# Patient Record
Sex: FEMALE | Race: WHITE | NOT HISPANIC OR LATINO | Employment: OTHER | URBAN - METROPOLITAN AREA
[De-identification: names, ages, dates, MRNs, and addresses within clinical notes are randomized per-mention and may not be internally consistent; named-entity substitution may affect disease eponyms.]

---

## 2017-02-01 ENCOUNTER — OFFICE VISIT (OUTPATIENT)
Dept: INTERNAL MEDICINE | Facility: CLINIC | Age: 77
End: 2017-02-01

## 2017-02-01 ENCOUNTER — APPOINTMENT (OUTPATIENT)
Dept: WOMENS IMAGING | Facility: HOSPITAL | Age: 77
End: 2017-02-01

## 2017-02-01 VITALS
WEIGHT: 187.2 LBS | SYSTOLIC BLOOD PRESSURE: 150 MMHG | HEART RATE: 80 BPM | DIASTOLIC BLOOD PRESSURE: 80 MMHG | HEIGHT: 61 IN | BODY MASS INDEX: 35.34 KG/M2

## 2017-02-01 DIAGNOSIS — E78.49 OTHER HYPERLIPIDEMIA: ICD-10-CM

## 2017-02-01 DIAGNOSIS — G43.109 MIGRAINE WITH AURA AND WITHOUT STATUS MIGRAINOSUS, NOT INTRACTABLE: ICD-10-CM

## 2017-02-01 DIAGNOSIS — F51.01 PRIMARY INSOMNIA: ICD-10-CM

## 2017-02-01 DIAGNOSIS — R53.82 CHRONIC FATIGUE: ICD-10-CM

## 2017-02-01 DIAGNOSIS — M79.672 LEFT FOOT PAIN: ICD-10-CM

## 2017-02-01 DIAGNOSIS — I10 ESSENTIAL HYPERTENSION: Primary | ICD-10-CM

## 2017-02-01 DIAGNOSIS — R73.02 GLUCOSE INTOLERANCE (IMPAIRED GLUCOSE TOLERANCE): ICD-10-CM

## 2017-02-01 DIAGNOSIS — E03.9 ACQUIRED HYPOTHYROIDISM: ICD-10-CM

## 2017-02-01 LAB
ALBUMIN SERPL-MCNC: 3.89 G/DL (ref 3.4–4.6)
ALBUMIN/GLOB SERPL: 1.3 G/DL
ALP SERPL-CCNC: 93 U/L (ref 46–116)
ALT SERPL W P-5'-P-CCNC: 21 U/L (ref 14–59)
ANION GAP SERPL CALCULATED.3IONS-SCNC: 13 MMOL/L
AST SERPL-CCNC: 17 U/L (ref 7–37)
BILIRUB SERPL-MCNC: 1.3 MG/DL (ref 0.2–1)
BUN BLD-MCNC: 16 MG/DL (ref 6–22)
BUN/CREAT SERPL: 20 (ref 7–25)
CALCIUM SPEC-SCNC: 9.1 MG/DL (ref 8.6–10.5)
CHLORIDE SERPL-SCNC: 103 MMOL/L (ref 95–107)
CHOLEST SERPL-MCNC: 128 MG/DL (ref 0–200)
CO2 SERPL-SCNC: 25 MMOL/L (ref 23–32)
CREAT BLD-MCNC: 0.8 MG/DL (ref 0.55–1.02)
GFR SERPL CREATININE-BSD FRML MDRD: 70 ML/MIN/1.73
GLOBULIN UR ELPH-MCNC: 2.9 GM/DL
GLUCOSE BLD-MCNC: 93 MG/DL (ref 70–100)
HDLC SERPL-MCNC: 71 MG/DL (ref 40–81)
LDLC SERPL CALC-MCNC: 42 MG/DL (ref 0–100)
LDLC/HDLC SERPL: 0.59 {RATIO}
POTASSIUM BLD-SCNC: 4.4 MMOL/L (ref 3.3–5.3)
PROT SERPL-MCNC: 6.8 G/DL (ref 6.3–8.4)
SODIUM BLD-SCNC: 141 MMOL/L (ref 136–145)
TRIGL SERPL-MCNC: 74 MG/DL (ref 0–150)
TSH SERPL DL<=0.05 MIU/L-ACNC: 3.08 MIU/ML (ref 0.4–4.2)
VLDLC SERPL-MCNC: 14.8 MG/DL

## 2017-02-01 PROCEDURE — 99214 OFFICE O/P EST MOD 30 MIN: CPT | Performed by: INTERNAL MEDICINE

## 2017-02-01 PROCEDURE — 80061 LIPID PANEL: CPT | Performed by: INTERNAL MEDICINE

## 2017-02-01 PROCEDURE — 73630 X-RAY EXAM OF FOOT: CPT | Performed by: INTERNAL MEDICINE

## 2017-02-01 PROCEDURE — 84443 ASSAY THYROID STIM HORMONE: CPT | Performed by: INTERNAL MEDICINE

## 2017-02-01 PROCEDURE — 73630 X-RAY EXAM OF FOOT: CPT | Performed by: RADIOLOGY

## 2017-02-01 PROCEDURE — 36415 COLL VENOUS BLD VENIPUNCTURE: CPT | Performed by: INTERNAL MEDICINE

## 2017-02-01 PROCEDURE — 80053 COMPREHEN METABOLIC PANEL: CPT | Performed by: INTERNAL MEDICINE

## 2017-02-01 NOTE — PROGRESS NOTES
"Kar Reyna is a 76 y.o. female here for   Chief Complaint   Patient presents with   • Hypertension   • Hyperlipidemia   • Hypothyroidism   .    Vitals:    02/01/17 0814 02/01/17 0835   BP: 150/86 150/80   Pulse: 80    Weight: 187 lb 3.2 oz (84.9 kg)    Height: 61\" (154.9 cm)        Hypertension   This is a chronic problem. The current episode started more than 1 year ago. The problem is unchanged. The problem is controlled. Associated symptoms include malaise/fatigue. Pertinent negatives include no chest pain, palpitations, peripheral edema or shortness of breath.   Hyperlipidemia   Exacerbating diseases include hypothyroidism. Pertinent negatives include no chest pain or shortness of breath.   Hypothyroidism   Associated symptoms include arthralgias (pain in left foot for 2-3 wks - no injury) and fatigue. Pertinent negatives include no chest pain, chills, coughing or fever.        Encounter Diagnoses   Name Primary?   • Essential hypertension Yes   • Other hyperlipidemia    • Glucose intolerance (impaired glucose tolerance)    • Acquired hypothyroidism    • Chronic fatigue    • Migraine with aura and without status migrainosus, not intractable    • Primary insomnia    • Left foot pain        The following portions of the patient's history were reviewed and updated as appropriate: allergies, current medications, past social history and problem list.    Review of Systems   Constitutional: Positive for fatigue and malaise/fatigue. Negative for chills and fever.   Respiratory: Negative for cough, shortness of breath and wheezing.    Cardiovascular: Negative for chest pain, palpitations and leg swelling.   Musculoskeletal: Positive for arthralgias (pain in left foot for 2-3 wks - no injury).   Psychiatric/Behavioral: Positive for sleep disturbance (b/c aching shoulders, wrists, knees). Negative for dysphoric mood. The patient is not nervous/anxious.        Objective   Physical Exam   Constitutional: She " appears well-developed and well-nourished. No distress.   Cardiovascular: Normal rate, regular rhythm and normal heart sounds.    Pulmonary/Chest: No respiratory distress. She has no wheezes. She has no rales. She exhibits no tenderness.   Musculoskeletal: Normal range of motion. She exhibits no edema or deformity.   Psychiatric: She has a normal mood and affect. Her behavior is normal.   Nursing note and vitals reviewed.    Left foot - no deformity or tenderness to palpation today.    Assessment/Plan   Problem List Items Addressed This Visit        Unprioritized    Glucose intolerance (impaired glucose tolerance)    Fatigue    Hypertension - Primary    Relevant Orders    Comprehensive Metabolic Panel    Migraine    Insomnia    Hyperlipemia    Relevant Orders    Comprehensive Metabolic Panel    Lipid Panel    Acquired hypothyroidism    Relevant Orders    TSH      Other Visit Diagnoses     Left foot pain        Relevant Orders    XR Foot 3+ View Left (Completed)    Ambulatory Referral to Podiatry       HTN - high today.  Need low salt, wt reducing diet.  Need weekly chk & call if bp over 140/90.   Left foot pain (normal exam today)- refer to podiatry - call if pain persists.

## 2017-02-02 DIAGNOSIS — E03.9 ACQUIRED HYPOTHYROIDISM: Primary | ICD-10-CM

## 2017-02-02 RX ORDER — LEVOTHYROXINE SODIUM 0.05 MG/1
50 TABLET ORAL DAILY
Qty: 90 TABLET | Refills: 1 | Status: SHIPPED | OUTPATIENT
Start: 2017-02-02 | End: 2017-08-08 | Stop reason: SDUPTHER

## 2017-03-09 RX ORDER — HYDROCHLOROTHIAZIDE 25 MG/1
TABLET ORAL
Qty: 90 TABLET | Refills: 1 | Status: SHIPPED | OUTPATIENT
Start: 2017-03-09 | End: 2017-06-12 | Stop reason: SINTOL

## 2017-06-01 RX ORDER — RAMIPRIL 10 MG/1
CAPSULE ORAL
Qty: 180 CAPSULE | Refills: 2 | Status: SHIPPED | OUTPATIENT
Start: 2017-06-01 | End: 2018-05-15 | Stop reason: SDUPTHER

## 2017-06-12 ENCOUNTER — OFFICE VISIT (OUTPATIENT)
Dept: INTERNAL MEDICINE | Facility: CLINIC | Age: 77
End: 2017-06-12

## 2017-06-12 ENCOUNTER — TELEPHONE (OUTPATIENT)
Dept: INTERNAL MEDICINE | Facility: CLINIC | Age: 77
End: 2017-06-12

## 2017-06-12 VITALS
DIASTOLIC BLOOD PRESSURE: 88 MMHG | HEIGHT: 61 IN | SYSTOLIC BLOOD PRESSURE: 162 MMHG | WEIGHT: 189.2 LBS | BODY MASS INDEX: 35.72 KG/M2

## 2017-06-12 DIAGNOSIS — I10 ESSENTIAL HYPERTENSION: ICD-10-CM

## 2017-06-12 DIAGNOSIS — G43.109 MIGRAINE WITH AURA AND WITHOUT STATUS MIGRAINOSUS, NOT INTRACTABLE: ICD-10-CM

## 2017-06-12 DIAGNOSIS — E03.9 ACQUIRED HYPOTHYROIDISM: ICD-10-CM

## 2017-06-12 DIAGNOSIS — F51.01 PRIMARY INSOMNIA: ICD-10-CM

## 2017-06-12 DIAGNOSIS — H35.30 MACULAR DEGENERATION OF BOTH EYES: ICD-10-CM

## 2017-06-12 DIAGNOSIS — E78.49 OTHER HYPERLIPIDEMIA: ICD-10-CM

## 2017-06-12 DIAGNOSIS — R53.82 CHRONIC FATIGUE: ICD-10-CM

## 2017-06-12 DIAGNOSIS — R82.90 ABNORMAL FINDING IN URINE: Primary | ICD-10-CM

## 2017-06-12 DIAGNOSIS — R35.0 FREQUENT URINATION: ICD-10-CM

## 2017-06-12 DIAGNOSIS — R73.02 GLUCOSE INTOLERANCE (IMPAIRED GLUCOSE TOLERANCE): ICD-10-CM

## 2017-06-12 LAB
BACTERIA UR QL AUTO: ABNORMAL /HPF
BASOPHILS # BLD AUTO: 0.02 10*3/MM3 (ref 0–0.2)
BASOPHILS NFR BLD AUTO: 0.2 % (ref 0–2)
BILIRUB UR QL STRIP: NEGATIVE
CLARITY UR: CLEAR
COLOR UR: YELLOW
DEPRECATED RDW RBC AUTO: 43 FL (ref 37–54)
EOSINOPHIL # BLD AUTO: 0.12 10*3/MM3 (ref 0–0.7)
EOSINOPHIL NFR BLD AUTO: 1.4 % (ref 0–5)
ERYTHROCYTE [DISTWIDTH] IN BLOOD BY AUTOMATED COUNT: 12 % (ref 11.5–15)
GLUCOSE UR STRIP-MCNC: NEGATIVE MG/DL
HCT VFR BLD AUTO: 40.6 % (ref 34.1–44.9)
HGB BLD-MCNC: 13 G/DL (ref 11.2–15.7)
HGB UR QL STRIP.AUTO: ABNORMAL
HYALINE CASTS UR QL AUTO: ABNORMAL /LPF
KETONES UR QL STRIP: NEGATIVE
LEUKOCYTE ESTERASE UR QL STRIP.AUTO: ABNORMAL
LYMPHOCYTES # BLD AUTO: 3.67 10*3/MM3 (ref 0.8–7)
LYMPHOCYTES NFR BLD AUTO: 43.3 % (ref 10–60)
MCH RBC QN AUTO: 31.9 PG (ref 26–34)
MCHC RBC AUTO-ENTMCNC: 32 G/DL (ref 31–37)
MCV RBC AUTO: 99.5 FL (ref 80–100)
MONOCYTES # BLD AUTO: 0.47 10*3/MM3 (ref 0–1)
MONOCYTES NFR BLD AUTO: 5.5 % (ref 0–13)
NEUTROPHILS # BLD AUTO: 4.2 10*3/MM3 (ref 1–11)
NEUTROPHILS NFR BLD AUTO: 49.6 % (ref 30–85)
NITRITE UR QL STRIP: NEGATIVE
PH UR STRIP.AUTO: 5.5 [PH] (ref 5–8)
PLATELET # BLD AUTO: 196 10*3/MM3 (ref 150–450)
PMV BLD AUTO: 10.9 FL (ref 6–12)
PROT UR QL STRIP: NEGATIVE
RBC # BLD AUTO: 4.08 10*6/MM3 (ref 3.93–5.22)
RBC # UR: ABNORMAL /HPF
REF LAB TEST METHOD: ABNORMAL
SP GR UR STRIP: <=1.005 (ref 1–1.03)
SQUAMOUS #/AREA URNS HPF: ABNORMAL /HPF
TSH SERPL DL<=0.05 MIU/L-ACNC: 2.39 MIU/ML (ref 0.4–4.2)
UROBILINOGEN UR QL STRIP: ABNORMAL
WBC NRBC COR # BLD: 8.48 10*3/MM3 (ref 5–10)
WBC UR QL AUTO: ABNORMAL /HPF

## 2017-06-12 PROCEDURE — 99214 OFFICE O/P EST MOD 30 MIN: CPT | Performed by: INTERNAL MEDICINE

## 2017-06-12 PROCEDURE — 84443 ASSAY THYROID STIM HORMONE: CPT | Performed by: INTERNAL MEDICINE

## 2017-06-12 PROCEDURE — 81001 URINALYSIS AUTO W/SCOPE: CPT | Performed by: INTERNAL MEDICINE

## 2017-06-12 PROCEDURE — 85025 COMPLETE CBC W/AUTO DIFF WBC: CPT | Performed by: INTERNAL MEDICINE

## 2017-06-12 PROCEDURE — 36415 COLL VENOUS BLD VENIPUNCTURE: CPT | Performed by: INTERNAL MEDICINE

## 2017-06-12 RX ORDER — NEBIVOLOL 10 MG/1
10 TABLET ORAL DAILY
Qty: 30 TABLET | Refills: 6 | Status: SHIPPED | OUTPATIENT
Start: 2017-06-12 | End: 2017-06-13 | Stop reason: RX

## 2017-06-12 NOTE — PROGRESS NOTES
"Kar Reyna is a 77 y.o. female here for   Chief Complaint   Patient presents with   • Hypertension     BP has been elevated   • Hyperlipidemia   • Hypothyroidism   • Urinary Frequency   .    Vitals:    06/12/17 1036   BP: 162/88   BP Location: Left arm   Patient Position: Sitting   Cuff Size: Large Adult   Weight: 189 lb 3.2 oz (85.8 kg)   Height: 61\" (154.9 cm)       Hypertension   This is a chronic problem. The current episode started more than 1 year ago. The problem has been gradually worsening since onset. The problem is uncontrolled. Associated symptoms include malaise/fatigue. Pertinent negatives include no chest pain, palpitations, peripheral edema or shortness of breath.   Hyperlipidemia   This is a chronic problem. The current episode started more than 1 year ago. The problem is controlled. Recent lipid tests were reviewed and are normal. Exacerbating diseases include hypothyroidism. She has no history of diabetes. Pertinent negatives include no chest pain or shortness of breath.   Hypothyroidism   This is a chronic problem. The current episode started more than 1 year ago. The problem occurs constantly. The problem has been unchanged. Associated symptoms include fatigue. Pertinent negatives include no chest pain, chills, coughing or fever.   Urinary Frequency    This is a chronic problem. The problem occurs intermittently. The problem has been gradually worsening. The patient is experiencing no pain. There has been no fever. Associated symptoms include frequency and urgency. Pertinent negatives include no chills.        Encounter Diagnoses   Name Primary?   • Essential hypertension Yes   • Other hyperlipidemia    • Glucose intolerance (impaired glucose tolerance)    • Macular degeneration of both eyes    • Primary insomnia    • Chronic fatigue    • Acquired hypothyroidism    • Migraine with aura and without status migrainosus, not intractable    • Frequent urination        The following " portions of the patient's history were reviewed and updated as appropriate: allergies, current medications, past social history and problem list.    Review of Systems   Constitutional: Positive for fatigue and malaise/fatigue. Negative for chills and fever.   Respiratory: Negative for cough, shortness of breath and wheezing.    Cardiovascular: Negative for chest pain, palpitations and leg swelling.   Genitourinary: Positive for enuresis, frequency and urgency.   Psychiatric/Behavioral: Positive for sleep disturbance. Negative for dysphoric mood. The patient is not nervous/anxious.      Not taking diuretic for 1 mo b/c frequent urination (& nocturia x3).    Objective   Physical Exam   Constitutional: She appears well-developed and well-nourished. No distress.   Cardiovascular: Normal rate, regular rhythm and normal heart sounds.    Pulmonary/Chest: No respiratory distress. She has no wheezes. She has no rales. She exhibits no tenderness.   Abdominal: Soft. She exhibits no mass. There is no tenderness. There is no rebound and no guarding. No hernia.   Musculoskeletal: She exhibits no edema.   Psychiatric: She has a normal mood and affect. Her behavior is normal.   Nursing note and vitals reviewed.      Assessment/Plan   Problem List Items Addressed This Visit        Unprioritized    Glucose intolerance (impaired glucose tolerance)    Fatigue    Relevant Orders    TSH    CBC Auto Differential    Hypertension - Primary    Relevant Medications    nebivolol (BYSTOLIC) 10 MG tablet    Migraine    Relevant Medications    nebivolol (BYSTOLIC) 10 MG tablet    Insomnia    Hyperlipemia    Macular degeneration of both eyes    Acquired hypothyroidism    Relevant Medications    nebivolol (BYSTOLIC) 10 MG tablet    Frequent urination    Relevant Medications    Mirabegron ER (MYRBETRIQ) 50 MG tablet sustained-release 24 hour 24 hr tablet    Other Relevant Orders    Urinalysis With / Microscopic If Indicated (Completed)     Urinalysis, Microscopic Only       HTN - uncontrolled b/c diuretic stopped 1 mo ago.  Trial of bystolic (or toprol if bystolic too expensive).  Call if bp stays over 140/90.  Need low salt diet & daily exercise.   Increased freq urin for 6 wks - chk urine today.  Sample of myrbetriq given today - use 1 daily.   Fatigue is worse - from hypothyroid vs frequent urin/noctia? - TSH & urine today.    Call if sx persist.

## 2017-06-12 NOTE — TELEPHONE ENCOUNTER
I spoke to MsRob Maribell and she was asking about the two medications that Dr. Giraldo has sent in to her pharmacy today. I had informed her that is was Bystolic 10 mg  # 30 w/ 6 refills and Mirabegron # 30 w/ 0 refills.     She stated that she was going to purchase them as she was a bit confused if there was a lot of tablets sent in of the Mirabegron since it was a bit expensive. I explained to her that it was only # 30 tablets. She voiced she understood and thank you.

## 2017-06-12 NOTE — TELEPHONE ENCOUNTER
----- Message from Edgard Stoddard sent at 6/12/2017  3:39 PM EDT -----  Contact: pt  Pt calling would like a call back regarding the b/p medication that was prescribed today. She says that there is some confusion at the pharmacy.    #[phone]

## 2017-06-13 ENCOUNTER — TELEPHONE (OUTPATIENT)
Dept: INTERNAL MEDICINE | Facility: CLINIC | Age: 77
End: 2017-06-13

## 2017-06-13 DIAGNOSIS — I10 ESSENTIAL HYPERTENSION: Primary | ICD-10-CM

## 2017-06-13 RX ORDER — METOPROLOL SUCCINATE 50 MG/1
50 TABLET, EXTENDED RELEASE ORAL DAILY
Qty: 30 TABLET | Refills: 6 | Status: SHIPPED | OUTPATIENT
Start: 2017-06-13 | End: 2017-07-14

## 2017-06-13 NOTE — TELEPHONE ENCOUNTER
----- Message from Edgard Stoddard sent at 6/13/2017  8:32 AM EDT -----  Contact: pt  Pt calling, she says that her insurance will not cover bystolic, and she would like you to prescribe something else. Please advise.    [address] [place] [address] [facility] [phone] [facility] [phone] FX    #[phone]

## 2017-06-13 NOTE — TELEPHONE ENCOUNTER
Ms. Maribell was informed of the new Rx that was sent in to her pharmacy. She stated that has picked this up and thank you.

## 2017-06-13 NOTE — TELEPHONE ENCOUNTER
I spoke to Ms. Reyna this morning to see if she knew what medications her insurance would cover since bystolic isn't one of them and she stated that she was unsure, but she was given 3 names by her pharmacy but didn't write them down.

## 2017-06-16 LAB
BACTERIA UR CULT: ABNORMAL
Lab: ABNORMAL
SUSCEPTIBILITY TESTING: ABNORMAL

## 2017-06-19 DIAGNOSIS — N30.00 ACUTE CYSTITIS WITHOUT HEMATURIA: Primary | ICD-10-CM

## 2017-06-19 RX ORDER — SULFAMETHOXAZOLE AND TRIMETHOPRIM 800; 160 MG/1; MG/1
1 TABLET ORAL 2 TIMES DAILY
Qty: 10 TABLET | Refills: 0 | Status: SHIPPED | OUTPATIENT
Start: 2017-06-19 | End: 2017-07-14

## 2017-07-14 ENCOUNTER — OFFICE VISIT (OUTPATIENT)
Dept: INTERNAL MEDICINE | Facility: CLINIC | Age: 77
End: 2017-07-14

## 2017-07-14 VITALS
TEMPERATURE: 96.9 F | WEIGHT: 189 LBS | OXYGEN SATURATION: 98 % | DIASTOLIC BLOOD PRESSURE: 94 MMHG | HEART RATE: 75 BPM | BODY MASS INDEX: 37.11 KG/M2 | HEIGHT: 60 IN | SYSTOLIC BLOOD PRESSURE: 144 MMHG | RESPIRATION RATE: 17 BRPM

## 2017-07-14 DIAGNOSIS — R73.02 GLUCOSE INTOLERANCE (IMPAIRED GLUCOSE TOLERANCE): ICD-10-CM

## 2017-07-14 DIAGNOSIS — M81.0 OSTEOPOROSIS: ICD-10-CM

## 2017-07-14 DIAGNOSIS — I10 ESSENTIAL HYPERTENSION: ICD-10-CM

## 2017-07-14 DIAGNOSIS — E55.9 VITAMIN D DEFICIENCY: ICD-10-CM

## 2017-07-14 DIAGNOSIS — F51.01 PRIMARY INSOMNIA: ICD-10-CM

## 2017-07-14 DIAGNOSIS — Z00.00 MEDICARE ANNUAL WELLNESS VISIT, SUBSEQUENT: Primary | ICD-10-CM

## 2017-07-14 DIAGNOSIS — E78.49 OTHER HYPERLIPIDEMIA: ICD-10-CM

## 2017-07-14 DIAGNOSIS — E03.9 ACQUIRED HYPOTHYROIDISM: ICD-10-CM

## 2017-07-14 DIAGNOSIS — R53.82 CHRONIC FATIGUE: ICD-10-CM

## 2017-07-14 LAB
25(OH)D3 SERPL-MCNC: 33.9 NG/ML (ref 30–100)
ALBUMIN SERPL-MCNC: 4.6 G/DL (ref 3.5–5.2)
ALBUMIN/GLOB SERPL: 1.8 G/DL
ALP SERPL-CCNC: 89 U/L (ref 39–117)
ALT SERPL-CCNC: 19 U/L (ref 1–33)
AST SERPL-CCNC: 20 U/L (ref 1–32)
BILIRUB SERPL-MCNC: 1.3 MG/DL (ref 0.1–1.2)
BUN SERPL-MCNC: 17 MG/DL (ref 8–23)
BUN/CREAT SERPL: 20 (ref 7–25)
CALCIUM SERPL-MCNC: 10.1 MG/DL (ref 8.6–10.5)
CHLORIDE SERPL-SCNC: 104 MMOL/L (ref 98–107)
CHOLEST SERPL-MCNC: 123 MG/DL (ref 0–200)
CO2 SERPL-SCNC: 26.4 MMOL/L (ref 22–29)
CREAT SERPL-MCNC: 0.85 MG/DL (ref 0.57–1)
GLOBULIN SER CALC-MCNC: 2.6 GM/DL
GLUCOSE SERPL-MCNC: 94 MG/DL (ref 65–99)
HBA1C MFR BLD: 5.47 % (ref 4.8–5.6)
HDLC SERPL-MCNC: 71 MG/DL (ref 40–60)
LDLC SERPL CALC-MCNC: 37 MG/DL (ref 0–100)
POTASSIUM SERPL-SCNC: 4.4 MMOL/L (ref 3.5–5.2)
PROT SERPL-MCNC: 7.2 G/DL (ref 6–8.5)
SODIUM SERPL-SCNC: 144 MMOL/L (ref 136–145)
TRIGL SERPL-MCNC: 76 MG/DL (ref 0–150)
VLDLC SERPL-MCNC: 15.2 MG/DL (ref 5–40)

## 2017-07-14 PROCEDURE — 99214 OFFICE O/P EST MOD 30 MIN: CPT | Performed by: INTERNAL MEDICINE

## 2017-07-14 RX ORDER — METOPROLOL SUCCINATE 100 MG/1
100 TABLET, EXTENDED RELEASE ORAL DAILY
Qty: 90 TABLET | Refills: 3 | Status: SHIPPED | OUTPATIENT
Start: 2017-07-14 | End: 2018-07-27 | Stop reason: SDUPTHER

## 2017-07-14 NOTE — PATIENT INSTRUCTIONS
Medicare Wellness  Personal Prevention Plan of Service     Date of Office Visit:  2017  Encounter Provider:  Margarita Giraldo MD  Place of Service:  [facility] MEDIC[facility]  Patient Name: Ayana Reyna  :  1940    As part of the Medicare Wellness portion of your visit today, we are providing you with this personalized preventive plan of services (PPPS). This plan is based upon recommendations of the [place] Preventive Services Task Force (USPSTF) and the Advisory Committee on Immunization Practices (ACIP).    This lists the preventive care services that should be considered, and provides dates of when you are due. Items listed as completed are up-to-date and do not require any further intervention.    Health Maintenance   Topic Date Due   • PNEUMOCOCCAL VACCINES (65+ LOW/MEDIUM RISK) (2 of 2 - PPSV23) 2013   • INFLUENZA VACCINE  2017   • LIPID PANEL  2018   • MEDICARE ANNUAL WELLNESS  2018   • MAMMOGRAM  10/11/2018   • DXA SCAN  10/11/2018   • TDAP/TD VACCINES (2 - Td) 2022   • ZOSTER VACCINE  Completed       No orders of the defined types were placed in this encounter.      No Follow-up on file.

## 2017-07-14 NOTE — PROGRESS NOTES
QUICK REFERENCE INFORMATION:  The ABCs of the Annual Wellness Visit    Subsequent Medicare Wellness Visit    HEALTH RISK ASSESSMENT    1940    Recent Hospitalizations:  No hospitalization(s) within the last year..        Current Medical Providers:  Patient Care Team:  Margarita Giraldo MD as PCP - General (Internal Medicine)  Margarita Giraldo MD as PCP - Claims Attributed  Tomer Fong MD as Consulting Physician (Orthopedic Surgery)  Toi Riley MD as Consulting Physician (Ophthalmology)  Monica Nichols MD (Dermatology)        Smoking Status:  History   Smoking Status   • Never Smoker   Smokeless Tobacco   • Never Used       Alcohol Consumption:  History   Alcohol Use No       Depression Screen:   PHQ-9 Depression Screening 7/14/2017   Little interest or pleasure in doing things 0   Feeling down, depressed, or hopeless 0   Trouble falling or staying asleep, or sleeping too much 0   Feeling tired or having little energy 0   Poor appetite or overeating 0   Feeling bad about yourself - or that you are a failure or have let yourself or your family down 0   Trouble concentrating on things, such as reading the newspaper or watching television 0   Moving or speaking so slowly that other people could have noticed. Or the opposite - being so fidgety or restless that you have been moving around a lot more than usual 0   Thoughts that you would be better off dead, or of hurting yourself in some way 0   PHQ-9 Total Score 0       Health Habits and Functional and Cognitive Screening:  Functional & Cognitive Status 7/14/2017   Do you have difficulty preparing food and eating? No   Do you have difficulty bathing yourself? No   Do you have difficulty getting dressed? No   Do you have difficulty using the toilet? No   Do you have difficulty moving around from place to place? No   In the past year have you fallen or experienced a near fall? Yes   Do you need help using the phone?  No   Are you deaf or do you have  serious difficulty hearing?  Yes   Do you need help with transportation? Yes   Do you need help shopping? No   Do you need help preparing meals?  No   Do you need help with housework?  No   Do you need help with laundry? No   Do you need help taking your medications? No   Do you need help managing money? No   Do you have difficulty concentrating, remembering or making decisions? No              Does the patient have evidence of cognitive impairment? No    Aspirin use counseling: Does not need ASA (and currently is not on it)      Recent Lab Results:  CMP:  Lab Results   Component Value Date     (H) 07/05/2016    BUN 16 02/01/2017    CREATININE 0.80 02/01/2017    EGFRIFNONA 70 02/01/2017    EGFRIFAFRI 82 07/05/2016    BCR 20.0 02/01/2017     02/01/2017    K 4.4 02/01/2017    CO2 25.0 02/01/2017    CALCIUM 9.1 02/01/2017    PROTENTOTREF 6.8 07/05/2016    ALBUMIN 3.89 02/01/2017    LABGLOBREF 2.1 07/05/2016    LABIL2 1.3 02/01/2017    BILITOT 1.3 (H) 02/01/2017    ALKPHOS 93 02/01/2017    AST 17 02/01/2017    ALT 21 02/01/2017     Lipid Panel:  Lab Results   Component Value Date    CHOL 128 02/01/2017    TRIG 74 02/01/2017    HDL 71 02/01/2017    VLDL 14.8 02/01/2017    LDLCALC 42 02/01/2017    LDLHDL 0.59 02/01/2017     HbA1c:  Lab Results   Component Value Date    HGBA1C 5.6 07/05/2016       Visual Acuity:  No exam data present    Age-appropriate Screening Schedule:  Refer to the list below for future screening recommendations based on patient's age, sex and/or medical conditions. Orders for these recommended tests are listed in the plan section. The patient has been provided with a written plan.    Health Maintenance   Topic Date Due   • PNEUMOCOCCAL VACCINES (65+ LOW/MEDIUM RISK) (2 of 2 - PPSV23) 02/01/2013   • INFLUENZA VACCINE  08/01/2017   • LIPID PANEL  02/01/2018   • MAMMOGRAM  10/11/2018   • DXA SCAN  10/11/2018   • TDAP/TD VACCINES (2 - Td) 02/01/2022   • ZOSTER VACCINE  Completed         Subjective   History of Present Illness    Ayana Reyna is a 77 y.o. female who presents for an Subsequent Wellness Visit.    The following portions of the patient's history were reviewed and updated as appropriate: allergies, current medications, past family history, past medical history, past social history, past surgical history and problem list.    Outpatient Medications Prior to Visit   Medication Sig Dispense Refill   • atorvastatin (LIPITOR) 10 MG tablet TAKE 1 TABLET EVERY DAY 90 tablet 2   • cholecalciferol (VITAMIN D3) 1000 UNITS tablet Take 1,000 Units by mouth daily.     • levothyroxine (SYNTHROID, LEVOTHROID) 50 MCG tablet Take 1 tablet by mouth Daily. 90 tablet 1   • Multiple Vitamins-Minerals (PRESERVISION/LUTEIN) capsule Take  by mouth 2 (two) times a day.     • ramipril (ALTACE) 10 MG capsule TAKE 2 CAPSULES EVERY  capsule 2   • vitamin B-12 (CYANOCOBALAMIN) 100 MCG tablet Take 50 mcg by mouth daily.     • metoprolol succinate XL (TOPROL XL) 50 MG 24 hr tablet Take 1 tablet by mouth Daily. 30 tablet 6   • Mirabegron ER (MYRBETRIQ) 50 MG tablet sustained-release 24 hour 24 hr tablet Take 50 mg by mouth Daily. 30 tablet 0   • sulfamethoxazole-trimethoprim (BACTRIM DS) 800-160 MG per tablet Take 1 tablet by mouth 2 (Two) Times a Day. 10 tablet 0     No facility-administered medications prior to visit.        Patient Active Problem List   Diagnosis   • Vitamin D deficiency   • Allergic rhinitis   • Glucose intolerance (impaired glucose tolerance)   • Osteoporosis   • Fatigue   • Hypertension   • Migraine   • Insomnia   • Hyperlipemia   • Macular degeneration of both eyes   • Acquired hypothyroidism   • Frequent urination       Advance Care Planning:  has an advance directive - a copy HAS NOT been provided. Have asked the patient to send this to us to add to record.    Identification of Risk Factors:  Risk factors include: weight , unhealthy diet, cardiovascular risk, inactivity, increased fall  "risk and chronic pain.    Review of Systems    Compared to one year ago, the patient feels her physical health is the same.  Compared to one year ago, the patient feels her mental health is the same.    Objective     Physical Exam    Vitals:    07/14/17 0914   BP: 144/94   BP Location: Left arm   Patient Position: Sitting   Cuff Size: Adult   Pulse: 75   Resp: 17   Temp: 96.9 °F (36.1 °C)   TempSrc: Temporal Artery    SpO2: 98%   Weight: 189 lb (85.7 kg)   Height: 60.25\" (153 cm)   PainSc:   2   PainLoc: Leg  Comment: Right       Body mass index is 36.61 kg/(m^2).  Discussed the patient's BMI with her. The BMI is above average; BMI management plan is completed.    Assessment/Plan   Patient Self-Management and Personalized Health Advice  The patient has been provided with information about: diet, exercise, weight management, prevention of cardiac or vascular disease, the relationship between weight and GERD and fall prevention and preventive services including:   · Advance directive, Exercise counseling provided, Fall Risk assessment done, Fall Risk plan of care done, Nutrition counseling provided, Pneumococcal vaccine , Screening for AAA, referral for ultrasound placed.    Visit Diagnoses:    ICD-10-CM ICD-9-CM   1. Essential hypertension I10 401.9   2. Other hyperlipidemia E78.4 272.4   3. Acquired hypothyroidism E03.9 244.9   4. Glucose intolerance (impaired glucose tolerance) R73.02 790.22   5. Chronic fatigue R53.82 780.79   6. Primary insomnia F51.01 307.42   7. Osteoporosis M81.0 733.00   8. Vitamin D deficiency E55.9 268.9       No orders of the defined types were placed in this encounter.      Outpatient Encounter Prescriptions as of 7/14/2017   Medication Sig Dispense Refill   • atorvastatin (LIPITOR) 10 MG tablet TAKE 1 TABLET EVERY DAY 90 tablet 2   • cholecalciferol (VITAMIN D3) 1000 UNITS tablet Take 1,000 Units by mouth daily.     • levothyroxine (SYNTHROID, LEVOTHROID) 50 MCG tablet Take 1 tablet by " mouth Daily. 90 tablet 1   • metoprolol succinate XL (TOPROL-XL) 100 MG 24 hr tablet Take 1 tablet by mouth Daily. 90 tablet 3   • Multiple Vitamins-Minerals (PRESERVISION/LUTEIN) capsule Take  by mouth 2 (two) times a day.     • ramipril (ALTACE) 10 MG capsule TAKE 2 CAPSULES EVERY  capsule 2   • vitamin B-12 (CYANOCOBALAMIN) 100 MCG tablet Take 50 mcg by mouth daily.     • [DISCONTINUED] metoprolol succinate XL (TOPROL XL) 50 MG 24 hr tablet Take 1 tablet by mouth Daily. 30 tablet 6   • [DISCONTINUED] Mirabegron ER (MYRBETRIQ) 50 MG tablet sustained-release 24 hour 24 hr tablet Take 50 mg by mouth Daily. 30 tablet 0   • [DISCONTINUED] sulfamethoxazole-trimethoprim (BACTRIM DS) 800-160 MG per tablet Take 1 tablet by mouth 2 (Two) Times a Day. 10 tablet 0     No facility-administered encounter medications on file as of 7/14/2017.        Reviewed use of high risk medication in the elderly: no  Reviewed for potential of harmful drug interactions in the elderly: yes    Follow Up:  Return in about 4 months (around 11/14/2017) for Recheck.     An After Visit Summary and PPPS with all of these plans were given to the patient.

## 2017-07-14 NOTE — PROGRESS NOTES
"Kar Reyna is a 77 y.o. female here for   Chief Complaint   Patient presents with   • Subsequent Wellness Visit   • Hypertension   • Hyperlipidemia   • Hypothyroidism   .    Vitals:    07/14/17 0914   BP: 144/94   BP Location: Left arm   Patient Position: Sitting   Cuff Size: Adult   Pulse: 75   Resp: 17   Temp: 96.9 °F (36.1 °C)   TempSrc: Temporal Artery    SpO2: 98%   Weight: 189 lb (85.7 kg)   Height: 60.25\" (153 cm)       Hypertension   This is a chronic problem. The current episode started more than 1 year ago. The problem is unchanged. The problem is controlled. Associated symptoms include malaise/fatigue. Pertinent negatives include no chest pain, palpitations, peripheral edema or shortness of breath.   Hyperlipidemia   This is a chronic problem. The current episode started more than 1 year ago. The problem is controlled. Recent lipid tests were reviewed and are normal. Exacerbating diseases include hypothyroidism. She has no history of diabetes. Pertinent negatives include no chest pain or shortness of breath.   Hypothyroidism   This is a chronic problem. The current episode started more than 1 year ago. The problem occurs constantly. The problem has been unchanged. Associated symptoms include fatigue. Pertinent negatives include no chest pain, chills, coughing or fever.        Encounter Diagnoses   Name Primary?   • Medicare annual wellness visit, subsequent Yes   • Essential hypertension    • Other hyperlipidemia    • Acquired hypothyroidism    • Glucose intolerance (impaired glucose tolerance)    • Chronic fatigue    • Primary insomnia    • Osteoporosis    • Vitamin D deficiency        The following portions of the patient's history were reviewed and updated as appropriate: allergies, current medications, past social history and problem list.    Review of Systems   Constitutional: Positive for fatigue and malaise/fatigue. Negative for chills and fever.   Respiratory: Negative for cough, " shortness of breath and wheezing.    Cardiovascular: Negative for chest pain, palpitations and leg swelling.   Psychiatric/Behavioral: Positive for sleep disturbance. Negative for dysphoric mood. The patient is not nervous/anxious.        Objective   Physical Exam   Constitutional: She appears well-developed and well-nourished. No distress.   Cardiovascular: Normal rate, regular rhythm and normal heart sounds.    Pulmonary/Chest: No respiratory distress. She has no wheezes. She has no rales. She exhibits no tenderness.   Musculoskeletal: She exhibits no edema.   Psychiatric: She has a normal mood and affect. Her behavior is normal.   Nursing note and vitals reviewed.      Assessment/Plan   Problem List Items Addressed This Visit        Unprioritized    Vitamin D deficiency     D=19         Relevant Orders    Vitamin D 25 Hydroxy    Glucose intolerance (impaired glucose tolerance)    Relevant Orders    Hemoglobin A1c    Osteoporosis    Fatigue    Hypertension    Relevant Medications    metoprolol succinate XL (TOPROL-XL) 100 MG 24 hr tablet    Other Relevant Orders    Comprehensive Metabolic Panel    Lipid Panel    Insomnia    Hyperlipemia    Relevant Orders    Comprehensive Metabolic Panel    Lipid Panel    Acquired hypothyroidism    Relevant Medications    metoprolol succinate XL (TOPROL-XL) 100 MG 24 hr tablet      Other Visit Diagnoses     Medicare annual wellness visit, subsequent    -  Primary       HTN uncontrolled at night.  Need to increase toprol from 50 mg to 100 mg daily.  Call if bp over 140/90.  Need diet/exercise.   Fatigue & insomnia (b/c pain in right leg)    Leg pain (right hip to ankle) - to see ortho.   Obesity - need low carb diet & more exercise.     Wellness today.     She will discuss pneu vaccine with pharmacy (she gets vaccines there).  She had carotid artery screening test 6/2017.

## 2017-08-08 DIAGNOSIS — E03.9 ACQUIRED HYPOTHYROIDISM: ICD-10-CM

## 2017-08-08 RX ORDER — LEVOTHYROXINE SODIUM 0.05 MG/1
TABLET ORAL
Qty: 90 TABLET | Refills: 1 | Status: SHIPPED | OUTPATIENT
Start: 2017-08-08 | End: 2018-01-30 | Stop reason: SDUPTHER

## 2017-10-05 RX ORDER — ATORVASTATIN CALCIUM 10 MG/1
TABLET, FILM COATED ORAL
Qty: 90 TABLET | Refills: 2 | Status: SHIPPED | OUTPATIENT
Start: 2017-10-05 | End: 2018-08-02 | Stop reason: SDUPTHER

## 2017-11-17 ENCOUNTER — APPOINTMENT (OUTPATIENT)
Dept: WOMENS IMAGING | Facility: HOSPITAL | Age: 77
End: 2017-11-17

## 2017-11-17 ENCOUNTER — OFFICE VISIT (OUTPATIENT)
Dept: INTERNAL MEDICINE | Facility: CLINIC | Age: 77
End: 2017-11-17

## 2017-11-17 VITALS
HEART RATE: 78 BPM | SYSTOLIC BLOOD PRESSURE: 154 MMHG | OXYGEN SATURATION: 98 % | WEIGHT: 188 LBS | HEIGHT: 60 IN | DIASTOLIC BLOOD PRESSURE: 94 MMHG | BODY MASS INDEX: 36.91 KG/M2

## 2017-11-17 DIAGNOSIS — R53.82 CHRONIC FATIGUE: ICD-10-CM

## 2017-11-17 DIAGNOSIS — J30.2 SEASONAL ALLERGIC RHINITIS, UNSPECIFIED CHRONICITY, UNSPECIFIED TRIGGER: ICD-10-CM

## 2017-11-17 DIAGNOSIS — R05.9 COUGH: Primary | ICD-10-CM

## 2017-11-17 DIAGNOSIS — I10 ESSENTIAL HYPERTENSION: ICD-10-CM

## 2017-11-17 DIAGNOSIS — E78.49 OTHER HYPERLIPIDEMIA: ICD-10-CM

## 2017-11-17 DIAGNOSIS — E03.9 ACQUIRED HYPOTHYROIDISM: ICD-10-CM

## 2017-11-17 DIAGNOSIS — R73.02 GLUCOSE INTOLERANCE (IMPAIRED GLUCOSE TOLERANCE): ICD-10-CM

## 2017-11-17 LAB
ALBUMIN SERPL-MCNC: 4.2 G/DL (ref 3.5–5.2)
ALBUMIN/GLOB SERPL: 1.8 G/DL
ALP SERPL-CCNC: 96 U/L (ref 39–117)
ALT SERPL W P-5'-P-CCNC: 12 U/L (ref 1–33)
ANION GAP SERPL CALCULATED.3IONS-SCNC: 13.2 MMOL/L
AST SERPL-CCNC: 17 U/L (ref 1–32)
BASOPHILS # BLD AUTO: 0.03 10*3/MM3 (ref 0–0.2)
BASOPHILS NFR BLD AUTO: 0.3 % (ref 0–2)
BILIRUB SERPL-MCNC: 1.2 MG/DL (ref 0.1–1.2)
BUN BLD-MCNC: 13 MG/DL (ref 8–23)
BUN/CREAT SERPL: 20.6 (ref 7–25)
CALCIUM SPEC-SCNC: 9.4 MG/DL (ref 8.6–10.5)
CHLORIDE SERPL-SCNC: 105 MMOL/L (ref 98–107)
CHOLEST SERPL-MCNC: 115 MG/DL (ref 0–200)
CO2 SERPL-SCNC: 24.8 MMOL/L (ref 22–29)
CREAT BLD-MCNC: 0.63 MG/DL (ref 0.57–1)
DEPRECATED RDW RBC AUTO: 44.3 FL (ref 37–54)
EOSINOPHIL # BLD AUTO: 0.12 10*3/MM3 (ref 0–0.7)
EOSINOPHIL NFR BLD AUTO: 1.2 % (ref 0–5)
ERYTHROCYTE [DISTWIDTH] IN BLOOD BY AUTOMATED COUNT: 12.5 % (ref 11.5–15)
GFR SERPL CREATININE-BSD FRML MDRD: 91 ML/MIN/1.73
GLOBULIN UR ELPH-MCNC: 2.3 GM/DL
GLUCOSE BLD-MCNC: 105 MG/DL (ref 65–99)
HCT VFR BLD AUTO: 37.7 % (ref 34.1–44.9)
HDLC SERPL-MCNC: 59 MG/DL (ref 40–60)
HGB BLD-MCNC: 12.4 G/DL (ref 11.2–15.7)
LDLC SERPL CALC-MCNC: 40 MG/DL (ref 0–100)
LDLC/HDLC SERPL: 0.67 {RATIO}
LYMPHOCYTES # BLD AUTO: 4.36 10*3/MM3 (ref 0.8–7)
LYMPHOCYTES NFR BLD AUTO: 42.7 % (ref 10–60)
MCH RBC QN AUTO: 32.7 PG (ref 26–34)
MCHC RBC AUTO-ENTMCNC: 32.9 G/DL (ref 31–37)
MCV RBC AUTO: 99.5 FL (ref 80–100)
MONOCYTES # BLD AUTO: 0.47 10*3/MM3 (ref 0–1)
MONOCYTES NFR BLD AUTO: 4.6 % (ref 0–13)
NEUTROPHILS # BLD AUTO: 5.23 10*3/MM3 (ref 1–11)
NEUTROPHILS NFR BLD AUTO: 51.2 % (ref 30–85)
PLATELET # BLD AUTO: 207 10*3/MM3 (ref 150–450)
PMV BLD AUTO: 11.1 FL (ref 6–12)
POTASSIUM BLD-SCNC: 4.1 MMOL/L (ref 3.5–5.2)
PROT SERPL-MCNC: 6.5 G/DL (ref 6–8.5)
RBC # BLD AUTO: 3.79 10*6/MM3 (ref 3.93–5.22)
SODIUM BLD-SCNC: 143 MMOL/L (ref 136–145)
TRIGL SERPL-MCNC: 81 MG/DL (ref 0–150)
TSH SERPL DL<=0.05 MIU/L-ACNC: 3.73 MIU/ML (ref 0.27–4.2)
VLDLC SERPL-MCNC: 16.2 MG/DL (ref 5–40)
WBC NRBC COR # BLD: 10.21 10*3/MM3 (ref 5–10)

## 2017-11-17 PROCEDURE — 36415 COLL VENOUS BLD VENIPUNCTURE: CPT | Performed by: INTERNAL MEDICINE

## 2017-11-17 PROCEDURE — 71020 XR CHEST 2 VW: CPT | Performed by: INTERNAL MEDICINE

## 2017-11-17 PROCEDURE — 71020 CHG CHEST X-RAY 2 VW: CPT | Performed by: RADIOLOGY

## 2017-11-17 PROCEDURE — 85025 COMPLETE CBC W/AUTO DIFF WBC: CPT | Performed by: INTERNAL MEDICINE

## 2017-11-17 PROCEDURE — 80061 LIPID PANEL: CPT | Performed by: INTERNAL MEDICINE

## 2017-11-17 PROCEDURE — 80053 COMPREHEN METABOLIC PANEL: CPT | Performed by: INTERNAL MEDICINE

## 2017-11-17 PROCEDURE — 84443 ASSAY THYROID STIM HORMONE: CPT | Performed by: INTERNAL MEDICINE

## 2017-11-17 PROCEDURE — 99214 OFFICE O/P EST MOD 30 MIN: CPT | Performed by: INTERNAL MEDICINE

## 2017-11-17 RX ORDER — PROMETHAZINE HYDROCHLORIDE AND CODEINE PHOSPHATE 6.25; 1 MG/5ML; MG/5ML
5 SYRUP ORAL EVERY 4 HOURS PRN
Qty: 118 ML | Refills: 0 | Status: SHIPPED | OUTPATIENT
Start: 2017-11-17 | End: 2017-11-22

## 2017-11-17 NOTE — PROGRESS NOTES
"Kar Reyna is a 77 y.o. female here for   Chief Complaint   Patient presents with   • Hypertension     4 mo f/u Fasting   • Hypothyroidism   • Cough     productive cough for x weeks   .    Vitals:    11/17/17 0810   BP: 154/94   BP Location: Left arm   Patient Position: Sitting   Pulse: 78   SpO2: 98%   Weight: 188 lb (85.3 kg)   Height: 60.25\" (153 cm)       Body mass index is 36.41 kg/(m^2).    Hypertension   This is a chronic problem. The current episode started more than 1 year ago. The problem is unchanged. The problem is controlled. Associated symptoms include headaches, malaise/fatigue, shortness of breath and sweats. Pertinent negatives include no chest pain, orthopnea, palpitations or peripheral edema.   Hypothyroidism   This is a chronic problem. The current episode started more than 1 year ago. The problem occurs constantly. The problem has been unchanged. Associated symptoms include congestion, coughing, fatigue and headaches. Pertinent negatives include no chest pain, chills, fever or sore throat.   Cough   This is a new problem. The current episode started 1 to 4 weeks ago. The problem has been gradually improving. The problem occurs every few minutes. The cough is non-productive. Associated symptoms include headaches, nasal congestion, postnasal drip, rhinorrhea, shortness of breath, sweats and wheezing. Pertinent negatives include no chest pain, chills, ear congestion, ear pain, fever or sore throat. The symptoms are aggravated by lying down.        The following portions of the patient's history were reviewed and updated as appropriate: allergies, current medications, past social history and problem list.    Review of Systems   Constitutional: Positive for fatigue and malaise/fatigue. Negative for chills and fever.   HENT: Positive for congestion, postnasal drip, rhinorrhea and voice change. Negative for ear pain, sinus pressure, sneezing, sore throat, tinnitus and trouble swallowing.  "   Respiratory: Positive for cough, shortness of breath and wheezing.    Cardiovascular: Negative for chest pain, palpitations, orthopnea and leg swelling.   Neurological: Positive for headaches.       Objective   Physical Exam   Constitutional: She appears well-developed and well-nourished. No distress.   HENT:   Head: Normocephalic.   Right Ear: External ear normal. Tympanic membrane is bulging. Tympanic membrane is not erythematous.   Left Ear: External ear normal. Tympanic membrane is bulging. Tympanic membrane is not erythematous.   Nose: Right sinus exhibits no frontal sinus tenderness. Left sinus exhibits no frontal sinus tenderness.   Mouth/Throat: Oropharynx is clear and moist. No oropharyngeal exudate.   Neck: Normal range of motion. Neck supple.   Cardiovascular: Normal rate, regular rhythm and normal heart sounds.    Pulmonary/Chest: Effort normal and breath sounds normal. No respiratory distress. She has no wheezes. She has no rales. She exhibits no tenderness.   Musculoskeletal: She exhibits no edema.   Lymphadenopathy:     She has no cervical adenopathy.   Psychiatric: She has a normal mood and affect. Her behavior is normal.   Nursing note and vitals reviewed.      Assessment/Plan   Diagnoses and all orders for this visit:    Cough  Comments:  for 2 wks (since return from cruise) - continue mucinex  Orders:  -     XR Chest 2 View  -     Fluticasone Furoate-Vilanterol (BREO ELLIPTA) 100-25 MCG/INH aerosol powder ; Inhale 1 puff Daily.  -     promethazine-codeine (PHENERGAN with CODEINE) 6.25-10 MG/5ML syrup; Take 5 mL by mouth Every 4 (Four) Hours As Needed for Cough for up to 5 days.    Acquired hypothyroidism  Comments:  stable  Orders:  -     TSH; Future    Essential hypertension  Comments:  call if bp over 140/90  Orders:  -     Comprehensive Metabolic Panel; Future  -     Lipid Panel; Future  -     CBC Auto Differential; Future    Other hyperlipidemia  Comments:  need diet & exercise  Orders:  -      Comprehensive Metabolic Panel; Future  -     Lipid Panel; Future    Glucose intolerance (impaired glucose tolerance)    Chronic fatigue  -     CBC Auto Differential; Future  -     TSH; Future    Seasonal allergic rhinitis, unspecified chronicity, unspecified trigger

## 2017-12-11 ENCOUNTER — APPOINTMENT (OUTPATIENT)
Dept: GENERAL RADIOLOGY | Facility: HOSPITAL | Age: 77
End: 2017-12-11

## 2017-12-11 ENCOUNTER — HOSPITAL ENCOUNTER (EMERGENCY)
Facility: HOSPITAL | Age: 77
Discharge: HOME | End: 2017-12-11
Attending: EMERGENCY MEDICINE | Admitting: EMERGENCY MEDICINE

## 2017-12-11 VITALS
OXYGEN SATURATION: 98 % | HEART RATE: 69 BPM | SYSTOLIC BLOOD PRESSURE: 171 MMHG | DIASTOLIC BLOOD PRESSURE: 74 MMHG | WEIGHT: 180 LBS | RESPIRATION RATE: 18 BRPM | HEIGHT: 61 IN | TEMPERATURE: 97.7 F | BODY MASS INDEX: 33.99 KG/M2

## 2017-12-11 DIAGNOSIS — M25.511 ACUTE PAIN OF RIGHT SHOULDER: Primary | ICD-10-CM

## 2017-12-11 PROCEDURE — 73030 X-RAY EXAM OF SHOULDER: CPT

## 2017-12-11 PROCEDURE — 93010 ELECTROCARDIOGRAM REPORT: CPT | Performed by: INTERNAL MEDICINE

## 2017-12-11 PROCEDURE — 71020 HC CHEST PA AND LATERAL: CPT

## 2017-12-11 PROCEDURE — 99284 EMERGENCY DEPT VISIT MOD MDM: CPT

## 2017-12-11 PROCEDURE — 93005 ELECTROCARDIOGRAM TRACING: CPT | Performed by: PHYSICIAN ASSISTANT

## 2017-12-11 RX ORDER — OXYCODONE HYDROCHLORIDE AND ACETAMINOPHEN 5; 325 MG/1; MG/1
2 TABLET ORAL ONCE
Status: COMPLETED | OUTPATIENT
Start: 2017-12-11 | End: 2017-12-11

## 2017-12-11 RX ORDER — ONDANSETRON 4 MG/1
4 TABLET, ORALLY DISINTEGRATING ORAL ONCE
Status: COMPLETED | OUTPATIENT
Start: 2017-12-11 | End: 2017-12-11

## 2017-12-11 RX ORDER — HYDROCODONE BITARTRATE AND ACETAMINOPHEN 5; 325 MG/1; MG/1
1 TABLET ORAL EVERY 6 HOURS PRN
Qty: 16 TABLET | Refills: 0 | Status: SHIPPED | OUTPATIENT
Start: 2017-12-11 | End: 2018-05-18

## 2017-12-11 RX ADMIN — ONDANSETRON 4 MG: 4 TABLET, ORALLY DISINTEGRATING ORAL at 20:27

## 2017-12-11 RX ADMIN — OXYCODONE HYDROCHLORIDE AND ACETAMINOPHEN 2 TABLET: 5; 325 TABLET ORAL at 20:27

## 2017-12-11 NOTE — ED NOTES
Pt reports sitting in chair watching tv about 30 minutes before she came here, and suddenly she felt a sharp pain in R shoulder, tried to lift arm above head and couldn't. She denies injury. Neurovascularly intact. 22 years ago she had R rotator cuff surgery and has not had problem since then.      Alecia Ramirez RN  12/11/17 9521

## 2017-12-12 ENCOUNTER — TELEPHONE (OUTPATIENT)
Dept: SOCIAL WORK | Facility: HOSPITAL | Age: 77
End: 2017-12-12

## 2017-12-12 NOTE — ED PROVIDER NOTES
" EMERGENCY DEPARTMENT ENCOUNTER    CHIEF COMPLAINT  Chief Complaint: R shoulder pain  History given by: Patient  History limited by: Nothing  Room Number: 04/04  PMD: Margarita Giraldo MD      HPI:  Pt is a 77 y.o. female who presents complaining of sharp R shoulder pain that started at about 2:30 this afternoon.  Pt reports that she was sitting in a chair watching TV when her shoulder pain began. The pain radiates down her right arm. Pt denies injury to the area, numbness or tingling, chest pain, nausea or vomiting.  Pt had a rotator cuff surgery 22 years ago. Pt has not taken anything for the pain PTA        Duration:  6 hours  Onset: Gradual   Timing: Constant  Location: R shoulder  Radiation: Radiates down R arm  Quality: \"Sharp\"  Intensity/Severity: Moderate  Progression: Unchanged  Associated Symptoms: None  Aggravating Factors: Movement  Alleviating Factors: Nothing  Previous Episodes: None  Treatment before arrival: NOne    PAST MEDICAL HISTORY  Active Ambulatory Problems     Diagnosis Date Noted   • Vitamin D deficiency 07/05/2016   • Allergic rhinitis 07/05/2016   • Glucose intolerance (impaired glucose tolerance) 07/05/2016   • Osteoporosis 07/05/2016   • Fatigue 07/05/2016   • Hypertension 07/05/2016   • Migraine 07/05/2016   • Insomnia 07/05/2016   • Hyperlipemia 07/05/2016   • Macular degeneration of both eyes 07/05/2016   • Acquired hypothyroidism 10/11/2016   • Frequent urination 06/12/2017     Resolved Ambulatory Problems     Diagnosis Date Noted   • No Resolved Ambulatory Problems     Past Medical History:   Diagnosis Date   • Allergic rhinitis    • Fatigue    • Glucose intolerance (impaired glucose tolerance)    • Hearing loss    • Hematuria    • Hyperlipemia    • Hypertension    • Insomnia    • Migraine    • Osteoporosis    • Shortness of breath    • Vitamin D deficiency        PAST SURGICAL HISTORY  Past Surgical History:   Procedure Laterality Date   • COLONOSCOPY  2008   • HYSTERECTOMY N/A " 1971    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   • KNEE SURGERY  1989       FAMILY HISTORY  Family History   Problem Relation Age of Onset   • Hodgkin's lymphoma Mother        SOCIAL HISTORY  Social History     Social History   • Marital status:      Spouse name: N/A   • Number of children: N/A   • Years of education: N/A     Occupational History   • Not on file.     Social History Main Topics   • Smoking status: Never Smoker   • Smokeless tobacco: Never Used   • Alcohol use No   • Drug use: No   • Sexual activity: No     Other Topics Concern   • Not on file     Social History Narrative       ALLERGIES  Review of patient's allergies indicates no known allergies.    REVIEW OF SYSTEMS  Review of Systems   Constitutional: Negative.  Negative for chills and fever.   HENT: Negative.  Negative for sore throat.    Eyes: Negative.    Respiratory: Negative.  Negative for cough.    Cardiovascular: Negative.  Negative for chest pain.   Gastrointestinal: Negative.  Negative for nausea and vomiting.   Genitourinary: Negative.  Negative for dysuria.   Musculoskeletal: Negative.  Negative for back pain.        R shoulder pain, R arm pain   Skin: Negative.  Negative for rash.   Neurological: Negative.  Negative for headaches.       PHYSICAL EXAM  ED Triage Vitals   Temp Heart Rate Resp BP SpO2   12/11/17 1511 12/11/17 1511 12/11/17 1538 12/11/17 1537 12/11/17 1511   97.6 °F (36.4 °C) 90 16 206/96 94 %      Temp src Heart Rate Source Patient Position BP Location FiO2 (%)   -- -- 12/11/17 1537 12/11/17 1537 --     Sitting Left arm        Physical Exam   Constitutional: She is oriented to person, place, and time and well-developed, well-nourished, and in no distress. No distress.   HENT:   Head: Normocephalic and atraumatic.   Eyes: EOM are normal. Pupils are equal, round, and reactive to light.   Neck: Normal range of motion. Neck supple.   Cardiovascular: Normal rate, regular rhythm, normal heart sounds and intact distal pulses.     Pulmonary/Chest: Effort normal and breath sounds normal. No respiratory distress.   Abdominal: Soft. There is no tenderness. There is no rebound and no guarding.   Musculoskeletal: Normal range of motion. She exhibits no edema.        Right shoulder: She exhibits no deformity.   Decreased range of motion at the right shoulder due to pain.    Neurological: She is alert and oriented to person, place, and time. She has normal sensation and normal strength.   Skin: Skin is warm and dry. No rash noted.   Psychiatric: Mood and affect normal.   Nursing note and vitals reviewed.      LAB RESULTS  Lab Results (last 24 hours)     ** No results found for the last 24 hours. **          I ordered the above labs and reviewed the results    RADIOLOGY  XR Chest 2 View   Final Result      XR Shoulder 2+ View Right   Final Result       TWO-VIEW CHEST      HISTORY: Chest pain.      FINDINGS: The lungs are well-expanded and clear. The heart is top normal  in size and no acute abnormality is seen.      This report was finalized on 12/11/2017 6:12 PM by Dr. Dennis Funez MD.      TWO-VIEW RIGHT SHOULDER      HISTORY: Right shoulder pain.      There are extremely severe degenerative changes at the glenohumeral  joint with marked joint space narrowing and some associated marginal  spurring of the humeral head and adjacent glenoid.      This report was finalized on 12/11/2017 6:12 PM by Dr. Dennis Funez MD.        I ordered the above noted radiological studies. Interpreted by radiologist.     PROCEDURES  Procedures  EKG           EKG time: 2053  Rhythm/Rate: Sinus, 67  P waves and DE: Normal  QRS, axis: Normal   ST and T waves: Normal     Interpreted Contemporaneously by me, independently viewed  unchanged compared to prior 9/11/13      PROGRESS AND CONSULTS  ED Course     Consulted with Dr. Loyd, and he agrees with plan of care.      2152  Rechecked the patient and she states that the pain medication helped but the pain is coming  back after being placed in the sling. Discussed EKG and imaging results.. Discussed the plan to discharge. Patient agrees with plan and all questions were addressed.       MEDICAL DECISION MAKING  Results were reviewed/discussed with the patient and they were also made aware of online access. Pt also made aware that some labs, such as cultures, will not be resulted during ER visit and follow up with PMD is necessary.     MDM  Number of Diagnoses or Management Options  Acute pain of right shoulder:      Amount and/or Complexity of Data Reviewed  Tests in the radiology section of CPT®: reviewed and ordered (XR scans show nothing acute.)  Tests in the medicine section of CPT®: ordered and reviewed (EKG is normal)    Patient Progress  Patient progress: stable         DIAGNOSIS  Final diagnoses:   Acute pain of right shoulder       DISPOSITION  DISCHARGE    Patient discharged in stable condition.    Reviewed implications of results, diagnosis, meds, responsibility to follow up, warning signs and symptoms of possible worsening, potential complications and reasons to return to ER.    Patient/Family voiced understanding of above instructions.    Discussed plan for discharge, as there is no emergent indication for admission.  Pt/family is agreeable and understands need for follow up and repeat testing.  Pt is aware that discharge does not mean that nothing is wrong but it indicates no emergency is present that requires admission and they must continue care with follow-up as given below or physician of their choice.     FOLLOW-UP  Margarita Giraldo MD  [facility] [place] [address]  [phone]    Schedule an appointment as soon as possible for a visit      [facility] - [place] [address]  [phone]  Schedule an appointment as soon as possible for a visit           Medication List      New Prescriptions          HYDROcodone-acetaminophen  5-325 MG per tablet   Commonly known as:  NORCO   Take 1 tablet by mouth Every 6 (Six) Hours As Needed for Moderate Pain .               Latest Documented Vital Signs:  As of 10:00 PM  BP- 171/74 HR- 69 Temp- 97.7 °F (36.5 °C) (Tympanic) O2 sat- 98%    --  Documentation assistance provided by samuel Dempsey for JANES Fields.  Information recorded by the charlesibe was done at my direction and has been verified and validated by me.            Margareth Dempsey  12/11/17 1141       DELORES Mcgraw  12/11/17 9598

## 2017-12-12 NOTE — ED NOTES
Pt calls out due to R shoulder pain at this time; pt and family updated that pt has to be seen by provider prior to receiving medication. HOB moved for pt comfort and pt denies extremity elevation. Reassurance given.            Alecia Ramirez RN  12/11/17 2055

## 2017-12-12 NOTE — TELEPHONE ENCOUNTER
ED f/u phone call: states she is wearing sling and has upcoming f/u mirta't w/ orthopedist. States that pain is relived somewhat w/ Norco, but it caused some nausea. Advised her to contact PCP re this. No other questions/concerns

## 2017-12-12 NOTE — ED PROVIDER NOTES
Pt presents complaining of atraumatic right shoulder pain onset this afternoon. The pain is exacerbated by certain movements. She was doing light housework today but cannot think of anything that would have caused the pain. She had rotator cuff surgery 22 years ago. On exam, her shoulder is held in internal rotation. Pt does initial abduction by recruiting her deltoid but cannot go past 5 degrees. No redness nor warmth. Pt will be discharged to f/u with orthopedist .    XR right shoulder: There are extremely severe degenerative changes at the glenohumeral  joint with marked joint space narrowing and some associated marginal spurring of the humeral head and adjacent glenoid.      EKG         EKG time: 2053  Rhythm/Rate: SR at 67  P waves and IA: normal  QRS, axis: normal   ST and T waves: normal     Interpreted Contemporaneously by me, independently viewed  Unchanged compared to prior.    I supervised care provided by the midlevel provider.    We have discussed this patient's history, physical exam, and treatment plan.   I have reviewed the note and personally saw and examined the patient and agree with the plan of care.    Documentation assistance provided by samuel Leo for Maurice Loyd.  Information recorded by the samuel was done at my direction and has been verified and validated by me.         Melissa Leo  12/11/17 8513       Maurice Loyd MD  12/12/17 [id number]

## 2017-12-12 NOTE — DISCHARGE INSTRUCTIONS
Wear sling, apply ice, pain medicine as needed, call first thing in the morning to schedule follow up with the orthopedist.

## 2018-01-30 DIAGNOSIS — E03.9 ACQUIRED HYPOTHYROIDISM: ICD-10-CM

## 2018-01-30 RX ORDER — LEVOTHYROXINE SODIUM 0.05 MG/1
TABLET ORAL
Qty: 90 TABLET | Refills: 1 | Status: SHIPPED | OUTPATIENT
Start: 2018-01-30 | End: 2018-08-02 | Stop reason: SDUPTHER

## 2018-03-21 ENCOUNTER — EPISODE CHANGES (OUTPATIENT)
Dept: CASE MANAGEMENT | Facility: OTHER | Age: 78
End: 2018-03-21

## 2018-05-15 RX ORDER — RAMIPRIL 10 MG/1
CAPSULE ORAL
Qty: 180 CAPSULE | Refills: 2 | Status: SHIPPED | OUTPATIENT
Start: 2018-05-15 | End: 2019-05-20 | Stop reason: SDUPTHER

## 2018-05-18 ENCOUNTER — OFFICE VISIT (OUTPATIENT)
Dept: INTERNAL MEDICINE | Facility: CLINIC | Age: 78
End: 2018-05-18

## 2018-05-18 VITALS
WEIGHT: 178 LBS | SYSTOLIC BLOOD PRESSURE: 124 MMHG | OXYGEN SATURATION: 95 % | BODY MASS INDEX: 33.61 KG/M2 | HEIGHT: 61 IN | HEART RATE: 70 BPM | DIASTOLIC BLOOD PRESSURE: 84 MMHG

## 2018-05-18 DIAGNOSIS — I10 ESSENTIAL HYPERTENSION: Primary | ICD-10-CM

## 2018-05-18 DIAGNOSIS — R06.02 SHORTNESS OF BREATH: ICD-10-CM

## 2018-05-18 DIAGNOSIS — R53.82 CHRONIC FATIGUE: ICD-10-CM

## 2018-05-18 DIAGNOSIS — R73.09 ELEVATED GLUCOSE: ICD-10-CM

## 2018-05-18 DIAGNOSIS — F51.01 PRIMARY INSOMNIA: ICD-10-CM

## 2018-05-18 DIAGNOSIS — E78.49 OTHER HYPERLIPIDEMIA: ICD-10-CM

## 2018-05-18 DIAGNOSIS — E55.9 VITAMIN D DEFICIENCY: ICD-10-CM

## 2018-05-18 DIAGNOSIS — E03.9 ACQUIRED HYPOTHYROIDISM: ICD-10-CM

## 2018-05-18 LAB
ALBUMIN SERPL-MCNC: 4.3 G/DL (ref 3.5–5.2)
ALBUMIN/GLOB SERPL: 2 G/DL
ALP SERPL-CCNC: 87 U/L (ref 39–117)
ALT SERPL W P-5'-P-CCNC: 15 U/L (ref 1–33)
ANION GAP SERPL CALCULATED.3IONS-SCNC: 13.1 MMOL/L
AST SERPL-CCNC: 17 U/L (ref 1–32)
BILIRUB SERPL-MCNC: 1.4 MG/DL (ref 0.1–1.2)
BUN BLD-MCNC: 19 MG/DL (ref 8–23)
BUN/CREAT SERPL: 22.6 (ref 7–25)
CALCIUM SPEC-SCNC: 9.8 MG/DL (ref 8.6–10.5)
CHLORIDE SERPL-SCNC: 106 MMOL/L (ref 98–107)
CHOLEST SERPL-MCNC: 124 MG/DL (ref 0–200)
CO2 SERPL-SCNC: 26.9 MMOL/L (ref 22–29)
CREAT BLD-MCNC: 0.84 MG/DL (ref 0.57–1)
GFR SERPL CREATININE-BSD FRML MDRD: 65 ML/MIN/1.73
GLOBULIN UR ELPH-MCNC: 2.2 GM/DL
GLUCOSE BLD-MCNC: 105 MG/DL (ref 65–99)
HBA1C MFR BLD: 5.35 % (ref 4.8–5.6)
HDLC SERPL-MCNC: 60 MG/DL (ref 40–60)
LDLC SERPL CALC-MCNC: 49 MG/DL (ref 0–100)
LDLC/HDLC SERPL: 0.82 {RATIO}
POTASSIUM BLD-SCNC: 5 MMOL/L (ref 3.5–5.2)
PROT SERPL-MCNC: 6.5 G/DL (ref 6–8.5)
SODIUM BLD-SCNC: 146 MMOL/L (ref 136–145)
TRIGL SERPL-MCNC: 74 MG/DL (ref 0–150)
TSH SERPL-ACNC: 3.3 MIU/ML (ref 0.27–4.2)
VLDLC SERPL-MCNC: 14.8 MG/DL (ref 5–40)

## 2018-05-18 PROCEDURE — 80053 COMPREHEN METABOLIC PANEL: CPT | Performed by: INTERNAL MEDICINE

## 2018-05-18 PROCEDURE — 80061 LIPID PANEL: CPT | Performed by: INTERNAL MEDICINE

## 2018-05-18 PROCEDURE — 83036 HEMOGLOBIN GLYCOSYLATED A1C: CPT | Performed by: INTERNAL MEDICINE

## 2018-05-18 PROCEDURE — 99214 OFFICE O/P EST MOD 30 MIN: CPT | Performed by: INTERNAL MEDICINE

## 2018-05-18 RX ORDER — TRAZODONE HYDROCHLORIDE 50 MG/1
50 TABLET ORAL NIGHTLY
Qty: 30 TABLET | Refills: 6 | Status: SHIPPED | OUTPATIENT
Start: 2018-05-18 | End: 2019-07-11 | Stop reason: SDUPTHER

## 2018-05-18 NOTE — PROGRESS NOTES
"Kar Reyna is a 78 y.o. female here for   Chief Complaint   Patient presents with   • Allergic Rhinitis   • Hyperlipidemia   • Hypertension   • Hypothyroidism   • Insomnia   .    Vitals:    05/18/18 0927   BP: 124/84   BP Location: Right arm   Patient Position: Sitting   Cuff Size: Adult   Pulse: 70   SpO2: 95%   Weight: 80.7 kg (178 lb)   Height: 154.9 cm (61\")       Body mass index is 33.63 kg/m².    Allergic Rhinitis   Presents for follow-up visit. She complains of fatigue. She reports no cough, fever or wheezing.   Hyperlipidemia   This is a chronic problem. The current episode started more than 1 year ago. The problem is controlled. Recent lipid tests were reviewed and are normal. Exacerbating diseases include hypothyroidism. Associated symptoms include shortness of breath (with exertion). Pertinent negatives include no chest pain.   Hypertension   This is a chronic problem. The current episode started more than 1 year ago. The problem is unchanged. The problem is controlled. Associated symptoms include anxiety and shortness of breath (with exertion). Pertinent negatives include no chest pain or palpitations.   Hypothyroidism   This is a chronic problem. The current episode started more than 1 year ago. The problem occurs constantly. The problem has been unchanged. Associated symptoms include fatigue. Pertinent negatives include no chest pain, chills, coughing or fever.   Insomnia   This is a chronic problem. The current episode started more than 1 year ago. The problem occurs constantly. The problem has been unchanged. Associated symptoms include fatigue. Pertinent negatives include no chest pain, chills, coughing or fever.   Shortness of Breath   This is a recurrent problem. The current episode started more than 1 month ago. The problem occurs intermittently. The problem has been waxing and waning. Pertinent negatives include no chest pain, fever, leg swelling or wheezing.        The following " portions of the patient's history were reviewed and updated as appropriate: allergies, current medications, past social history and problem list.    Review of Systems   Constitutional: Positive for fatigue. Negative for chills and fever.   Respiratory: Positive for shortness of breath (with exertion). Negative for cough and wheezing.    Cardiovascular: Negative for chest pain, palpitations and leg swelling.   Psychiatric/Behavioral: Positive for sleep disturbance. Negative for dysphoric mood. The patient is nervous/anxious and has insomnia.      She is losing wt with wt watchers.    Objective   Physical Exam   Constitutional: She appears well-developed and well-nourished. No distress.   Cardiovascular: Normal rate, regular rhythm and normal heart sounds.    Pulmonary/Chest: No respiratory distress. She has no wheezes. She has no rales. She exhibits no tenderness.   Musculoskeletal: She exhibits no edema.   Psychiatric: She has a normal mood and affect. Her behavior is normal.   Nursing note and vitals reviewed.      Assessment/Plan   Diagnoses and all orders for this visit:    Essential hypertension  Comments:  stable - continue wt loss - call if bp over 140/90  Orders:  -     Comprehensive Metabolic Panel; Future  -     Lipid Panel; Future  -     Comprehensive Metabolic Panel  -     Lipid Panel    Other hyperlipidemia  Comments:  continue diet/ex  Orders:  -     Comprehensive Metabolic Panel; Future  -     Lipid Panel; Future  -     Comprehensive Metabolic Panel  -     Lipid Panel    Acquired hypothyroidism  -     TSH Rfx On Abnormal To Free T4; Future  -     TSH Rfx On Abnormal To Free T4    Primary insomnia  Comments:  trial of trazodone nightly - may start with 1/2 pill, then 1 qhs - may increase to 1.5 qhs if needed  Orders:  -     traZODone (DESYREL) 50 MG tablet; Take 1 tablet by mouth Every Night.    Chronic fatigue  Comments:  call if worse    Shortness of breath  Comments:  LOZADA - need stress test - call if  sx persist  Orders:  -     Treadmill Stress Test; Future    Elevated glucose  Comments:  need low sugar diet  Orders:  -     Hemoglobin A1c; Future  -     Hemoglobin A1c    Vitamin D deficiency  Comments:  continue 3,000 units/d

## 2018-05-24 ENCOUNTER — HOSPITAL ENCOUNTER (OUTPATIENT)
Dept: CARDIOLOGY | Facility: HOSPITAL | Age: 78
Discharge: HOME | End: 2018-05-24
Attending: INTERNAL MEDICINE | Admitting: INTERNAL MEDICINE

## 2018-05-24 DIAGNOSIS — R06.02 SHORTNESS OF BREATH: ICD-10-CM

## 2018-05-24 LAB
BH CV STRESS BP STAGE 1: NORMAL
BH CV STRESS BP STAGE 2: NORMAL
BH CV STRESS DURATION MIN STAGE 1: 3
BH CV STRESS DURATION MIN STAGE 2: 0
BH CV STRESS DURATION SEC STAGE 1: 0
BH CV STRESS DURATION SEC STAGE 2: 56
BH CV STRESS GRADE STAGE 1: 10
BH CV STRESS GRADE STAGE 2: 12
BH CV STRESS HR STAGE 1: 114
BH CV STRESS HR STAGE 2: 122
BH CV STRESS METS STAGE 1: 5
BH CV STRESS METS STAGE 2: 7.5
BH CV STRESS PROTOCOL 1: NORMAL
BH CV STRESS RECOVERY BP: NORMAL MMHG
BH CV STRESS RECOVERY HR: 77 BPM
BH CV STRESS SPEED STAGE 1: 1.7
BH CV STRESS SPEED STAGE 2: 2.5
BH CV STRESS STAGE 1: 1
BH CV STRESS STAGE 2: 2
MAXIMAL PREDICTED HEART RATE: 141 BPM
PERCENT MAX PREDICTED HR: 88.65 %
STRESS BASELINE BP: NORMAL MMHG
STRESS BASELINE HR: 72 BPM
STRESS PERCENT HR: 104 %
STRESS POST ESTIMATED WORKLOAD: 5.4 METS
STRESS POST EXERCISE DUR MIN: 3 MIN
STRESS POST EXERCISE DUR SEC: 56 SEC
STRESS POST PEAK BP: NORMAL MMHG
STRESS POST PEAK HR: 125 BPM
STRESS TARGET HR: 120 BPM

## 2018-05-24 PROCEDURE — 93017 CV STRESS TEST TRACING ONLY: CPT

## 2018-05-24 PROCEDURE — 93018 CV STRESS TEST I&R ONLY: CPT | Performed by: INTERNAL MEDICINE

## 2018-05-24 PROCEDURE — 93016 CV STRESS TEST SUPVJ ONLY: CPT | Performed by: INTERNAL MEDICINE

## 2018-07-27 DIAGNOSIS — I10 ESSENTIAL HYPERTENSION: ICD-10-CM

## 2018-07-27 RX ORDER — METOPROLOL SUCCINATE 100 MG/1
TABLET, EXTENDED RELEASE ORAL
Qty: 90 TABLET | Refills: 3 | Status: SHIPPED | OUTPATIENT
Start: 2018-07-27 | End: 2019-08-02 | Stop reason: SDUPTHER

## 2018-08-02 DIAGNOSIS — E03.9 ACQUIRED HYPOTHYROIDISM: ICD-10-CM

## 2018-08-02 RX ORDER — LEVOTHYROXINE SODIUM 0.05 MG/1
TABLET ORAL
Qty: 90 TABLET | Refills: 1 | Status: SHIPPED | OUTPATIENT
Start: 2018-08-02 | End: 2019-02-07 | Stop reason: SDUPTHER

## 2018-08-02 RX ORDER — ATORVASTATIN CALCIUM 10 MG/1
TABLET, FILM COATED ORAL
Qty: 90 TABLET | Refills: 2 | Status: SHIPPED | OUTPATIENT
Start: 2018-08-02 | End: 2019-05-15 | Stop reason: SDUPTHER

## 2018-08-17 ENCOUNTER — OFFICE VISIT (OUTPATIENT)
Dept: INTERNAL MEDICINE | Facility: CLINIC | Age: 78
End: 2018-08-17

## 2018-08-17 VITALS
BODY MASS INDEX: 33.23 KG/M2 | HEIGHT: 61 IN | WEIGHT: 176 LBS | SYSTOLIC BLOOD PRESSURE: 138 MMHG | DIASTOLIC BLOOD PRESSURE: 80 MMHG

## 2018-08-17 DIAGNOSIS — R73.02 GLUCOSE INTOLERANCE (IMPAIRED GLUCOSE TOLERANCE): ICD-10-CM

## 2018-08-17 DIAGNOSIS — E03.9 ACQUIRED HYPOTHYROIDISM: ICD-10-CM

## 2018-08-17 DIAGNOSIS — E78.49 OTHER HYPERLIPIDEMIA: ICD-10-CM

## 2018-08-17 DIAGNOSIS — R60.0 PEDAL EDEMA: ICD-10-CM

## 2018-08-17 DIAGNOSIS — I10 ESSENTIAL HYPERTENSION: Primary | ICD-10-CM

## 2018-08-17 LAB
ALBUMIN SERPL-MCNC: 4.6 G/DL (ref 3.5–5.2)
ALBUMIN/GLOB SERPL: 2.3 G/DL
ALP SERPL-CCNC: 74 U/L (ref 39–117)
ALT SERPL W P-5'-P-CCNC: 17 U/L (ref 1–33)
ANION GAP SERPL CALCULATED.3IONS-SCNC: 12.6 MMOL/L
AST SERPL-CCNC: 18 U/L (ref 1–32)
BASOPHILS # BLD AUTO: 0.03 10*3/MM3 (ref 0–0.2)
BASOPHILS NFR BLD AUTO: 0.4 % (ref 0–2)
BILIRUB SERPL-MCNC: 1.3 MG/DL (ref 0.1–1.2)
BUN BLD-MCNC: 18 MG/DL (ref 8–23)
BUN/CREAT SERPL: 20.7 (ref 7–25)
CALCIUM SPEC-SCNC: 9.2 MG/DL (ref 8.6–10.5)
CHLORIDE SERPL-SCNC: 105 MMOL/L (ref 98–107)
CHOLEST SERPL-MCNC: 114 MG/DL (ref 0–200)
CO2 SERPL-SCNC: 27.4 MMOL/L (ref 22–29)
CREAT BLD-MCNC: 0.87 MG/DL (ref 0.57–1)
DEPRECATED RDW RBC AUTO: 43.1 FL (ref 37–54)
EOSINOPHIL # BLD AUTO: 0.09 10*3/MM3 (ref 0–0.7)
EOSINOPHIL NFR BLD AUTO: 1.1 % (ref 0–5)
ERYTHROCYTE [DISTWIDTH] IN BLOOD BY AUTOMATED COUNT: 12 % (ref 11.5–15)
GFR SERPL CREATININE-BSD FRML MDRD: 63 ML/MIN/1.73
GLOBULIN UR ELPH-MCNC: 2 GM/DL
GLUCOSE BLD-MCNC: 97 MG/DL (ref 65–99)
HCT VFR BLD AUTO: 36.6 % (ref 34.1–44.9)
HDLC SERPL-MCNC: 63 MG/DL (ref 40–60)
HGB BLD-MCNC: 12.2 G/DL (ref 11.2–15.7)
LDLC SERPL CALC-MCNC: 40 MG/DL (ref 0–100)
LDLC/HDLC SERPL: 0.63 {RATIO}
LYMPHOCYTES # BLD AUTO: 3.94 10*3/MM3 (ref 0.8–7)
LYMPHOCYTES NFR BLD AUTO: 50.1 % (ref 10–60)
MCH RBC QN AUTO: 33.5 PG (ref 26–34)
MCHC RBC AUTO-ENTMCNC: 33.3 G/DL (ref 31–37)
MCV RBC AUTO: 100.5 FL (ref 80–100)
MONOCYTES # BLD AUTO: 0.45 10*3/MM3 (ref 0–1)
MONOCYTES NFR BLD AUTO: 5.7 % (ref 0–13)
NEUTROPHILS # BLD AUTO: 3.35 10*3/MM3 (ref 1–11)
NEUTROPHILS NFR BLD AUTO: 42.7 % (ref 30–85)
PLATELET # BLD AUTO: 182 10*3/MM3 (ref 150–450)
PMV BLD AUTO: 10.7 FL (ref 6–12)
POTASSIUM BLD-SCNC: 4.4 MMOL/L (ref 3.5–5.2)
PROT SERPL-MCNC: 6.6 G/DL (ref 6–8.5)
RBC # BLD AUTO: 3.64 10*6/MM3 (ref 3.93–5.22)
SODIUM BLD-SCNC: 145 MMOL/L (ref 136–145)
TRIGL SERPL-MCNC: 56 MG/DL (ref 0–150)
TSH SERPL DL<=0.05 MIU/L-ACNC: 3.08 MIU/ML (ref 0.27–4.2)
VLDLC SERPL-MCNC: 11.2 MG/DL (ref 5–40)
WBC NRBC COR # BLD: 7.86 10*3/MM3 (ref 5–10)

## 2018-08-17 PROCEDURE — 80053 COMPREHEN METABOLIC PANEL: CPT | Performed by: INTERNAL MEDICINE

## 2018-08-17 PROCEDURE — 36415 COLL VENOUS BLD VENIPUNCTURE: CPT | Performed by: INTERNAL MEDICINE

## 2018-08-17 PROCEDURE — 80061 LIPID PANEL: CPT | Performed by: INTERNAL MEDICINE

## 2018-08-17 PROCEDURE — 99213 OFFICE O/P EST LOW 20 MIN: CPT | Performed by: INTERNAL MEDICINE

## 2018-08-17 PROCEDURE — 84443 ASSAY THYROID STIM HORMONE: CPT | Performed by: INTERNAL MEDICINE

## 2018-08-17 PROCEDURE — 85025 COMPLETE CBC W/AUTO DIFF WBC: CPT | Performed by: INTERNAL MEDICINE

## 2018-08-17 RX ORDER — HYDROCHLOROTHIAZIDE 25 MG/1
25 TABLET ORAL DAILY
Qty: 30 TABLET | Refills: 1 | Status: SHIPPED | OUTPATIENT
Start: 2018-08-17 | End: 2019-02-20 | Stop reason: SDUPTHER

## 2018-08-17 NOTE — PROGRESS NOTES
"Kar Reyna is a 78 y.o. female here for   Chief Complaint   Patient presents with   • Hyperlipidemia     3 month follow-up   • Hypertension   • Hypothyroidism   • Insomnia   .    Vitals:    08/17/18 1555   BP: 138/80   BP Location: Left arm   Patient Position: Sitting   Cuff Size: Adult   Weight: 79.8 kg (176 lb)   Height: 154.9 cm (61\")       Body mass index is 33.25 kg/m².    Hypertension   This is a chronic problem. The current episode started more than 1 year ago. The problem is unchanged. The problem is controlled. Pertinent negatives include no chest pain, palpitations or shortness of breath.   Hyperlipidemia   This is a chronic problem. The current episode started more than 1 year ago. The problem is controlled. Recent lipid tests were reviewed and are normal. She has no history of diabetes. Pertinent negatives include no chest pain or shortness of breath.        The following portions of the patient's history were reviewed and updated as appropriate: allergies, current medications, past social history and problem list.    Review of Systems   Constitutional: Negative for chills, fatigue and fever.   Respiratory: Negative for cough, shortness of breath and wheezing.    Cardiovascular: Negative for chest pain, palpitations and leg swelling.   Psychiatric/Behavioral: Negative for dysphoric mood and sleep disturbance. The patient is not nervous/anxious.      She requests HCTZ to use for occ pedal edema.    Objective   Physical Exam   Constitutional: She appears well-developed and well-nourished. No distress.   Cardiovascular: Normal rate, regular rhythm and normal heart sounds.    Pulmonary/Chest: No respiratory distress. She has no wheezes. She has no rales. She exhibits no tenderness.   Musculoskeletal: She exhibits no edema.   Psychiatric: She has a normal mood and affect. Her behavior is normal.   Nursing note and vitals reviewed.      Assessment/Plan   Diagnoses and all orders for this " visit:    Essential hypertension  Comments:  stable - ok to take 1 ramipril instead of 2 daily - call if bp over 140/90  Orders:  -     Lipid Panel; Future  -     Comprehensive Metabolic Panel; Future  -     CBC Auto Differential; Future  -     Lipid Panel  -     Comprehensive Metabolic Panel  -     CBC Auto Differential    Other hyperlipidemia  Comments:  continue diet/ex  Orders:  -     Lipid Panel; Future  -     Comprehensive Metabolic Panel; Future  -     Lipid Panel  -     Comprehensive Metabolic Panel    Glucose intolerance (impaired glucose tolerance)  Comments:  continue low sugar diet    Acquired hypothyroidism  Comments:  rechk today  Orders:  -     TSH Rfx On Abnormal To Free T4; Future  -     TSH Rfx On Abnormal To Free T4    Pedal edema  Comments:  off & on - ok to use HCTZ rarely prn  Orders:  -     hydrochlorothiazide (HYDRODIURIL) 25 MG tablet; Take 1 tablet by mouth Daily.

## 2018-10-10 ENCOUNTER — EPISODE CHANGES (OUTPATIENT)
Dept: CASE MANAGEMENT | Facility: OTHER | Age: 78
End: 2018-10-10

## 2019-02-07 DIAGNOSIS — E03.9 ACQUIRED HYPOTHYROIDISM: ICD-10-CM

## 2019-02-07 RX ORDER — LEVOTHYROXINE SODIUM 0.05 MG/1
TABLET ORAL
Qty: 90 TABLET | Refills: 1 | Status: SHIPPED | OUTPATIENT
Start: 2019-02-07 | End: 2019-08-19 | Stop reason: SDUPTHER

## 2019-02-20 ENCOUNTER — RESULTS ENCOUNTER (OUTPATIENT)
Dept: INTERNAL MEDICINE | Facility: CLINIC | Age: 79
End: 2019-02-20

## 2019-02-20 ENCOUNTER — OFFICE VISIT (OUTPATIENT)
Dept: INTERNAL MEDICINE | Facility: CLINIC | Age: 79
End: 2019-02-20

## 2019-02-20 VITALS
OXYGEN SATURATION: 99 % | WEIGHT: 171 LBS | DIASTOLIC BLOOD PRESSURE: 78 MMHG | BODY MASS INDEX: 32.28 KG/M2 | HEIGHT: 61 IN | HEART RATE: 70 BPM | SYSTOLIC BLOOD PRESSURE: 150 MMHG

## 2019-02-20 DIAGNOSIS — R53.82 CHRONIC FATIGUE: ICD-10-CM

## 2019-02-20 DIAGNOSIS — F51.01 PRIMARY INSOMNIA: ICD-10-CM

## 2019-02-20 DIAGNOSIS — Z12.11 COLON CANCER SCREENING: ICD-10-CM

## 2019-02-20 DIAGNOSIS — E78.49 OTHER HYPERLIPIDEMIA: ICD-10-CM

## 2019-02-20 DIAGNOSIS — R60.0 PEDAL EDEMA: ICD-10-CM

## 2019-02-20 DIAGNOSIS — Z23 NEED FOR PNEUMOCOCCAL VACCINATION: ICD-10-CM

## 2019-02-20 DIAGNOSIS — E55.9 VITAMIN D DEFICIENCY: ICD-10-CM

## 2019-02-20 DIAGNOSIS — I10 ESSENTIAL HYPERTENSION: Primary | ICD-10-CM

## 2019-02-20 DIAGNOSIS — E03.9 ACQUIRED HYPOTHYROIDISM: ICD-10-CM

## 2019-02-20 DIAGNOSIS — Z00.00 MEDICARE ANNUAL WELLNESS VISIT, SUBSEQUENT: ICD-10-CM

## 2019-02-20 DIAGNOSIS — R73.02 GLUCOSE INTOLERANCE (IMPAIRED GLUCOSE TOLERANCE): ICD-10-CM

## 2019-02-20 DIAGNOSIS — Z78.0 MENOPAUSE: ICD-10-CM

## 2019-02-20 PROBLEM — R06.02 SHORTNESS OF BREATH: Status: RESOLVED | Noted: 2018-05-18 | Resolved: 2019-02-20

## 2019-02-20 LAB
ALBUMIN SERPL-MCNC: 4.1 G/DL (ref 3.5–5.2)
ALBUMIN/GLOB SERPL: 1.6 G/DL
ALP SERPL-CCNC: 68 U/L (ref 39–117)
ALT SERPL W P-5'-P-CCNC: 20 U/L (ref 1–33)
ANION GAP SERPL CALCULATED.3IONS-SCNC: 11.9 MMOL/L
AST SERPL-CCNC: 16 U/L (ref 1–32)
BACTERIA UR QL AUTO: ABNORMAL /HPF
BASOPHILS # BLD AUTO: 0.02 10*3/MM3 (ref 0–0.2)
BASOPHILS NFR BLD AUTO: 0.3 % (ref 0–1.5)
BILIRUB SERPL-MCNC: 1 MG/DL (ref 0.1–1.2)
BILIRUB UR QL STRIP: NEGATIVE
BUN BLD-MCNC: 24 MG/DL (ref 8–23)
BUN/CREAT SERPL: 27.6 (ref 7–25)
CALCIUM SPEC-SCNC: 10 MG/DL (ref 8.6–10.5)
CHLORIDE SERPL-SCNC: 105 MMOL/L (ref 98–107)
CHOLEST SERPL-MCNC: 125 MG/DL (ref 0–200)
CLARITY UR: ABNORMAL
CO2 SERPL-SCNC: 27.1 MMOL/L (ref 22–29)
COLOR UR: YELLOW
CREAT BLD-MCNC: 0.87 MG/DL (ref 0.57–1)
DEPRECATED RDW RBC AUTO: 43.1 FL (ref 37–54)
EOSINOPHIL # BLD AUTO: 0.22 10*3/MM3 (ref 0–0.4)
EOSINOPHIL NFR BLD AUTO: 2.9 % (ref 0.3–6.2)
ERYTHROCYTE [DISTWIDTH] IN BLOOD BY AUTOMATED COUNT: 12.3 % (ref 12.3–15.4)
GFR SERPL CREATININE-BSD FRML MDRD: 63 ML/MIN/1.73
GLOBULIN UR ELPH-MCNC: 2.5 GM/DL
GLUCOSE BLD-MCNC: 97 MG/DL (ref 65–99)
GLUCOSE UR STRIP-MCNC: NEGATIVE MG/DL
HCT VFR BLD AUTO: 36.7 % (ref 34–46.6)
HDLC SERPL-MCNC: 63 MG/DL (ref 40–60)
HGB BLD-MCNC: 12.1 G/DL (ref 12–15.9)
HGB UR QL STRIP.AUTO: ABNORMAL
HYALINE CASTS UR QL AUTO: ABNORMAL /LPF
KETONES UR QL STRIP: NEGATIVE
LDLC SERPL CALC-MCNC: 51 MG/DL (ref 0–100)
LDLC/HDLC SERPL: 0.81 {RATIO}
LEUKOCYTE ESTERASE UR QL STRIP.AUTO: ABNORMAL
LYMPHOCYTES # BLD AUTO: 3.08 10*3/MM3 (ref 0.7–3.1)
LYMPHOCYTES NFR BLD AUTO: 40.4 % (ref 19.6–45.3)
MCH RBC QN AUTO: 33 PG (ref 26.6–33)
MCHC RBC AUTO-ENTMCNC: 33 G/DL (ref 31.5–35.7)
MCV RBC AUTO: 100 FL (ref 79–97)
MONOCYTES # BLD AUTO: 0.47 10*3/MM3 (ref 0.1–0.9)
MONOCYTES NFR BLD AUTO: 6.2 % (ref 5–12)
MUCOUS THREADS URNS QL MICRO: ABNORMAL /HPF
NEUTROPHILS # BLD AUTO: 3.83 10*3/MM3 (ref 1.4–7)
NEUTROPHILS NFR BLD AUTO: 50.2 % (ref 42.7–76)
NITRITE UR QL STRIP: NEGATIVE
PH UR STRIP.AUTO: 6 [PH] (ref 5–8)
PLATELET # BLD AUTO: 170 10*3/MM3 (ref 140–450)
PMV BLD AUTO: 10.6 FL (ref 6–12)
POTASSIUM BLD-SCNC: 4.9 MMOL/L (ref 3.5–5.2)
PROT SERPL-MCNC: 6.6 G/DL (ref 6–8.5)
PROT UR QL STRIP: NEGATIVE
RBC # BLD AUTO: 3.67 10*6/MM3 (ref 3.77–5.28)
RBC # UR: ABNORMAL /HPF
REF LAB TEST METHOD: ABNORMAL
SODIUM BLD-SCNC: 144 MMOL/L (ref 136–145)
SP GR UR STRIP: 1.02 (ref 1–1.03)
SQUAMOUS #/AREA URNS HPF: ABNORMAL /HPF
TRIGL SERPL-MCNC: 55 MG/DL (ref 0–150)
UROBILINOGEN UR QL STRIP: ABNORMAL
VLDLC SERPL-MCNC: 11 MG/DL (ref 5–40)
WBC NRBC COR # BLD: 7.62 10*3/MM3 (ref 3.4–10.8)
WBC UR QL AUTO: ABNORMAL /HPF

## 2019-02-20 PROCEDURE — 90732 PPSV23 VACC 2 YRS+ SUBQ/IM: CPT | Performed by: INTERNAL MEDICINE

## 2019-02-20 PROCEDURE — 85025 COMPLETE CBC W/AUTO DIFF WBC: CPT | Performed by: INTERNAL MEDICINE

## 2019-02-20 PROCEDURE — 80053 COMPREHEN METABOLIC PANEL: CPT | Performed by: INTERNAL MEDICINE

## 2019-02-20 PROCEDURE — G0439 PPPS, SUBSEQ VISIT: HCPCS | Performed by: INTERNAL MEDICINE

## 2019-02-20 PROCEDURE — 80061 LIPID PANEL: CPT | Performed by: INTERNAL MEDICINE

## 2019-02-20 PROCEDURE — 81001 URINALYSIS AUTO W/SCOPE: CPT | Performed by: INTERNAL MEDICINE

## 2019-02-20 PROCEDURE — 99214 OFFICE O/P EST MOD 30 MIN: CPT | Performed by: INTERNAL MEDICINE

## 2019-02-20 PROCEDURE — G0009 ADMIN PNEUMOCOCCAL VACCINE: HCPCS | Performed by: INTERNAL MEDICINE

## 2019-02-20 RX ORDER — HYDROCHLOROTHIAZIDE 25 MG/1
25 TABLET ORAL DAILY
Qty: 30 TABLET | Refills: 3 | Status: SHIPPED | OUTPATIENT
Start: 2019-02-20 | End: 2019-08-02 | Stop reason: SDUPTHER

## 2019-02-20 NOTE — PROGRESS NOTES
QUICK REFERENCE INFORMATION:  The ABCs of the Annual Wellness Visit    Subsequent Medicare Wellness Visit    HEALTH RISK ASSESSMENT    1940    Recent Hospitalizations:  No hospitalization(s) within the last year..        Current Medical Providers:  Patient Care Team:  Margarita Giraldo MD as PCP - General (Internal Medicine)  Margarita Giraldo MD as PCP - Claims Attributed  Tomer Fong MD as Consulting Physician (Orthopedic Surgery)  Toi Riley MD as Consulting Physician (Ophthalmology)  Monica Nichols MD (Dermatology)  Amanuel Nuñez MD as Consulting Physician (Orthopedic Surgery)        Smoking Status:  Social History     Tobacco Use   Smoking Status Never Smoker   Smokeless Tobacco Never Used       Alcohol Consumption:  Social History     Substance and Sexual Activity   Alcohol Use No       Depression Screen:   PHQ-2/PHQ-9 Depression Screening 2/20/2019   Little interest or pleasure in doing things 0   Feeling down, depressed, or hopeless 0   Trouble falling or staying asleep, or sleeping too much 0   Feeling tired or having little energy 0   Poor appetite or overeating 0   Feeling bad about yourself - or that you are a failure or have let yourself or your family down 0   Trouble concentrating on things, such as reading the newspaper or watching television 0   Moving or speaking so slowly that other people could have noticed. Or the opposite - being so fidgety or restless that you have been moving around a lot more than usual 0   Thoughts that you would be better off dead, or of hurting yourself in some way 0   Total Score 0       Health Habits and Functional and Cognitive Screening:  Functional & Cognitive Status 2/20/2019   Do you have difficulty preparing food and eating? No   Do you have difficulty bathing yourself, getting dressed or grooming yourself? No   Do you have difficulty using the toilet? No   Do you have difficulty moving around from place to place? No   Do you have  trouble with steps or getting out of a bed or a chair? No   In the past year have you fallen or experienced a near fall? No   Current Diet Well Balanced Diet   Dental Exam Up to date   Eye Exam Up to date   Exercise (times per week) 3 times per week   Current Exercise Activities Include Walking   Do you need help using the phone?  No   Are you deaf or do you have serious difficulty hearing?  No   Do you need help with transportation? No   Do you need help shopping? No   Do you need help preparing meals?  No   Do you need help with housework?  No   Do you need help with laundry? No   Do you need help taking your medications? No   Do you need help managing money? No   Do you ever drive or ride in a car without wearing a seat belt? No   Have you felt unusual stress, anger or loneliness in the last month? No   Who do you live with? Spouse   If you need help, do you have trouble finding someone available to you? No   Have you been bothered in the last four weeks by sexual problems? No   Do you have difficulty concentrating, remembering or making decisions? No           Does the patient have evidence of cognitive impairment? No    Aspirin use counseling: Does not need ASA (and currently is not on it)      Recent Lab Results:  CMP:  Lab Results   Component Value Date    GLU 94 07/14/2017    BUN 18 08/17/2018    CREATININE 0.87 08/17/2018    EGFRIFNONA 63 08/17/2018    EGFRIFAFRI 78 07/14/2017    BCR 20.7 08/17/2018     08/17/2018    K 4.4 08/17/2018    CO2 27.4 08/17/2018    CALCIUM 9.2 08/17/2018    PROTENTOTREF 7.2 07/14/2017    ALBUMIN 4.60 08/17/2018    LABGLOBREF 2.6 07/14/2017    LABIL2 1.8 07/14/2017    BILITOT 1.3 (H) 08/17/2018    ALKPHOS 74 08/17/2018    AST 18 08/17/2018    ALT 17 08/17/2018     Lipid Panel:  Lab Results   Component Value Date    CHOL 114 08/17/2018    TRIG 56 08/17/2018    HDL 63 (H) 08/17/2018    VLDL 11.2 08/17/2018    LDLHDL 0.63 08/17/2018     HbA1c:  Lab Results   Component Value  Date    HGBA1C 5.35 05/18/2018       Visual Acuity:  No exam data present    Age-appropriate Screening Schedule:  Refer to the list below for future screening recommendations based on patient's age, sex and/or medical conditions. Orders for these recommended tests are listed in the plan section. The patient has been provided with a written plan.    Health Maintenance   Topic Date Due   • ZOSTER VACCINE (2 of 3) 03/28/2012   • PNEUMOCOCCAL VACCINES (65+ LOW/MEDIUM RISK) (2 of 2 - PPSV23) 04/03/2016   • DXA SCAN  10/11/2018   • MAMMOGRAM  02/20/2020 (Originally 10/11/2018)   • LIPID PANEL  08/17/2019   • TDAP/TD VACCINES (2 - Td) 02/01/2022   • INFLUENZA VACCINE  Completed        Subjective   History of Present Illness    Ayana Reyna is a 78 y.o. female who presents for an Subsequent Wellness Visit.    The following portions of the patient's history were reviewed and updated as appropriate: allergies, current medications, past family history, past medical history, past social history, past surgical history and problem list.    Outpatient Medications Prior to Visit   Medication Sig Dispense Refill   • atorvastatin (LIPITOR) 10 MG tablet TAKE 1 TABLET EVERY DAY 90 tablet 2   • cholecalciferol (VITAMIN D3) 1000 UNITS tablet Take 1,000 Units by mouth daily.     • levothyroxine (SYNTHROID, LEVOTHROID) 50 MCG tablet TAKE 1 TABLET BY MOUTH ONCE DAILY 90 tablet 1   • metoprolol succinate XL (TOPROL-XL) 100 MG 24 hr tablet TAKE ONE TABLET BY MOUTH ONCE DAILY 90 tablet 3   • Multiple Vitamins-Minerals (PRESERVISION/LUTEIN) capsule Take  by mouth 2 (two) times a day.     • ramipril (ALTACE) 10 MG capsule TAKE 2 CAPSULES EVERY  capsule 2   • traZODone (DESYREL) 50 MG tablet Take 1 tablet by mouth Every Night. 30 tablet 6   • vitamin B-12 (CYANOCOBALAMIN) 100 MCG tablet Take 50 mcg by mouth daily.     • hydrochlorothiazide (HYDRODIURIL) 25 MG tablet Take 1 tablet by mouth Daily. 30 tablet 1     No facility-administered  "medications prior to visit.        Patient Active Problem List   Diagnosis   • Vitamin D deficiency   • Allergic rhinitis   • Glucose intolerance (impaired glucose tolerance)   • Osteoporosis   • Fatigue   • Hypertension   • Migraine   • Insomnia   • Hyperlipemia   • Macular degeneration of both eyes   • Acquired hypothyroidism   • Frequent urination   • Pedal edema       Advance Care Planning:  has NO advance directive - not interested in additional information    Identification of Risk Factors:  Risk factors include: weight , unhealthy diet, cardiovascular risk, inactivity, increased fall risk, vision limitations and incontinence.    Review of Systems    Compared to one year ago, the patient feels her physical health is the same.  Compared to one year ago, the patient feels her mental health is the same.    Objective     Physical Exam    Vitals:    02/20/19 0808   BP: 158/74   Pulse: 70   SpO2: 99%   Weight: 77.6 kg (171 lb)   Height: 154.9 cm (61\")       Patient's Body mass index is 32.31 kg/m². BMI is above normal parameters. Recommendations include: exercise counseling, no follow-up required and nutrition counseling.      Assessment/Plan   Patient Self-Management and Personalized Health Advice  The patient has been provided with information about: diet, exercise, weight management, prevention of cardiac or vascular disease, the relationship between weight and GERD, fall prevention and designing advance directives and preventive services including:   · Advance directive, Bone densitometry screening, Colorectal cancer screening, cologuard test ordered, Counseling for cardiovascular disease risk reduction, Diabetes screening, see lab orders, Exercise counseling provided, Fall Risk assessment done, Fall Risk plan of care done, Nutrition counseling provided, Pneumococcal vaccine , Screening mammography, referral placed, Urinary Incontinence assessment done, Zostavax vaccine (Herpes Zoster).    Visit Diagnoses:    " ICD-10-CM ICD-9-CM   1. Essential hypertension I10 401.9   2. Menopause Z78.0 627.2   3. Pedal edema R60.0 782.3   4. Medicare annual wellness visit, subsequent Z00.00 V70.0   5. Other hyperlipidemia E78.49 272.4   6. Vitamin D deficiency E55.9 268.9   7. Glucose intolerance (impaired glucose tolerance) R73.02 790.22   8. Acquired hypothyroidism E03.9 244.9   9. Primary insomnia F51.01 307.42   10. Chronic fatigue R53.82 780.79       Orders Placed This Encounter   Procedures   • DEXA Bone Density Axial     Standing Status:   Future     Standing Expiration Date:   2/20/2020     Order Specific Question:   Reason for Exam:     Answer:   x       Outpatient Encounter Medications as of 2/20/2019   Medication Sig Dispense Refill   • atorvastatin (LIPITOR) 10 MG tablet TAKE 1 TABLET EVERY DAY 90 tablet 2   • cholecalciferol (VITAMIN D3) 1000 UNITS tablet Take 1,000 Units by mouth daily.     • hydrochlorothiazide (HYDRODIURIL) 25 MG tablet Take 1 tablet by mouth Daily. 30 tablet 3   • levothyroxine (SYNTHROID, LEVOTHROID) 50 MCG tablet TAKE 1 TABLET BY MOUTH ONCE DAILY 90 tablet 1   • metoprolol succinate XL (TOPROL-XL) 100 MG 24 hr tablet TAKE ONE TABLET BY MOUTH ONCE DAILY 90 tablet 3   • Multiple Vitamins-Minerals (PRESERVISION/LUTEIN) capsule Take  by mouth 2 (two) times a day.     • ramipril (ALTACE) 10 MG capsule TAKE 2 CAPSULES EVERY  capsule 2   • traZODone (DESYREL) 50 MG tablet Take 1 tablet by mouth Every Night. 30 tablet 6   • vitamin B-12 (CYANOCOBALAMIN) 100 MCG tablet Take 50 mcg by mouth daily.     • [DISCONTINUED] hydrochlorothiazide (HYDRODIURIL) 25 MG tablet Take 1 tablet by mouth Daily. 30 tablet 1     No facility-administered encounter medications on file as of 2/20/2019.        Reviewed use of high risk medication in the elderly: not applicable  Reviewed for potential of harmful drug interactions in the elderly: not applicable    Follow Up:  No Follow-up on file.     An After Visit Summary and  PPPS with all of these plans were given to the patient.

## 2019-02-20 NOTE — PROGRESS NOTES
"Kar Reyna is a 78 y.o. female here for   Chief Complaint   Patient presents with   • Medicare Wellness-subsequent   • Hypertension   • Hyperlipidemia   • Hypothyroidism   .    Vitals:    02/20/19 0808 02/20/19 1243   BP: 158/74 150/78   Pulse: 70    SpO2: 99%    Weight: 77.6 kg (171 lb)    Height: 154.9 cm (61\")        Body mass index is 32.31 kg/m².    Hypertension   This is a chronic problem. The current episode started more than 1 year ago. The problem is unchanged. The problem is controlled. Pertinent negatives include no chest pain, palpitations or shortness of breath.   Hyperlipidemia   This is a chronic problem. The current episode started more than 1 year ago. The problem is controlled. Recent lipid tests were reviewed and are normal. Exacerbating diseases include hypothyroidism. She has no history of diabetes. Pertinent negatives include no chest pain or shortness of breath.   Hypothyroidism   This is a chronic problem. The current episode started more than 1 year ago. The problem occurs constantly. The problem has been unchanged. Associated symptoms include arthralgias (multiple). Pertinent negatives include no chest pain, chills, coughing, fatigue or fever.        The following portions of the patient's history were reviewed and updated as appropriate: allergies, current medications, past social history and problem list.    Review of Systems   Constitutional: Negative for chills, fatigue and fever.   Respiratory: Negative for cough, shortness of breath and wheezing.    Cardiovascular: Positive for leg swelling (only with prolonged sitting, noé traveling). Negative for chest pain and palpitations.   Musculoskeletal: Positive for arthralgias (multiple).   Psychiatric/Behavioral: Positive for sleep disturbance (occ). Negative for dysphoric mood. The patient is not nervous/anxious.        Objective   Physical Exam   Constitutional: She appears well-developed and well-nourished. No distress. "   Cardiovascular: Normal rate, regular rhythm and normal heart sounds.   Pulmonary/Chest: No respiratory distress. She has no wheezes. She has no rales. She exhibits no tenderness.   Musculoskeletal: She exhibits no edema.   Psychiatric: She has a normal mood and affect. Her behavior is normal.   Nursing note and vitals reviewed.      Assessment/Plan   Diagnoses and all orders for this visit:    Essential hypertension  Comments:  high today -need diet/ex - may need incr dose toprol- need weekly chk & call if bp over 140/90  Orders:  -     CBC Auto Differential; Future  -     Comprehensive Metabolic Panel; Future  -     Lipid Panel; Future  -     Urinalysis With Microscopic If Indicated (No Culture) - Urine, Clean Catch; Future  -     CBC Auto Differential  -     Comprehensive Metabolic Panel  -     Lipid Panel  -     Urinalysis With Microscopic If Indicated (No Culture) - Urine, Clean Catch  -     Urinalysis, Microscopic Only - Urine, Clean Catch; Future  -     Urinalysis, Microscopic Only - Urine, Clean Catch    Menopause  -     DEXA Bone Density Axial; Future    Pedal edema  Comments:  off & on - ok to use HCTZ rarely prn - she requests refill  Orders:  -     hydrochlorothiazide (HYDRODIURIL) 25 MG tablet; Take 1 tablet by mouth Daily.    Medicare annual wellness visit, subsequent    Other hyperlipidemia  Comments:  need diet/ex  Orders:  -     Lipid Panel; Future  -     Lipid Panel    Vitamin D deficiency  Comments:  continue vit D 2,000 units/d    Glucose intolerance (impaired glucose tolerance)  Comments:  continue low sugar/carb diet & daily exercise    Acquired hypothyroidism  Comments:  rechk TSH  Orders:  -     TSH Rfx On Abnormal To Free T4; Future  -     TSH Rfx On Abnormal To Free T4    Primary insomnia  Comments:  mild - call if worse    Chronic fatigue  Comments:  mild -call if worse    Colon cancer screening  -     Cologuard - Stool, Per Rectum; Future    Need for pneumococcal vaccination  -      Pneumococcal Polysaccharide Vaccine 23-Valent Greater Than or Equal To 3yo Subcutaneous / IM       Need shingrix.     Wellness today.   Need daily strengthening & balance exercises (shown today).   Need screening for AAA & carotid disease.  Information given today.

## 2019-02-20 NOTE — PATIENT INSTRUCTIONS
Medicare Wellness  Personal Prevention Plan of Service     Date of Office Visit:  2019  Encounter Provider:  Margarita Giraldo MD  Place of Service:  [facility]  Patient Name: Ayana Reyna  :  1940    As part of the Medicare Wellness portion of your visit today, we are providing you with this personalized preventive plan of services (PPPS). This plan is based upon recommendations of the [place] Preventive Services Task Force (USPSTF) and the Advisory Committee on Immunization Practices (ACIP).    This lists the preventive care services that should be considered, and provides dates of when you are due. Items listed as completed are up-to-date and do not require any further intervention.    Health Maintenance   Topic Date Due   • ZOSTER VACCINE (2 of 3) 2012   • PNEUMOCOCCAL VACCINES (65+ LOW/MEDIUM RISK) (2 of 2 - PPSV23) 2016   • DXA SCAN  10/11/2018   • COLOGUARD  2019   • MAMMOGRAM  2020 (Originally 10/11/2018)   • LIPID PANEL  2019   • MEDICARE ANNUAL WELLNESS  2020   • TDAP/TD VACCINES (2 - Td) 2022   • INFLUENZA VACCINE  Completed       Orders Placed This Encounter   Procedures   • DEXA Bone Density Axial     Standing Status:   Future     Standing Expiration Date:   2020     Order Specific Question:   Reason for Exam:     Answer:   x   • Cologuard - Stool, Per Rectum     Standing Status:   Future     Standing Expiration Date:   2020       No Follow-up on file.

## 2019-02-21 LAB — TSH SERPL-ACNC: 4.22 UIU/ML (ref 0.45–4.5)

## 2019-05-03 ENCOUNTER — TELEPHONE (OUTPATIENT)
Dept: INTERNAL MEDICINE | Facility: CLINIC | Age: 79
End: 2019-05-03

## 2019-05-03 NOTE — TELEPHONE ENCOUNTER
Patient informed we will have to contact [employer] to obtain results as we have not seen them at this time. She was very understanding and pleased

## 2019-05-03 NOTE — TELEPHONE ENCOUNTER
----- Message from Chelo De La Rosa sent at 5/3/2019 10:14 AM EDT -----  Pt had Cologard in March and is calling for results.  Please advise    Pt# [phone]

## 2019-05-15 RX ORDER — ATORVASTATIN CALCIUM 10 MG/1
TABLET, FILM COATED ORAL
Qty: 90 TABLET | Refills: 2 | Status: SHIPPED | OUTPATIENT
Start: 2019-05-15 | End: 2020-03-02

## 2019-05-20 RX ORDER — RAMIPRIL 10 MG/1
CAPSULE ORAL
Qty: 180 CAPSULE | Refills: 2 | Status: SHIPPED | OUTPATIENT
Start: 2019-05-20 | End: 2020-05-19

## 2019-07-11 DIAGNOSIS — F51.01 PRIMARY INSOMNIA: ICD-10-CM

## 2019-07-11 RX ORDER — TRAZODONE HYDROCHLORIDE 50 MG/1
50 TABLET ORAL NIGHTLY
Qty: 30 TABLET | Refills: 6 | Status: SHIPPED | OUTPATIENT
Start: 2019-07-11 | End: 2020-05-04

## 2019-08-02 DIAGNOSIS — R60.0 PEDAL EDEMA: ICD-10-CM

## 2019-08-02 DIAGNOSIS — I10 ESSENTIAL HYPERTENSION: ICD-10-CM

## 2019-08-02 RX ORDER — HYDROCHLOROTHIAZIDE 25 MG/1
TABLET ORAL
Qty: 90 TABLET | Refills: 1 | Status: SHIPPED | OUTPATIENT
Start: 2019-08-02 | End: 2020-04-15

## 2019-08-02 RX ORDER — METOPROLOL SUCCINATE 100 MG/1
TABLET, EXTENDED RELEASE ORAL
Qty: 90 TABLET | Refills: 1 | Status: SHIPPED | OUTPATIENT
Start: 2019-08-02 | End: 2020-02-05

## 2019-08-19 DIAGNOSIS — E03.9 ACQUIRED HYPOTHYROIDISM: ICD-10-CM

## 2019-08-19 RX ORDER — LEVOTHYROXINE SODIUM 0.05 MG/1
TABLET ORAL
Qty: 90 TABLET | Refills: 1 | Status: SHIPPED | OUTPATIENT
Start: 2019-08-19 | End: 2020-02-20

## 2019-08-27 ENCOUNTER — OFFICE VISIT (OUTPATIENT)
Dept: INTERNAL MEDICINE | Facility: CLINIC | Age: 79
End: 2019-08-27

## 2019-08-27 ENCOUNTER — CLINICAL SUPPORT (OUTPATIENT)
Dept: INTERNAL MEDICINE | Facility: CLINIC | Age: 79
End: 2019-08-27

## 2019-08-27 VITALS
DIASTOLIC BLOOD PRESSURE: 86 MMHG | SYSTOLIC BLOOD PRESSURE: 134 MMHG | WEIGHT: 171 LBS | HEIGHT: 61 IN | BODY MASS INDEX: 32.28 KG/M2

## 2019-08-27 DIAGNOSIS — E78.49 OTHER HYPERLIPIDEMIA: ICD-10-CM

## 2019-08-27 DIAGNOSIS — R53.82 CHRONIC FATIGUE: ICD-10-CM

## 2019-08-27 DIAGNOSIS — E03.9 ACQUIRED HYPOTHYROIDISM: ICD-10-CM

## 2019-08-27 DIAGNOSIS — Z78.0 MENOPAUSE: ICD-10-CM

## 2019-08-27 DIAGNOSIS — R73.02 GLUCOSE INTOLERANCE (IMPAIRED GLUCOSE TOLERANCE): ICD-10-CM

## 2019-08-27 DIAGNOSIS — I10 ESSENTIAL HYPERTENSION: Primary | ICD-10-CM

## 2019-08-27 DIAGNOSIS — F51.01 PRIMARY INSOMNIA: ICD-10-CM

## 2019-08-27 LAB
ALBUMIN SERPL-MCNC: 4.1 G/DL (ref 3.5–5.2)
ALBUMIN/GLOB SERPL: 2 G/DL
ALP SERPL-CCNC: 76 U/L (ref 39–117)
ALT SERPL W P-5'-P-CCNC: 17 U/L (ref 1–33)
ANION GAP SERPL CALCULATED.3IONS-SCNC: 10.3 MMOL/L (ref 5–15)
AST SERPL-CCNC: 19 U/L (ref 1–32)
BACTERIA UR QL AUTO: ABNORMAL /HPF
BASOPHILS # BLD AUTO: 0.03 10*3/MM3 (ref 0–0.2)
BASOPHILS NFR BLD AUTO: 0.4 % (ref 0–1.5)
BILIRUB SERPL-MCNC: 1.3 MG/DL (ref 0.2–1.2)
BILIRUB UR QL STRIP: NEGATIVE
BUN BLD-MCNC: 18 MG/DL (ref 8–23)
BUN/CREAT SERPL: 23.4 (ref 7–25)
CALCIUM SPEC-SCNC: 9.5 MG/DL (ref 8.6–10.5)
CHLORIDE SERPL-SCNC: 106 MMOL/L (ref 98–107)
CHOLEST SERPL-MCNC: 139 MG/DL (ref 0–200)
CLARITY UR: CLEAR
CO2 SERPL-SCNC: 26.7 MMOL/L (ref 22–29)
COLOR UR: YELLOW
CREAT BLD-MCNC: 0.77 MG/DL (ref 0.57–1)
DEPRECATED RDW RBC AUTO: 44 FL (ref 37–54)
EOSINOPHIL # BLD AUTO: 0.13 10*3/MM3 (ref 0–0.4)
EOSINOPHIL NFR BLD AUTO: 1.8 % (ref 0.3–6.2)
ERYTHROCYTE [DISTWIDTH] IN BLOOD BY AUTOMATED COUNT: 12.3 % (ref 12.3–15.4)
GFR SERPL CREATININE-BSD FRML MDRD: 72 ML/MIN/1.73
GLOBULIN UR ELPH-MCNC: 2.1 GM/DL
GLUCOSE BLD-MCNC: 100 MG/DL (ref 65–99)
GLUCOSE UR STRIP-MCNC: NEGATIVE MG/DL
HCT VFR BLD AUTO: 38.6 % (ref 34–46.6)
HDLC SERPL-MCNC: 74 MG/DL (ref 40–60)
HGB BLD-MCNC: 12.7 G/DL (ref 12–15.9)
HGB UR QL STRIP.AUTO: ABNORMAL
HYALINE CASTS UR QL AUTO: ABNORMAL /LPF
KETONES UR QL STRIP: NEGATIVE
LDLC SERPL CALC-MCNC: 49 MG/DL (ref 0–100)
LDLC/HDLC SERPL: 0.66 {RATIO}
LEUKOCYTE ESTERASE UR QL STRIP.AUTO: NEGATIVE
LYMPHOCYTES # BLD AUTO: 2.81 10*3/MM3 (ref 0.7–3.1)
LYMPHOCYTES NFR BLD AUTO: 39.7 % (ref 19.6–45.3)
MCH RBC QN AUTO: 32.9 PG (ref 26.6–33)
MCHC RBC AUTO-ENTMCNC: 32.9 G/DL (ref 31.5–35.7)
MCV RBC AUTO: 100 FL (ref 79–97)
MONOCYTES # BLD AUTO: 0.31 10*3/MM3 (ref 0.1–0.9)
MONOCYTES NFR BLD AUTO: 4.4 % (ref 5–12)
MUCOUS THREADS URNS QL MICRO: ABNORMAL /HPF
NEUTROPHILS # BLD AUTO: 3.8 10*3/MM3 (ref 1.7–7)
NEUTROPHILS NFR BLD AUTO: 53.7 % (ref 42.7–76)
NITRITE UR QL STRIP: NEGATIVE
PH UR STRIP.AUTO: 5.5 [PH] (ref 5–8)
PLATELET # BLD AUTO: 185 10*3/MM3 (ref 140–450)
PMV BLD AUTO: 10.4 FL (ref 6–12)
POTASSIUM BLD-SCNC: 4.5 MMOL/L (ref 3.5–5.2)
PROT SERPL-MCNC: 6.2 G/DL (ref 6–8.5)
PROT UR QL STRIP: ABNORMAL
RBC # BLD AUTO: 3.86 10*6/MM3 (ref 3.77–5.28)
RBC # UR: ABNORMAL /HPF
REF LAB TEST METHOD: ABNORMAL
SODIUM BLD-SCNC: 143 MMOL/L (ref 136–145)
SP GR UR STRIP: 1.02 (ref 1–1.03)
SQUAMOUS #/AREA URNS HPF: ABNORMAL /HPF
TRIGL SERPL-MCNC: 82 MG/DL (ref 0–150)
TSH SERPL DL<=0.05 MIU/L-ACNC: 2.95 MIU/ML (ref 0.27–4.2)
UROBILINOGEN UR QL STRIP: ABNORMAL
VLDLC SERPL-MCNC: 16.4 MG/DL (ref 5–40)
WBC NRBC COR # BLD: 7.08 10*3/MM3 (ref 3.4–10.8)
WBC UR QL AUTO: ABNORMAL /HPF

## 2019-08-27 PROCEDURE — 77080 DXA BONE DENSITY AXIAL: CPT | Performed by: INTERNAL MEDICINE

## 2019-08-27 PROCEDURE — 36415 COLL VENOUS BLD VENIPUNCTURE: CPT | Performed by: INTERNAL MEDICINE

## 2019-08-27 PROCEDURE — 80061 LIPID PANEL: CPT | Performed by: INTERNAL MEDICINE

## 2019-08-27 PROCEDURE — 80053 COMPREHEN METABOLIC PANEL: CPT | Performed by: INTERNAL MEDICINE

## 2019-08-27 PROCEDURE — 84443 ASSAY THYROID STIM HORMONE: CPT | Performed by: INTERNAL MEDICINE

## 2019-08-27 PROCEDURE — 85025 COMPLETE CBC W/AUTO DIFF WBC: CPT | Performed by: INTERNAL MEDICINE

## 2019-08-27 PROCEDURE — 99213 OFFICE O/P EST LOW 20 MIN: CPT | Performed by: INTERNAL MEDICINE

## 2019-08-27 PROCEDURE — 81001 URINALYSIS AUTO W/SCOPE: CPT | Performed by: INTERNAL MEDICINE

## 2019-08-27 NOTE — PROGRESS NOTES
"Kar Reyna is a 79 y.o. female here for   Chief Complaint   Patient presents with   • Hyperlipidemia   • Hypertension   .    Vitals:    08/27/19 1031   BP: 134/86   BP Location: Right arm   Patient Position: Sitting   Cuff Size: Adult   Weight: 77.6 kg (171 lb)   Height: 154.9 cm (61\")       Body mass index is 32.31 kg/m².    Hyperlipidemia   This is a chronic problem. The current episode started more than 1 year ago. The problem is controlled. Recent lipid tests were reviewed and are normal. She has no history of diabetes. Pertinent negatives include no chest pain or shortness of breath.   Hypertension   This is a chronic problem. The current episode started more than 1 year ago. The problem is unchanged. The problem is controlled. Pertinent negatives include no chest pain, palpitations or shortness of breath.        The following portions of the patient's history were reviewed and updated as appropriate: allergies, current medications, past social history and problem list.    Review of Systems   Constitutional: Positive for fatigue. Negative for chills and fever.   Respiratory: Negative for cough, shortness of breath and wheezing.    Cardiovascular: Negative for chest pain, palpitations and leg swelling.   Psychiatric/Behavioral: Positive for sleep disturbance. Negative for dysphoric mood. The patient is nervous/anxious.        Objective   Physical Exam   Constitutional: She appears well-developed and well-nourished. No distress.   Cardiovascular: Normal rate, regular rhythm and normal heart sounds.   Pulmonary/Chest: No respiratory distress. She has no wheezes. She has no rales. She exhibits no tenderness.   Musculoskeletal: She exhibits no edema.   Psychiatric: She has a normal mood and affect. Her behavior is normal.   Nursing note and vitals reviewed.      Assessment/Plan   Diagnoses and all orders for this visit:    Essential hypertension  Comments:  controlled - call if bp over " 140/90  Orders:  -     CBC Auto Differential; Future  -     Comprehensive Metabolic Panel; Future  -     Lipid Panel; Future  -     Urinalysis With Microscopic If Indicated (No Culture) - Urine, Clean Catch; Future    Other hyperlipidemia  Comments:  need diet/ex  Orders:  -     Comprehensive Metabolic Panel; Future  -     Lipid Panel; Future    Glucose intolerance (impaired glucose tolerance)  Comments:  need low sugar diet    Acquired hypothyroidism  Comments:  need rechk  Orders:  -     TSH Rfx On Abnormal To Free T4; Future    Chronic fatigue  Comments:  call if worse    Primary insomnia  Comments:  better with trazodone

## 2019-09-03 ENCOUNTER — OFFICE VISIT (OUTPATIENT)
Dept: INTERNAL MEDICINE | Facility: CLINIC | Age: 79
End: 2019-09-03

## 2019-09-03 VITALS
SYSTOLIC BLOOD PRESSURE: 120 MMHG | HEART RATE: 69 BPM | HEIGHT: 61 IN | DIASTOLIC BLOOD PRESSURE: 78 MMHG | OXYGEN SATURATION: 98 % | WEIGHT: 171 LBS | BODY MASS INDEX: 32.28 KG/M2 | TEMPERATURE: 98.4 F

## 2019-09-03 DIAGNOSIS — H65.02 ACUTE SEROUS OTITIS MEDIA OF LEFT EAR, RECURRENCE NOT SPECIFIED: ICD-10-CM

## 2019-09-03 DIAGNOSIS — H61.22 IMPACTED CERUMEN OF LEFT EAR: ICD-10-CM

## 2019-09-03 DIAGNOSIS — H93.8X2 EAR FULLNESS, LEFT: Primary | ICD-10-CM

## 2019-09-03 PROCEDURE — 69209 REMOVE IMPACTED EAR WAX UNI: CPT | Performed by: NURSE PRACTITIONER

## 2019-09-03 PROCEDURE — 99213 OFFICE O/P EST LOW 20 MIN: CPT | Performed by: NURSE PRACTITIONER

## 2019-09-03 RX ORDER — AMOXICILLIN 500 MG/1
500 CAPSULE ORAL 2 TIMES DAILY
Qty: 20 CAPSULE | Refills: 0 | Status: SHIPPED | OUTPATIENT
Start: 2019-09-03 | End: 2019-09-13

## 2019-09-03 NOTE — PATIENT INSTRUCTIONS
Otitis Media, Adult    Otitis media means that the middle ear is red and swollen (inflamed) and full of fluid. The condition usually goes away on its own.  Follow these instructions at home:  · Take over-the-counter and prescription medicines only as told by your doctor.  · If you were prescribed an antibiotic medicine, take it as told by your doctor. Do not stop taking the antibiotic even if you start to feel better.  · Keep all follow-up visits as told by your doctor. This is important.  Contact a doctor if:  · You have bleeding from your nose.  · There is a lump on your neck.  · You are not getting better in 5 days.  · You feel worse instead of better.  Get help right away if:  · You have pain that is not helped with medicine.  · You have swelling, redness, or pain around your ear.  · You get a stiff neck.  · You cannot move part of your face (paralyzed).  · You notice that the bone behind your ear hurts when you touch it.  · You get a very bad headache.  Summary  · Otitis media means that the middle ear is red, swollen, and full of fluid.  · This condition usually goes away on its own. In some cases, treatment may be needed.  · If you were prescribed an antibiotic medicine, take it as told by your doctor.  This information is not intended to replace advice given to you by your health care provider. Make sure you discuss any questions you have with your health care provider.  Document Released: 06/05/2009 Document Revised: 01/08/2018 Document Reviewed: 01/08/2018  [employer] Interactive Patient Education © 2019 [employer]  Earwax Buildup, Adult  The ears produce a substance called earwax that helps keep bacteria out of the ear and protects the skin in the ear canal. Occasionally, earwax can build up in the ear and cause discomfort or hearing loss.  What increases the risk?  This condition is more likely to develop in people who:  · Are male.  · Are elderly.  · Naturally produce more earwax.  · Clean their ears  often with cotton swabs.  · Use earplugs often.  · Use in-ear headphones often.  · Wear hearing aids.  · Have narrow ear canals.  · Have earwax that is overly thick or sticky.  · Have eczema.  · Are dehydrated.  · Have excess hair in the ear canal.  What are the signs or symptoms?  Symptoms of this condition include:  · Reduced or muffled hearing.  · A feeling of fullness in the ear or feeling that the ear is plugged.  · Fluid coming from the ear.  · Ear pain.  · Ear itch.  · Ringing in the ear.  · Coughing.  · An obvious piece of earwax that can be seen inside the ear canal.  How is this diagnosed?  This condition may be diagnosed based on:  · Your symptoms.  · Your medical history.  · An ear exam. During the exam, your health care provider will look into your ear with an instrument called an otoscope.  You may have tests, including a hearing test.  How is this treated?  This condition may be treated by:  · Using ear drops to soften the earwax.  · Having the earwax removed by a health care provider. The health care provider may:  ? Flush the ear with water.  ? Use an instrument that has a loop on the end (curette).  ? Use a suction device.  · Surgery to remove the wax buildup. This may be done in severe cases.  Follow these instructions at home:    · Take over-the-counter and prescription medicines only as told by your health care provider.  · Do not put any objects, including cotton swabs, into your ear. You can clean the opening of your ear canal with a washcloth or facial tissue.  · Follow instructions from your health care provider about cleaning your ears. Do not over-clean your ears.  · Drink enough fluid to keep your urine clear or pale yellow. This will help to thin the earwax.  · Keep all follow-up visits as told by your health care provider. If earwax builds up in your ears often or if you use hearing aids, consider seeing your health care provider for routine, preventive ear cleanings. Ask your health  care provider how often you should schedule your cleanings.  · If you have hearing aids, clean them according to instructions from the  and your health care provider.  Contact a health care provider if:  · You have ear pain.  · You develop a fever.  · You have blood, pus, or other fluid coming from your ear.  · You have hearing loss.  · You have ringing in your ears that does not go away.  · Your symptoms do not improve with treatment.  · You feel like the room is spinning (vertigo).  Summary  · Earwax can build up in the ear and cause discomfort or hearing loss.  · The most common symptoms of this condition include reduced or muffled hearing and a feeling of fullness in the ear or feeling that the ear is plugged.  · This condition may be diagnosed based on your symptoms, your medical history, and an ear exam.  · This condition may be treated by using ear drops to soften the earwax or by having the earwax removed by a health care provider.  · Do not put any objects, including cotton swabs, into your ear. You can clean the opening of your ear canal with a washcloth or facial tissue.  This information is not intended to replace advice given to you by your health care provider. Make sure you discuss any questions you have with your health care provider.  Document Released: 01/25/2006 Document Revised: 11/29/2018 Document Reviewed: 02/28/2018  [employer] Interactive Patient Education © 2019 [employer]

## 2019-09-03 NOTE — PROGRESS NOTES
Kar Reyna is a 79 y.o. female.     Ear Fullness    There is pain in the left ear. This is a new problem. Episode onset: sunday  The pain is at a severity of 0/10. The patient is experiencing no pain. Associated symptoms include hearing loss (left ear ) and rhinorrhea. Pertinent negatives include no abdominal pain, coughing, diarrhea, ear discharge, headaches (slight), neck pain, sore throat or vomiting.        The following portions of the patient's history were reviewed and updated as appropriate: allergies, current medications, past family history, past medical history, past social history, past surgical history and problem list.    Review of Systems   Constitutional: Negative for appetite change, chills, fatigue and fever.   HENT: Positive for hearing loss (left ear ) and rhinorrhea. Negative for congestion, ear discharge, ear pain, facial swelling, postnasal drip, sinus pressure, sneezing, sore throat and tinnitus.    Respiratory: Negative for cough, chest tightness, shortness of breath and wheezing.    Cardiovascular: Negative for chest pain, palpitations and leg swelling.   Gastrointestinal: Negative for abdominal pain, diarrhea, nausea and vomiting.   Musculoskeletal: Negative for neck pain and neck stiffness.   Allergic/Immunologic: Negative for environmental allergies.   Neurological: Negative for headaches (slight).   Hematological: Negative for adenopathy.       Objective     Physical Exam   Constitutional: She appears well-developed and well-nourished. She is cooperative. She does not have a sickly appearance. She does not appear ill.   HENT:   Head: Normocephalic.   Right Ear: Tympanic membrane and external ear normal. No drainage, swelling or tenderness. No mastoid tenderness. Tympanic membrane is not injected, not scarred, not erythematous and not bulging. Tympanic membrane mobility is normal. No middle ear effusion. Decreased hearing (chronic ) is noted.   Left Ear: Hearing and  external ear normal. No drainage, swelling or tenderness. No mastoid tenderness. Tympanic membrane is erythematous. Tympanic membrane is not injected, not scarred and not bulging. Tympanic membrane mobility is normal. A middle ear effusion is present. No decreased hearing is noted.   Nose: Nose normal. No mucosal edema, rhinorrhea, sinus tenderness or nasal deformity. Right sinus exhibits no maxillary sinus tenderness and no frontal sinus tenderness. Left sinus exhibits no maxillary sinus tenderness and no frontal sinus tenderness.   Mouth/Throat: Oropharynx is clear and moist and mucous membranes are normal. Normal dentition.   Left cerumen impaction   Visible air bubbles noted after irrigation    Eyes: Conjunctivae and lids are normal. Pupils are equal, round, and reactive to light. Right eye exhibits no discharge and no exudate. Left eye exhibits no discharge and no exudate.   Neck: Trachea normal and normal range of motion. Carotid bruit is not present. No edema present. No thyroid mass and no thyromegaly present.   Cardiovascular: Regular rhythm, normal heart sounds and normal pulses.   No murmur heard.  Pulmonary/Chest: Breath sounds normal. No respiratory distress. She has no decreased breath sounds. She has no wheezes. She has no rhonchi. She has no rales.   Lymphadenopathy:        Head (right side): No submental, no submandibular, no tonsillar, no preauricular, no posterior auricular and no occipital adenopathy present.        Head (left side): No submental, no submandibular, no tonsillar, no preauricular, no posterior auricular and no occipital adenopathy present.   Neurological: She is alert.   Skin: Skin is warm, dry and intact. No cyanosis. Nails show no clubbing.       Assessment/Plan   Ayana was seen today for ear fullness.    Diagnoses and all orders for this visit:    Ear fullness, left    Impacted cerumen of left ear  -     Ear Cerumen Removal    Acute serous otitis media of left ear, recurrence not  specified  -     amoxicillin (AMOXIL) 500 MG capsule; Take 1 capsule by mouth 2 (Two) Times a Day for 10 days.    Ear Cerumen Removal  Date/Time: 9/3/2019 3:00 PM  Performed by: Sana Guerra APRN  Authorized by: Sana Guerra APRN   Consent: Verbal consent obtained.  Risks and benefits: risks, benefits and alternatives were discussed  Consent given by: patient  Ceruminolytics applied: Ceruminolytics applied prior to the procedure.  Location details: left ear  Patient tolerance: Patient tolerated the procedure well with no immediate complications  Procedure type: irrigation

## 2019-09-09 ENCOUNTER — OFFICE VISIT (OUTPATIENT)
Dept: INTERNAL MEDICINE | Facility: CLINIC | Age: 79
End: 2019-09-09

## 2019-09-09 VITALS
BODY MASS INDEX: 32.28 KG/M2 | OXYGEN SATURATION: 99 % | TEMPERATURE: 97.9 F | DIASTOLIC BLOOD PRESSURE: 90 MMHG | SYSTOLIC BLOOD PRESSURE: 142 MMHG | WEIGHT: 171 LBS | HEIGHT: 61 IN | HEART RATE: 69 BPM

## 2019-09-09 DIAGNOSIS — H93.8X2 EAR CONGESTION, LEFT: Primary | ICD-10-CM

## 2019-09-09 PROCEDURE — 99213 OFFICE O/P EST LOW 20 MIN: CPT | Performed by: NURSE PRACTITIONER

## 2019-09-09 NOTE — PROGRESS NOTES
Subjective   Ayana Reyna is a 79 y.o. female.     She presents for L plugged ear sensation/ear congestion x 1 wk. 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 She is on her 7th day of a 10 day regimen of amoxicillin for L otitis media. She was seen here in our office 6 days ago and was given the amoxicillin. She doesn't have ear pain.      Earache    There is pain in the left ear. This is a new problem. The current episode started in the past 7 days. The problem occurs constantly. The problem has been gradually improving. There has been no fever. Associated symptoms include rhinorrhea. Pertinent negatives include no ear discharge or sore throat. She has tried antibiotics (claritin) for the symptoms. The treatment provided mild relief. Her past medical history is significant for hearing loss.        The following portions of the patient's history were reviewed and updated as appropriate: allergies, current medications, past family history, past medical history, past social history, past surgical history and problem list.    Review of Systems   Constitutional: Negative for fever.   HENT: Positive for rhinorrhea. Negative for ear discharge, ear pain and sore throat.        Objective   Physical Exam   Constitutional: She appears well-developed and well-nourished. No distress.   HENT:   Head: Normocephalic and atraumatic.   Right Ear: Hearing and tympanic membrane normal.   Left Ear: Hearing and tympanic membrane normal.   Nose: Rhinorrhea present. No mucosal edema.   Mouth/Throat: Uvula is midline and mucous membranes are normal. No oropharyngeal exudate or posterior oropharyngeal erythema (clear hypersecretions noted). No tonsillar exudate.   Eyes: Conjunctivae are normal. Right eye exhibits no discharge. Left eye exhibits no discharge.   Cardiovascular: Normal rate, regular rhythm and normal heart sounds.   No murmur heard.  Pulmonary/Chest: Effort normal and breath sounds normal. No tachypnea. She  has no wheezes.   Lymphadenopathy:        Head (right side): No submental, no submandibular and no tonsillar adenopathy present.        Head (left side): No submental, no submandibular and no tonsillar adenopathy present.   Neurological: She is alert.   Skin: She is not diaphoretic.   Psychiatric: She has a normal mood and affect.   Vitals reviewed.      Assessment/Plan   [place] was seen today for earache.    Diagnoses and all orders for this visit:    Ear congestion, left    She will continue to use Claritin daily and add Flonase to her regimen. She is to finish the rest of the amoxicillin.    Return for worsening of sx.

## 2019-09-26 ENCOUNTER — TELEPHONE (OUTPATIENT)
Dept: INTERNAL MEDICINE | Facility: CLINIC | Age: 79
End: 2019-09-26

## 2019-09-26 DIAGNOSIS — H92.09 OTALGIA, UNSPECIFIED LATERALITY: Primary | ICD-10-CM

## 2019-09-26 NOTE — TELEPHONE ENCOUNTER
----- Message from Carrie Patel sent at 9/26/2019 12:17 PM EDT -----  Contact: Patient   The patient is requesting that  give her a referral to an ENT. She has been in to our acute clinic twice this month and is still having problems with her left ear that did not improve after an ear lavage and 10 days of amoxicillin (AMOXIL) 500 MG capsule. She states her symptoms are worsening. Her call back is [phone]. Please advise. Thank you.

## 2020-02-05 DIAGNOSIS — I10 ESSENTIAL HYPERTENSION: ICD-10-CM

## 2020-02-05 RX ORDER — METOPROLOL SUCCINATE 100 MG/1
TABLET, EXTENDED RELEASE ORAL
Qty: 90 TABLET | Refills: 1 | Status: SHIPPED | OUTPATIENT
Start: 2020-02-05 | End: 2020-08-04

## 2020-02-20 DIAGNOSIS — E03.9 ACQUIRED HYPOTHYROIDISM: ICD-10-CM

## 2020-02-20 RX ORDER — LEVOTHYROXINE SODIUM 0.05 MG/1
TABLET ORAL
Qty: 90 TABLET | Refills: 0 | Status: SHIPPED | OUTPATIENT
Start: 2020-02-20 | End: 2020-05-25

## 2020-02-28 ENCOUNTER — OFFICE VISIT (OUTPATIENT)
Dept: INTERNAL MEDICINE | Facility: CLINIC | Age: 80
End: 2020-02-28

## 2020-02-28 VITALS
HEIGHT: 61 IN | DIASTOLIC BLOOD PRESSURE: 82 MMHG | SYSTOLIC BLOOD PRESSURE: 120 MMHG | BODY MASS INDEX: 32.1 KG/M2 | WEIGHT: 170 LBS

## 2020-02-28 DIAGNOSIS — R00.2 HEART PALPITATIONS: ICD-10-CM

## 2020-02-28 DIAGNOSIS — E78.49 OTHER HYPERLIPIDEMIA: ICD-10-CM

## 2020-02-28 DIAGNOSIS — Z00.00 MEDICARE ANNUAL WELLNESS VISIT, SUBSEQUENT: Primary | ICD-10-CM

## 2020-02-28 DIAGNOSIS — Z12.31 ENCOUNTER FOR SCREENING MAMMOGRAM FOR BREAST CANCER: ICD-10-CM

## 2020-02-28 DIAGNOSIS — R01.1 HEART MURMUR: ICD-10-CM

## 2020-02-28 DIAGNOSIS — E03.9 ACQUIRED HYPOTHYROIDISM: ICD-10-CM

## 2020-02-28 DIAGNOSIS — J30.2 SEASONAL ALLERGIC RHINITIS, UNSPECIFIED TRIGGER: ICD-10-CM

## 2020-02-28 DIAGNOSIS — I10 ESSENTIAL HYPERTENSION: ICD-10-CM

## 2020-02-28 DIAGNOSIS — R53.82 CHRONIC FATIGUE: ICD-10-CM

## 2020-02-28 DIAGNOSIS — R73.02 GLUCOSE INTOLERANCE (IMPAIRED GLUCOSE TOLERANCE): ICD-10-CM

## 2020-02-28 LAB
ALBUMIN SERPL-MCNC: 4 G/DL (ref 3.5–5.2)
ALBUMIN/GLOB SERPL: 2.2 G/DL
ALP SERPL-CCNC: 66 U/L (ref 39–117)
ALT SERPL-CCNC: 18 U/L (ref 1–33)
AST SERPL-CCNC: 13 U/L (ref 1–32)
BILIRUB SERPL-MCNC: 0.8 MG/DL (ref 0.2–1.2)
BUN SERPL-MCNC: 20 MG/DL (ref 8–23)
BUN/CREAT SERPL: 24.4 (ref 7–25)
CALCIUM SERPL-MCNC: 9 MG/DL (ref 8.6–10.5)
CHLORIDE SERPL-SCNC: 106 MMOL/L (ref 98–107)
CHOLEST SERPL-MCNC: 128 MG/DL (ref 0–200)
CO2 SERPL-SCNC: 27.8 MMOL/L (ref 22–29)
CREAT SERPL-MCNC: 0.82 MG/DL (ref 0.57–1)
GLOBULIN SER CALC-MCNC: 1.8 GM/DL
GLUCOSE SERPL-MCNC: 95 MG/DL (ref 65–99)
HDLC SERPL-MCNC: 66 MG/DL (ref 40–60)
LDLC SERPL CALC-MCNC: 49 MG/DL (ref 0–100)
POTASSIUM SERPL-SCNC: 4.3 MMOL/L (ref 3.5–5.2)
PROT SERPL-MCNC: 5.8 G/DL (ref 6–8.5)
SODIUM SERPL-SCNC: 143 MMOL/L (ref 136–145)
TRIGL SERPL-MCNC: 64 MG/DL (ref 0–150)
TSH SERPL DL<=0.005 MIU/L-ACNC: 3.87 UIU/ML (ref 0.27–4.2)
VLDLC SERPL CALC-MCNC: 12.8 MG/DL

## 2020-02-28 PROCEDURE — G0439 PPPS, SUBSEQ VISIT: HCPCS | Performed by: INTERNAL MEDICINE

## 2020-02-28 PROCEDURE — 99213 OFFICE O/P EST LOW 20 MIN: CPT | Performed by: INTERNAL MEDICINE

## 2020-02-28 NOTE — PATIENT INSTRUCTIONS
Medicare Wellness  Personal Prevention Plan of Service     Date of Office Visit:  2020  Encounter Provider:  Margarita Giraldo MD  Place of Service:  [facility] INTERNAL MED[facility]  Patient Name: Ayana Reyna  :  1940    As part of the Medicare Wellness portion of your visit today, we are providing you with this personalized preventive plan of services (PPPS). This plan is based upon recommendations of the [place] Preventive Services Task Force (USPSTF) and the Advisory Committee on Immunization Practices (ACIP).    This lists the preventive care services that should be considered, and provides dates of when you are due. Items listed as completed are up-to-date and do not require any further intervention.    Health Maintenance   Topic Date Due   • MAMMOGRAM  10/11/2018   • ZOSTER VACCINE (2 of 3) 2021 (Originally 3/28/2012)   • LIPID PANEL  2020   • MEDICARE ANNUAL WELLNESS  2021   • DXA SCAN  2021   • TDAP/TD VACCINES (2 - Td) 2022   • COLOGUARD  2022   • Pneumococcal Vaccine Once at 65 Years Old  Completed   • INFLUENZA VACCINE  Completed       Orders Placed This Encounter   Procedures   • Mammo Screening Bilateral With CAD     Standing Status:   Future     Standing Expiration Date:   2021     Order Specific Question:   Reason for Exam:     Answer:   screen       No follow-ups on file.

## 2020-02-28 NOTE — PROGRESS NOTES
The ABCs of the Annual Wellness Visit  Subsequent Medicare Wellness Visit    Chief Complaint   Patient presents with   • Medicare Wellness-subsequent   • Fatigue   • Palpitations       Subjective   History of Present Illness:  Ayana Ryena is a 79 y.o. female who presents for a Subsequent Medicare Wellness Visit.    HEALTH RISK ASSESSMENT    Recent Hospitalizations:  No hospitalization(s) within the last year.    Current Medical Providers:  Patient Care Team:  Margarita Giraldo MD as PCP - General (Internal Medicine)  Margarita Giraldo MD as PCP - Claims Attributed  Tomer Fong MD as Consulting Physician (Orthopedic Surgery)  Toi Riley MD as Consulting Physician (Ophthalmology)  Monica Nichols MD (Dermatology)  Amanuel Nuñez MD as Consulting Physician (Orthopedic Surgery)  Darryl Joel MD as Consulting Physician (Otolaryngology)    Smoking Status:  Social History     Tobacco Use   Smoking Status Never Smoker   Smokeless Tobacco Never Used       Alcohol Consumption:  Social History     Substance and Sexual Activity   Alcohol Use No       Depression Screen:   PHQ-2/PHQ-9 Depression Screening 2/28/2020   Little interest or pleasure in doing things 0   Feeling down, depressed, or hopeless 0   Trouble falling or staying asleep, or sleeping too much 0   Feeling tired or having little energy 0   Poor appetite or overeating 0   Feeling bad about yourself - or that you are a failure or have let yourself or your family down 0   Trouble concentrating on things, such as reading the newspaper or watching television 0   Moving or speaking so slowly that other people could have noticed. Or the opposite - being so fidgety or restless that you have been moving around a lot more than usual 0   Thoughts that you would be better off dead, or of hurting yourself in some way 0   Total Score 0   If you checked off any problems, how difficult have these problems made it for you to do your work, take care  of things at home, or get along with other people? Not difficult at all       Fall Risk Screen:  YARITZA Fall Risk Assessment was completed, and patient is at LOW risk for falls.Assessment completed on:2/28/2020    Health Habits and Functional and Cognitive Screening:  Functional & Cognitive Status 2/28/2020   Do you have difficulty preparing food and eating? No   Do you have difficulty bathing yourself, getting dressed or grooming yourself? No   Do you have difficulty using the toilet? No   Do you have difficulty moving around from place to place? No   Do you have trouble with steps or getting out of a bed or a chair? No   Current Diet Well Balanced Diet   Dental Exam Up to date   Eye Exam Up to date   Exercise (times per week) 0 times per week   Current Exercise Activities Include None   Do you need help using the phone?  No   Are you deaf or do you have serious difficulty hearing?  No   Do you need help with transportation? No   Do you need help shopping? No   Do you need help preparing meals?  No   Do you need help with housework?  No   Do you need help with laundry? No   Do you need help taking your medications? No   Do you need help managing money? No   Do you ever drive or ride in a car without wearing a seat belt? No   Have you felt unusual stress, anger or loneliness in the last month? No   Who do you live with? Spouse   If you need help, do you have trouble finding someone available to you? No   Have you been bothered in the last four weeks by sexual problems? No   Do you have difficulty concentrating, remembering or making decisions? No         Does the patient have evidence of cognitive impairment? No    Asprin use counseling:Does not need ASA (and currently is not on it)    Age-appropriate Screening Schedule:  Refer to the list below for future screening recommendations based on patient's age, sex and/or medical conditions. Orders for these recommended tests are listed in the plan section. The patient  has been provided with a written plan.    Health Maintenance   Topic Date Due   • MAMMOGRAM  10/11/2018   • ZOSTER VACCINE (2 of 3) 03/08/2021 (Originally 3/28/2012)   • LIPID PANEL  08/27/2020   • DXA SCAN  08/27/2021   • TDAP/TD VACCINES (2 - Td) 02/01/2022   • INFLUENZA VACCINE  Completed          The following portions of the patient's history were reviewed and updated as appropriate: allergies, current medications, past family history, past medical history, past social history, past surgical history and problem list.    Outpatient Medications Prior to Visit   Medication Sig Dispense Refill   • atorvastatin (LIPITOR) 10 MG tablet TAKE 1 TABLET EVERY DAY 90 tablet 2   • cholecalciferol (VITAMIN D3) 1000 UNITS tablet Take 1,000 Units by mouth daily.     • hydrochlorothiazide (HYDRODIURIL) 25 MG tablet TAKE 1 TABLET BY MOUTH ONCE DAILY 90 tablet 1   • levothyroxine (SYNTHROID, LEVOTHROID) 50 MCG tablet TAKE 1 TABLET BY MOUTH ONCE DAILY 90 tablet 0   • metoprolol succinate XL (TOPROL-XL) 100 MG 24 hr tablet TAKE 1 TABLET BY MOUTH ONCE DAILY 90 tablet 1   • Multiple Vitamins-Minerals (PRESERVISION/LUTEIN) capsule Take  by mouth 2 (two) times a day.     • ramipril (ALTACE) 10 MG capsule TAKE 2 CAPSULES EVERY  capsule 2   • traZODone (DESYREL) 50 MG tablet TAKE 1 TABLET BY MOUTH EVERY NIGHT 30 tablet 6   • vitamin B-12 (CYANOCOBALAMIN) 100 MCG tablet Take 50 mcg by mouth daily.       No facility-administered medications prior to visit.        Patient Active Problem List   Diagnosis   • Vitamin D deficiency   • Allergic rhinitis   • Glucose intolerance (impaired glucose tolerance)   • Osteoporosis   • Fatigue   • Hypertension   • Migraine   • Insomnia   • Hyperlipemia   • Macular degeneration of both eyes   • Acquired hypothyroidism   • Frequent urination   • Pedal edema   • Heart murmur   • Heart palpitations       Advanced Care Planning:  ACP discussion was held with the patient during this visit. Patient does  "not have an advance directive, declines further assistance.    Review of Systems   Constitutional: Positive for fatigue. Negative for chills and fever.   HENT: Positive for congestion and sinus pressure. Negative for ear pain and voice change.    Respiratory: Negative for cough, shortness of breath and wheezing.    Cardiovascular: Positive for palpitations. Negative for chest pain and leg swelling.   Allergic/Immunologic: Positive for environmental allergies.   Neurological: Positive for headaches (off/on). Negative for dizziness.   Psychiatric/Behavioral: Negative for dysphoric mood and sleep disturbance. The patient is not nervous/anxious.        Compared to one year ago, the patient feels her physical health is the same.  Compared to one year ago, the patient feels her mental health is the same.    Reviewed chart for potential of high risk medication in the elderly: not applicable  Reviewed chart for potential of harmful drug interactions in the elderly:not applicable    Objective         Vitals:    02/28/20 0754   BP: 120/82   BP Location: Right arm   Patient Position: Sitting   Cuff Size: Adult   Weight: 77.1 kg (170 lb)   Height: 154.9 cm (61\")   PainSc: 0-No pain       Body mass index is 32.12 kg/m².  Discussed the patient's BMI with her. The BMI is above average; BMI management plan is completed.    Physical Exam   Constitutional: She appears well-developed and well-nourished. No distress.   Cardiovascular: Normal rate and regular rhythm.   Murmur heard.   Systolic murmur is present with a grade of 2/6.  Pulmonary/Chest: No respiratory distress. She has no wheezes. She has no rales. She exhibits no tenderness.   Musculoskeletal: She exhibits no edema.   Psychiatric: She has a normal mood and affect. Her behavior is normal.   Nursing note and vitals reviewed.            Assessment/Plan   Medicare Risks and Personalized Health Plan  CMS Preventative Services Quick Reference  Abdominal Aortic Aneurysm " Screening  Advance Directive Discussion  Breast Cancer/Mammogram Screening  Cardiovascular risk  Immunizations Discussed/Encouraged (specific immunizations; Shingrix )  Inactivity/Sedentary  Obesity/Overweight   Urinary Incontinence    The above risks/problems have been discussed with the patient.  Pertinent information has been shared with the patient in the After Visit Summary.  Follow up plans and orders are seen below in the Assessment/Plan Section.    Diagnoses and all orders for this visit:    1. Medicare annual wellness visit, subsequent (Primary)    2. Encounter for screening mammogram for breast cancer  -     Mammo Screening Bilateral With CAD; Future    3. Essential hypertension  Comments:  controlled -call if bp over 140/90  Orders:  -     Comprehensive Metabolic Panel  -     Lipid Panel    4. Glucose intolerance (impaired glucose tolerance)  Comments:  need low sugar diet    5. Seasonal allergic rhinitis, unspecified trigger  Comments:  increased congestion - need daily antihis & flonase,etc    6. Acquired hypothyroidism  Comments:  labs today  Orders:  -     TSH Rfx On Abnormal To Free T4    7. Chronic fatigue  Comments:  need labs - call if worse    8. Other hyperlipidemia  Comments:  need diet/ex  Orders:  -     Comprehensive Metabolic Panel  -     Lipid Panel    9. Heart palpitations  Comments:  off & on - need echo - may need holter -call if incr sx  Orders:  -     Adult Transthoracic Echo Complete W/ Cont if Necessary Per Protocol; Future    10. Heart murmur  Comments:  need echo      Follow Up:  Return in about 6 months (around 8/28/2020) for Recheck.     An After Visit Summary and PPPS were given to the patient.        Need daily strengthening & balance exercises (shown today).   Need screening for AAA & carotid disease.  Information given today.

## 2020-02-28 NOTE — PROGRESS NOTES
"Kar Reyna is a 79 y.o. female here for   Chief Complaint   Patient presents with   • Medicare Wellness-subsequent   .    Vitals:    02/28/20 0754   BP: 120/82   BP Location: Right arm   Patient Position: Sitting   Cuff Size: Adult   Weight: 77.1 kg (170 lb)   Height: 154.9 cm (61\")       Body mass index is 32.12 kg/m².    History of Present Illness     The following portions of the patient's history were reviewed and updated as appropriate: allergies, current medications, past social history and problem list.    Review of Systems    Objective   Physical Exam    Assessment/Plan   There are no diagnoses linked to this encounter.       "

## 2020-03-02 RX ORDER — ATORVASTATIN CALCIUM 10 MG/1
TABLET, FILM COATED ORAL
Qty: 90 TABLET | Refills: 0 | Status: SHIPPED | OUTPATIENT
Start: 2020-03-02 | End: 2020-06-02

## 2020-03-16 ENCOUNTER — APPOINTMENT (OUTPATIENT)
Dept: WOMENS IMAGING | Facility: HOSPITAL | Age: 80
End: 2020-03-16

## 2020-03-16 ENCOUNTER — OFFICE VISIT (OUTPATIENT)
Dept: INTERNAL MEDICINE | Facility: CLINIC | Age: 80
End: 2020-03-16

## 2020-03-16 DIAGNOSIS — Z12.31 ENCOUNTER FOR SCREENING MAMMOGRAM FOR BREAST CANCER: ICD-10-CM

## 2020-03-16 PROCEDURE — 77067 SCR MAMMO BI INCL CAD: CPT | Performed by: RADIOLOGY

## 2020-03-16 PROCEDURE — 77067 SCR MAMMO BI INCL CAD: CPT | Performed by: INTERNAL MEDICINE

## 2020-03-17 ENCOUNTER — TELEPHONE (OUTPATIENT)
Dept: INTERNAL MEDICINE | Facility: CLINIC | Age: 80
End: 2020-03-17

## 2020-03-19 ENCOUNTER — TELEPHONE (OUTPATIENT)
Dept: INTERNAL MEDICINE | Facility: CLINIC | Age: 80
End: 2020-03-19

## 2020-03-19 NOTE — TELEPHONE ENCOUNTER
Hub can tell patient: Lvm for patient to call and reschedule Echo appointment. Dr Giraldo as well as cardiology have requested that the patient reschedule her appointment due to the corona virus and keeping her from being possibly exposed. Gave patient phone number [phone] to call and reschedule.

## 2020-03-26 ENCOUNTER — DOCUMENTATION (OUTPATIENT)
Dept: INTERNAL MEDICINE | Facility: CLINIC | Age: 80
End: 2020-03-26

## 2020-03-26 DIAGNOSIS — N64.89 BREAST ASYMMETRY: Primary | ICD-10-CM

## 2020-03-26 NOTE — PROGRESS NOTES
Spoke with pt regarding recent abnormal screening mammogram results. She is scheduled for diagnostic breast imaging at [facility] on 3-    ASHLEY Ray)(NEHEMIAS),AYANAT

## 2020-03-27 ENCOUNTER — APPOINTMENT (OUTPATIENT)
Dept: WOMENS IMAGING | Facility: HOSPITAL | Age: 80
End: 2020-03-27

## 2020-03-27 PROCEDURE — 76641 ULTRASOUND BREAST COMPLETE: CPT | Performed by: RADIOLOGY

## 2020-03-27 PROCEDURE — G0279 TOMOSYNTHESIS, MAMMO: HCPCS | Performed by: RADIOLOGY

## 2020-03-27 PROCEDURE — 77065 DX MAMMO INCL CAD UNI: CPT | Performed by: RADIOLOGY

## 2020-04-14 ENCOUNTER — HOSPITAL ENCOUNTER (OUTPATIENT)
Dept: CARDIOLOGY | Facility: HOSPITAL | Age: 80
Discharge: HOME | End: 2020-04-14
Admitting: INTERNAL MEDICINE

## 2020-04-14 VITALS
WEIGHT: 170 LBS | BODY MASS INDEX: 32.1 KG/M2 | HEART RATE: 77 BPM | DIASTOLIC BLOOD PRESSURE: 72 MMHG | HEIGHT: 61 IN | SYSTOLIC BLOOD PRESSURE: 124 MMHG | OXYGEN SATURATION: 99 %

## 2020-04-14 DIAGNOSIS — R00.2 HEART PALPITATIONS: ICD-10-CM

## 2020-04-14 PROCEDURE — 93306 TTE W/DOPPLER COMPLETE: CPT | Performed by: INTERNAL MEDICINE

## 2020-04-14 PROCEDURE — 93306 TTE W/DOPPLER COMPLETE: CPT

## 2020-04-15 DIAGNOSIS — R60.0 PEDAL EDEMA: ICD-10-CM

## 2020-04-15 LAB
AORTIC DIMENSIONLESS INDEX: 0.4 (DI)
AORTIC ROOT ANNULUS: 2 CM
ASCENDING AORTA: 2.6 CM
BH CV ECHO MEAS - ACS: 1.6 CM
BH CV ECHO MEAS - AI DEC SLOPE: 301.2 CM/SEC^2
BH CV ECHO MEAS - AI MAX PG: 92.6 MMHG
BH CV ECHO MEAS - AI MAX VEL: 481.2 CM/SEC
BH CV ECHO MEAS - AI P1/2T: 468 MSEC
BH CV ECHO MEAS - AO MAX PG (FULL): 19.5 MMHG
BH CV ECHO MEAS - AO MAX PG: 22.9 MMHG
BH CV ECHO MEAS - AO MEAN PG (FULL): 12.1 MMHG
BH CV ECHO MEAS - AO MEAN PG: 14.4 MMHG
BH CV ECHO MEAS - AO ROOT AREA (BSA CORRECTED): 1.4
BH CV ECHO MEAS - AO ROOT AREA: 4.5 CM^2
BH CV ECHO MEAS - AO ROOT DIAM: 2.4 CM
BH CV ECHO MEAS - AO V2 MAX: 239.4 CM/SEC
BH CV ECHO MEAS - AO V2 MEAN: 181.9 CM/SEC
BH CV ECHO MEAS - AO V2 VTI: 66.3 CM
BH CV ECHO MEAS - ASC AORTA: 2.6 CM
BH CV ECHO MEAS - AVA(I,A): 0.92 CM^2
BH CV ECHO MEAS - AVA(I,D): 0.92 CM^2
BH CV ECHO MEAS - AVA(V,A): 0.98 CM^2
BH CV ECHO MEAS - AVA(V,D): 0.98 CM^2
BH CV ECHO MEAS - BSA(HAYCOCK): 1.9 M^2
BH CV ECHO MEAS - BSA: 1.8 M^2
BH CV ECHO MEAS - BZI_BMI: 32.1 KILOGRAMS/M^2
BH CV ECHO MEAS - BZI_METRIC_HEIGHT: 154.9 CM
BH CV ECHO MEAS - BZI_METRIC_WEIGHT: 77.1 KG
BH CV ECHO MEAS - EDV(MOD-SP2): 64 ML
BH CV ECHO MEAS - EDV(MOD-SP4): 77 ML
BH CV ECHO MEAS - EDV(TEICH): 69.1 ML
BH CV ECHO MEAS - EF(CUBED): 71.7 %
BH CV ECHO MEAS - EF(MOD-BP): 65 %
BH CV ECHO MEAS - EF(MOD-SP2): 64.1 %
BH CV ECHO MEAS - EF(MOD-SP4): 63.6 %
BH CV ECHO MEAS - EF(TEICH): 64 %
BH CV ECHO MEAS - ESV(MOD-SP2): 23 ML
BH CV ECHO MEAS - ESV(MOD-SP4): 28 ML
BH CV ECHO MEAS - ESV(TEICH): 24.9 ML
BH CV ECHO MEAS - FS: 34.4 %
BH CV ECHO MEAS - IVS/LVPW: 1.1
BH CV ECHO MEAS - IVSD: 1.1 CM
BH CV ECHO MEAS - LAT PEAK E' VEL: 6 CM/SEC
BH CV ECHO MEAS - LV DIASTOLIC VOL/BSA (35-75): 43.7 ML/M^2
BH CV ECHO MEAS - LV MASS(C)D: 141 GRAMS
BH CV ECHO MEAS - LV MASS(C)DI: 80 GRAMS/M^2
BH CV ECHO MEAS - LV MAX PG: 3.4 MMHG
BH CV ECHO MEAS - LV MEAN PG: 2.2 MMHG
BH CV ECHO MEAS - LV SYSTOLIC VOL/BSA (12-30): 15.9 ML/M^2
BH CV ECHO MEAS - LV V1 MAX: 92.4 CM/SEC
BH CV ECHO MEAS - LV V1 MEAN: 71.8 CM/SEC
BH CV ECHO MEAS - LV V1 VTI: 24.3 CM
BH CV ECHO MEAS - LVIDD: 4 CM
BH CV ECHO MEAS - LVIDS: 2.6 CM
BH CV ECHO MEAS - LVLD AP2: 6.5 CM
BH CV ECHO MEAS - LVLD AP4: 7.1 CM
BH CV ECHO MEAS - LVLS AP2: 5.8 CM
BH CV ECHO MEAS - LVLS AP4: 5.7 CM
BH CV ECHO MEAS - LVOT AREA (M): 2.5 CM^2
BH CV ECHO MEAS - LVOT AREA: 2.5 CM^2
BH CV ECHO MEAS - LVOT DIAM: 1.8 CM
BH CV ECHO MEAS - LVPWD: 1 CM
BH CV ECHO MEAS - MED PEAK E' VEL: 7 CM/SEC
BH CV ECHO MEAS - MR MAX PG: 127.5 MMHG
BH CV ECHO MEAS - MR MAX VEL: 564.7 CM/SEC
BH CV ECHO MEAS - MV A DUR: 0.1 SEC
BH CV ECHO MEAS - MV A MAX VEL: 103.4 CM/SEC
BH CV ECHO MEAS - MV DEC SLOPE: 518.1 CM/SEC^2
BH CV ECHO MEAS - MV DEC TIME: 0.16 SEC
BH CV ECHO MEAS - MV E MAX VEL: 138 CM/SEC
BH CV ECHO MEAS - MV E/A: 1.3
BH CV ECHO MEAS - MV MAX PG: 7.2 MMHG
BH CV ECHO MEAS - MV MEAN PG: 3 MMHG
BH CV ECHO MEAS - MV P1/2T MAX VEL: 135.7 CM/SEC
BH CV ECHO MEAS - MV P1/2T: 76.7 MSEC
BH CV ECHO MEAS - MV V2 MAX: 134.2 CM/SEC
BH CV ECHO MEAS - MV V2 MEAN: 82.8 CM/SEC
BH CV ECHO MEAS - MV V2 VTI: 39.6 CM
BH CV ECHO MEAS - MVA P1/2T LCG: 1.6 CM^2
BH CV ECHO MEAS - MVA(P1/2T): 2.9 CM^2
BH CV ECHO MEAS - MVA(VTI): 1.5 CM^2
BH CV ECHO MEAS - PA ACC TIME: 0.08 SEC
BH CV ECHO MEAS - PA MAX PG (FULL): 1.8 MMHG
BH CV ECHO MEAS - PA MAX PG: 3 MMHG
BH CV ECHO MEAS - PA PR(ACCEL): 43.3 MMHG
BH CV ECHO MEAS - PA V2 MAX: 87.3 CM/SEC
BH CV ECHO MEAS - PAPD(PI EDV): 5.7 MMHG
BH CV ECHO MEAS - PI END-D VEL: 119.4 CM/SEC
BH CV ECHO MEAS - PULM A REVS DUR: 0.08 SEC
BH CV ECHO MEAS - PULM A REVS VEL: 32.6 CM/SEC
BH CV ECHO MEAS - PULM DIAS VEL: 82.7 CM/SEC
BH CV ECHO MEAS - PULM S/D: 1.1
BH CV ECHO MEAS - PULM SYS VEL: 87 CM/SEC
BH CV ECHO MEAS - PVA(V,A): 1.6 CM^2
BH CV ECHO MEAS - PVA(V,D): 1.6 CM^2
BH CV ECHO MEAS - QP/QS: 0.65
BH CV ECHO MEAS - RAP SYSTOLE: 3 MMHG
BH CV ECHO MEAS - RV MAX PG: 1.2 MMHG
BH CV ECHO MEAS - RV MEAN PG: 0.86 MMHG
BH CV ECHO MEAS - RV V1 MAX: 55.3 CM/SEC
BH CV ECHO MEAS - RV V1 MEAN: 45 CM/SEC
BH CV ECHO MEAS - RV V1 VTI: 15.9 CM
BH CV ECHO MEAS - RVOT AREA: 2.5 CM^2
BH CV ECHO MEAS - RVOT DIAM: 1.8 CM
BH CV ECHO MEAS - RVSP: 43 MMHG
BH CV ECHO MEAS - SI(AO): 169.6 ML/M^2
BH CV ECHO MEAS - SI(CUBED): 25.6 ML/M^2
BH CV ECHO MEAS - SI(LVOT): 34.8 ML/M^2
BH CV ECHO MEAS - SI(MOD-SP2): 23.3 ML/M^2
BH CV ECHO MEAS - SI(MOD-SP4): 27.8 ML/M^2
BH CV ECHO MEAS - SI(TEICH): 25.1 ML/M^2
BH CV ECHO MEAS - SUP REN AO DIAM: 1.9 CM
BH CV ECHO MEAS - SV(AO): 299 ML
BH CV ECHO MEAS - SV(CUBED): 45.2 ML
BH CV ECHO MEAS - SV(LVOT): 61.3 ML
BH CV ECHO MEAS - SV(MOD-SP2): 41 ML
BH CV ECHO MEAS - SV(MOD-SP4): 49 ML
BH CV ECHO MEAS - SV(RVOT): 39.9 ML
BH CV ECHO MEAS - SV(TEICH): 44.3 ML
BH CV ECHO MEAS - TAPSE (>1.6): 2.8 CM2
BH CV ECHO MEAS - TR MAX VEL: 317.4 CM/SEC
BH CV ECHO MEASUREMENTS AVERAGE E/E' RATIO: 21.23
BH CV VAS BP RIGHT ARM: NORMAL MMHG
BH CV XLRA - RV BASE: 2.5 CM
BH CV XLRA - RV LENGTH: 7.8 CM
BH CV XLRA - RV MID: 3 CM
BH CV XLRA - TDI S': 13 CM/SEC
LEFT ATRIUM VOLUME INDEX: 22 ML/M2
LV EF 2D ECHO EST: 65 %
MAXIMAL PREDICTED HEART RATE: 140 BPM
SINUS: 2.8 CM
STJ: 2.6 CM
STRESS TARGET HR: 119 BPM

## 2020-04-15 RX ORDER — HYDROCHLOROTHIAZIDE 25 MG/1
TABLET ORAL
Qty: 90 TABLET | Refills: 0 | Status: SHIPPED | OUTPATIENT
Start: 2020-04-15 | End: 2020-08-28

## 2020-04-20 ENCOUNTER — TELEPHONE (OUTPATIENT)
Dept: INTERNAL MEDICINE | Facility: CLINIC | Age: 80
End: 2020-04-20

## 2020-04-21 DIAGNOSIS — R06.02 SHORTNESS OF BREATH: ICD-10-CM

## 2020-04-21 DIAGNOSIS — R93.1 ABNORMAL ECHOCARDIOGRAM: Primary | ICD-10-CM

## 2020-05-04 DIAGNOSIS — F51.01 PRIMARY INSOMNIA: ICD-10-CM

## 2020-05-04 RX ORDER — TRAZODONE HYDROCHLORIDE 50 MG/1
TABLET ORAL
Qty: 30 TABLET | Refills: 5 | Status: SHIPPED | OUTPATIENT
Start: 2020-05-04 | End: 2020-11-23

## 2020-05-19 RX ORDER — RAMIPRIL 10 MG/1
CAPSULE ORAL
Qty: 180 CAPSULE | Refills: 2 | Status: SHIPPED | OUTPATIENT
Start: 2020-05-19 | End: 2020-12-23 | Stop reason: SDUPTHER

## 2020-05-23 DIAGNOSIS — E03.9 ACQUIRED HYPOTHYROIDISM: ICD-10-CM

## 2020-05-25 RX ORDER — LEVOTHYROXINE SODIUM 50 UG/1
TABLET ORAL
Qty: 90 TABLET | Refills: 0 | Status: SHIPPED | OUTPATIENT
Start: 2020-05-25 | End: 2020-08-20

## 2020-06-02 DIAGNOSIS — E78.49 OTHER HYPERLIPIDEMIA: Primary | ICD-10-CM

## 2020-06-02 RX ORDER — ATORVASTATIN CALCIUM 10 MG/1
TABLET, FILM COATED ORAL
Qty: 90 TABLET | Refills: 1 | Status: SHIPPED | OUTPATIENT
Start: 2020-06-02 | End: 2020-12-03

## 2020-07-16 ENCOUNTER — HOSPITAL ENCOUNTER (OUTPATIENT)
Dept: CARDIOLOGY | Facility: HOSPITAL | Age: 80
Discharge: HOME | End: 2020-07-16
Admitting: INTERNAL MEDICINE

## 2020-07-16 ENCOUNTER — OFFICE VISIT (OUTPATIENT)
Dept: CARDIOLOGY | Facility: CLINIC | Age: 80
End: 2020-07-16

## 2020-07-16 VITALS
WEIGHT: 172 LBS | HEIGHT: 61 IN | HEART RATE: 80 BPM | SYSTOLIC BLOOD PRESSURE: 132 MMHG | DIASTOLIC BLOOD PRESSURE: 90 MMHG | BODY MASS INDEX: 32.47 KG/M2

## 2020-07-16 DIAGNOSIS — E78.49 OTHER HYPERLIPIDEMIA: ICD-10-CM

## 2020-07-16 DIAGNOSIS — R60.0 EDEMA LEG: ICD-10-CM

## 2020-07-16 DIAGNOSIS — R06.02 SHORTNESS OF BREATH: Primary | ICD-10-CM

## 2020-07-16 DIAGNOSIS — I10 ESSENTIAL HYPERTENSION: ICD-10-CM

## 2020-07-16 LAB
BH CV LOWER VASCULAR LEFT COMMON FEMORAL AUGMENT: NORMAL
BH CV LOWER VASCULAR LEFT COMMON FEMORAL COMPETENT: NORMAL
BH CV LOWER VASCULAR LEFT COMMON FEMORAL COMPRESS: NORMAL
BH CV LOWER VASCULAR LEFT COMMON FEMORAL PHASIC: NORMAL
BH CV LOWER VASCULAR LEFT COMMON FEMORAL SPONT: NORMAL
BH CV LOWER VASCULAR LEFT DISTAL FEMORAL COMPRESS: NORMAL
BH CV LOWER VASCULAR LEFT GASTRONEMIUS COMPRESS: NORMAL
BH CV LOWER VASCULAR LEFT GREATER SAPH AK COMPRESS: NORMAL
BH CV LOWER VASCULAR LEFT GREATER SAPH BK COMPRESS: NORMAL
BH CV LOWER VASCULAR LEFT MID FEMORAL AUGMENT: NORMAL
BH CV LOWER VASCULAR LEFT MID FEMORAL COMPETENT: NORMAL
BH CV LOWER VASCULAR LEFT MID FEMORAL COMPRESS: NORMAL
BH CV LOWER VASCULAR LEFT MID FEMORAL PHASIC: NORMAL
BH CV LOWER VASCULAR LEFT MID FEMORAL SPONT: NORMAL
BH CV LOWER VASCULAR LEFT PERONEAL COMPRESS: NORMAL
BH CV LOWER VASCULAR LEFT POPLITEAL AUGMENT: NORMAL
BH CV LOWER VASCULAR LEFT POPLITEAL COMPETENT: NORMAL
BH CV LOWER VASCULAR LEFT POPLITEAL COMPRESS: NORMAL
BH CV LOWER VASCULAR LEFT POPLITEAL PHASIC: NORMAL
BH CV LOWER VASCULAR LEFT POPLITEAL SPONT: NORMAL
BH CV LOWER VASCULAR LEFT POSTERIOR TIBIAL COMPRESS: NORMAL
BH CV LOWER VASCULAR LEFT PROFUNDA FEMORAL COMPRESS: NORMAL
BH CV LOWER VASCULAR LEFT PROXIMAL FEMORAL COMPRESS: NORMAL
BH CV LOWER VASCULAR LEFT SAPHENOFEMORAL JUNCTION COMPRESS: NORMAL
BH CV LOWER VASCULAR RIGHT COMMON FEMORAL AUGMENT: NORMAL
BH CV LOWER VASCULAR RIGHT COMMON FEMORAL COMPETENT: NORMAL
BH CV LOWER VASCULAR RIGHT COMMON FEMORAL COMPRESS: NORMAL
BH CV LOWER VASCULAR RIGHT COMMON FEMORAL PHASIC: NORMAL
BH CV LOWER VASCULAR RIGHT COMMON FEMORAL SPONT: NORMAL

## 2020-07-16 PROCEDURE — 93000 ELECTROCARDIOGRAM COMPLETE: CPT | Performed by: INTERNAL MEDICINE

## 2020-07-16 PROCEDURE — 93971 EXTREMITY STUDY: CPT

## 2020-07-16 PROCEDURE — 99204 OFFICE O/P NEW MOD 45 MIN: CPT | Performed by: INTERNAL MEDICINE

## 2020-07-16 NOTE — PROGRESS NOTES
Answers for HPI/ROS submitted by the patient on 2020   What is the primary reason for your visit?: Other  Please describe your symptoms.: Visit is due to test ordered by Dr Margarita Giraldo, Also some shortness of breath  Have you had these symptoms before?: Yes  How long have you been having these symptoms?: Greater than 2 weeks  Please list any medications you are currently taking for this condition.: None  Please describe any probable cause for these symptoms. : Climbing Stairs, Brisk walking  Date of Office Visit: 2020  Encounter Provider: Ana Orozco MD  Place of Service: [facility] CARDIOLOGY  Patient Name: Ayana Reyna  :1940      Patient ID:  Ayana Reyna is a 80 y.o. female is here for shortness of breath          History of Present Illness    She has a history of hypertension, hyperlipidemia, hypothyroidism.    She is , has 4 children and is retired.  She is 1 cup of coffee per day.  She uses no cigarettes, alcohol or drugs.  Her mother had cancer.  Her  is a patient of mine, named Sandoval reyna    Labs on 2020 show normal CMP, normal TSH, HDL 66, LDL 49, normal CBC.  Echo done 2020 shows ejection fraction 65% with grade 2 diastolic dysfunction, mild concentric left ventricular hypertrophy, mild to moderate aortic stenosis with mild to moderate aortic insufficiency, moderate mitral insufficiency posteriorly directed with severe mitral annular calcification, RVSP 43, mild to moderate tricuspid insufficiency.  She had a nonischemic treadmill stress study on 2018.  During the stress study, she had dizziness.  Her [facility] treadmill score indicated moderate risk for ischemic heart disease.    She had a normal lifeline screening 2 years ago.      In 2020, she and her  were exercising, walking in the mall, and she noticed short windedness.  The short windedness with such that she would have to stop and she became very  diaphoretic.  She did not feel her heart racing or skipping.  She was noticing some intermittent chest tightness which she still gets right now with activity.  The chest tightness never wakes her out of sleep.  She does not have orthopnea or PND.  Her energy level has been fairly stable but they have not been exercising.  She stays active doing yard work.  She has noticed however 1 week of left lower extremity edema which is new.  It is painless.  She is had no recent travel or injury to the left lower extremity.    Past Medical History:   Diagnosis Date   • Acquired hypothyroidism    • Allergic rhinitis    • Fatigue    • Glucose intolerance (impaired glucose tolerance)    • Hearing loss    • Heart murmur    • Heart palpitations    • Hematuria    • Hyperlipemia    • Hypertension    • Insomnia    • Migraine    • Osteoporosis    • Shortness of breath    • Vitamin D deficiency    • Wears hearing aid in both ears 12/2019         Past Surgical History:   Procedure Laterality Date   • CHOLECYSTECTOMY     • COLONOSCOPY  2008   • FRACTURE SURGERY  1975,2014   • HYSTERECTOMY N/A 1971    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   • JOINT REPLACEMENT  1995, 2013   • KNEE SURGERY  1989   • TONSILLECTOMY  1950       Current Outpatient Medications on File Prior to Visit   Medication Sig Dispense Refill   • atorvastatin (LIPITOR) 10 MG tablet Take 1 tablet by mouth once daily 90 tablet 1   • cholecalciferol (VITAMIN D3) 1000 UNITS tablet Take 1,000 Units by mouth daily.     • EUTHYROX 50 MCG tablet Take 1 tablet by mouth once daily 90 tablet 0   • hydroCHLOROthiazide (HYDRODIURIL) 25 MG tablet Take 1 tablet by mouth once daily 90 tablet 0   • metoprolol succinate XL (TOPROL-XL) 100 MG 24 hr tablet TAKE 1 TABLET BY MOUTH ONCE DAILY 90 tablet 1   • Multiple Vitamins-Minerals (PRESERVISION/LUTEIN) capsule Take  by mouth 2 (two) times a day.     • ramipril (ALTACE) 10 MG capsule TAKE 2 CAPSULES EVERY  capsule 2   • traZODone (DESYREL) 50 MG tablet  Take 1 tablet by mouth nightly 30 tablet 5   • vitamin B-12 (CYANOCOBALAMIN) 100 MCG tablet Take 50 mcg by mouth daily.       No current facility-administered medications on file prior to visit.        Social History     Socioeconomic History   • Marital status:      Spouse name: Not on file   • Number of children: Not on file   • Years of education: Not on file   • Highest education level: Not on file   Tobacco Use   • Smoking status: Never Smoker   • Smokeless tobacco: Never Used   • Tobacco comment: caffeine use   Substance and Sexual Activity   • Alcohol use: No   • Drug use: No   • Sexual activity: Never     Partners: Male           Review of Systems   Constitution: Negative.   HENT: Negative for congestion.    Eyes: Negative for vision loss in left eye and vision loss in right eye.   Respiratory: Positive for shortness of breath. Negative for cough, hemoptysis, sleep disturbances due to breathing, snoring, sputum production and wheezing.    Endocrine: Negative.    Hematologic/Lymphatic: Negative.    Skin: Negative for poor wound healing and rash.   Musculoskeletal: Negative for falls, gout, muscle cramps and myalgias.   Gastrointestinal: Negative for abdominal pain, diarrhea, dysphagia, hematemesis, melena, nausea and vomiting.   Neurological: Negative for excessive daytime sleepiness, dizziness, headaches, light-headedness, loss of balance, seizures and vertigo.   Psychiatric/Behavioral: Negative for depression and substance abuse. The patient is not nervous/anxious.        Procedures    ECG 12 Lead  Date/Time: 7/16/2020 8:46 AM  Performed by: Ana Orozco MD  Authorized by: Ana Orozco MD   Comparison: compared with previous ECG   Similar to previous ECG  Rhythm: sinus rhythm  Ectopy: atrial premature contractions    Clinical impression: normal ECG                Objective:      Vitals:    07/16/20 0805 07/16/20 0806   BP: 132/90 132/90   BP Location: Right arm Left arm   Patient  "Position: Sitting Sitting   Pulse: 80    Weight: 78 kg (172 lb)    Height: 154.9 cm (61\")      Body mass index is 32.5 kg/m².    Physical Exam   Constitutional: She is oriented to person, place, and time. She appears well-developed and well-nourished. No distress.   HENT:   Head: Normocephalic and atraumatic.   Eyes: Conjunctivae are normal. No scleral icterus.   Neck: Neck supple. No JVD present. Carotid bruit is not present. No thyromegaly present.   Cardiovascular: Normal rate, regular rhythm, S1 normal, S2 normal and intact distal pulses.  No extrasystoles are present. PMI is not displaced. Exam reveals no gallop.   Murmur heard.   Harsh midsystolic murmur is present with a grade of 3/6 at the upper right sternal border radiating to the neck. 1+ LLE edema  Pulses:       Carotid pulses are 2+ on the right side with bruit, and 2+ on the left side with bruit.       Radial pulses are 2+ on the right side, and 2+ on the left side.        Dorsalis pedis pulses are 2+ on the right side, and 2+ on the left side.        Posterior tibial pulses are 2+ on the right side, and 2+ on the left side.   Pulmonary/Chest: Effort normal and breath sounds normal. No respiratory distress. She has no wheezes. She has no rhonchi. She has no rales. She exhibits no tenderness.   Abdominal: Soft. Bowel sounds are normal. She exhibits no distension, no abdominal bruit and no mass. There is no tenderness.   Musculoskeletal: She exhibits no edema or deformity.   Lymphadenopathy:     She has no cervical adenopathy.   Neurological: She is alert and oriented to person, place, and time. No cranial nerve deficit.   Skin: Skin is warm and dry. No rash noted. She is not diaphoretic. No cyanosis. No pallor. Nails show no clubbing.   Psychiatric: She has a normal mood and affect. Judgment normal.   Vitals reviewed.      Lab Review:       Assessment:      Diagnosis Plan   1. Shortness of breath  Stress Test With Myocardial Perfusion One Day   2. " Essential hypertension  Stress Test With Myocardial Perfusion One Day   3. Other hyperlipidemia  Stress Test With Myocardial Perfusion One Day   4. Edema leg  Duplex Venous Lower Extremity - Left CAR     1. Dyspnea with exertion.  Concern for coronary ischemia.  She also has diastolic dysfunction and valvular disease.  We will set up for a non-walking stress nuclear perfusion study.  2. New left lower extremity edema.  Set for venous duplex to be done today.  3. Hypertension, take metoprolol at night and take hydrochlorothiazide and ramipril in the morning.  Goal is less than 120/80.  She checks her blood pressure at home.  Advised her to record these.  4. Hyperlipidemia, controlled on atorvastatin.  5. Hypothyroidism.     Plan:       Change timing of medications only.  See Aminat in 3 months, set up testing.  May need better blood pressure control given the results of her echo showing left ventricular hypertrophy and grade 2 diastolic dysfunction.

## 2020-07-16 NOTE — PROGRESS NOTES
7/16/20 preliminary results are negative for acute deep vein thrombosis within the left lower extremity. Results called to ordering Dr. Ana Orozco.

## 2020-07-21 ENCOUNTER — HOSPITAL ENCOUNTER (OUTPATIENT)
Dept: CARDIOLOGY | Facility: HOSPITAL | Age: 80
Discharge: HOME | End: 2020-07-21
Admitting: INTERNAL MEDICINE

## 2020-07-21 ENCOUNTER — TELEPHONE (OUTPATIENT)
Dept: CARDIOLOGY | Facility: CLINIC | Age: 80
End: 2020-07-21

## 2020-07-21 VITALS — WEIGHT: 171.96 LBS | HEIGHT: 61 IN | BODY MASS INDEX: 32.47 KG/M2

## 2020-07-21 DIAGNOSIS — E78.49 OTHER HYPERLIPIDEMIA: ICD-10-CM

## 2020-07-21 DIAGNOSIS — I10 ESSENTIAL HYPERTENSION: ICD-10-CM

## 2020-07-21 DIAGNOSIS — R06.02 SHORTNESS OF BREATH: ICD-10-CM

## 2020-07-21 LAB
BH CV NUCLEAR PRIOR STUDY: 3
BH CV STRESS BP STAGE 1: NORMAL
BH CV STRESS COMMENTS STAGE 1: NORMAL
BH CV STRESS DOSE REGADENOSON STAGE 1: 0.4
BH CV STRESS DURATION MIN STAGE 1: 0
BH CV STRESS DURATION SEC STAGE 1: 10
BH CV STRESS HR STAGE 1: 112
BH CV STRESS PROTOCOL 1: NORMAL
BH CV STRESS RECOVERY BP: NORMAL MMHG
BH CV STRESS RECOVERY HR: 94 BPM
BH CV STRESS STAGE 1: 1
LV EF NUC BP: 74 %
MAXIMAL PREDICTED HEART RATE: 139 BPM
PERCENT MAX PREDICTED HR: 80.58 %
STRESS BASELINE BP: NORMAL MMHG
STRESS BASELINE HR: 90 BPM
STRESS PERCENT HR: 95 %
STRESS POST EXERCISE DUR SEC: 10 SEC
STRESS POST PEAK BP: NORMAL MMHG
STRESS POST PEAK HR: 112 BPM
STRESS TARGET HR: 118 BPM

## 2020-07-21 PROCEDURE — A9502 TC99M TETROFOSMIN: HCPCS | Performed by: INTERNAL MEDICINE

## 2020-07-21 PROCEDURE — 93018 CV STRESS TEST I&R ONLY: CPT | Performed by: INTERNAL MEDICINE

## 2020-07-21 PROCEDURE — 78452 HT MUSCLE IMAGE SPECT MULT: CPT | Performed by: INTERNAL MEDICINE

## 2020-07-21 PROCEDURE — 93016 CV STRESS TEST SUPVJ ONLY: CPT | Performed by: INTERNAL MEDICINE

## 2020-07-21 PROCEDURE — 93017 CV STRESS TEST TRACING ONLY: CPT

## 2020-07-21 PROCEDURE — 78452 HT MUSCLE IMAGE SPECT MULT: CPT

## 2020-07-21 PROCEDURE — 25010000002 REGADENOSON 0.4 MG/5ML SOLUTION: Performed by: INTERNAL MEDICINE

## 2020-07-21 PROCEDURE — 0 TECHNETIUM TETROFOSMIN KIT: Performed by: INTERNAL MEDICINE

## 2020-07-21 RX ADMIN — TETROFOSMIN 1 DOSE: 1.38 INJECTION, POWDER, LYOPHILIZED, FOR SOLUTION INTRAVENOUS at 08:58

## 2020-07-21 RX ADMIN — TETROFOSMIN 1 DOSE: 1.38 INJECTION, POWDER, LYOPHILIZED, FOR SOLUTION INTRAVENOUS at 08:04

## 2020-07-21 RX ADMIN — REGADENOSON 0.4 MG: 0.08 INJECTION, SOLUTION INTRAVENOUS at 08:58

## 2020-08-04 DIAGNOSIS — I10 ESSENTIAL HYPERTENSION: ICD-10-CM

## 2020-08-04 RX ORDER — METOPROLOL SUCCINATE 100 MG/1
TABLET, EXTENDED RELEASE ORAL
Qty: 90 TABLET | Refills: 1 | Status: SHIPPED | OUTPATIENT
Start: 2020-08-04 | End: 2021-02-08

## 2020-08-20 DIAGNOSIS — E03.9 ACQUIRED HYPOTHYROIDISM: ICD-10-CM

## 2020-08-20 RX ORDER — LEVOTHYROXINE SODIUM 50 UG/1
TABLET ORAL
Qty: 90 TABLET | Refills: 0 | Status: SHIPPED | OUTPATIENT
Start: 2020-08-20 | End: 2020-11-05

## 2020-08-28 DIAGNOSIS — R60.0 PEDAL EDEMA: ICD-10-CM

## 2020-08-28 RX ORDER — HYDROCHLOROTHIAZIDE 25 MG/1
TABLET ORAL
Qty: 90 TABLET | Refills: 0 | Status: SHIPPED | OUTPATIENT
Start: 2020-08-28 | End: 2020-12-23

## 2020-09-01 ENCOUNTER — OFFICE VISIT (OUTPATIENT)
Dept: INTERNAL MEDICINE | Facility: CLINIC | Age: 80
End: 2020-09-01

## 2020-09-01 VITALS
DIASTOLIC BLOOD PRESSURE: 90 MMHG | WEIGHT: 179 LBS | HEART RATE: 74 BPM | OXYGEN SATURATION: 99 % | TEMPERATURE: 98.2 F | HEIGHT: 61 IN | BODY MASS INDEX: 33.79 KG/M2 | SYSTOLIC BLOOD PRESSURE: 136 MMHG

## 2020-09-01 DIAGNOSIS — R73.02 GLUCOSE INTOLERANCE (IMPAIRED GLUCOSE TOLERANCE): ICD-10-CM

## 2020-09-01 DIAGNOSIS — E03.9 ACQUIRED HYPOTHYROIDISM: ICD-10-CM

## 2020-09-01 DIAGNOSIS — E78.49 OTHER HYPERLIPIDEMIA: ICD-10-CM

## 2020-09-01 DIAGNOSIS — R53.82 CHRONIC FATIGUE: ICD-10-CM

## 2020-09-01 DIAGNOSIS — H35.30 MACULAR DEGENERATION OF BOTH EYES, UNSPECIFIED TYPE: ICD-10-CM

## 2020-09-01 DIAGNOSIS — J30.2 SEASONAL ALLERGIC RHINITIS, UNSPECIFIED TRIGGER: ICD-10-CM

## 2020-09-01 DIAGNOSIS — E55.9 VITAMIN D DEFICIENCY: ICD-10-CM

## 2020-09-01 DIAGNOSIS — G44.041 INTRACTABLE CHRONIC PAROXYSMAL HEMICRANIA: Primary | ICD-10-CM

## 2020-09-01 DIAGNOSIS — I10 ESSENTIAL HYPERTENSION: ICD-10-CM

## 2020-09-01 LAB — ERYTHROCYTE [SEDIMENTATION RATE] IN BLOOD: 7 MM/HR (ref 0–20)

## 2020-09-01 PROCEDURE — 36415 COLL VENOUS BLD VENIPUNCTURE: CPT | Performed by: INTERNAL MEDICINE

## 2020-09-01 PROCEDURE — 85651 RBC SED RATE NONAUTOMATED: CPT | Performed by: INTERNAL MEDICINE

## 2020-09-01 PROCEDURE — 99214 OFFICE O/P EST MOD 30 MIN: CPT | Performed by: INTERNAL MEDICINE

## 2020-09-01 NOTE — PROGRESS NOTES
"Kar Reyna is a 80 y.o. female here for   Chief Complaint   Patient presents with   • Hyperlipidemia   • Hypertension   • Headache     worse for 1 mo   • Eye Problem     right eye worsening - seeing dr sepulveda   .    Vitals:    09/01/20 0752   BP: 136/90   Pulse: 74   Temp: 98.2 °F (36.8 °C)   SpO2: 99%   Weight: 81.2 kg (179 lb)   Height: 154.9 cm (61\")       Body mass index is 33.82 kg/m².    Hyperlipidemia   This is a chronic problem. The current episode started more than 1 year ago. The problem is controlled. Recent lipid tests were reviewed and are normal. She has no history of diabetes. Associated symptoms include shortness of breath. Pertinent negatives include no chest pain.   Hypertension   This is a chronic problem. The current episode started more than 1 year ago. The problem is unchanged. The problem is controlled. Associated symptoms include headaches (increased right sided h/a for 1 mo) and shortness of breath. Pertinent negatives include no chest pain, neck pain or palpitations.   Headache    This is a chronic problem. The current episode started more than 1 year ago. The problem occurs daily. The problem has been gradually worsening. The pain is located in the right unilateral region. The pain does not radiate. The quality of the pain is described as aching. The pain is moderate. Associated symptoms include sinus pressure. Pertinent negatives include no coughing, fever, neck pain, sore throat or vomiting.   Eye Problem    Both eyes are affected.The problem occurs constantly. The problem has been gradually worsening. There was no injury mechanism. Pertinent negatives include no fever or vomiting.        The following portions of the patient's history were reviewed and updated as appropriate: allergies, current medications, past social history and problem list.    Review of Systems   Constitutional: Positive for fatigue. Negative for chills and fever.   HENT: Positive for sinus pressure. " Negative for sore throat.    Eyes: Positive for visual disturbance (left macular degen - now right eye problem).   Respiratory: Positive for shortness of breath. Negative for cough and wheezing.    Cardiovascular: Negative for chest pain, palpitations and leg swelling.   Gastrointestinal: Negative for vomiting.   Musculoskeletal: Negative for neck pain.   Neurological: Positive for headaches (increased right sided h/a for 1 mo).   Psychiatric/Behavioral: Positive for dysphoric mood. Negative for sleep disturbance. The patient is nervous/anxious.        Objective   Physical Exam   Constitutional: She appears well-developed and well-nourished. No distress.   HENT:   Head: Normocephalic.   Right Ear: External ear normal. Tympanic membrane is bulging. Tympanic membrane is not erythematous.   Left Ear: External ear normal. Tympanic membrane is bulging. Tympanic membrane is not erythematous.   Nose: Right sinus exhibits no frontal sinus tenderness. Left sinus exhibits no frontal sinus tenderness.   Mouth/Throat: Oropharynx is clear and moist. No oropharyngeal exudate.   Neck: Normal range of motion. Neck supple.   Cardiovascular: Normal rate, regular rhythm and normal heart sounds.   Pulmonary/Chest: Effort normal and breath sounds normal. No respiratory distress. She has no wheezes. She has no rales. She exhibits no tenderness.   Musculoskeletal: She exhibits no edema.   Lymphadenopathy:     She has no cervical adenopathy.   Psychiatric: She has a normal mood and affect. Her behavior is normal.   Nursing note and vitals reviewed.    Nontender temporal arteries.    Assessment/Plan   Diagnoses and all orders for this visit:    Intractable chronic paroxysmal hemicrania  Comments:  worsening h/a- labs today - need head CT - call if no better  Orders:  -     Sedimentation Rate  -     CT Head Without Contrast; Future    Vitamin D deficiency  Comments:  need rechk    Seasonal allergic rhinitis, unspecified trigger    Glucose  intolerance (impaired glucose tolerance)  Comments:  need low sugar diet  Orders:  -     Hemoglobin A1c    Acquired hypothyroidism  Comments:  need rechk  Orders:  -     TSH Rfx On Abnormal To Free T4    Essential hypertension  Comments:  normal at home - call if bp over 140/90  Orders:  -     CBC & Differential  -     Comprehensive Metabolic Panel  -     Lipid Panel  -     Urinalysis With Microscopic If Indicated (No Culture) - Urine, Clean Catch    Chronic fatigue    Other hyperlipidemia  Comments:  continue diet/ex  Orders:  -     Comprehensive Metabolic Panel  -     Lipid Panel    Macular degeneration of both eyes, unspecified type  Comments:  need f/u in 3 wks with opthal

## 2020-09-02 DIAGNOSIS — D64.9 ANEMIA, UNSPECIFIED TYPE: Primary | ICD-10-CM

## 2020-09-02 LAB
ALBUMIN SERPL-MCNC: 4.3 G/DL (ref 3.5–5.2)
ALBUMIN/GLOB SERPL: 3.1 G/DL
ALP SERPL-CCNC: 68 U/L (ref 39–117)
ALT SERPL-CCNC: 16 U/L (ref 1–33)
APPEARANCE UR: CLEAR
AST SERPL-CCNC: 18 U/L (ref 1–32)
BACTERIA #/AREA URNS HPF: ABNORMAL /HPF
BASOPHILS # BLD AUTO: 0.04 10*3/MM3 (ref 0–0.2)
BASOPHILS NFR BLD AUTO: 0.6 % (ref 0–1.5)
BILIRUB SERPL-MCNC: 1.1 MG/DL (ref 0–1.2)
BILIRUB UR QL STRIP: NEGATIVE
BUN SERPL-MCNC: 19 MG/DL (ref 8–23)
BUN/CREAT SERPL: 21.8 (ref 7–25)
CALCIUM SERPL-MCNC: 9.3 MG/DL (ref 8.6–10.5)
CASTS URNS MICRO: ABNORMAL
CHLORIDE SERPL-SCNC: 107 MMOL/L (ref 98–107)
CHOLEST SERPL-MCNC: 125 MG/DL (ref 0–200)
CO2 SERPL-SCNC: 28.4 MMOL/L (ref 22–29)
COLOR UR: YELLOW
CREAT SERPL-MCNC: 0.87 MG/DL (ref 0.57–1)
EOSINOPHIL # BLD AUTO: 0.11 10*3/MM3 (ref 0–0.4)
EOSINOPHIL NFR BLD AUTO: 1.7 % (ref 0.3–6.2)
EPI CELLS #/AREA URNS HPF: ABNORMAL /HPF
ERYTHROCYTE [DISTWIDTH] IN BLOOD BY AUTOMATED COUNT: 11.8 % (ref 12.3–15.4)
GLOBULIN SER CALC-MCNC: 1.4 GM/DL
GLUCOSE SERPL-MCNC: 97 MG/DL (ref 65–99)
GLUCOSE UR QL: NEGATIVE
HBA1C MFR BLD: 5.63 % (ref 4.8–5.6)
HCT VFR BLD AUTO: 34.4 % (ref 34–46.6)
HDLC SERPL-MCNC: 69 MG/DL (ref 40–60)
HGB BLD-MCNC: 11.9 G/DL (ref 12–15.9)
HGB UR QL STRIP: ABNORMAL
IMM GRANULOCYTES # BLD AUTO: 0.01 10*3/MM3 (ref 0–0.05)
IMM GRANULOCYTES NFR BLD AUTO: 0.2 % (ref 0–0.5)
KETONES UR QL STRIP: NEGATIVE
LDLC SERPL CALC-MCNC: 43 MG/DL (ref 0–100)
LEUKOCYTE ESTERASE UR QL STRIP: ABNORMAL
LYMPHOCYTES # BLD AUTO: 2.21 10*3/MM3 (ref 0.7–3.1)
LYMPHOCYTES NFR BLD AUTO: 34.2 % (ref 19.6–45.3)
MCH RBC QN AUTO: 33.8 PG (ref 26.6–33)
MCHC RBC AUTO-ENTMCNC: 34.6 G/DL (ref 31.5–35.7)
MCV RBC AUTO: 97.7 FL (ref 79–97)
MONOCYTES # BLD AUTO: 0.38 10*3/MM3 (ref 0.1–0.9)
MONOCYTES NFR BLD AUTO: 5.9 % (ref 5–12)
NEUTROPHILS # BLD AUTO: 3.71 10*3/MM3 (ref 1.7–7)
NEUTROPHILS NFR BLD AUTO: 57.4 % (ref 42.7–76)
NITRITE UR QL STRIP: POSITIVE
NRBC BLD AUTO-RTO: 0 /100 WBC (ref 0–0.2)
PH UR STRIP: 5.5 [PH] (ref 5–8)
PLATELET # BLD AUTO: 193 10*3/MM3 (ref 140–450)
POTASSIUM SERPL-SCNC: 4.2 MMOL/L (ref 3.5–5.2)
PROT SERPL-MCNC: 5.7 G/DL (ref 6–8.5)
PROT UR QL STRIP: ABNORMAL
RBC # BLD AUTO: 3.52 10*6/MM3 (ref 3.77–5.28)
RBC #/AREA URNS HPF: ABNORMAL /HPF
SODIUM SERPL-SCNC: 143 MMOL/L (ref 136–145)
SP GR UR: 1.02 (ref 1–1.03)
T4 FREE SERPL-MCNC: 1.28 NG/DL (ref 0.93–1.7)
TRIGL SERPL-MCNC: 67 MG/DL (ref 0–150)
TSH SERPL DL<=0.005 MIU/L-ACNC: 4.85 UIU/ML (ref 0.27–4.2)
UROBILINOGEN UR STRIP-MCNC: ABNORMAL MG/DL
VLDLC SERPL CALC-MCNC: 13.4 MG/DL
WBC # BLD AUTO: 6.46 10*3/MM3 (ref 3.4–10.8)
WBC #/AREA URNS HPF: ABNORMAL /HPF

## 2020-09-03 LAB
FERRITIN SERPL-MCNC: 364 NG/ML (ref 13–150)
FOLATE SERPL-MCNC: 16.4 NG/ML (ref 4.78–24.2)
IRON SATN MFR SERPL: 39 % (ref 20–50)
IRON SERPL-MCNC: 107 MCG/DL (ref 37–145)
REF LAB TEST METHOD: NORMAL
TIBC SERPL-MCNC: 273 MCG/DL
UIBC SERPL-MCNC: 166 MCG/DL (ref 112–346)
VIT B12 SERPL-MCNC: 1767 PG/ML (ref 211–946)
WRITTEN AUTHORIZATION: NORMAL

## 2020-09-15 ENCOUNTER — HOSPITAL ENCOUNTER (OUTPATIENT)
Dept: CT IMAGING | Facility: HOSPITAL | Age: 80
Discharge: HOME | End: 2020-09-15
Admitting: INTERNAL MEDICINE

## 2020-09-15 DIAGNOSIS — G44.041 INTRACTABLE CHRONIC PAROXYSMAL HEMICRANIA: ICD-10-CM

## 2020-09-15 PROCEDURE — 70450 CT HEAD/BRAIN W/O DYE: CPT

## 2020-10-19 ENCOUNTER — OFFICE VISIT (OUTPATIENT)
Dept: CARDIOLOGY | Facility: CLINIC | Age: 80
End: 2020-10-19

## 2020-10-19 ENCOUNTER — TELEPHONE (OUTPATIENT)
Dept: CARDIOLOGY | Facility: CLINIC | Age: 80
End: 2020-10-19

## 2020-10-19 VITALS
HEIGHT: 61 IN | WEIGHT: 178.4 LBS | DIASTOLIC BLOOD PRESSURE: 100 MMHG | HEART RATE: 73 BPM | SYSTOLIC BLOOD PRESSURE: 180 MMHG | BODY MASS INDEX: 33.68 KG/M2

## 2020-10-19 DIAGNOSIS — R06.02 SHORTNESS OF BREATH: ICD-10-CM

## 2020-10-19 DIAGNOSIS — R07.2 PRECORDIAL PAIN: ICD-10-CM

## 2020-10-19 DIAGNOSIS — I10 ESSENTIAL HYPERTENSION: Primary | ICD-10-CM

## 2020-10-19 DIAGNOSIS — I35.0 NONRHEUMATIC AORTIC VALVE STENOSIS: ICD-10-CM

## 2020-10-19 DIAGNOSIS — H54.3 VISION LOSS, BILATERAL: ICD-10-CM

## 2020-10-19 DIAGNOSIS — I35.1 NONRHEUMATIC AORTIC VALVE INSUFFICIENCY: ICD-10-CM

## 2020-10-19 DIAGNOSIS — I34.0 NONRHEUMATIC MITRAL VALVE REGURGITATION: ICD-10-CM

## 2020-10-19 PROCEDURE — 99214 OFFICE O/P EST MOD 30 MIN: CPT | Performed by: NURSE PRACTITIONER

## 2020-10-19 PROCEDURE — 93000 ELECTROCARDIOGRAM COMPLETE: CPT | Performed by: NURSE PRACTITIONER

## 2020-10-19 RX ORDER — AMLODIPINE BESYLATE 5 MG/1
5 TABLET ORAL DAILY
Qty: 30 TABLET | Refills: 11 | Status: SHIPPED | OUTPATIENT
Start: 2020-10-19 | End: 2021-03-24 | Stop reason: SDUPTHER

## 2020-10-19 NOTE — TELEPHONE ENCOUNTER
My apoligies patient was seen with Aminta today, however she want to schedule next appointment same time as her  in December.     Thank you,   Alyssia

## 2020-10-19 NOTE — PROGRESS NOTES
"  Date of Office Visit: 10/19/2020  Encounter Provider: TRINI Bangura  Place of Service: [facility] CARDIOLOGY  Patient Name: Ayana Reyna  :1940  Primary Cardiologist: Dr. Ana Orozco     Chief Complaint   Patient presents with   • Chest Pain   • Shortness of Breath   • Hypertension   • Follow-up   :     Dear Dr. Margarita Giraldo,     HPI: Ayana Reyna is a pleasant 80 y.o. female who presents today for cardiac follow up. She is a new patient to me and her previous records have been reviewed.    She has a known history of hypertension, hyperlipidemia, and hypothyroidism.  She is  with 4 daughters and describes herself as the matriarch of the large family.  Her , Sandoval Carty is also a patient of ours.    In 2020, she had a 2D echocardiogram completed with the following results: EF 65%, grade 2 diastolic dysfunction, mild LVH, mild to moderate aortic stenosis, mild to moderate aortic insufficiency, moderate mitral insufficiency, mild pulmonary hypertension, and mild to moderate tricuspid insufficiency.    In 2020, she was evaluated by Dr. Forte.  She explains in 2020, she was exercise walking at the mall and became short of breath and very diaphoretic.  She reported some new onset intermittent chest tightness with exertion and new onset lower extremity edema.  Dr. Orozco initiated HCTZ.  On 2020, nuclear stress test was completed and showed no evidence of ischemia.    She presents today for a 3-month follow-up visit.  A couple of weeks ago she was walking at [employer] and developed the same midsternal chest tightness and shortness of breath with exertion that improved with rest.  She also notices at nighttime when she first lies down a heaviness or a weight on her chest.  She reports some mild dyspnea with exertion although she is really not been exerting herself since .  She also states that she is having \"a pity " "party for herself\" because she is now having vision loss of the right eye over the past couple of months.  She has suffered from macular degeneration of the left eye and has a little bit of peripheral vision there, but is really struggling with the loss of vision of her right eye.  She also reports bilateral hearing loss.  Her blood pressures are elevated today and she admits that she has been under an increased amount of stress.  She denies palpitations, dizziness, syncope, or bleeding.    I reviewed her blood work from 9/1/2020: CBC showed hemoglobin 11.9 and hematocrit 34.4.  CMP normal except for total protein of 5.7.  Total cholesterol 125, triglycerides 67, HDL 69, and LDL 43.    Past Medical History:   Diagnosis Date   • Acquired hypothyroidism    • Allergic rhinitis    • Aortic stenosis     mild to moderate per echo 2020   • Fatigue    • Glucose intolerance (impaired glucose tolerance)    • Hearing loss    • Heart murmur    • Heart palpitations    • Hematuria    • Hyperlipemia    • Hypertension    • Insomnia    • Macular degeneration     vision loss of left eye    • Migraine    • Nonrheumatic aortic valve insufficiency 10/19/2020   • Osteoporosis    • Shortness of breath    • Vision loss, bilateral     left eye macular degeneration; right eye etiology unknown   • Vitamin D deficiency    • Wears hearing aid in both ears 12/2019       Past Surgical History:   Procedure Laterality Date   • CHOLECYSTECTOMY     • COLONOSCOPY  2008   • FRACTURE SURGERY  1975,2014   • HYSTERECTOMY N/A 1971    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   • JOINT REPLACEMENT  1995, 2013   • KNEE SURGERY  1989   • TONSILLECTOMY  1950       Social History     Socioeconomic History   • Marital status:      Spouse name: Not on file   • Number of children: Not on file   • Years of education: Not on file   • Highest education level: Not on file   Tobacco Use   • Smoking status: Never Smoker   • Smokeless tobacco: Never Used   Substance and Sexual Activity "   • Alcohol use: Yes     Alcohol/week: 1.0 standard drinks     Types: 1 Glasses of wine per week     Comment: Less than 1 glass of wine a week//caffeine use:1 cup daily   • Drug use: No   • Sexual activity: Never     Partners: Male       Family History   Problem Relation Age of Onset   • Hodgkin's lymphoma Mother    • Cancer Mother        The following portion of the patient's history were reviewed and updated as appropriate: past medical history, past surgical history, past social history, past family history, allergies, current medications, and problem list.    Review of Systems   Constitution: Negative for chills, diaphoresis, fever, malaise/fatigue, night sweats, weight gain and weight loss.   HENT: Negative for hearing loss, nosebleeds, sore throat and tinnitus.    Eyes: Positive for vision loss in left eye and vision loss in right eye. Negative for blurred vision, double vision, pain and visual disturbance.   Cardiovascular: Positive for chest pain and dyspnea on exertion. Negative for claudication, cyanosis, irregular heartbeat, leg swelling, near-syncope, orthopnea, palpitations, paroxysmal nocturnal dyspnea and syncope.   Respiratory: Positive for shortness of breath. Negative for cough, hemoptysis, snoring and wheezing.    Endocrine: Negative for cold intolerance, heat intolerance and polyuria.   Hematologic/Lymphatic: Negative for bleeding problem. Does not bruise/bleed easily.   Skin: Negative for color change, dry skin, flushing and itching.   Musculoskeletal: Negative for falls, joint pain, joint swelling, muscle cramps, muscle weakness and myalgias.   Gastrointestinal: Negative for abdominal pain, constipation, heartburn, melena, nausea and vomiting.   Genitourinary: Negative for dysuria and hematuria.   Neurological: Negative for excessive daytime sleepiness, dizziness, light-headedness, loss of balance, numbness, paresthesias, seizures and vertigo.   Psychiatric/Behavioral: Negative for altered  "mental status, depression, memory loss and substance abuse. The patient does not have insomnia and is not nervous/anxious.    Allergic/Immunologic: Negative for environmental allergies.       No Known Allergies      Current Outpatient Medications:   •  atorvastatin (LIPITOR) 10 MG tablet, Take 1 tablet by mouth once daily, Disp: 90 tablet, Rfl: 1  •  cholecalciferol (VITAMIN D3) 1000 UNITS tablet, Take 1,000 Units by mouth daily., Disp: , Rfl:   •  EUTHYROX 50 MCG tablet, Take 1 tablet by mouth once daily, Disp: 90 tablet, Rfl: 0  •  hydroCHLOROthiazide (HYDRODIURIL) 25 MG tablet, Take 1 tablet by mouth once daily, Disp: 90 tablet, Rfl: 0  •  metoprolol succinate XL (TOPROL-XL) 100 MG 24 hr tablet, Take 1 tablet by mouth once daily, Disp: 90 tablet, Rfl: 1  •  Multiple Vitamins-Minerals (PRESERVISION/LUTEIN) capsule, Take  by mouth 2 (two) times a day., Disp: , Rfl:   •  ramipril (ALTACE) 10 MG capsule, TAKE 2 CAPSULES EVERY DAY, Disp: 180 capsule, Rfl: 2  •  traZODone (DESYREL) 50 MG tablet, Take 1 tablet by mouth nightly, Disp: 30 tablet, Rfl: 5  •  vitamin B-12 (CYANOCOBALAMIN) 100 MCG tablet, Take 50 mcg by mouth daily., Disp: , Rfl:           Objective:     Vitals:    10/19/20 0802 10/19/20 0804 10/19/20 0837   BP: 170/80 172/80 180/100   BP Location: Left arm Right arm Left arm   Patient Position:   Sitting   Pulse: 73     Weight: 80.9 kg (178 lb 6.4 oz)     Height: 154.9 cm (61\")       Body mass index is 33.71 kg/m².    PHYSICAL EXAM:    Vitals Reviewed.   General Appearance: No acute distress, well developed and well nourished. Obese.   Eyes: Conjunctiva and lids: No erythema, swelling, or discharge. Sclera non-icteric.   HENT: Atraumatic, normocephalic. External eyes, ears, and nose normal. No hearing loss noted. Mucous membranes normal. Lips not cyanotic. Neck supple with no tenderness.  Respiratory: No signs of respiratory distress. Respiration rhythm and depth normal.   Clear to auscultation. No rales, " crackles, rhonchi, or wheezing auscultated.   Cardiovascular:  Jugular Venous Pressure: Normal  Heart Rate and Rhythm: Normal, Heart Sounds: Normal S1 and S2. No S3 or S4 noted.  Murmurs: RUSB grade 2/6 systolic murmur. [place] grade 1/6 systolic murmur present. No rubs, thrills, or gallops.   Arterial Pulses: Carotid pulses normal. No carotid bruit noted. Posterior tibialis and dorsalis pedis pulses normal.   Lower Extremities: No edema noted.  Gastrointestinal:  Abdomen soft, non-distended, non-tender. Normal bowel sounds.    Musculoskeletal: Normal movement of extremities  Skin and Nails: General appearance normal. No pallor, cyanosis, diaphoresis. Skin temperature normal. No clubbing of fingernails.   Psychiatric: Patient alert and oriented to person, place, and time. Speech and behavior appropriate. Normal mood and affect.       ECG 12 Lead    Date/Time: 10/19/2020 7:58 AM  Performed by: Aminta Bowles APRN  Authorized by: Aminta Bowles APRN   Comparison: compared with previous ECG from 7/16/2020  Similar to previous ECG  Rhythm: sinus rhythm  Rate: normal  BPM: 73  Conduction: conduction normal  ST Segments: ST segments normal  T Waves: T waves normal  QRS axis: normal  Other: no other findings    Clinical impression: normal ECG              Assessment:       Diagnosis Plan   1. Essential hypertension     2. Shortness of breath     3. Precordial pain     4. Nonrheumatic aortic valve stenosis     5. Nonrheumatic mitral valve regurgitation     6. Nonrheumatic aortic valve insufficiency     7. Vision loss, bilateral            Plan:       1.  Hypertension: Her blood pressure is quite elevated today.  She says at home her blood pressure is averaging in the upper 130s/70s.  I have recommended adding amlodipine 5 mg daily to her regimen.  We discussed potential adverse effects and she will call with any concerns.  She was noted to have diastolic dysfunction and left ventricular hypertrophy on  echocardiogram.    2/3.  Shortness of Breath and Chest Pain: She had an echocardiogram previously which showed normal EF and mild to moderate valvular heart disease.  Her recent nuclear stress test was normal.  She continues to experience exertional shortness of breath and occasional chest pain.  We had a long discussion about the next steps.  She would like to first treat her blood pressure to see if her symptoms improve or resolve.  If not, she may benefit from a heart catheterization and this was discussed.    4/5/6.  Valvular Heart Disease: Noted to have mild to moderate aortic valve stenosis, mitral valve regurgitation, and aortic insufficiency.  We discussed the importance of good blood pressure control.    7.  Vision Loss: She has had vision loss at the left eye for some time now and over the past couple of months has lost vision in her right eye.  She is having a very hard time with this understandably and feels that she is under an increased amount of stress which could be affecting her symptoms and blood pressure.    8.  I offered her a telehealth visit with me in November.  She would prefer to come back in person because of her vision loss.  Her  is scheduled on 12/8 to see Dr. Orozco and will see if we can arrange the same day.    As always, it has been a pleasure to participate in your patient's care. Thank you.       Sincerely,       TRINI Honeycutt  [facility] Cardiology      · COVID-19 Precautions - Patient was compliant in wearing a mask. When I saw the patient, I used appropriate personal protective equipment (PPE) including mask and eye shield (standard procedure).  Additionally, I used gown and gloves if indicated.  Hand hygiene was completed before and after seeing the patient.  · Dictated utilizing Dragon Dictation

## 2020-10-19 NOTE — TELEPHONE ENCOUNTER
Mainor MANNING recommended the patient come back within the next month to be seen.  Her  is scheduled to see Dr. Orozco in December.  They were wondering if she could be seen the same day.  Please advise.  Thank you

## 2020-11-05 DIAGNOSIS — E03.9 ACQUIRED HYPOTHYROIDISM: ICD-10-CM

## 2020-11-05 RX ORDER — LEVOTHYROXINE SODIUM 50 UG/1
TABLET ORAL
Qty: 90 TABLET | Refills: 0 | Status: SHIPPED | OUTPATIENT
Start: 2020-11-05 | End: 2021-02-08

## 2020-11-23 DIAGNOSIS — F51.01 PRIMARY INSOMNIA: ICD-10-CM

## 2020-11-23 RX ORDER — TRAZODONE HYDROCHLORIDE 50 MG/1
TABLET ORAL
Qty: 30 TABLET | Refills: 0 | Status: SHIPPED | OUTPATIENT
Start: 2020-11-23 | End: 2020-12-21

## 2020-12-03 DIAGNOSIS — E78.49 OTHER HYPERLIPIDEMIA: ICD-10-CM

## 2020-12-03 RX ORDER — ATORVASTATIN CALCIUM 10 MG/1
TABLET, FILM COATED ORAL
Qty: 90 TABLET | Refills: 0 | Status: SHIPPED | OUTPATIENT
Start: 2020-12-03 | End: 2021-03-04

## 2020-12-21 DIAGNOSIS — F51.01 PRIMARY INSOMNIA: ICD-10-CM

## 2020-12-21 RX ORDER — TRAZODONE HYDROCHLORIDE 50 MG/1
TABLET ORAL
Qty: 30 TABLET | Refills: 6 | Status: SHIPPED | OUTPATIENT
Start: 2020-12-21 | End: 2021-01-05 | Stop reason: SDUPTHER

## 2020-12-23 ENCOUNTER — OFFICE VISIT (OUTPATIENT)
Dept: CARDIOLOGY | Facility: CLINIC | Age: 80
End: 2020-12-23

## 2020-12-23 ENCOUNTER — TELEPHONE (OUTPATIENT)
Dept: CARDIOLOGY | Facility: CLINIC | Age: 80
End: 2020-12-23

## 2020-12-23 VITALS
HEART RATE: 83 BPM | SYSTOLIC BLOOD PRESSURE: 140 MMHG | HEIGHT: 61 IN | BODY MASS INDEX: 34.21 KG/M2 | WEIGHT: 181.2 LBS | DIASTOLIC BLOOD PRESSURE: 70 MMHG

## 2020-12-23 DIAGNOSIS — R07.2 PRECORDIAL PAIN: ICD-10-CM

## 2020-12-23 DIAGNOSIS — I34.0 NONRHEUMATIC MITRAL VALVE REGURGITATION: ICD-10-CM

## 2020-12-23 DIAGNOSIS — I35.0 NONRHEUMATIC AORTIC VALVE STENOSIS: Primary | ICD-10-CM

## 2020-12-23 DIAGNOSIS — E78.2 MIXED HYPERLIPIDEMIA: ICD-10-CM

## 2020-12-23 DIAGNOSIS — I10 ESSENTIAL HYPERTENSION: ICD-10-CM

## 2020-12-23 DIAGNOSIS — I35.1 NONRHEUMATIC AORTIC VALVE INSUFFICIENCY: ICD-10-CM

## 2020-12-23 PROCEDURE — 99214 OFFICE O/P EST MOD 30 MIN: CPT | Performed by: INTERNAL MEDICINE

## 2020-12-23 PROCEDURE — 93000 ELECTROCARDIOGRAM COMPLETE: CPT | Performed by: INTERNAL MEDICINE

## 2020-12-23 RX ORDER — RAMIPRIL 10 MG/1
10 CAPSULE ORAL NIGHTLY
Qty: 90 CAPSULE | Refills: 3 | Status: SHIPPED | OUTPATIENT
Start: 2020-12-23 | End: 2022-01-21 | Stop reason: SDUPTHER

## 2020-12-23 RX ORDER — SPIRONOLACTONE 25 MG/1
25 TABLET ORAL EVERY MORNING
Qty: 90 TABLET | Refills: 3 | Status: SHIPPED | OUTPATIENT
Start: 2020-12-23 | End: 2021-12-10 | Stop reason: SDUPTHER

## 2020-12-23 NOTE — PROGRESS NOTES
Date of Office Visit: 2020  Encounter Provider: Ana Orozco MD  Place of Service: [facility] CARDIOLOGY  Patient Name: Ayana Parikh  :1940      Patient ID:  Ayana Parikh is a 80 y.o. female is here for  followup for valvular heart disease.        History of Present Illness    She has a history of hypertension, hyperlipidemia, hypothyroidism.     She is , has 4 children and is retired.  She is 1 cup of coffee per day.  She uses no cigarettes, alcohol or drugs.  Her mother had cancer.  Her  is a patient of mine, named Sandoval parikh     She had a nonischemic treadmill stress study on 2018.  During the stress study, she had dizziness.  Her [facility] treadmill score indicated moderate risk for ischemic heart disease.  She had a normal lifeline screening 2 years ago.       She had dyspnea with exertion starting 2020.  She underwent stress nuclear perfusion study on 2020 showing no evidence of ischemia.  She had an echocardiogram done 2020 which showed ejection fraction 65% with grade 2 diastolic dysfunction, mild concentric left ventricular perjury, mild to moderate aortic stenosis and insufficiency, moderate mitral insufficiency, mild to moderate tricuspid insufficiency with RVSP 43 mmHg.  She had normal left lower extremity venous duplex study on 2020.  Laboratory values on 2020 show normal CMP, hemoglobin A1c 5.6, normal TSH and free T4, HDL 69, LDL 43, hemoglobin 11.9, otherwise normal CBC.    She has noticed that with activity, she gets squeezing in the central and left side of her chest.  This is most noticeable when going up steps or walking quickly.  She is also had increasing exertional dyspnea.  She has occasional palpitations but no tachycardia, dizziness or syncope.  She has no orthopnea or PND and the chest pain does not wake her up sleep.  She is taking her Medications as directed without difficulty.  She has had no  nausea, vomiting or diarrhea.    Past Medical History:   Diagnosis Date   • Acquired hypothyroidism    • Allergic rhinitis    • Aortic stenosis     mild to moderate per echo 2020   • Fatigue    • Glucose intolerance (impaired glucose tolerance)    • Hearing loss    • Heart murmur    • Heart palpitations    • Hematuria    • Hyperlipemia    • Hypertension    • Insomnia    • Macular degeneration     vision loss of left eye    • Migraine    • Nonrheumatic aortic valve insufficiency 10/19/2020   • Osteoporosis    • Shortness of breath    • Vision loss, bilateral     left eye macular degeneration; right eye etiology unknown   • Vitamin D deficiency    • Wears hearing aid in both ears 12/2019         Past Surgical History:   Procedure Laterality Date   • CHOLECYSTECTOMY     • COLONOSCOPY  2008   • FRACTURE SURGERY  1975,2014   • HYSTERECTOMY N/A 1971    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   • JOINT REPLACEMENT  1995, 2013   • KNEE SURGERY  1989   • TONSILLECTOMY  1950       Current Outpatient Medications on File Prior to Visit   Medication Sig Dispense Refill   • amLODIPine (NORVASC) 5 MG tablet Take 1 tablet by mouth Daily. 30 tablet 11   • atorvastatin (LIPITOR) 10 MG tablet Take 1 tablet by mouth once daily 90 tablet 0   • cholecalciferol (VITAMIN D3) 1000 UNITS tablet Take 1,000 Units by mouth daily.     • Euthyrox 50 MCG tablet Take 1 tablet by mouth once daily 90 tablet 0   • hydroCHLOROthiazide (HYDRODIURIL) 25 MG tablet Take 1 tablet by mouth once daily 90 tablet 0   • metoprolol succinate XL (TOPROL-XL) 100 MG 24 hr tablet Take 1 tablet by mouth once daily 90 tablet 1   • Multiple Vitamins-Minerals (PRESERVISION/LUTEIN) capsule Take  by mouth 2 (two) times a day.     • ramipril (ALTACE) 10 MG capsule TAKE 2 CAPSULES EVERY  capsule 2   • traZODone (DESYREL) 50 MG tablet Take 1 tablet by mouth nightly 30 tablet 6   • vitamin B-12 (CYANOCOBALAMIN) 100 MCG tablet Take 50 mcg by mouth daily.       No current facility-administered  "medications on file prior to visit.        Social History     Socioeconomic History   • Marital status:      Spouse name: Not on file   • Number of children: Not on file   • Years of education: Not on file   • Highest education level: Not on file   Tobacco Use   • Smoking status: Never Smoker   • Smokeless tobacco: Never Used   Substance and Sexual Activity   • Alcohol use: Yes     Alcohol/week: 1.0 standard drinks     Types: 1 Glasses of wine per week     Comment: Less than 1 glass of wine a week//caffeine use:1 cup daily   • Drug use: No   • Sexual activity: Never     Partners: Male           Review of Systems   Constitution: Negative.   HENT: Negative for congestion.    Eyes: Negative for vision loss in left eye and vision loss in right eye.   Respiratory: Negative.  Negative for cough, hemoptysis, shortness of breath, sleep disturbances due to breathing, snoring, sputum production and wheezing.    Endocrine: Negative.    Hematologic/Lymphatic: Negative.    Skin: Negative for poor wound healing and rash.   Musculoskeletal: Negative for falls, gout, muscle cramps and myalgias.   Gastrointestinal: Negative for abdominal pain, diarrhea, dysphagia, hematemesis, melena, nausea and vomiting.   Neurological: Negative for excessive daytime sleepiness, dizziness, headaches, light-headedness, loss of balance, seizures and vertigo.   Psychiatric/Behavioral: Negative for depression and substance abuse. The patient is not nervous/anxious.        Procedures    ECG 12 Lead    Date/Time: 12/23/2020 8:29 AM  Performed by: Ana Orozco MD  Authorized by: Ana Orozco MD   Comparison: compared with previous ECG   Similar to previous ECG  Rhythm: sinus rhythm    Clinical impression: normal ECG                Objective:      Vitals:    12/23/20 0758   BP: 140/70   BP Location: Left arm   Pulse: 83   Weight: 82.2 kg (181 lb 3.2 oz)   Height: 154.9 cm (61\")     Body mass index is 34.24 kg/m².    Vitals signs " reviewed.   Constitutional:       General: Not in acute distress.     Appearance: Well-developed. Not diaphoretic.   Eyes:      General: No scleral icterus.     Conjunctiva/sclera: Conjunctivae normal.   HENT:      Head: Normocephalic and atraumatic.   Neck:      Musculoskeletal: Neck supple.      Thyroid: No thyromegaly.      Vascular: No carotid bruit or JVD.      Lymphadenopathy: No cervical adenopathy.   Pulmonary:      Effort: Pulmonary effort is normal. No respiratory distress.      Breath sounds: Normal breath sounds. No wheezing. No rhonchi. No rales.   Chest:      Chest wall: Not tender to palpatation.   Cardiovascular:      Normal rate. Regular rhythm.      Murmurs: There is a grade 2/6 harsh midsystolic murmur at the URSB, radiating to the neck.      No gallop.   Pulses:     Intact distal pulses.   Edema:     Peripheral edema absent.   Abdominal:      General: Bowel sounds are normal. There is no distension or abdominal bruit.      Palpations: Abdomen is soft. There is no abdominal mass.      Tenderness: There is no abdominal tenderness.   Musculoskeletal:         General: No deformity.      Extremities: No clubbing present.  Skin:     General: Skin is warm and dry. There is no cyanosis.      Coloration: Skin is not pale.      Findings: No rash.   Neurological:      Mental Status: Alert and oriented to person, place, and time.      Cranial Nerves: No cranial nerve deficit.   Psychiatric:         Judgment: Judgment normal.         Lab Review:       Assessment:      Diagnosis Plan   1. Nonrheumatic aortic valve stenosis     2. Nonrheumatic mitral valve regurgitation     3. Nonrheumatic aortic valve insufficiency     4. Essential hypertension  CT Angiogram Heart With 3D Image   5. Mixed hyperlipidemia  CT Angiogram Heart With 3D Image   6. Precordial pain  CT Angiogram Heart With 3D Image     1. Dyspnea with exertion no ischemia on stress nuclear perfusion study.  2. Left lower extremity edema.  Noncardiac,  likely venous insufficiency.  3. Hypertension, goal less than 120/80.  4. Hyperlipidemia, controlled on atorvastatin.  5. Hypothyroidism.  6. Mild to moderate aortic stenosis insufficiency  7. moderate mitral insufficiency.  8. Exertional chest tightness, set up coronary CTA.     Plan:       See steve in 3 months, start spironolactone 25 mg in the morning and decrease ramipril to 10 mg nightly only.  Stop hydrochlorothiazide.  May need to increase metoprolol to 150 mg nightly.  She is taking amlodipine in the morning.

## 2020-12-23 NOTE — TELEPHONE ENCOUNTER
[facility] called stating that they are getting a red flag interaction with Spironolactone and pts ace inhibitor    They wanted to make you aware and see if you wanted to make any changes

## 2021-01-05 ENCOUNTER — OFFICE VISIT (OUTPATIENT)
Dept: INTERNAL MEDICINE | Facility: CLINIC | Age: 81
End: 2021-01-05

## 2021-01-05 VITALS
HEART RATE: 69 BPM | RESPIRATION RATE: 15 BRPM | WEIGHT: 179.4 LBS | DIASTOLIC BLOOD PRESSURE: 100 MMHG | SYSTOLIC BLOOD PRESSURE: 142 MMHG | TEMPERATURE: 97.3 F | OXYGEN SATURATION: 97 % | BODY MASS INDEX: 33.87 KG/M2 | HEIGHT: 61 IN

## 2021-01-05 DIAGNOSIS — R73.02 GLUCOSE INTOLERANCE (IMPAIRED GLUCOSE TOLERANCE): ICD-10-CM

## 2021-01-05 DIAGNOSIS — F51.01 PRIMARY INSOMNIA: ICD-10-CM

## 2021-01-05 DIAGNOSIS — I10 ESSENTIAL HYPERTENSION: Primary | ICD-10-CM

## 2021-01-05 DIAGNOSIS — E03.9 ACQUIRED HYPOTHYROIDISM: ICD-10-CM

## 2021-01-05 DIAGNOSIS — E78.2 MIXED HYPERLIPIDEMIA: ICD-10-CM

## 2021-01-05 DIAGNOSIS — H35.3223: ICD-10-CM

## 2021-01-05 PROCEDURE — 99214 OFFICE O/P EST MOD 30 MIN: CPT | Performed by: INTERNAL MEDICINE

## 2021-01-05 RX ORDER — TRAZODONE HYDROCHLORIDE 50 MG/1
TABLET ORAL
Qty: 135 TABLET | Refills: 3 | Status: SHIPPED | OUTPATIENT
Start: 2021-01-05 | End: 2021-12-10 | Stop reason: SDUPTHER

## 2021-01-05 NOTE — PROGRESS NOTES
Kar Reyna is a 80 y.o. female seen today for Hyperlipidemia (4 month follow-up), Hypertension, Hypothyroidism, and Insomnia.     Hyperlipidemia  This is a chronic problem. The current episode started more than 1 year ago. The problem is controlled. Recent lipid tests were reviewed and are normal. Exacerbating diseases include hypothyroidism. Associated symptoms include chest pain (with exertion) and shortness of breath (with exertion).   Hypertension  This is a chronic problem. The current episode started more than 1 year ago. The problem is unchanged. The problem is controlled. Associated symptoms include chest pain (with exertion) and shortness of breath (with exertion). Pertinent negatives include no palpitations.   Hypothyroidism  This is a chronic problem. The current episode started more than 1 year ago. The problem occurs constantly. The problem has been unchanged. Associated symptoms include chest pain (with exertion) and fatigue. Pertinent negatives include no chills, coughing or fever.   Insomnia  This is a recurrent problem. The current episode started more than 1 year ago. The problem occurs intermittently. The problem has been gradually worsening. Associated symptoms include chest pain (with exertion) and fatigue. Pertinent negatives include no chills, coughing or fever.        The following portions of the patient's history were reviewed and updated as appropriate: allergies, current medications, past social history and problem list.    Review of Systems   Constitutional: Positive for fatigue. Negative for chills and fever.   Respiratory: Positive for shortness of breath (with exertion). Negative for cough and wheezing.    Cardiovascular: Positive for chest pain (with exertion). Negative for palpitations and leg swelling.   Psychiatric/Behavioral: Positive for sleep disturbance. Negative for dysphoric mood. The patient has insomnia. The patient is not nervous/anxious.      BP less than  "140/90 at home.    Objective   /100 (BP Location: Left arm, Patient Position: Sitting, Cuff Size: Adult)   Pulse 69   Temp 97.3 °F (36.3 °C) (Temporal)   Resp 15   Ht 154.9 cm (61\")   Wt 81.4 kg (179 lb 6.4 oz)   LMP  (LMP Unknown)   SpO2 97%   Breastfeeding No   BMI 33.90 kg/m²   Physical Exam  Vitals signs and nursing note reviewed.   Constitutional:       General: She is not in acute distress.     Appearance: She is well-developed.   Cardiovascular:      Rate and Rhythm: Normal rate and regular rhythm.      Heart sounds: Normal heart sounds.   Pulmonary:      Effort: No respiratory distress.      Breath sounds: No wheezing or rales.   Chest:      Chest wall: No tenderness.   Psychiatric:         Behavior: Behavior normal.         Assessment/Plan   Problems Addressed this Visit        Unprioritized    Hypertension - Primary     Hypertension is unchanged.  Continue current treatment regimen.  Dietary sodium restriction.  Weight loss.  Regular aerobic exercise.  Blood pressure will be reassessed at the next regular appointment.         Insomnia     Worse - trial of increased trazodone. Need daily exercise.         Relevant Medications    traZODone (DESYREL) 50 MG tablet    Hyperlipemia     Lipid abnormalities are improving with treatment.  Nutritional counseling was provided.  Lipids will be reassessed in 6 months.         Acquired hypothyroidism    Exudative age-related macular degeneration, left eye, with inactive scar (CMS/[facility])      Diagnoses       Codes Comments    Essential hypertension    -  Primary ICD-10-CM: I10  ICD-9-CM: 401.9     Mixed hyperlipidemia     ICD-10-CM: E78.2  ICD-9-CM: 272.2     Acquired hypothyroidism     ICD-10-CM: E03.9  ICD-9-CM: 244.9     Primary insomnia     ICD-10-CM: F51.01  ICD-9-CM: 307.42     Primary insomnia     ICD-10-CM: F51.01  ICD-9-CM: 307.42 trial of trazodone nightly - may start with 1/2 pill, then 1 qhs - may increase to 1.5 qhs if needed    Exudative " age-related macular degeneration, left eye, with inactive scar (CMS/[facility])     ICD-10-CM: H35.3223  ICD-9-CM: 362.52          She will call if insomnia still severe in spite of incr activity during day & increased trazodone qhs.

## 2021-01-22 ENCOUNTER — HOSPITAL ENCOUNTER (OUTPATIENT)
Dept: CT IMAGING | Facility: HOSPITAL | Age: 81
Discharge: HOME | End: 2021-01-22
Admitting: INTERNAL MEDICINE

## 2021-01-22 VITALS
TEMPERATURE: 97.8 F | SYSTOLIC BLOOD PRESSURE: 176 MMHG | HEART RATE: 80 BPM | DIASTOLIC BLOOD PRESSURE: 81 MMHG | OXYGEN SATURATION: 99 % | RESPIRATION RATE: 16 BRPM

## 2021-01-22 DIAGNOSIS — I10 ESSENTIAL HYPERTENSION: ICD-10-CM

## 2021-01-22 DIAGNOSIS — R07.2 PRECORDIAL PAIN: ICD-10-CM

## 2021-01-22 DIAGNOSIS — E78.2 MIXED HYPERLIPIDEMIA: ICD-10-CM

## 2021-01-22 LAB
CREAT BLDA-MCNC: 0.9 MG/DL (ref 0.6–1.3)
QT INTERVAL: 398 MS

## 2021-01-22 PROCEDURE — 93005 ELECTROCARDIOGRAM TRACING: CPT | Performed by: INTERNAL MEDICINE

## 2021-01-22 PROCEDURE — 75574 CT ANGIO HRT W/3D IMAGE: CPT

## 2021-01-22 PROCEDURE — 82565 ASSAY OF CREATININE: CPT

## 2021-01-22 PROCEDURE — 75574 CT ANGIO HRT W/3D IMAGE: CPT | Performed by: INTERNAL MEDICINE

## 2021-01-22 PROCEDURE — 0 IOPAMIDOL PER 1 ML: Performed by: INTERNAL MEDICINE

## 2021-01-22 RX ADMIN — IOPAMIDOL 100 ML: 755 INJECTION, SOLUTION INTRAVENOUS at 14:07

## 2021-01-22 NOTE — NURSING NOTE
1345 phoned Mary RN with , to report patients HR 66, /74.  She is going to speak with Dr. Orozco and then call me back.  1350 Mary phoned back and states Dr. Orozco states ok to proceed.  1353 CT phoned and told we can proceed with CT Angiogram for her.

## 2021-01-22 NOTE — NURSING NOTE
Patient in triage bay 2 for CT angiogram. She is mask compliant, we are wearing masks and eye protection to care for her.

## 2021-01-25 ENCOUNTER — DOCUMENTATION (OUTPATIENT)
Dept: CARDIOLOGY | Facility: CLINIC | Age: 81
End: 2021-01-25

## 2021-01-25 NOTE — PROGRESS NOTES
Cardiac CTA with morphology  Imaging done 1/22/2021  Reason for the exam: chest pain    Calcium score is 252 Agatston units.  This is between the  percentile for women between the ages of 80-84 years old.    Heart rate 71 bpm.  Left ventricular end-diastolic volume 100 mL.  Left ventricular end-systolic volume 42 mL.  Ejection fraction 58%.  Stroke volume 58 mL.  Cardiac output 4125 mL/m    The right atrium is normal in size.  The right ventricle is normal in size.  There is grossly normal right ventricular systolic function.  The pulmonary artery is normal in size.  There are 4 pulmonary veins which enter the left atrium in their expected location.  The left atrial appendage was visualized and is without thrombus.  The intra-atrial septum appeared to be intact.  The left ventricle is normal in size with normal systolic function.  There was no evidence of a left ventricular thrombus.  The intraventricular septum appeared to be intact.  The mitral valve has mitral annular calcification.  The aortic valve was trileaflet and appears structurally and functionally normal.  The pulmonic valve appeared structurally normal.  The tricuspid valve appeared structurally normal.  There was no pericardial effusion.  The pericardium appeared normal.    The left main coronary artery came off the left coronary cusp in its anticipated location.  It bifurcated into the left anterior descending artery and the circumflex coronary artery.  There was evidence of atherosclerotic disease of the left main coronary artery, >50% calcified ostial stenosis.  Left anterior descending artery wraps around the apex of the heart.  There is evidence of atherosclerotic disease, 50% proximal calcified LAD stenosis and 50% mid calcified LAD stenosis, >50% distal mixed plaque stenosis.  The circumflex artery is the dominant vessel with evidence of atherosclerotic disease, <25% calcified proximal stenosis.  The right coronary artery is almost  nonexistent.     Conclusions:  1.  Normal coronary artery anatomy with evidence of atherosclerotic disease (see above).  Calcium score is 252 Agatston units.  2.  Structurally normal heart.      Ana Orozco MD  01/25/21

## 2021-01-27 ENCOUNTER — TELEPHONE (OUTPATIENT)
Dept: CARDIOLOGY | Facility: CLINIC | Age: 81
End: 2021-01-27

## 2021-01-27 DIAGNOSIS — R06.02 SHORTNESS OF BREATH: ICD-10-CM

## 2021-01-27 DIAGNOSIS — I25.10 LEFT MAIN CORONARY ARTERY DISEASE: ICD-10-CM

## 2021-01-27 DIAGNOSIS — R07.2 PRECORDIAL PAIN: Primary | ICD-10-CM

## 2021-01-27 NOTE — TELEPHONE ENCOUNTER
I called the patient and gave her the results of her coronary CTA which includes what appears to be significant left main coronary artery calcification with stenosis.  She is having exertional dyspnea which is significant and lifestyle limiting.  In addition she has chest heaviness.  I was set up for stress echocardiogram to see how her EKG and her left ventricle response to exercise unlikely after this she is getting a heart catheterization as well.  Unfortunately the coronary artery calcification and stenosis are right at the ostial left main which sometimes can even get missed on a heart catheterization.  I did not make any medication changes but she does understand the test results and is awaiting scheduling.

## 2021-01-29 ENCOUNTER — TRANSCRIBE ORDERS (OUTPATIENT)
Dept: SLEEP MEDICINE | Facility: HOSPITAL | Age: 81
End: 2021-01-29

## 2021-01-29 DIAGNOSIS — Z01.818 OTHER SPECIFIED PRE-OPERATIVE EXAMINATION: Primary | ICD-10-CM

## 2021-01-30 ENCOUNTER — LAB (OUTPATIENT)
Dept: LAB | Facility: HOSPITAL | Age: 81
End: 2021-01-30

## 2021-01-30 DIAGNOSIS — Z01.818 OTHER SPECIFIED PRE-OPERATIVE EXAMINATION: ICD-10-CM

## 2021-01-30 PROCEDURE — C9803 HOPD COVID-19 SPEC COLLECT: HCPCS

## 2021-01-30 PROCEDURE — U0004 COV-19 TEST NON-CDC HGH THRU: HCPCS

## 2021-02-01 LAB — SARS-COV-2 RNA RESP QL NAA+PROBE: NOT DETECTED

## 2021-02-02 ENCOUNTER — TELEPHONE (OUTPATIENT)
Dept: CARDIOLOGY | Facility: CLINIC | Age: 81
End: 2021-02-02

## 2021-02-02 ENCOUNTER — HOSPITAL ENCOUNTER (OUTPATIENT)
Dept: CARDIOLOGY | Facility: HOSPITAL | Age: 81
Discharge: HOME | End: 2021-02-02
Admitting: INTERNAL MEDICINE

## 2021-02-02 VITALS
SYSTOLIC BLOOD PRESSURE: 120 MMHG | BODY MASS INDEX: 33.79 KG/M2 | DIASTOLIC BLOOD PRESSURE: 60 MMHG | WEIGHT: 179 LBS | HEART RATE: 102 BPM | HEIGHT: 61 IN | OXYGEN SATURATION: 100 %

## 2021-02-02 DIAGNOSIS — R07.2 PRECORDIAL PAIN: ICD-10-CM

## 2021-02-02 DIAGNOSIS — I25.10 LEFT MAIN CORONARY ARTERY DISEASE: ICD-10-CM

## 2021-02-02 DIAGNOSIS — R06.02 SHORTNESS OF BREATH: ICD-10-CM

## 2021-02-02 LAB
ASCENDING AORTA: 2.8 CM
BH CV ECHO MEAS - ACS: 1.4 CM
BH CV ECHO MEAS - AI DEC SLOPE: 464.7 CM/SEC^2
BH CV ECHO MEAS - AI MAX PG: 91.3 MMHG
BH CV ECHO MEAS - AI MAX VEL: 477.8 CM/SEC
BH CV ECHO MEAS - AI P1/2T: 301.1 MSEC
BH CV ECHO MEAS - AO MAX PG (FULL): 29.2 MMHG
BH CV ECHO MEAS - AO MAX PG: 33.8 MMHG
BH CV ECHO MEAS - AO MEAN PG (FULL): 19.1 MMHG
BH CV ECHO MEAS - AO MEAN PG: 22.2 MMHG
BH CV ECHO MEAS - AO ROOT AREA (BSA CORRECTED): 1.4
BH CV ECHO MEAS - AO ROOT AREA: 5.3 CM^2
BH CV ECHO MEAS - AO ROOT DIAM: 2.6 CM
BH CV ECHO MEAS - AO V2 MAX: 290.5 CM/SEC
BH CV ECHO MEAS - AO V2 MEAN: 225.7 CM/SEC
BH CV ECHO MEAS - AO V2 VTI: 73.3 CM
BH CV ECHO MEAS - ASC AORTA: 2.8 CM
BH CV ECHO MEAS - AVA(I,A): 1 CM^2
BH CV ECHO MEAS - AVA(I,D): 1 CM^2
BH CV ECHO MEAS - AVA(V,A): 1.1 CM^2
BH CV ECHO MEAS - AVA(V,D): 1.1 CM^2
BH CV ECHO MEAS - BSA(HAYCOCK): 1.9 M^2
BH CV ECHO MEAS - BSA: 1.8 M^2
BH CV ECHO MEAS - BZI_BMI: 33.8 KILOGRAMS/M^2
BH CV ECHO MEAS - BZI_METRIC_HEIGHT: 154.9 CM
BH CV ECHO MEAS - BZI_METRIC_WEIGHT: 81.2 KG
BH CV ECHO MEAS - EDV(MOD-SP2): 57 ML
BH CV ECHO MEAS - EDV(MOD-SP4): 73 ML
BH CV ECHO MEAS - EDV(TEICH): 70.6 ML
BH CV ECHO MEAS - EF(CUBED): 75 %
BH CV ECHO MEAS - EF(MOD-BP): 62 %
BH CV ECHO MEAS - EF(MOD-SP2): 70.2 %
BH CV ECHO MEAS - EF(MOD-SP4): 79.5 %
BH CV ECHO MEAS - EF(TEICH): 67.5 %
BH CV ECHO MEAS - ESV(MOD-SP2): 17 ML
BH CV ECHO MEAS - ESV(MOD-SP4): 15 ML
BH CV ECHO MEAS - ESV(TEICH): 23 ML
BH CV ECHO MEAS - FS: 37 %
BH CV ECHO MEAS - IVS/LVPW: 1
BH CV ECHO MEAS - IVSD: 1 CM
BH CV ECHO MEAS - LAT PEAK E' VEL: 5.2 CM/SEC
BH CV ECHO MEAS - LV DIASTOLIC VOL/BSA (35-75): 40.5 ML/M^2
BH CV ECHO MEAS - LV MASS(C)D: 130.2 GRAMS
BH CV ECHO MEAS - LV MASS(C)DI: 72.2 GRAMS/M^2
BH CV ECHO MEAS - LV MAX PG: 4.5 MMHG
BH CV ECHO MEAS - LV MEAN PG: 3 MMHG
BH CV ECHO MEAS - LV SYSTOLIC VOL/BSA (12-30): 8.3 ML/M^2
BH CV ECHO MEAS - LV V1 MAX: 106.5 CM/SEC
BH CV ECHO MEAS - LV V1 MEAN: 82.5 CM/SEC
BH CV ECHO MEAS - LV V1 VTI: 25.9 CM
BH CV ECHO MEAS - LVIDD: 4 CM
BH CV ECHO MEAS - LVIDS: 2.5 CM
BH CV ECHO MEAS - LVLD AP2: 6.7 CM
BH CV ECHO MEAS - LVLD AP4: 6.5 CM
BH CV ECHO MEAS - LVLS AP2: 5.5 CM
BH CV ECHO MEAS - LVLS AP4: 5.5 CM
BH CV ECHO MEAS - LVOT AREA (M): 2.8 CM^2
BH CV ECHO MEAS - LVOT AREA: 3 CM^2
BH CV ECHO MEAS - LVOT DIAM: 1.9 CM
BH CV ECHO MEAS - LVPWD: 1 CM
BH CV ECHO MEAS - MED PEAK E' VEL: 6.1 CM/SEC
BH CV ECHO MEAS - MR MAX PG: 139.5 MMHG
BH CV ECHO MEAS - MR MAX VEL: 590.6 CM/SEC
BH CV ECHO MEAS - MV A DUR: 0.09 SEC
BH CV ECHO MEAS - MV A MAX VEL: 105.3 CM/SEC
BH CV ECHO MEAS - MV DEC SLOPE: 537.5 CM/SEC^2
BH CV ECHO MEAS - MV DEC TIME: 0.19 SEC
BH CV ECHO MEAS - MV E MAX VEL: 128 CM/SEC
BH CV ECHO MEAS - MV E/A: 1.2
BH CV ECHO MEAS - MV MAX PG: 6.3 MMHG
BH CV ECHO MEAS - MV MEAN PG: 3.4 MMHG
BH CV ECHO MEAS - MV P1/2T MAX VEL: 126.7 CM/SEC
BH CV ECHO MEAS - MV P1/2T: 69 MSEC
BH CV ECHO MEAS - MV V2 MAX: 125.2 CM/SEC
BH CV ECHO MEAS - MV V2 MEAN: 87.4 CM/SEC
BH CV ECHO MEAS - MV V2 VTI: 36.9 CM
BH CV ECHO MEAS - MVA P1/2T LCG: 1.7 CM^2
BH CV ECHO MEAS - MVA(P1/2T): 3.2 CM^2
BH CV ECHO MEAS - MVA(VTI): 2.1 CM^2
BH CV ECHO MEAS - PULM A REVS DUR: 0.08 SEC
BH CV ECHO MEAS - PULM A REVS VEL: 27.9 CM/SEC
BH CV ECHO MEAS - PULM DIAS VEL: 78.2 CM/SEC
BH CV ECHO MEAS - PULM S/D: 1.4
BH CV ECHO MEAS - PULM SYS VEL: 112.1 CM/SEC
BH CV ECHO MEAS - RAP SYSTOLE: 3 MMHG
BH CV ECHO MEAS - RVSP: 39 MMHG
BH CV ECHO MEAS - SI(AO): 216.6 ML/M^2
BH CV ECHO MEAS - SI(CUBED): 26.9 ML/M^2
BH CV ECHO MEAS - SI(LVOT): 42.6 ML/M^2
BH CV ECHO MEAS - SI(MOD-SP2): 22.2 ML/M^2
BH CV ECHO MEAS - SI(MOD-SP4): 32.2 ML/M^2
BH CV ECHO MEAS - SI(TEICH): 26.4 ML/M^2
BH CV ECHO MEAS - SV(AO): 390.4 ML
BH CV ECHO MEAS - SV(CUBED): 48.5 ML
BH CV ECHO MEAS - SV(LVOT): 76.7 ML
BH CV ECHO MEAS - SV(MOD-SP2): 40 ML
BH CV ECHO MEAS - SV(MOD-SP4): 58 ML
BH CV ECHO MEAS - SV(TEICH): 47.6 ML
BH CV ECHO MEAS - TAPSE (>1.6): 2.4 CM
BH CV ECHO MEAS - TR MAX VEL: 300 CM/SEC
BH CV ECHO MEASUREMENTS AVERAGE E/E' RATIO: 22.65
BH CV STRESS BP STAGE 1: NORMAL
BH CV STRESS DURATION MIN STAGE 1: 3
BH CV STRESS DURATION MIN STAGE 2: 1
BH CV STRESS DURATION SEC STAGE 1: 0
BH CV STRESS DURATION SEC STAGE 2: 30
BH CV STRESS ECHO POST STRESS EJECTION FRACTION EF: 76 %
BH CV STRESS GRADE STAGE 1: 10
BH CV STRESS GRADE STAGE 2: 12
BH CV STRESS HR STAGE 1: 129
BH CV STRESS HR STAGE 2: 150
BH CV STRESS METS STAGE 1: 5
BH CV STRESS METS STAGE 2: 7.5
BH CV STRESS PROTOCOL 1: NORMAL
BH CV STRESS SPEED STAGE 1: 1.7
BH CV STRESS SPEED STAGE 2: 2.5
BH CV STRESS STAGE 1: 1
BH CV STRESS STAGE 2: 2
BH CV XLRA - RV BASE: 3.3 CM
BH CV XLRA - RV LENGTH: 5.8 CM
BH CV XLRA - RV MID: 2.8 CM
BH CV XLRA - TDI S': 14.9 CM/SEC
LEFT ATRIUM VOLUME INDEX: 38 ML/M2
LV EF 2D ECHO EST: 62 %
MAXIMAL PREDICTED HEART RATE: 139 BPM
PERCENT MAX PREDICTED HR: 107.91 %
SINUS: 2.8 CM
STJ: 2.6 CM
STRESS BASELINE BP: NORMAL MMHG
STRESS BASELINE HR: 102 BPM
STRESS PERCENT HR: 127 %
STRESS POST ESTIMATED WORKLOAD: 6 METS
STRESS POST EXERCISE DUR MIN: 4 MIN
STRESS POST EXERCISE DUR SEC: 30 SEC
STRESS POST PEAK BP: NORMAL MMHG
STRESS POST PEAK HR: 150 BPM
STRESS TARGET HR: 118 BPM

## 2021-02-02 PROCEDURE — 93350 STRESS TTE ONLY: CPT

## 2021-02-02 PROCEDURE — 93320 DOPPLER ECHO COMPLETE: CPT

## 2021-02-02 PROCEDURE — 93017 CV STRESS TEST TRACING ONLY: CPT

## 2021-02-02 PROCEDURE — 93018 CV STRESS TEST I&R ONLY: CPT | Performed by: INTERNAL MEDICINE

## 2021-02-02 PROCEDURE — 93325 DOPPLER ECHO COLOR FLOW MAPG: CPT | Performed by: INTERNAL MEDICINE

## 2021-02-02 PROCEDURE — 93320 DOPPLER ECHO COMPLETE: CPT | Performed by: INTERNAL MEDICINE

## 2021-02-02 PROCEDURE — 25010000002 PERFLUTREN (DEFINITY) 8.476 MG IN SODIUM CHLORIDE (PF) 0.9 % 10 ML INJECTION: Performed by: INTERNAL MEDICINE

## 2021-02-02 PROCEDURE — 93016 CV STRESS TEST SUPVJ ONLY: CPT | Performed by: INTERNAL MEDICINE

## 2021-02-02 PROCEDURE — 93350 STRESS TTE ONLY: CPT | Performed by: INTERNAL MEDICINE

## 2021-02-02 PROCEDURE — 93325 DOPPLER ECHO COLOR FLOW MAPG: CPT

## 2021-02-02 PROCEDURE — 93352 ADMIN ECG CONTRAST AGENT: CPT | Performed by: INTERNAL MEDICINE

## 2021-02-02 RX ADMIN — PERFLUTREN 3 ML: 6.52 INJECTION, SUSPENSION INTRAVENOUS at 10:42

## 2021-02-02 NOTE — TELEPHONE ENCOUNTER
Results reviewed with the patient. Patient verbalized understanding. Denied further questions at this time.    Rubi Spencer RN  Triage MG

## 2021-02-07 DIAGNOSIS — E03.9 ACQUIRED HYPOTHYROIDISM: ICD-10-CM

## 2021-02-07 DIAGNOSIS — I10 ESSENTIAL HYPERTENSION: ICD-10-CM

## 2021-02-08 RX ORDER — LEVOTHYROXINE SODIUM 50 UG/1
TABLET ORAL
Qty: 90 TABLET | Refills: 0 | Status: SHIPPED | OUTPATIENT
Start: 2021-02-08 | End: 2021-05-10

## 2021-02-08 RX ORDER — METOPROLOL SUCCINATE 100 MG/1
TABLET, EXTENDED RELEASE ORAL
Qty: 90 TABLET | Refills: 0 | Status: SHIPPED | OUTPATIENT
Start: 2021-02-08 | End: 2021-03-24 | Stop reason: SDUPTHER

## 2021-03-04 DIAGNOSIS — E78.49 OTHER HYPERLIPIDEMIA: ICD-10-CM

## 2021-03-04 RX ORDER — ATORVASTATIN CALCIUM 10 MG/1
TABLET, FILM COATED ORAL
Qty: 90 TABLET | Refills: 0 | Status: SHIPPED | OUTPATIENT
Start: 2021-03-04 | End: 2021-03-24 | Stop reason: SDUPTHER

## 2021-03-09 DIAGNOSIS — Z23 IMMUNIZATION DUE: ICD-10-CM

## 2021-03-24 ENCOUNTER — TRANSCRIBE ORDERS (OUTPATIENT)
Dept: CARDIOLOGY | Facility: CLINIC | Age: 81
End: 2021-03-24

## 2021-03-24 ENCOUNTER — OFFICE VISIT (OUTPATIENT)
Dept: CARDIOLOGY | Facility: CLINIC | Age: 81
End: 2021-03-24

## 2021-03-24 VITALS
SYSTOLIC BLOOD PRESSURE: 122 MMHG | WEIGHT: 178.8 LBS | DIASTOLIC BLOOD PRESSURE: 64 MMHG | BODY MASS INDEX: 33.76 KG/M2 | HEIGHT: 61 IN | HEART RATE: 72 BPM

## 2021-03-24 DIAGNOSIS — R07.89 CHEST TIGHTNESS: Primary | ICD-10-CM

## 2021-03-24 DIAGNOSIS — Z01.810 PRE-OPERATIVE CARDIOVASCULAR EXAMINATION: Primary | ICD-10-CM

## 2021-03-24 DIAGNOSIS — E78.2 MIXED HYPERLIPIDEMIA: ICD-10-CM

## 2021-03-24 DIAGNOSIS — I25.10 CORONARY ARTERY CALCIFICATION: ICD-10-CM

## 2021-03-24 DIAGNOSIS — I10 ESSENTIAL HYPERTENSION: ICD-10-CM

## 2021-03-24 DIAGNOSIS — Z13.6 SCREENING FOR ISCHEMIC HEART DISEASE: ICD-10-CM

## 2021-03-24 DIAGNOSIS — E78.49 OTHER HYPERLIPIDEMIA: ICD-10-CM

## 2021-03-24 DIAGNOSIS — R06.09 DYSPNEA ON EXERTION: ICD-10-CM

## 2021-03-24 DIAGNOSIS — H35.3223: ICD-10-CM

## 2021-03-24 DIAGNOSIS — I25.84 CORONARY ARTERY CALCIFICATION: ICD-10-CM

## 2021-03-24 DIAGNOSIS — R91.8 PULMONARY NODULES: ICD-10-CM

## 2021-03-24 PROBLEM — R00.2 HEART PALPITATIONS: Status: RESOLVED | Noted: 2020-02-28 | Resolved: 2021-03-24

## 2021-03-24 PROBLEM — R60.0 PEDAL EDEMA: Status: RESOLVED | Noted: 2019-02-20 | Resolved: 2021-03-24

## 2021-03-24 PROCEDURE — 99215 OFFICE O/P EST HI 40 MIN: CPT | Performed by: NURSE PRACTITIONER

## 2021-03-24 PROCEDURE — 93000 ELECTROCARDIOGRAM COMPLETE: CPT | Performed by: NURSE PRACTITIONER

## 2021-03-24 RX ORDER — ATORVASTATIN CALCIUM 20 MG/1
20 TABLET, FILM COATED ORAL DAILY
Qty: 90 TABLET | Refills: 3 | Status: SHIPPED | OUTPATIENT
Start: 2021-03-24 | End: 2022-01-21 | Stop reason: SDUPTHER

## 2021-03-24 RX ORDER — AMLODIPINE BESYLATE 5 MG/1
5 TABLET ORAL DAILY
Qty: 90 TABLET | Refills: 3 | Status: SHIPPED | OUTPATIENT
Start: 2021-03-24 | End: 2021-10-27

## 2021-03-24 RX ORDER — ATORVASTATIN CALCIUM 20 MG/1
10 TABLET, FILM COATED ORAL DAILY
Qty: 90 TABLET | Refills: 3 | Status: SHIPPED | OUTPATIENT
Start: 2021-03-24 | End: 2021-03-24 | Stop reason: SDUPTHER

## 2021-03-24 RX ORDER — METOPROLOL SUCCINATE 100 MG/1
100 TABLET, EXTENDED RELEASE ORAL DAILY
Qty: 90 TABLET | Refills: 3 | Status: SHIPPED | OUTPATIENT
Start: 2021-03-24 | End: 2021-05-10

## 2021-03-24 RX ORDER — ASPIRIN 81 MG/1
81 TABLET ORAL DAILY
Qty: 90 TABLET | Refills: 0
Start: 2021-03-24 | End: 2023-01-25 | Stop reason: ALTCHOICE

## 2021-03-24 NOTE — PROGRESS NOTES
Date of Office Visit: 2021  Encounter Provider: TRINI Bangura  Place of Service: [facility] CARDIOLOGY  Patient Name: Ayana Reyna  :1940  Primary Cardiologist: Dr. Ana Orozco     Chief Complaint   Patient presents with   • Chest Pain   • Shortness of Breath   • Hypertension   :     HPI: Ayana Reyna is a pleasant 80 y.o. female who presents today for cardiac reassessment.  I reviewed her medical records.    She has a known history of hypertension, hyperlipidemia, and hypothyroidism.  She is  with 4 daughters and describes herself as the matriarch of the large family.  Her , Sandoval Carty is also a patient of ours.     In 2020, she had a 2D echocardiogram completed with the following results: EF 65%, grade 2 diastolic dysfunction, mild LVH, mild to moderate aortic stenosis, mild to moderate aortic insufficiency, moderate mitral insufficiency, mild pulmonary hypertension, and mild to moderate tricuspid insufficiency.     In 2020, she was evaluated by Dr. Orozco.  In 2020, she was exercise walking at the mall and became short of breath and very diaphoretic. She reported some new onset intermittent chest tightness with exertion and new onset lower extremity edema.  Dr. Orozco initiated HCTZ.  On 2020, nuclear stress test was completed and showed no evidence of ischemia.    In 2020, she followed up with me in the office.  She reported midsternal chest tightness and dyspnea.  She had also lost vision in her right eye and had bilateral hearing loss.  She felt like her blood pressure was elevated because of stress.  I recommended adding amlodipine 5 mg for better blood pressure control.  As far as her symptoms, she wanted to see if they would improve with better blood pressure and we discussed that potentially a heart catheterization may be warranted.    In 2020, she followed up with Dr. Orozco.  Her  blood pressure was 140/70.  She was started on spironolactone 25 mg in the morning and recommended decrease the ramipril to 10 mg at nighttime.  Her HCT was discontinued and she was to take amlodipine in the morning.  Dr. Forte said we may need to increase her metoprolol to 150 mg at nighttime.      On 1/22/2021, she had a CT angiogram of her heart.  This showed a 9 mm left upper lobe pulmonary nodule, 2 smaller pleural based nodules at the left upper lobe, linear scarring at the lower and right lobes, and scarring of the medial aspect of the right lower lobe.  Structurally her heart was normal, left main had greater than 50% calcified ostial stenosis, 50% proximal calcified LAD stenosis, 50% mid calcified LAD stenosis, greater than 50% distal mixed plaque stenosis, less than 25% calcified proximal circumflex stenosis, and the RCA was almost nonexistent.  Her total calcium score was 252.    On 2/2/2021 she had a stress echocardiogram completed with the following results: EF 62%, grade 2 diastolic dysfunction, mild aortic valve stenosis, moderate aortic valve calcification, trace aortic regurgitation, mild mitral valve regurgitation, moderate mitral annular calcification, mild tricuspid regurgitation, normal stress portion.    She presents today for follow-up visit.  Her blood pressure is under excellent control with her current medication regimen.  She continues to experience midsternal chest tightness and dyspnea with exertion that resolves with rest.  She notices this just with making her bed.  She has lost vision in both of her eyes and feels dizzy/lightheaded because of this she thinks.  She denies palpitations, edema, syncope, or bleeding.    Past Medical History:   Diagnosis Date   • Acquired hypothyroidism    • Allergic rhinitis    • Aortic stenosis     mild to moderate per echo 2020   • Fatigue    • Glucose intolerance (impaired glucose tolerance)    • Hearing loss    • Heart murmur    • Heart  palpitations    • Hematuria    • Hyperlipemia    • Hypertension    • Insomnia    • Macular degeneration     vision loss of left eye    • Migraine    • Nonrheumatic aortic valve insufficiency 10/19/2020   • Osteoporosis    • Pulmonary nodules 3/24/2021   • Shortness of breath    • Vision loss, bilateral     left eye macular degeneration; right eye etiology unknown   • Vitamin D deficiency    • Wears hearing aid in both ears 12/2019       Past Surgical History:   Procedure Laterality Date   • CHOLECYSTECTOMY     • COLONOSCOPY  2008   • FRACTURE SURGERY  1975,2014   • HYSTERECTOMY N/A 1971    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   • JOINT REPLACEMENT  1995, 2013   • KNEE SURGERY  1989   • TONSILLECTOMY  1950       Social History     Socioeconomic History   • Marital status:      Spouse name: Not on file   • Number of children: Not on file   • Years of education: Not on file   • Highest education level: Not on file   Tobacco Use   • Smoking status: Never Smoker   • Smokeless tobacco: Never Used   Substance and Sexual Activity   • Alcohol use: Yes     Alcohol/week: 1.0 standard drinks     Types: 1 Glasses of wine per week     Comment: Less than 1 glass of wine a week//caffeine use:1 cup daily   • Drug use: No   • Sexual activity: Never     Partners: Male       Family History   Problem Relation Age of Onset   • Hodgkin's lymphoma Mother    • Cancer Mother        The following portion of the patient's history were reviewed and updated as appropriate: past medical history, past surgical history, past social history, past family history, allergies, current medications, and problem list.    Review of Systems   Constitutional: Positive for weight gain.   HENT: Positive for hearing loss.    Eyes: Positive for vision loss in left eye and vision loss in right eye.   Cardiovascular: Positive for dyspnea on exertion.   Respiratory: Positive for shortness of breath.    Hematologic/Lymphatic: Negative.    Musculoskeletal: Positive for joint pain  "and myalgias.   Neurological: Positive for dizziness and light-headedness.       No Known Allergies      Current Outpatient Medications:   •  amLODIPine (NORVASC) 5 MG tablet, Take 1 tablet by mouth Daily., Disp: 90 tablet, Rfl: 3  •  atorvastatin (LIPITOR) 20 MG tablet, Take 0.5 tablets by mouth Daily., Disp: 90 tablet, Rfl: 3  •  cholecalciferol (VITAMIN D3) 1000 UNITS tablet, Take 1,000 Units by mouth daily., Disp: , Rfl:   •  Euthyrox 50 MCG tablet, Take 1 tablet by mouth once daily, Disp: 90 tablet, Rfl: 0  •  metoprolol succinate XL (TOPROL-XL) 100 MG 24 hr tablet, Take 1 tablet by mouth Daily., Disp: 90 tablet, Rfl: 3  •  Multiple Vitamins-Minerals (PRESERVISION/LUTEIN) capsule, Take  by mouth 2 (two) times a day., Disp: , Rfl:   •  ramipril (ALTACE) 10 MG capsule, Take 1 capsule by mouth Every Night., Disp: 90 capsule, Rfl: 3  •  spironolactone (ALDACTONE) 25 MG tablet, Take 1 tablet by mouth Every Morning., Disp: 90 tablet, Rfl: 3  •  traZODone (DESYREL) 50 MG tablet, 1.5 po qhs, Disp: 135 tablet, Rfl: 3  •  vitamin B-12 (CYANOCOBALAMIN) 100 MCG tablet, Take 50 mcg by mouth daily., Disp: , Rfl:         Objective:     Vitals:    03/24/21 0801 03/24/21 0808   BP: 122/64 122/64   BP Location: Left arm Right arm   Pulse: 72    Weight: 81.1 kg (178 lb 12.8 oz)    Height: 154.9 cm (61\")      Body mass index is 33.78 kg/m².    PHYSICAL EXAM:    Vitals Reviewed.   General Appearance: No acute distress, well developed and well nourished. Obese.   Eyes: Conjunctiva and lids: No erythema, swelling, or discharge. Sclera non-icteric.   HENT: Atraumatic, normocephalic. External eyes, ears, and nose normal. No hearing loss noted. Mucous membranes normal. Lips not cyanotic. Neck supple with no tenderness.  Respiratory: No signs of respiratory distress. Respiration rhythm and depth normal.   Clear to auscultation. No rales, crackles, rhonchi, or wheezing auscultated.   Cardiovascular:  Jugular Venous Pressure: Normal  Heart " Rate and Rhythm: Normal, Heart Sounds: Normal S1 and S2. No S3 or S4 noted.  Murmurs: No murmurs noted. No rubs, thrills, or gallops.   Lower Extremities: No edema noted.  Gastrointestinal:  Abdomen soft, non-distended, non-tender. Normal bowel sounds.    Musculoskeletal: Normal movement of extremities  Skin and Nails: General appearance normal. No pallor, cyanosis, diaphoresis. Skin temperature normal. No clubbing of fingernails.   Psychiatric: Patient alert and oriented to person, place, and time. Speech and behavior appropriate. Normal mood and affect.       ECG 12 Lead    Date/Time: 3/24/2021 8:04 AM  Performed by: Aminta Bowles APRN  Authorized by: Aminta Bowles APRN   Comparison: compared with previous ECG from 1/22/2021  Similar to previous ECG  Rhythm: sinus rhythm  Rate: normal  BPM: 72  Conduction: conduction normal  ST Segments: ST segments normal  T Waves: T waves normal  QRS axis: normal  Other: no other findings    Clinical impression: normal ECG              Assessment:       Diagnosis Plan   1. Chest tightness  Case Request Cath Lab: Coronary angiography, Left heart cath, Left ventriculography   2. Dyspnea on exertion  Case Request Cath Lab: Coronary angiography, Left heart cath, Left ventriculography   3. Coronary artery calcification  Case Request Cath Lab: Coronary angiography, Left heart cath, Left ventriculography   4. Essential hypertension  metoprolol succinate XL (TOPROL-XL) 100 MG 24 hr tablet   5. Mixed hyperlipidemia     6. Exudative age-related macular degeneration, left eye, with inactive scar (CMS/HCC)     7. Other hyperlipidemia  atorvastatin (LIPITOR) 20 MG tablet   8. Pulmonary nodules            Plan:       1/2/3.  Chest Tightness and Dyspnea on Exertion: Since at least July 2020, she has been experiencing midsternal chest tightness and dyspnea with exertion that resolves with rest.  She now notices symptoms with minimal exertion such as making her bed or doing her normal  housework.  Despite the medication changes in her regimen, her symptoms have not improved.  A coronary CT angiogram showed greater than 50% calcified left main stenosis and 50% stenosis in the LAD as well.  I recommended adding aspirin 81 mg and increasing Lipitor to 20 mg to her regimen.  I have recommended proceeding with a left heart catheterization for further evaluation.    4.  Hypertension: Blood pressure well controlled.    5.  Hyperlipidemia: Increase atorvastatin to 20 mg daily.    6.  Macular Degeneration: She has vision loss of both eyes.    7.  Pulmonary Nodules: Noted on CT scan and she had some mild scarring of the lungs.  I recommended follow-up with Dr. Giraldo.    8.  Further recommendations to follow after heart catheterization.    As always, it has been a pleasure to participate in your patient's care. Thank you.       Sincerely,       TRINI Honeycutt  [facility]      · COVID-19 Precautions - Patient was compliant in wearing a mask. When I saw the patient, I used appropriate personal protective equipment (PPE) including mask and eye shield (standard procedure).  Additionally, I used gown and gloves if indicated.  Hand hygiene was completed before and after seeing the patient.  · Dictated utilizing Dragon Dictation

## 2021-03-25 ENCOUNTER — TRANSCRIBE ORDERS (OUTPATIENT)
Dept: LAB | Facility: HOSPITAL | Age: 81
End: 2021-03-25

## 2021-03-25 DIAGNOSIS — Z01.818 OTHER SPECIFIED PRE-OPERATIVE EXAMINATION: Primary | ICD-10-CM

## 2021-03-26 ENCOUNTER — LAB (OUTPATIENT)
Dept: LAB | Facility: HOSPITAL | Age: 81
End: 2021-03-26

## 2021-03-26 DIAGNOSIS — Z01.818 OTHER SPECIFIED PRE-OPERATIVE EXAMINATION: ICD-10-CM

## 2021-03-26 DIAGNOSIS — Z13.6 SCREENING FOR ISCHEMIC HEART DISEASE: ICD-10-CM

## 2021-03-26 DIAGNOSIS — Z01.810 PRE-OPERATIVE CARDIOVASCULAR EXAMINATION: ICD-10-CM

## 2021-03-26 LAB
ANION GAP SERPL CALCULATED.3IONS-SCNC: 8.3 MMOL/L (ref 5–15)
BASOPHILS # BLD AUTO: 0.08 10*3/MM3 (ref 0–0.2)
BASOPHILS NFR BLD AUTO: 1.1 % (ref 0–1.5)
BUN SERPL-MCNC: 17 MG/DL (ref 8–23)
BUN/CREAT SERPL: 17.7 (ref 7–25)
CALCIUM SPEC-SCNC: 9.7 MG/DL (ref 8.6–10.5)
CHLORIDE SERPL-SCNC: 107 MMOL/L (ref 98–107)
CO2 SERPL-SCNC: 26.7 MMOL/L (ref 22–29)
CREAT SERPL-MCNC: 0.96 MG/DL (ref 0.57–1)
DEPRECATED RDW RBC AUTO: 42 FL (ref 37–54)
EOSINOPHIL # BLD AUTO: 0.24 10*3/MM3 (ref 0–0.4)
EOSINOPHIL NFR BLD AUTO: 3.2 % (ref 0.3–6.2)
ERYTHROCYTE [DISTWIDTH] IN BLOOD BY AUTOMATED COUNT: 12 % (ref 12.3–15.4)
GFR SERPL CREATININE-BSD FRML MDRD: 56 ML/MIN/1.73
GLUCOSE SERPL-MCNC: 107 MG/DL (ref 65–99)
HCT VFR BLD AUTO: 37.1 % (ref 34–46.6)
HGB BLD-MCNC: 12.4 G/DL (ref 12–15.9)
IMM GRANULOCYTES # BLD AUTO: 0.02 10*3/MM3 (ref 0–0.05)
IMM GRANULOCYTES NFR BLD AUTO: 0.3 % (ref 0–0.5)
LYMPHOCYTES # BLD AUTO: 3.58 10*3/MM3 (ref 0.7–3.1)
LYMPHOCYTES NFR BLD AUTO: 47.7 % (ref 19.6–45.3)
MCH RBC QN AUTO: 32.5 PG (ref 26.6–33)
MCHC RBC AUTO-ENTMCNC: 33.4 G/DL (ref 31.5–35.7)
MCV RBC AUTO: 97.1 FL (ref 79–97)
MONOCYTES # BLD AUTO: 0.42 10*3/MM3 (ref 0.1–0.9)
MONOCYTES NFR BLD AUTO: 5.6 % (ref 5–12)
NEUTROPHILS NFR BLD AUTO: 3.17 10*3/MM3 (ref 1.7–7)
NEUTROPHILS NFR BLD AUTO: 42.1 % (ref 42.7–76)
NRBC BLD AUTO-RTO: 0 /100 WBC (ref 0–0.2)
PLATELET # BLD AUTO: 203 10*3/MM3 (ref 140–450)
PMV BLD AUTO: 10.4 FL (ref 6–12)
POTASSIUM SERPL-SCNC: 4.7 MMOL/L (ref 3.5–5.2)
RBC # BLD AUTO: 3.82 10*6/MM3 (ref 3.77–5.28)
SODIUM SERPL-SCNC: 142 MMOL/L (ref 136–145)
WBC # BLD AUTO: 7.51 10*3/MM3 (ref 3.4–10.8)

## 2021-03-26 PROCEDURE — C9803 HOPD COVID-19 SPEC COLLECT: HCPCS

## 2021-03-26 PROCEDURE — 36415 COLL VENOUS BLD VENIPUNCTURE: CPT

## 2021-03-26 PROCEDURE — U0005 INFEC AGEN DETEC AMPLI PROBE: HCPCS

## 2021-03-26 PROCEDURE — 80048 BASIC METABOLIC PNL TOTAL CA: CPT

## 2021-03-26 PROCEDURE — U0004 COV-19 TEST NON-CDC HGH THRU: HCPCS

## 2021-03-26 PROCEDURE — 85025 COMPLETE CBC W/AUTO DIFF WBC: CPT

## 2021-03-27 LAB — SARS-COV-2 RNA RESP QL NAA+PROBE: NOT DETECTED

## 2021-03-29 ENCOUNTER — HOSPITAL ENCOUNTER (OUTPATIENT)
Facility: HOSPITAL | Age: 81
Setting detail: HOSPITAL OUTPATIENT SURGERY
Discharge: HOME | End: 2021-03-29
Attending: INTERNAL MEDICINE | Admitting: INTERNAL MEDICINE

## 2021-03-29 VITALS
WEIGHT: 175 LBS | BODY MASS INDEX: 33.04 KG/M2 | HEART RATE: 62 BPM | RESPIRATION RATE: 20 BRPM | HEIGHT: 61 IN | OXYGEN SATURATION: 97 % | DIASTOLIC BLOOD PRESSURE: 65 MMHG | TEMPERATURE: 98.9 F | SYSTOLIC BLOOD PRESSURE: 141 MMHG

## 2021-03-29 DIAGNOSIS — I25.84 CORONARY ARTERY CALCIFICATION: ICD-10-CM

## 2021-03-29 DIAGNOSIS — I25.10 CORONARY ARTERY CALCIFICATION: ICD-10-CM

## 2021-03-29 DIAGNOSIS — R06.09 DYSPNEA ON EXERTION: ICD-10-CM

## 2021-03-29 DIAGNOSIS — R07.89 CHEST TIGHTNESS: ICD-10-CM

## 2021-03-29 PROCEDURE — 93458 L HRT ARTERY/VENTRICLE ANGIO: CPT | Performed by: INTERNAL MEDICINE

## 2021-03-29 PROCEDURE — C1894 INTRO/SHEATH, NON-LASER: HCPCS | Performed by: INTERNAL MEDICINE

## 2021-03-29 PROCEDURE — 0 IOPAMIDOL PER 1 ML: Performed by: INTERNAL MEDICINE

## 2021-03-29 PROCEDURE — C1769 GUIDE WIRE: HCPCS | Performed by: INTERNAL MEDICINE

## 2021-03-29 PROCEDURE — 99152 MOD SED SAME PHYS/QHP 5/>YRS: CPT | Performed by: INTERNAL MEDICINE

## 2021-03-29 PROCEDURE — 25010000002 MIDAZOLAM PER 1 MG: Performed by: INTERNAL MEDICINE

## 2021-03-29 PROCEDURE — 25010000002 FENTANYL CITRATE (PF) 100 MCG/2ML SOLUTION: Performed by: INTERNAL MEDICINE

## 2021-03-29 PROCEDURE — 25010000002 HEPARIN (PORCINE) PER 1000 UNITS: Performed by: INTERNAL MEDICINE

## 2021-03-29 RX ORDER — LIDOCAINE HYDROCHLORIDE 20 MG/ML
INJECTION, SOLUTION INFILTRATION; PERINEURAL AS NEEDED
Status: DISCONTINUED | OUTPATIENT
Start: 2021-03-29 | End: 2021-03-29 | Stop reason: HOSPADM

## 2021-03-29 RX ORDER — SODIUM CHLORIDE 9 MG/ML
75 INJECTION, SOLUTION INTRAVENOUS CONTINUOUS
Status: DISCONTINUED | OUTPATIENT
Start: 2021-03-29 | End: 2021-03-29 | Stop reason: HOSPADM

## 2021-03-29 RX ORDER — ACETAMINOPHEN 325 MG/1
650 TABLET ORAL EVERY 4 HOURS PRN
Status: CANCELLED | OUTPATIENT
Start: 2021-03-29

## 2021-03-29 RX ORDER — FENTANYL CITRATE 50 UG/ML
INJECTION, SOLUTION INTRAMUSCULAR; INTRAVENOUS AS NEEDED
Status: DISCONTINUED | OUTPATIENT
Start: 2021-03-29 | End: 2021-03-29 | Stop reason: HOSPADM

## 2021-03-29 RX ORDER — MIDAZOLAM HYDROCHLORIDE 1 MG/ML
INJECTION INTRAMUSCULAR; INTRAVENOUS AS NEEDED
Status: DISCONTINUED | OUTPATIENT
Start: 2021-03-29 | End: 2021-03-29 | Stop reason: HOSPADM

## 2021-03-29 RX ORDER — SODIUM CHLORIDE 0.9 % (FLUSH) 0.9 %
3 SYRINGE (ML) INJECTION EVERY 12 HOURS SCHEDULED
Status: DISCONTINUED | OUTPATIENT
Start: 2021-03-29 | End: 2021-03-29 | Stop reason: HOSPADM

## 2021-03-29 RX ORDER — LIDOCAINE HYDROCHLORIDE 10 MG/ML
0.1 INJECTION, SOLUTION EPIDURAL; INFILTRATION; INTRACAUDAL; PERINEURAL ONCE AS NEEDED
Status: DISCONTINUED | OUTPATIENT
Start: 2021-03-29 | End: 2021-03-29 | Stop reason: HOSPADM

## 2021-03-29 RX ORDER — SODIUM CHLORIDE 0.9 % (FLUSH) 0.9 %
10 SYRINGE (ML) INJECTION AS NEEDED
Status: CANCELLED | OUTPATIENT
Start: 2021-03-29

## 2021-03-29 RX ORDER — SODIUM CHLORIDE 0.9 % (FLUSH) 0.9 %
10 SYRINGE (ML) INJECTION AS NEEDED
Status: DISCONTINUED | OUTPATIENT
Start: 2021-03-29 | End: 2021-03-29 | Stop reason: HOSPADM

## 2021-03-29 RX ORDER — SODIUM CHLORIDE 0.9 % (FLUSH) 0.9 %
3 SYRINGE (ML) INJECTION EVERY 12 HOURS SCHEDULED
Status: CANCELLED | OUTPATIENT
Start: 2021-03-29

## 2021-03-29 RX ADMIN — SODIUM CHLORIDE 75 ML/HR: 9 INJECTION, SOLUTION INTRAVENOUS at 08:23

## 2021-04-05 ENCOUNTER — TELEPHONE (OUTPATIENT)
Dept: CARDIOLOGY | Facility: CLINIC | Age: 81
End: 2021-04-05

## 2021-04-05 NOTE — TELEPHONE ENCOUNTER
I called to check on patient after the heart catheterization.  She is doing fine.  She walked at the mall today and had some shortness of breath and chest tightness.  We reviewed her heart catheterization results which showed coronary artery calcification, but no stenosis.  I do not feel that her symptoms are coming from a cardiac origin.  I have recommended follow-up with her PCP for possible pulmonary etiology.    She will follow-up with Dr. Orozco in 6 months.

## 2021-04-06 NOTE — TELEPHONE ENCOUNTER
Margarita Giraldo MD Simpson, Shereha, MA  Caller: Unspecified (Yesterday,  4:21 PM)  Ok to work her in             4/6/2021-Spoke to patient- She has been scheduled for Thursday 4/8/2021 at 9 AM.

## 2021-04-08 ENCOUNTER — OFFICE VISIT (OUTPATIENT)
Dept: INTERNAL MEDICINE | Facility: CLINIC | Age: 81
End: 2021-04-08

## 2021-04-08 VITALS
BODY MASS INDEX: 33.64 KG/M2 | WEIGHT: 178.2 LBS | OXYGEN SATURATION: 98 % | HEIGHT: 61 IN | RESPIRATION RATE: 16 BRPM | TEMPERATURE: 97.5 F | DIASTOLIC BLOOD PRESSURE: 78 MMHG | SYSTOLIC BLOOD PRESSURE: 135 MMHG | HEART RATE: 72 BPM

## 2021-04-08 DIAGNOSIS — R91.8 INDETERMINATE PULMONARY NODULES: ICD-10-CM

## 2021-04-08 DIAGNOSIS — I10 ESSENTIAL HYPERTENSION: ICD-10-CM

## 2021-04-08 DIAGNOSIS — E03.9 ACQUIRED HYPOTHYROIDISM: ICD-10-CM

## 2021-04-08 DIAGNOSIS — E78.2 MIXED HYPERLIPIDEMIA: ICD-10-CM

## 2021-04-08 DIAGNOSIS — R91.8 PULMONARY NODULES: ICD-10-CM

## 2021-04-08 DIAGNOSIS — R73.02 GLUCOSE INTOLERANCE (IMPAIRED GLUCOSE TOLERANCE): ICD-10-CM

## 2021-04-08 DIAGNOSIS — R06.02 SHORTNESS OF BREATH ON EXERTION: Primary | ICD-10-CM

## 2021-04-08 PROBLEM — R07.89 CHEST TIGHTNESS: Status: RESOLVED | Noted: 2021-03-24 | Resolved: 2021-04-08

## 2021-04-08 LAB
ALBUMIN SERPL-MCNC: 4.2 G/DL (ref 3.5–5.2)
ALBUMIN/GLOB SERPL: 2.2 G/DL
ALP SERPL-CCNC: 95 U/L (ref 39–117)
ALT SERPL-CCNC: 15 U/L (ref 1–33)
AST SERPL-CCNC: 18 U/L (ref 1–32)
BILIRUB SERPL-MCNC: 1.3 MG/DL (ref 0–1.2)
BUN SERPL-MCNC: 17 MG/DL (ref 8–23)
BUN/CREAT SERPL: 20.7 (ref 7–25)
CALCIUM SERPL-MCNC: 9.8 MG/DL (ref 8.6–10.5)
CHLORIDE SERPL-SCNC: 108 MMOL/L (ref 98–107)
CHOLEST SERPL-MCNC: 99 MG/DL (ref 0–200)
CO2 SERPL-SCNC: 26.2 MMOL/L (ref 22–29)
CREAT SERPL-MCNC: 0.82 MG/DL (ref 0.57–1)
GLOBULIN SER CALC-MCNC: 1.9 GM/DL
GLUCOSE SERPL-MCNC: 98 MG/DL (ref 65–99)
HBA1C MFR BLD: 5.4 % (ref 4.8–5.6)
HDLC SERPL-MCNC: 63 MG/DL (ref 40–60)
LDLC SERPL CALC-MCNC: 21 MG/DL (ref 0–100)
POTASSIUM SERPL-SCNC: 5.2 MMOL/L (ref 3.5–5.2)
PROT SERPL-MCNC: 6.1 G/DL (ref 6–8.5)
SODIUM SERPL-SCNC: 142 MMOL/L (ref 136–145)
TRIGL SERPL-MCNC: 69 MG/DL (ref 0–150)
TSH SERPL DL<=0.005 MIU/L-ACNC: 4.1 UIU/ML (ref 0.27–4.2)
VLDLC SERPL CALC-MCNC: 15 MG/DL (ref 5–40)

## 2021-04-08 PROCEDURE — 99214 OFFICE O/P EST MOD 30 MIN: CPT | Performed by: INTERNAL MEDICINE

## 2021-04-08 NOTE — PROGRESS NOTES
"Chief Complaint  Shortness of Breath, Fall (2 days ago -in shower), Eye Problem (macular degeneration), and Hypertension    Subjective          Ayana Reyna presents to [facility] for   Shortness of Breath  This is a recurrent problem. The current episode started more than 1 year ago. The problem occurs intermittently. The problem has been unchanged. Pertinent negatives include no chest pain, fever, headaches, leg swelling, vomiting or wheezing. The symptoms are aggravated by any activity.   Hypertension  This is a chronic problem. The current episode started more than 1 year ago. The problem is unchanged. The problem is controlled. Pertinent negatives include no chest pain, headaches, palpitations or shortness of breath.   Fall  The accident occurred 2 days ago. The fall occurred while standing. She fell from a height of 1 to 2 ft. She landed on hard floor. The point of impact was the head. The pain is present in the head. The pain is mild. Pertinent negatives include no fever, headaches, loss of consciousness, nausea, numbness, tingling, visual change or vomiting.       Review of Systems   Constitutional: Negative for chills, fatigue and fever.   Respiratory: Negative for cough, shortness of breath and wheezing.    Cardiovascular: Negative for chest pain, palpitations and leg swelling.   Gastrointestinal: Negative for nausea and vomiting.   Neurological: Negative for tingling, loss of consciousness, numbness and headaches.        Objective   Vital Signs:   /78 (BP Location: Left arm, Patient Position: Sitting, Cuff Size: Adult)   Pulse 72   Temp 97.5 °F (36.4 °C) (Temporal)   Resp 16   Ht 154.9 cm (61\")   Wt 80.8 kg (178 lb 3.2 oz)   SpO2 98%   BMI 33.67 kg/m²     Physical Exam  Vitals and nursing note reviewed.   Constitutional:       General: She is not in acute distress.     Appearance: She is well-developed.   Cardiovascular:      Rate and Rhythm: Normal rate and " regular rhythm.      Heart sounds: Normal heart sounds.   Pulmonary:      Effort: No respiratory distress.      Breath sounds: No wheezing or rales.   Chest:      Chest wall: No tenderness.   Psychiatric:         Behavior: Behavior normal.        Result Review :                 Assessment and Plan    Problem List Items Addressed This Visit        Unprioritized    Glucose intolerance (impaired glucose tolerance)    Relevant Orders    Hemoglobin A1c    Hypertension    Current Assessment & Plan     Hypertension is improving with treatment.  Continue current treatment regimen.  Dietary sodium restriction.  Weight loss.  Regular aerobic exercise.  Continue current medications.  Blood pressure will be reassessed at the next regular appointment.         Relevant Orders    Comprehensive Metabolic Panel    Lipid Panel    Hyperlipemia    Current Assessment & Plan     Lipid abnormalities are unchanged.  Nutritional counseling was provided.  Lipids will be reassessed in 3 months.         Acquired hypothyroidism    Current Assessment & Plan     Continue euthyrox 50 mcg daily.         Relevant Orders    TSH Rfx On Abnormal To Free T4    Pulmonary nodules    Current Assessment & Plan     Need recheck chest CT and referral to pulmonology.         Shortness of breath on exertion - Primary    Overview     Added automatically from request for surgery [id number]         Relevant Orders    Ambulatory Referral to Pulmonology    CT Chest Without Contrast      Other Visit Diagnoses     Indeterminate pulmonary nodules        Relevant Orders    Ambulatory Referral to Pulmonology    CT Chest Without Contrast          Follow Up   Return in about 2 months (around 6/8/2021), or if symptoms worsen or fail to improve, for Next scheduled follow up.  Patient was given instructions and counseling regarding her condition or for health maintenance advice. Please see specific information pulled into the AVS if appropriate.      Exercise oximetry showed  resting oxygen of 100% but it did decrease to 90% with exercise and her heart rate went up to 105.

## 2021-04-21 ENCOUNTER — HOSPITAL ENCOUNTER (OUTPATIENT)
Dept: CT IMAGING | Facility: HOSPITAL | Age: 81
Discharge: HOME | End: 2021-04-21
Admitting: INTERNAL MEDICINE

## 2021-04-21 DIAGNOSIS — R91.8 INDETERMINATE PULMONARY NODULES: ICD-10-CM

## 2021-04-21 DIAGNOSIS — R06.02 SHORTNESS OF BREATH ON EXERTION: ICD-10-CM

## 2021-04-21 PROCEDURE — 71250 CT THORAX DX C-: CPT

## 2021-05-10 DIAGNOSIS — E03.9 ACQUIRED HYPOTHYROIDISM: ICD-10-CM

## 2021-05-10 DIAGNOSIS — I10 ESSENTIAL HYPERTENSION: ICD-10-CM

## 2021-05-10 RX ORDER — METOPROLOL SUCCINATE 100 MG/1
TABLET, EXTENDED RELEASE ORAL
Qty: 90 TABLET | Refills: 0 | Status: SHIPPED | OUTPATIENT
Start: 2021-05-10 | End: 2021-08-16

## 2021-05-10 RX ORDER — LEVOTHYROXINE SODIUM 50 UG/1
TABLET ORAL
Qty: 90 TABLET | Refills: 0 | Status: SHIPPED | OUTPATIENT
Start: 2021-05-10 | End: 2021-08-09

## 2021-05-28 ENCOUNTER — OFFICE VISIT (OUTPATIENT)
Dept: INTERNAL MEDICINE | Facility: CLINIC | Age: 81
End: 2021-05-28

## 2021-05-28 VITALS
RESPIRATION RATE: 15 BRPM | OXYGEN SATURATION: 98 % | WEIGHT: 175 LBS | TEMPERATURE: 97.8 F | BODY MASS INDEX: 33.04 KG/M2 | HEIGHT: 61 IN | DIASTOLIC BLOOD PRESSURE: 70 MMHG | SYSTOLIC BLOOD PRESSURE: 122 MMHG | HEART RATE: 74 BPM

## 2021-05-28 DIAGNOSIS — Z00.00 ENCOUNTER FOR SUBSEQUENT ANNUAL WELLNESS VISIT IN MEDICARE PATIENT: Primary | ICD-10-CM

## 2021-05-28 DIAGNOSIS — Z12.31 ENCOUNTER FOR SCREENING MAMMOGRAM FOR BREAST CANCER: ICD-10-CM

## 2021-05-28 PROBLEM — H40.023 OPEN ANGLE WITH BORDERLINE FINDINGS, HIGH RISK, BILATERAL: Status: ACTIVE | Noted: 2021-04-14

## 2021-05-28 PROBLEM — H25.13 AGE-RELATED NUCLEAR CATARACT OF BOTH EYES: Status: ACTIVE | Noted: 2021-04-14

## 2021-05-28 PROCEDURE — G0439 PPPS, SUBSEQ VISIT: HCPCS | Performed by: INTERNAL MEDICINE

## 2021-05-28 NOTE — PROGRESS NOTES
The ABCs of the Annual Wellness Visit  Subsequent Medicare Wellness Visit    Chief Complaint   Patient presents with   • Medicare Wellness-subsequent       Subjective   History of Present Illness:  Ayana Reyna is a 81 y.o. female who presents for a Subsequent Medicare Wellness Visit.    HEALTH RISK ASSESSMENT    Recent Hospitalizations:  No hospitalization(s) within the last year.    Current Medical Providers:  Patient Care Team:  Margarita Giraldo MD as PCP - General (Internal Medicine)  Tomer Fong MD as Consulting Physician (Orthopedic Surgery)  Toi Riley MD as Consulting Physician (Ophthalmology)  Monica Nichols MD (Dermatology)  Amanuel Nuñez MD as Consulting Physician (Orthopedic Surgery)  Darryl Joel MD as Consulting Physician (Otolaryngology)    Smoking Status:  Social History     Tobacco Use   Smoking Status Never Smoker   Smokeless Tobacco Never Used       Alcohol Consumption:  Social History     Substance and Sexual Activity   Alcohol Use Yes   • Alcohol/week: 1.0 standard drinks   • Types: 1 Glasses of wine per week    Comment: Less than 1 glass of wine a week//caffeine use:1 cup daily       Depression Screen:   PHQ-2/PHQ-9 Depression Screening 5/28/2021   Little interest or pleasure in doing things 0   Feeling down, depressed, or hopeless 0   Trouble falling or staying asleep, or sleeping too much 1   Feeling tired or having little energy 1   Poor appetite or overeating 0   Feeling bad about yourself - or that you are a failure or have let yourself or your family down 0   Trouble concentrating on things, such as reading the newspaper or watching television 1   Moving or speaking so slowly that other people could have noticed. Or the opposite - being so fidgety or restless that you have been moving around a lot more than usual 0   Thoughts that you would be better off dead, or of hurting yourself in some way 0   Total Score 3   If you checked off any problems, how  difficult have these problems made it for you to do your work, take care of things at home, or get along with other people? Somewhat difficult       Fall Risk Screen:  YARITZA Fall Risk Assessment has not been completed.    Health Habits and Functional and Cognitive Screening:  Functional & Cognitive Status 5/28/2021   Do you have difficulty preparing food and eating? No   Do you have difficulty bathing yourself, getting dressed or grooming yourself? No   Do you have difficulty using the toilet? No   Do you have difficulty moving around from place to place? No   Do you have trouble with steps or getting out of a bed or a chair? No   Current Diet Well Balanced Diet   Dental Exam Up to date   Eye Exam Up to date   Exercise (times per week) 0 times per week   Current Exercises Include No Regular Exercise   Current Exercise Activities Include -   Do you need help using the phone?  No   Are you deaf or do you have serious difficulty hearing?  Yes   Do you need help with transportation? Yes   Do you need help shopping? No   Do you need help preparing meals?  No   Do you need help with housework?  No   Do you need help with laundry? No   Do you need help taking your medications? No   Do you need help managing money? No   Do you ever drive or ride in a car without wearing a seat belt? No   Have you felt unusual stress, anger or loneliness in the last month? Yes   Who do you live with? Spouse   If you need help, do you have trouble finding someone available to you? No   Have you been bothered in the last four weeks by sexual problems? No   Do you have difficulty concentrating, remembering or making decisions? No         Does the patient have evidence of cognitive impairment? No    Asprin use counseling:Taking ASA appropriately as indicated    Age-appropriate Screening Schedule:  Refer to the list below for future screening recommendations based on patient's age, sex and/or medical conditions. Orders for these recommended  tests are listed in the plan section. The patient has been provided with a written plan.    Health Maintenance   Topic Date Due   • ZOSTER VACCINE (2 of 3) 06/06/2022 (Originally 3/28/2012)   • INFLUENZA VACCINE  08/01/2021   • DXA SCAN  08/27/2021   • TDAP/TD VACCINES (2 - Td or Tdap) 02/01/2022   • MAMMOGRAM  03/27/2022   • LIPID PANEL  04/08/2022          The following portions of the patient's history were reviewed and updated as appropriate: allergies, current medications, past family history, past medical history, past social history, past surgical history and problem list.    Outpatient Medications Prior to Visit   Medication Sig Dispense Refill   • amLODIPine (NORVASC) 5 MG tablet Take 1 tablet by mouth Daily. 90 tablet 3   • aspirin (aspirin) 81 MG EC tablet Take 1 tablet by mouth Daily. 90 tablet 0   • atorvastatin (LIPITOR) 20 MG tablet Take 1 tablet by mouth Daily. 90 tablet 3   • cholecalciferol (VITAMIN D3) 1000 UNITS tablet Take 1,000 Units by mouth daily.     • Euthyrox 50 MCG tablet Take 1 tablet by mouth once daily 90 tablet 0   • metoprolol succinate XL (TOPROL-XL) 100 MG 24 hr tablet Take 1 tablet by mouth once daily 90 tablet 0   • Multiple Vitamins-Minerals (PRESERVISION/LUTEIN) capsule Take  by mouth 2 (two) times a day.     • ramipril (ALTACE) 10 MG capsule Take 1 capsule by mouth Every Night. 90 capsule 3   • spironolactone (ALDACTONE) 25 MG tablet Take 1 tablet by mouth Every Morning. 90 tablet 3   • traZODone (DESYREL) 50 MG tablet 1.5 po qhs 135 tablet 3   • vitamin B-12 (CYANOCOBALAMIN) 100 MCG tablet Take 50 mcg by mouth daily.       No facility-administered medications prior to visit.       Patient Active Problem List   Diagnosis   • Vitamin D deficiency   • Allergic rhinitis   • Glucose intolerance (impaired glucose tolerance)   • Osteoporosis   • Fatigue   • Hypertension   • Migraine   • Insomnia   • Hyperlipemia   • Macular degeneration of both eyes   • Acquired hypothyroidism   •  "Frequent urination   • Heart murmur   • Intractable chronic paroxysmal hemicrania   • Vision loss, bilateral   • Nonrheumatic aortic valve stenosis   • Nonrheumatic mitral valve regurgitation   • Nonrheumatic aortic valve insufficiency   • Exudative age-related macular degeneration, left eye, with inactive scar (CMS/HCC)   • Pulmonary nodules   • Shortness of breath on exertion   • Coronary artery calcification   • Age-related nuclear cataract of both eyes   • Open angle with borderline findings, high risk, bilateral       Advanced Care Planning:  ACP discussion was held with the patient during this visit. Patient does not have an advance directive, declines further assistance.    Review of Systems    Compared to one year ago, the patient feels her physical health is the same.  Compared to one year ago, the patient feels her mental health is the same.    Reviewed chart for potential of high risk medication in the elderly: yes  Reviewed chart for potential of harmful drug interactions in the elderly:yes    Objective         Vitals:    05/28/21 0851   BP: 122/70   BP Location: Right arm   Patient Position: Sitting   Cuff Size: Adult   Pulse: 74   Resp: 15   Temp: 97.8 °F (36.6 °C)   SpO2: 98%   Weight: 79.4 kg (175 lb)   Height: 154.9 cm (61\")   PainSc: 0-No pain       Body mass index is 33.07 kg/m².  Discussed the patient's BMI with her. The BMI is above average; BMI management plan is completed.    Physical Exam    Lab Results   Component Value Date    GLU 98 04/08/2021    CHLPL 99 04/08/2021    TRIG 69 04/08/2021    HDL 63 (H) 04/08/2021    LDL 21 04/08/2021    VLDL 15 04/08/2021    HGBA1C 5.40 04/08/2021        Assessment/Plan   Medicare Risks and Personalized Health Plan  CMS Preventative Services Quick Reference  Abdominal Aortic Aneurysm Screening  Advance Directive Discussion  Breast Cancer/Mammogram Screening  Cardiovascular risk  Inactivity/Sedentary  Obesity/Overweight     The above risks/problems have been " discussed with the patient.  Pertinent information has been shared with the patient in the After Visit Summary.  Follow up plans and orders are seen below in the Assessment/Plan Section.    Diagnoses and all orders for this visit:    1. Encounter for subsequent annual wellness visit in Medicare patient (Primary)    2. Encounter for screening mammogram for breast cancer  -     Mammo Screening Bilateral With CAD; Future      Follow Up:  Return in about 6 months (around 11/28/2021) for Recheck.     An After Visit Summary and PPPS were given to the patient.        Need screening for AAA & carotid disease.  Information given today.   Need daily strengthening & balance exercises (shown today).

## 2021-05-28 NOTE — PATIENT INSTRUCTIONS
Medicare Wellness  Personal Prevention Plan of Service     Date of Office Visit:  2021  Encounter Provider:  Margarita Giraldo MD  Place of Service:  [facility] PRIMARY [facility]ARE  Patient Name: Ayana Reyna  :  1940    As part of the Medicare Wellness portion of your visit today, we are providing you with this personalized preventive plan of services (PPPS). This plan is based upon recommendations of the [place] Preventive Services Task Force (USPSTF) and the Advisory Committee on Immunization Practices (ACIP).    This lists the preventive care services that should be considered, and provides dates of when you are due. Items listed as completed are up-to-date and do not require any further intervention.    Health Maintenance   Topic Date Due   • ZOSTER VACCINE (2 of 3) 2022 (Originally 3/28/2012)   • INFLUENZA VACCINE  2021   • DXA SCAN  2021   • TDAP/TD VACCINES (2 - Td or Tdap) 2022   • COLORECTAL CANCER SCREENING  2022   • MAMMOGRAM  2022   • LIPID PANEL  2022   • ANNUAL WELLNESS VISIT  2022   • COVID-19 Vaccine  Completed   • Pneumococcal Vaccine 65+  Completed       No orders of the defined types were placed in this encounter.      No follow-ups on file.

## 2021-06-15 ENCOUNTER — OFFICE VISIT (OUTPATIENT)
Dept: INTERNAL MEDICINE | Facility: CLINIC | Age: 81
End: 2021-06-15

## 2021-06-15 ENCOUNTER — APPOINTMENT (OUTPATIENT)
Dept: WOMENS IMAGING | Facility: HOSPITAL | Age: 81
End: 2021-06-15

## 2021-06-15 DIAGNOSIS — Z12.31 ENCOUNTER FOR SCREENING MAMMOGRAM FOR BREAST CANCER: ICD-10-CM

## 2021-06-15 PROCEDURE — 77067 SCR MAMMO BI INCL CAD: CPT | Performed by: RADIOLOGY

## 2021-06-15 PROCEDURE — 77067 SCR MAMMO BI INCL CAD: CPT | Performed by: INTERNAL MEDICINE

## 2021-07-01 ENCOUNTER — TRANSCRIBE ORDERS (OUTPATIENT)
Dept: ADMINISTRATIVE | Facility: HOSPITAL | Age: 81
End: 2021-07-01

## 2021-07-01 DIAGNOSIS — R91.1 LUNG NODULE: Primary | ICD-10-CM

## 2021-08-09 DIAGNOSIS — E03.9 ACQUIRED HYPOTHYROIDISM: ICD-10-CM

## 2021-08-09 RX ORDER — LEVOTHYROXINE SODIUM 50 UG/1
TABLET ORAL
Qty: 90 TABLET | Refills: 0 | Status: SHIPPED | OUTPATIENT
Start: 2021-08-09 | End: 2021-11-11

## 2021-08-16 DIAGNOSIS — I10 ESSENTIAL HYPERTENSION: ICD-10-CM

## 2021-08-16 RX ORDER — METOPROLOL SUCCINATE 100 MG/1
TABLET, EXTENDED RELEASE ORAL
Qty: 90 TABLET | Refills: 1 | Status: SHIPPED | OUTPATIENT
Start: 2021-08-16 | End: 2021-12-10 | Stop reason: SDUPTHER

## 2021-10-04 ENCOUNTER — HOSPITAL ENCOUNTER (OUTPATIENT)
Dept: CT IMAGING | Facility: HOSPITAL | Age: 81
Discharge: HOME | End: 2021-10-04
Admitting: INTERNAL MEDICINE

## 2021-10-04 DIAGNOSIS — R91.1 LUNG NODULE: ICD-10-CM

## 2021-10-04 PROCEDURE — 71250 CT THORAX DX C-: CPT

## 2021-10-25 ENCOUNTER — TRANSCRIBE ORDERS (OUTPATIENT)
Dept: ADMINISTRATIVE | Facility: HOSPITAL | Age: 81
End: 2021-10-25

## 2021-10-25 DIAGNOSIS — R91.1 LUNG NODULE: Primary | ICD-10-CM

## 2021-10-27 RX ORDER — AMLODIPINE BESYLATE 5 MG/1
TABLET ORAL
Qty: 90 TABLET | Refills: 0 | Status: SHIPPED | OUTPATIENT
Start: 2021-10-27 | End: 2022-01-21 | Stop reason: SDUPTHER

## 2021-11-11 DIAGNOSIS — E03.9 ACQUIRED HYPOTHYROIDISM: ICD-10-CM

## 2021-11-11 RX ORDER — LEVOTHYROXINE SODIUM 50 UG/1
TABLET ORAL
Qty: 90 TABLET | Refills: 0 | Status: SHIPPED | OUTPATIENT
Start: 2021-11-11 | End: 2021-12-10 | Stop reason: SDUPTHER

## 2021-12-10 ENCOUNTER — OFFICE VISIT (OUTPATIENT)
Dept: INTERNAL MEDICINE | Facility: CLINIC | Age: 81
End: 2021-12-10

## 2021-12-10 VITALS
TEMPERATURE: 97.8 F | OXYGEN SATURATION: 100 % | RESPIRATION RATE: 17 BRPM | DIASTOLIC BLOOD PRESSURE: 96 MMHG | HEART RATE: 74 BPM | SYSTOLIC BLOOD PRESSURE: 158 MMHG | HEIGHT: 61 IN | BODY MASS INDEX: 33.42 KG/M2 | WEIGHT: 177 LBS

## 2021-12-10 DIAGNOSIS — E03.9 ACQUIRED HYPOTHYROIDISM: Primary | ICD-10-CM

## 2021-12-10 DIAGNOSIS — R73.9 HIGH BLOOD SUGAR: ICD-10-CM

## 2021-12-10 DIAGNOSIS — I10 ESSENTIAL HYPERTENSION: ICD-10-CM

## 2021-12-10 DIAGNOSIS — I10 PRIMARY HYPERTENSION: ICD-10-CM

## 2021-12-10 DIAGNOSIS — F51.01 PRIMARY INSOMNIA: ICD-10-CM

## 2021-12-10 DIAGNOSIS — E78.2 MIXED HYPERLIPIDEMIA: ICD-10-CM

## 2021-12-10 PROCEDURE — 99214 OFFICE O/P EST MOD 30 MIN: CPT | Performed by: INTERNAL MEDICINE

## 2021-12-10 RX ORDER — METOPROLOL SUCCINATE 100 MG/1
100 TABLET, EXTENDED RELEASE ORAL DAILY
Qty: 90 TABLET | Refills: 3 | Status: SHIPPED | OUTPATIENT
Start: 2021-12-10 | End: 2022-02-17

## 2021-12-10 RX ORDER — TRAZODONE HYDROCHLORIDE 50 MG/1
TABLET ORAL
Qty: 135 TABLET | Refills: 3 | Status: SHIPPED | OUTPATIENT
Start: 2021-12-10 | End: 2022-12-16

## 2021-12-10 RX ORDER — LEVOTHYROXINE SODIUM 0.05 MG/1
50 TABLET ORAL DAILY
Qty: 90 TABLET | Refills: 3 | Status: SHIPPED | OUTPATIENT
Start: 2021-12-10 | End: 2022-06-13

## 2021-12-10 RX ORDER — SPIRONOLACTONE 25 MG/1
25 TABLET ORAL EVERY MORNING
Qty: 90 TABLET | Refills: 3 | Status: SHIPPED | OUTPATIENT
Start: 2021-12-10 | End: 2021-12-30

## 2021-12-10 NOTE — ASSESSMENT & PLAN NOTE
Hypertension is worsening.  Continue current treatment regimen.  Dietary sodium restriction.  Weight loss.  Regular aerobic exercise.  Continue current medications.  Blood pressure will be reassessed at the next regular appointment.    High today, but normal at home (& she had insomnia last night). Need bp chk several x this weekend & send message to me next wk.

## 2021-12-11 LAB
ALBUMIN SERPL-MCNC: 4.3 G/DL (ref 3.6–4.6)
ALBUMIN/GLOB SERPL: 2.3 {RATIO} (ref 1.2–2.2)
ALP SERPL-CCNC: 94 IU/L (ref 44–121)
ALT SERPL-CCNC: 23 IU/L (ref 0–32)
AST SERPL-CCNC: 15 IU/L (ref 0–40)
BASOPHILS # BLD AUTO: 0 X10E3/UL (ref 0–0.2)
BASOPHILS NFR BLD AUTO: 0 %
BILIRUB SERPL-MCNC: 1.4 MG/DL (ref 0–1.2)
BUN SERPL-MCNC: 20 MG/DL (ref 8–27)
BUN/CREAT SERPL: 23 (ref 12–28)
CALCIUM SERPL-MCNC: 9.5 MG/DL (ref 8.7–10.3)
CHLORIDE SERPL-SCNC: 104 MMOL/L (ref 96–106)
CHOLEST SERPL-MCNC: 132 MG/DL (ref 100–199)
CO2 SERPL-SCNC: 22 MMOL/L (ref 20–29)
CREAT SERPL-MCNC: 0.88 MG/DL (ref 0.57–1)
EOSINOPHIL # BLD AUTO: 0.2 X10E3/UL (ref 0–0.4)
EOSINOPHIL NFR BLD AUTO: 2 %
ERYTHROCYTE [DISTWIDTH] IN BLOOD BY AUTOMATED COUNT: 11.8 % (ref 11.7–15.4)
GLOBULIN SER CALC-MCNC: 1.9 G/DL (ref 1.5–4.5)
GLUCOSE SERPL-MCNC: 96 MG/DL (ref 65–99)
HBA1C MFR BLD: 5.9 % (ref 4.8–5.6)
HCT VFR BLD AUTO: 38.8 % (ref 34–46.6)
HDLC SERPL-MCNC: 67 MG/DL
HGB BLD-MCNC: 12.9 G/DL (ref 11.1–15.9)
IMM GRANULOCYTES # BLD AUTO: 0 X10E3/UL (ref 0–0.1)
IMM GRANULOCYTES NFR BLD AUTO: 0 %
LDLC SERPL CALC-MCNC: 52 MG/DL (ref 0–99)
LYMPHOCYTES # BLD AUTO: 3.1 X10E3/UL (ref 0.7–3.1)
LYMPHOCYTES NFR BLD AUTO: 30 %
MCH RBC QN AUTO: 32.2 PG (ref 26.6–33)
MCHC RBC AUTO-ENTMCNC: 33.2 G/DL (ref 31.5–35.7)
MCV RBC AUTO: 97 FL (ref 79–97)
MONOCYTES # BLD AUTO: 0.7 X10E3/UL (ref 0.1–0.9)
MONOCYTES NFR BLD AUTO: 7 %
NEUTROPHILS # BLD AUTO: 6.4 X10E3/UL (ref 1.4–7)
NEUTROPHILS NFR BLD AUTO: 61 %
PLATELET # BLD AUTO: 258 X10E3/UL (ref 150–450)
POTASSIUM SERPL-SCNC: 4.8 MMOL/L (ref 3.5–5.2)
PROT SERPL-MCNC: 6.2 G/DL (ref 6–8.5)
RBC # BLD AUTO: 4.01 X10E6/UL (ref 3.77–5.28)
SODIUM SERPL-SCNC: 140 MMOL/L (ref 134–144)
TRIGL SERPL-MCNC: 59 MG/DL (ref 0–149)
TSH SERPL DL<=0.005 MIU/L-ACNC: 3.83 UIU/ML (ref 0.45–4.5)
VLDLC SERPL CALC-MCNC: 13 MG/DL (ref 5–40)
WBC # BLD AUTO: 10.4 X10E3/UL (ref 3.4–10.8)

## 2021-12-30 RX ORDER — SPIRONOLACTONE 25 MG/1
TABLET ORAL
Qty: 90 TABLET | Refills: 3 | Status: SHIPPED | OUTPATIENT
Start: 2021-12-30 | End: 2022-01-21 | Stop reason: SDUPTHER

## 2022-01-21 ENCOUNTER — OFFICE VISIT (OUTPATIENT)
Dept: CARDIOLOGY | Facility: CLINIC | Age: 82
End: 2022-01-21

## 2022-01-21 VITALS
SYSTOLIC BLOOD PRESSURE: 168 MMHG | BODY MASS INDEX: 33.61 KG/M2 | DIASTOLIC BLOOD PRESSURE: 62 MMHG | HEIGHT: 61 IN | HEART RATE: 75 BPM | WEIGHT: 178 LBS

## 2022-01-21 DIAGNOSIS — E78.49 OTHER HYPERLIPIDEMIA: ICD-10-CM

## 2022-01-21 DIAGNOSIS — R01.1 HEART MURMUR: ICD-10-CM

## 2022-01-21 DIAGNOSIS — R06.02 SHORTNESS OF BREATH ON EXERTION: ICD-10-CM

## 2022-01-21 DIAGNOSIS — H35.3223 EXUDATIVE AGE-RELATED MACULAR DEGENERATION, LEFT EYE, WITH INACTIVE SCAR: ICD-10-CM

## 2022-01-21 DIAGNOSIS — R07.2 PRECORDIAL PAIN: ICD-10-CM

## 2022-01-21 DIAGNOSIS — R25.2 BILATERAL LEG CRAMPS: ICD-10-CM

## 2022-01-21 DIAGNOSIS — I25.84 CORONARY ARTERY CALCIFICATION: Primary | ICD-10-CM

## 2022-01-21 DIAGNOSIS — I25.10 CORONARY ARTERY CALCIFICATION: Primary | ICD-10-CM

## 2022-01-21 DIAGNOSIS — I10 PRIMARY HYPERTENSION: ICD-10-CM

## 2022-01-21 PROCEDURE — 99214 OFFICE O/P EST MOD 30 MIN: CPT | Performed by: NURSE PRACTITIONER

## 2022-01-21 PROCEDURE — 93000 ELECTROCARDIOGRAM COMPLETE: CPT | Performed by: NURSE PRACTITIONER

## 2022-01-21 RX ORDER — RAMIPRIL 10 MG/1
10 CAPSULE ORAL NIGHTLY
Qty: 90 CAPSULE | Refills: 3 | Status: SHIPPED | OUTPATIENT
Start: 2022-01-21 | End: 2022-12-29 | Stop reason: SDUPTHER

## 2022-01-21 RX ORDER — ATORVASTATIN CALCIUM 20 MG/1
20 TABLET, FILM COATED ORAL DAILY
Qty: 90 TABLET | Refills: 3 | Status: SHIPPED | OUTPATIENT
Start: 2022-01-21 | End: 2022-12-28

## 2022-01-21 RX ORDER — SPIRONOLACTONE 25 MG/1
25 TABLET ORAL EVERY MORNING
Qty: 90 TABLET | Refills: 3 | Status: SHIPPED | OUTPATIENT
Start: 2022-01-21 | End: 2022-10-04 | Stop reason: SDUPTHER

## 2022-01-21 RX ORDER — AMLODIPINE BESYLATE 5 MG/1
5 TABLET ORAL DAILY
Qty: 90 TABLET | Refills: 3 | Status: SHIPPED | OUTPATIENT
Start: 2022-01-21 | End: 2022-12-28

## 2022-01-21 NOTE — PROGRESS NOTES
Date of Office Visit: 2022  Encounter Provider: TRINI Bangura  Place of Service: [facility] CARDIOLOGY  Patient Name: Ayana Reyna  :1940  Primary Cardiologist: Dr. Ana Orozco     Chief Complaint   Patient presents with   • Chest Pain   • Shortness of Breath   • Follow-up   :     HPI: Ayana Reyna is a pleasant 81 y.o. female who presents today for follow-up on chest pain and dyspnea.  I reviewed her medical records.    She has a known history of hypertension, hyperlipidemia, and hypothyroidism.  She is  with 4 daughters and describes herself as the matriarch of the large family.  Her , Sandoval Reyna was also a patient of ours and unexpectedly passed away in 2021.  She has macular degeneration of both eyes and has suffered vision loss bilaterally.    She has had a known heart murmur.  In 2020, echocardiogram showed the following: EF 65%, grade 2 diastolic dysfunction, mild LVH, mild to moderate aortic stenosis, mild to moderate aortic insufficiency, moderate mitral insufficiency, mild pulmonary hypertension, and mild to moderate tricuspid insufficiency.  She started experiencing dyspnea and chest pain.  In 2020, stress test showed no evidence of ischemia.     In 2021, she had a CT angiogram of her heart.  This showed a 9 mm left upper lobe pulmonary nodule, 2 smaller pleural based nodules at the left upper lobe, linear scarring at the lower and right lobes, and scarring of the medial aspect of the right lower lobe.  Structurally her heart was normal, left main had greater than 50% calcified ostial stenosis, 50% proximal calcified LAD stenosis, 50% mid calcified LAD stenosis, greater than 50% distal mixed plaque stenosis, less than 25% calcified proximal circumflex stenosis, and the RCA was almost nonexistent.  Her total calcium score was 252.    In 2021, she had a stress echocardiogram completed with the  following results: EF 62%, grade 2 diastolic dysfunction, mild aortic valve stenosis, moderate aortic valve calcification, trace aortic regurgitation, mild mitral valve regurgitation, moderate mitral annular calcification, mild tricuspid regurgitation, normal stress portion.  She continued to have symptoms so in March 2021, she underwent cardiac catheterization which showed the following: Left main minimally calcified, LAD mildly calcified in the proximal segment, diagonal branches normal, minimal calcification in the proximal left circumflex, obtuse marginal branches, posterior lateral, and posterior descending branches normal, RCA normal.  Her chest pain and dyspnea was not felt to be a cardiac etiology.    She presents today for follow-up visit.  Her blood pressure is elevated today and she has not had her morning medications.  Typically her blood pressure runs 120-130/60-70s at home.  Unfortunately, she lost her  unexpectedly in November 2020.  She also suffers from bilateral vision loss, but is still living independently at home by herself.  She notices dyspnea with exertion and mild chest discomfort when walking into [place] where she likes to watch [employer] athletics.  Her symptoms resolved with rest.  She explains that she has followed up with pulmonary and was told that her lungs were good.  She wonders if it is due to her weight and deconditioning.  She also suffers from leg cramps bilaterally at nighttime.  Her last potassium was normal.  She denies palpitations, edema, dizziness, syncope, or bleeding.      Past Medical History:   Diagnosis Date   • Acquired hypothyroidism    • Allergic rhinitis    • Aortic stenosis     mild to moderate per echo 2020   • Bilateral leg cramps 1/21/2022   • Coronary artery calcification 03/24/2021   • Fatigue    • Glucose intolerance (impaired glucose tolerance)    • Hearing loss    • Heart murmur    • Heart palpitations    • Hematuria    • Hyperlipemia    •  Hypertension    • Insomnia    • Macular degeneration     vision loss of left eye    • Migraine    • Nonrheumatic aortic valve insufficiency 10/19/2020   • Osteoporosis    • Pulmonary nodules 3/24/2021   • Shortness of breath    • Vision loss, bilateral     left eye macular degeneration; right eye etiology unknown   • Vitamin D deficiency    • Wears hearing aid in both ears 12/2019       Past Surgical History:   Procedure Laterality Date   • CARDIAC CATHETERIZATION N/A 3/29/2021    Procedure: Coronary angiography;  Surgeon: Zain Mendoza MD;  Location:  [facility];  Service: Cardiovascular;  Laterality: N/A;   • CARDIAC CATHETERIZATION N/A 3/29/2021    Procedure: Left heart cath;  Surgeon: Zain Mendoza MD;  Location:  [facility];  Service: Cardiovascular;  Laterality: N/A;   • CARDIAC CATHETERIZATION N/A 3/29/2021    Procedure: Left ventriculography;  Surgeon: Zain Mendoza MD;  Location:  [facility];  Service: Cardiovascular;  Laterality: N/A;   • CHOLECYSTECTOMY     • COLONOSCOPY  2008   • FRACTURE SURGERY  1975,2014   • HYSTERECTOMY N/A 1971    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   • JOINT REPLACEMENT  1995, 2013   • KNEE SURGERY  1989   • TONSILLECTOMY  1950       Social History     Socioeconomic History   • Marital status:    Tobacco Use   • Smoking status: Never Smoker   • Smokeless tobacco: Never Used   Vaping Use   • Vaping Use: Never used   Substance and Sexual Activity   • Alcohol use: Yes     Alcohol/week: 1.0 standard drink     Types: 1 Glasses of wine per week     Comment: Less than 1 glass of wine a week//caffeine use:1 cup daily   • Drug use: No   • Sexual activity: Never     Partners: Male       Family History   Problem Relation Age of Onset   • Hodgkin's lymphoma Mother    • Cancer Mother        The following portion of the patient's history were reviewed and updated as appropriate: past medical history, past surgical history, past  "social history, past family history, allergies, current medications, and problem list.    Review of Systems   Constitutional: Negative for weight gain.   HENT: Positive for hearing loss.    Eyes: Positive for vision loss in left eye and vision loss in right eye.   Cardiovascular: Positive for chest pain and dyspnea on exertion.   Respiratory: Positive for shortness of breath.    Hematologic/Lymphatic: Bruises/bleeds easily.   Musculoskeletal: Negative for joint pain and myalgias.   Neurological: Negative.  Negative for dizziness, headaches and light-headedness.       No Known Allergies      Current Outpatient Medications:   •  amLODIPine (NORVASC) 5 MG tablet, Take 1 tablet by mouth Daily., Disp: 90 tablet, Rfl: 3  •  aspirin (aspirin) 81 MG EC tablet, Take 1 tablet by mouth Daily., Disp: 90 tablet, Rfl: 0  •  atorvastatin (LIPITOR) 20 MG tablet, Take 1 tablet by mouth Daily., Disp: 90 tablet, Rfl: 3  •  cholecalciferol (VITAMIN D3) 1000 UNITS tablet, Take 1,000 Units by mouth daily., Disp: , Rfl:   •  levothyroxine (Euthyrox) 50 MCG tablet, Take 1 tablet by mouth Daily., Disp: 90 tablet, Rfl: 3  •  metoprolol succinate XL (TOPROL-XL) 100 MG 24 hr tablet, Take 1 tablet by mouth Daily., Disp: 90 tablet, Rfl: 3  •  Multiple Vitamins-Minerals (PRESERVISION/LUTEIN) capsule, Take  by mouth 2 (two) times a day., Disp: , Rfl:   •  ramipril (ALTACE) 10 MG capsule, Take 1 capsule by mouth Every Night., Disp: 90 capsule, Rfl: 3  •  spironolactone (ALDACTONE) 25 MG tablet, Take 1 tablet by mouth Every Morning., Disp: 90 tablet, Rfl: 3  •  traZODone (DESYREL) 50 MG tablet, 1.5 po qhs, Disp: 135 tablet, Rfl: 3  •  vitamin B-12 (CYANOCOBALAMIN) 100 MCG tablet, Take 50 mcg by mouth daily., Disp: , Rfl:         Objective:     Vitals:    01/21/22 0853 01/21/22 0903   BP: 168/72 168/62   BP Location: Left arm Right arm   Pulse: 75    Weight: 80.7 kg (178 lb)    Height: 154.9 cm (61\")      Body mass index is 33.63 kg/m².    PHYSICAL " EXAM:    Vitals Reviewed.   General Appearance: No acute distress, well developed and well nourished. Obese.   Eyes: Conjunctiva and lids: No erythema, swelling, or discharge. Sclera non-icteric.   HENT: Atraumatic, normocephalic. External eyes, ears, and nose normal. No hearing loss noted. Mucous membranes normal. Lips not cyanotic. Neck supple with no tenderness.  Wearing a mask.  Respiratory: No signs of respiratory distress. Respiration rhythm and depth normal.   Clear to auscultation. No rales, crackles, rhonchi, or wheezing auscultated.   Cardiovascular:  Jugular Venous Pressure: Normal  Heart Rate and Rhythm: Normal, Heart Sounds: Normal S1 and S2. No S3 or S4 noted.  Murmurs: RUSB grade 1/6 systolic murmur present.  No rubs, thrills, or gallops.   Lower Extremities: No edema noted.  Gastrointestinal:  Abdomen soft, non-distended, non-tender. Normal bowel sounds.    Musculoskeletal: Normal movement of extremities  Skin and Nails: General appearance normal. No pallor, cyanosis, diaphoresis. Skin temperature normal. No clubbing of fingernails.   Psychiatric: Patient alert and oriented to person, place, and time. Speech and behavior appropriate. Normal mood and affect.       ECG 12 Lead    Date/Time: 1/21/2022 8:49 AM  Performed by: Aminta Bowles APRN  Authorized by: Aminta Bowles APRN   Comparison: compared with previous ECG from 3/24/2021  Similar to previous ECG  Rhythm: sinus rhythm  Rate: normal  BPM: 75  Conduction: conduction normal  ST Segments: ST segments normal  T Waves: T waves normal  QRS axis: normal    Clinical impression: normal ECG              Assessment:       Diagnosis Plan   1. Coronary artery calcification     2. Shortness of breath on exertion     3. Precordial pain     4. Primary hypertension     5. Other hyperlipidemia  atorvastatin (LIPITOR) 20 MG tablet   6. Heart murmur     7. Exudative age-related macular degeneration, left eye, with inactive scar (HCC)     8. Bilateral leg  "cramps            Plan:       1.  Coronary Artery Calcification: Very mild calcification noted per coronary angiography in March 2021.  Continue with current medications and risk factor modification.    2/3.  Dyspnea and Chest Pain: She has had a work-up through pulmonary and cardiac with no abnormalities noted.  She wonders if her symptoms may be due to deconditioning and her weight.    4.  Hypertension: Blood pressure typically well controlled.  Blood pressure goal of 120s/80s or less recommended.  She has not had her morning medications today.    5.  Hyperlipidemia: Cholesterol panel excellent on current dose of atorvastatin 20 mg daily.    6.  Heart Murmur: She has known aortic and mitral valve calcification with no significant stenosis/regurgitation per last echocardiogram.    7.  Macular Degeneration: She has vision loss of both eyes.  She remains independent living by herself at home.    8.  Leg Cramps: She experiences bilateral leg cramping at nighttime and says it feels like a \"labor contraction\".  Her last potassium was normal.  She may benefit from a magnesium check on her next blood test.  I recommended trying magnesium 400 mg 1-2 tablets at nighttime.  I will follow-up with her via telephone in a month.    9.  Overall, I think she is doing well from a cardiac standpoint.  I recommended a 1 year follow-up visit with Dr. Orozco.  I have provided refills on her cardiac medications.    ADDENDUM 2/24/2022:  · Leg cramps have resolved with adding magnesium.    As always, it has been a pleasure to participate in your patient's care. Thank you.       Sincerely,       TRINI Honeycutt  [facility]      · COVID-19 Precautions - Patient was compliant in wearing a mask. When I saw the patient, I used appropriate personal protective equipment (PPE) including mask and eye shield (standard procedure).  Additionally, I used gown and gloves if indicated.  Hand hygiene was completed " before and after seeing the patient.  · Dictated utilizing Dragon Dictation  I spent 34 minutes reviewing her medical records/testing/previous office notes/labs, face-to-face interaction with patient, physical examination, formulating the plan of care, and discussion of plan of care with patient.

## 2022-02-17 DIAGNOSIS — I10 ESSENTIAL HYPERTENSION: ICD-10-CM

## 2022-02-17 RX ORDER — METOPROLOL SUCCINATE 100 MG/1
TABLET, EXTENDED RELEASE ORAL
Qty: 90 TABLET | Refills: 0 | Status: SHIPPED | OUTPATIENT
Start: 2022-02-17 | End: 2023-01-10 | Stop reason: SDUPTHER

## 2022-02-24 ENCOUNTER — TELEPHONE (OUTPATIENT)
Dept: CARDIOLOGY | Facility: CLINIC | Age: 82
End: 2022-02-24

## 2022-02-24 NOTE — TELEPHONE ENCOUNTER
Please check to see how leg cramps are doing on magnesium supplement.  If better, continue the magnesium.  Thank you

## 2022-06-10 ENCOUNTER — OFFICE VISIT (OUTPATIENT)
Dept: INTERNAL MEDICINE | Facility: CLINIC | Age: 82
End: 2022-06-10

## 2022-06-10 VITALS
WEIGHT: 181 LBS | BODY MASS INDEX: 34.17 KG/M2 | TEMPERATURE: 97.7 F | SYSTOLIC BLOOD PRESSURE: 142 MMHG | HEART RATE: 76 BPM | DIASTOLIC BLOOD PRESSURE: 88 MMHG | OXYGEN SATURATION: 98 % | HEIGHT: 61 IN

## 2022-06-10 DIAGNOSIS — F51.01 PRIMARY INSOMNIA: Chronic | ICD-10-CM

## 2022-06-10 DIAGNOSIS — E03.9 ACQUIRED HYPOTHYROIDISM: Chronic | ICD-10-CM

## 2022-06-10 DIAGNOSIS — Z00.00 ENCOUNTER FOR MEDICAL EXAMINATION TO ESTABLISH CARE: Primary | ICD-10-CM

## 2022-06-10 DIAGNOSIS — I25.10 CORONARY ARTERY CALCIFICATION: Chronic | ICD-10-CM

## 2022-06-10 DIAGNOSIS — I10 PRIMARY HYPERTENSION: Chronic | ICD-10-CM

## 2022-06-10 DIAGNOSIS — E55.9 VITAMIN D DEFICIENCY: Chronic | ICD-10-CM

## 2022-06-10 DIAGNOSIS — E78.2 MIXED HYPERLIPIDEMIA: Chronic | ICD-10-CM

## 2022-06-10 DIAGNOSIS — I25.84 CORONARY ARTERY CALCIFICATION: Chronic | ICD-10-CM

## 2022-06-10 DIAGNOSIS — R73.02 GLUCOSE INTOLERANCE (IMPAIRED GLUCOSE TOLERANCE): Chronic | ICD-10-CM

## 2022-06-10 PROCEDURE — 99214 OFFICE O/P EST MOD 30 MIN: CPT | Performed by: NURSE PRACTITIONER

## 2022-06-10 NOTE — PROGRESS NOTES
"Chief Complaint  Establish Care    Subjective        Ayana Reyna presents to [facility]  History of Present Illness  This is an 83 y/o female presenting to office to establish care. Patient is currently  and retired.     Patient reports no current exercise. Patient reports trying to follow healthy diet.     Patient has hx of macular degeneration-- follows with Dr. Riley.     Patient continues following with Dr. Orozco for cardiology needs. Continues on amlodipine, metoprolol, ramipril-- checks BP at home and averages 130/70's. Patient denies chest pain or heart palpitations. Patient denies any issues with statin-- continues on this regularly.     Patient has hx of multiple lung nodules-- continues following with pulmonary-- denies any issues or shortness of breath or wheezing episodes.     Patient's last mammogram was 6/15/21. Patient no longer requires pap smear. Would like to wait to do dexa scan when mammo is due next-- hx of osteopenia. Continues on vit D supplementation.       Objective   Vital Signs:  /88 (BP Location: Left arm, Patient Position: Sitting, Cuff Size: Adult)   Pulse 76   Temp 97.7 °F (36.5 °C) (Temporal)   Ht 154.9 cm (60.98\")   Wt 82.1 kg (181 lb)   SpO2 98%   BMI 34.22 kg/m²   Estimated body mass index is 34.22 kg/m² as calculated from the following:    Height as of this encounter: 154.9 cm (60.98\").    Weight as of this encounter: 82.1 kg (181 lb).    BMI is >= 30 and <35. (Class 1 Obesity). The following options were offered after discussion;: exercise counseling/recommendations and nutrition counseling/recommendations      Physical Exam  Constitutional:       Appearance: Normal appearance. She is normal weight.   HENT:      Head: Normocephalic.   Eyes:      Conjunctiva/sclera: Conjunctivae normal.      Pupils: Pupils are equal, round, and reactive to light.   Cardiovascular:      Rate and Rhythm: Normal rate and regular rhythm.      " Pulses: Normal pulses.      Heart sounds: Normal heart sounds. No murmur heard.    No friction rub. No gallop.   Pulmonary:      Effort: Pulmonary effort is normal. No respiratory distress.      Breath sounds: Normal breath sounds. No wheezing or rales.   Abdominal:      General: Bowel sounds are normal. There is no distension.      Tenderness: There is no abdominal tenderness.   Musculoskeletal:         General: No swelling or deformity. Normal range of motion.      Cervical back: Normal range of motion.   Skin:     General: Skin is warm.   Neurological:      General: No focal deficit present.      Mental Status: She is alert and oriented to person, place, and time. Mental status is at baseline.      Motor: No weakness.   Psychiatric:         Mood and Affect: Mood normal.         Thought Content: Thought content normal.         Judgment: Judgment normal.        Result Review :  The following data was reviewed by: TRINI Seaman on 06/10/2022:  Common labs    Common Labsle 12/10/21 12/10/21 12/10/21 12/10/21    1025 1025 1025 1025   Glucose  96     BUN  20     Creatinine  0.88     eGFR Non  Am  61     eGFR African Am  71     Sodium  140     Potassium  4.8     Chloride  104     Calcium  9.5     Total Protein  6.2     Albumin  4.3     Total Bilirubin  1.4 (A)     Alkaline Phosphatase  94     AST (SGOT)  15     ALT (SGPT)  23     WBC 10.4      Hemoglobin 12.9      Hematocrit 38.8      Platelets 258      Total Cholesterol   132    Triglycerides   59    HDL Cholesterol   67    LDL Cholesterol    52    Hemoglobin A1C    5.9 (A)   (A) Abnormal value       Comments are available for some flowsheets but are not being displayed.                     Assessment and Plan   Diagnoses and all orders for this visit:    1. Encounter for medical examination to establish care (Primary)    2. Primary insomnia    3. Acquired hypothyroidism  -     TSH Rfx On Abnormal To Free T4    4. Primary hypertension  Assessment &  Plan:  Hypertension is worsening.  Dietary sodium restriction.  Weight loss.  Regular aerobic exercise.  Medication changes per orders.  Blood pressure will be reassessed at the next regular appointment.    Orders:  -     CBC & Differential  -     Comprehensive metabolic panel    5. Mixed hyperlipidemia  Assessment & Plan:  Continues on statin.     Orders:  -     Lipid panel    6. Vitamin D deficiency  Assessment & Plan:  Cont on vit d supplementation.       7. Glucose intolerance (impaired glucose tolerance)  -     Hemoglobin A1c    8. Coronary artery calcification  Assessment & Plan:  Cont on aspirin.   Following with cardiology.              I spent 30 minutes caring for Ayana on this date of service. This time includes time spent by me in the following activities:preparing for the visit, reviewing tests, obtaining and/or reviewing a separately obtained history, performing a medically appropriate examination and/or evaluation , counseling and educating the patient/family/caregiver, documenting information in the medical record and care coordination  Follow Up   Return in about 6 months (around 12/10/2022) for Medicare Wellness.  Patient was given instructions and counseling regarding her condition or for health maintenance advice. Please see specific information pulled into the AVS if appropriate.

## 2022-06-11 LAB
ALBUMIN SERPL-MCNC: 4.1 G/DL (ref 3.6–4.6)
ALBUMIN/GLOB SERPL: 2 {RATIO} (ref 1.2–2.2)
ALP SERPL-CCNC: 87 IU/L (ref 44–121)
ALT SERPL-CCNC: 17 IU/L (ref 0–32)
AST SERPL-CCNC: 15 IU/L (ref 0–40)
BASOPHILS # BLD AUTO: 0.1 X10E3/UL (ref 0–0.2)
BASOPHILS NFR BLD AUTO: 1 %
BILIRUB SERPL-MCNC: 1 MG/DL (ref 0–1.2)
BUN SERPL-MCNC: 19 MG/DL (ref 8–27)
BUN/CREAT SERPL: 22 (ref 12–28)
CALCIUM SERPL-MCNC: 9.8 MG/DL (ref 8.7–10.3)
CHLORIDE SERPL-SCNC: 107 MMOL/L (ref 96–106)
CHOLEST SERPL-MCNC: 120 MG/DL (ref 100–199)
CO2 SERPL-SCNC: 22 MMOL/L (ref 20–29)
CREAT SERPL-MCNC: 0.88 MG/DL (ref 0.57–1)
EGFRCR SERPLBLD CKD-EPI 2021: 65 ML/MIN/1.73
EOSINOPHIL # BLD AUTO: 0.3 X10E3/UL (ref 0–0.4)
EOSINOPHIL NFR BLD AUTO: 4 %
ERYTHROCYTE [DISTWIDTH] IN BLOOD BY AUTOMATED COUNT: 12.4 % (ref 11.7–15.4)
GLOBULIN SER CALC-MCNC: 2.1 G/DL (ref 1.5–4.5)
GLUCOSE SERPL-MCNC: 98 MG/DL (ref 65–99)
HBA1C MFR BLD: 5.8 % (ref 4.8–5.6)
HCT VFR BLD AUTO: 36.4 % (ref 34–46.6)
HDLC SERPL-MCNC: 66 MG/DL
HGB BLD-MCNC: 12.2 G/DL (ref 11.1–15.9)
IMM GRANULOCYTES # BLD AUTO: 0 X10E3/UL (ref 0–0.1)
IMM GRANULOCYTES NFR BLD AUTO: 0 %
LDLC SERPL CALC-MCNC: 41 MG/DL (ref 0–99)
LYMPHOCYTES # BLD AUTO: 2.9 X10E3/UL (ref 0.7–3.1)
LYMPHOCYTES NFR BLD AUTO: 37 %
MCH RBC QN AUTO: 33.2 PG (ref 26.6–33)
MCHC RBC AUTO-ENTMCNC: 33.5 G/DL (ref 31.5–35.7)
MCV RBC AUTO: 99 FL (ref 79–97)
MONOCYTES # BLD AUTO: 0.5 X10E3/UL (ref 0.1–0.9)
MONOCYTES NFR BLD AUTO: 7 %
NEUTROPHILS # BLD AUTO: 4 X10E3/UL (ref 1.4–7)
NEUTROPHILS NFR BLD AUTO: 51 %
PLATELET # BLD AUTO: 245 X10E3/UL (ref 150–450)
POTASSIUM SERPL-SCNC: 4.9 MMOL/L (ref 3.5–5.2)
PROT SERPL-MCNC: 6.2 G/DL (ref 6–8.5)
RBC # BLD AUTO: 3.68 X10E6/UL (ref 3.77–5.28)
SODIUM SERPL-SCNC: 143 MMOL/L (ref 134–144)
T4 FREE SERPL-MCNC: 1.36 NG/DL (ref 0.82–1.77)
TRIGL SERPL-MCNC: 58 MG/DL (ref 0–149)
TSH SERPL DL<=0.005 MIU/L-ACNC: 6.03 UIU/ML (ref 0.45–4.5)
VLDLC SERPL CALC-MCNC: 13 MG/DL (ref 5–40)
WBC # BLD AUTO: 7.7 X10E3/UL (ref 3.4–10.8)

## 2022-06-13 DIAGNOSIS — E03.9 ACQUIRED HYPOTHYROIDISM: Primary | ICD-10-CM

## 2022-06-13 RX ORDER — LEVOTHYROXINE SODIUM 0.07 MG/1
75 TABLET ORAL DAILY
Qty: 90 TABLET | Refills: 0 | Status: SHIPPED | OUTPATIENT
Start: 2022-06-13 | End: 2022-09-15 | Stop reason: SDUPTHER

## 2022-06-13 NOTE — PROGRESS NOTES
Please let patient know:  CBC stable.   CMP stable.   A1C shows continued prediabetes-- recommend low glycemic diet and exercise as tolerated.   TSH is elevated-- I would like patient to increase synthroid dose to 75mcg daily. Take this 1 hour before food and drink and without any other medications. Return in 6-8 weeks for recheck in lab. [facility]   Lipid panel is at goal.     F/u in labs in 6-8 weeks for lab recheck.

## 2022-07-25 ENCOUNTER — TELEPHONE (OUTPATIENT)
Dept: INTERNAL MEDICINE | Facility: CLINIC | Age: 82
End: 2022-07-25

## 2022-07-25 NOTE — TELEPHONE ENCOUNTER
UNABLE TO WARM TRANSFER    Caller: Ayana Reyna    Relationship to patient: Self    Best call back number: [phone]    Patient is needing: PATIENT IS NEEDING TO RESCHEDULE A LAB APPOINTMENT FOR 7/28.  PLEASE CALL.

## 2022-09-07 DIAGNOSIS — E03.9 ACQUIRED HYPOTHYROIDISM: ICD-10-CM

## 2022-09-08 RX ORDER — LEVOTHYROXINE SODIUM 75 UG/1
TABLET ORAL
Qty: 20 TABLET | Refills: 0 | OUTPATIENT
Start: 2022-09-08

## 2022-09-14 DIAGNOSIS — E03.9 ACQUIRED HYPOTHYROIDISM: ICD-10-CM

## 2022-09-14 RX ORDER — LEVOTHYROXINE SODIUM 75 UG/1
TABLET ORAL
Qty: 90 TABLET | Refills: 0 | OUTPATIENT
Start: 2022-09-14

## 2022-09-15 DIAGNOSIS — E03.9 ACQUIRED HYPOTHYROIDISM: ICD-10-CM

## 2022-09-15 RX ORDER — LEVOTHYROXINE SODIUM 0.07 MG/1
75 TABLET ORAL DAILY
Qty: 90 TABLET | Refills: 1 | Status: SHIPPED | OUTPATIENT
Start: 2022-09-15 | End: 2023-01-10 | Stop reason: SDUPTHER

## 2022-10-04 RX ORDER — SPIRONOLACTONE 25 MG/1
25 TABLET ORAL EVERY MORNING
Qty: 90 TABLET | Refills: 3 | Status: SHIPPED | OUTPATIENT
Start: 2022-10-04

## 2022-10-11 ENCOUNTER — HOSPITAL ENCOUNTER (OUTPATIENT)
Dept: CT IMAGING | Facility: HOSPITAL | Age: 82
Discharge: HOME | End: 2022-10-11
Admitting: INTERNAL MEDICINE

## 2022-10-11 DIAGNOSIS — R91.1 LUNG NODULE: ICD-10-CM

## 2022-10-11 PROCEDURE — 71250 CT THORAX DX C-: CPT

## 2022-11-18 ENCOUNTER — OFFICE VISIT (OUTPATIENT)
Dept: INTERNAL MEDICINE | Facility: CLINIC | Age: 82
End: 2022-11-18

## 2022-11-18 VITALS
TEMPERATURE: 97.7 F | BODY MASS INDEX: 34.4 KG/M2 | HEART RATE: 76 BPM | DIASTOLIC BLOOD PRESSURE: 80 MMHG | OXYGEN SATURATION: 100 % | HEIGHT: 61 IN | WEIGHT: 182.2 LBS | SYSTOLIC BLOOD PRESSURE: 140 MMHG

## 2022-11-18 DIAGNOSIS — S81.802A LEG WOUND, LEFT, INITIAL ENCOUNTER: Primary | ICD-10-CM

## 2022-11-18 PROCEDURE — 99213 OFFICE O/P EST LOW 20 MIN: CPT | Performed by: NURSE PRACTITIONER

## 2022-11-18 RX ORDER — CEPHALEXIN 500 MG/1
CAPSULE ORAL
COMMUNITY
Start: 2022-10-21 | End: 2023-01-25 | Stop reason: ALTCHOICE

## 2022-11-18 NOTE — PROGRESS NOTES
"Chief Complaint  Follow-up    Subjective        Ayana Reyna presents to [facility]  History of Present Illness  This is an 81 y/o female presenting to office for complaints of recent wound to LLE. Patient had hit her leg on step at home. Patient reports it had become swollen and had bled quite a bit. Patient reports she had seen orthopedic for her shoulder which they ended up prescribing antibiotics for the wound. Patient was treated with keflex for 10 days. Patient reports she is not putting anything on her wound. Denies any fever. Patient reports some tenderness to the area.     Objective   Vital Signs:  /80 (BP Location: Left arm, Patient Position: Sitting, Cuff Size: Large Adult)   Pulse 76   Temp 97.7 °F (36.5 °C) (Infrared)   Ht 154.9 cm (61\")   Wt 82.6 kg (182 lb 3.2 oz)   SpO2 100%   BMI 34.43 kg/m²   Estimated body mass index is 34.43 kg/m² as calculated from the following:    Height as of this encounter: 154.9 cm (61\").    Weight as of this encounter: 82.6 kg (182 lb 3.2 oz).          Physical Exam  Constitutional:       Appearance: Normal appearance.   HENT:      Head: Normocephalic and atraumatic.   Eyes:      Conjunctiva/sclera: Conjunctivae normal.      Pupils: Pupils are equal, round, and reactive to light.   Pulmonary:      Effort: Pulmonary effort is normal.   Musculoskeletal:         General: Swelling and signs of injury present.      Cervical back: Normal range of motion and neck supple.   Skin:     Findings: Erythema present.          Neurological:      Mental Status: She is alert.   Psychiatric:         Mood and Affect: Mood normal.         Thought Content: Thought content normal.         Judgment: Judgment normal.        Result Review :  The following data was reviewed by: TRINI Seaman on 11/18/2022:  Common labs    Common Labs 12/10/21 12/10/21 12/10/21 12/10/21 6/10/22 6/10/22 6/10/22 6/10/22    1025 1025 1025 1025 0848 0848 0848 0848 "   Glucose  96    98     BUN  20    19     Creatinine  0.88    0.88     eGFR Non African Am  61         eGFR African Am  71         Sodium  140    143     Potassium  4.8    4.9     Chloride  104    107 (A)     Calcium  9.5    9.8     Total Protein  6.2    6.2     Albumin  4.3    4.1     Total Bilirubin  1.4 (A)    1.0     Alkaline Phosphatase  94    87     AST (SGOT)  15    15     ALT (SGPT)  23    17     WBC 10.4    7.7      Hemoglobin 12.9    12.2      Hematocrit 38.8    36.4      Platelets 258    245      Total Cholesterol   132     120   Triglycerides   59     58   HDL Cholesterol   67     66   LDL Cholesterol    52     41   Hemoglobin A1C    5.9 (A)   5.8 (A)    (A) Abnormal value       Comments are available for some flowsheets but are not being displayed.                     Assessment and Plan   Diagnoses and all orders for this visit:    1. Leg wound, left, initial encounter (Primary)  Assessment & Plan:  Cleaned in office today with NS and iodine. Covered with bactracin and covered with dry dressing.   Recommended to continue to keep area clean with mild soap/water, apply GUSTAVO.   Referral placed to wound clinic.     Orders:  -     Ambulatory Referral to Wound Clinic           Follow Up   Return for Next scheduled follow up 12/16/22, Recheck.  Patient was given instructions and counseling regarding her condition or for health maintenance advice. Please see specific information pulled into the AVS if appropriate.

## 2022-11-18 NOTE — ASSESSMENT & PLAN NOTE
Cleaned in office today with NS and iodine. Covered with bactracin and covered with dry dressing.   Recommended to continue to keep area clean with mild soap/water, apply GUSTAVO.   Referral placed to wound clinic.

## 2022-12-16 ENCOUNTER — OFFICE VISIT (OUTPATIENT)
Dept: INTERNAL MEDICINE | Facility: CLINIC | Age: 82
End: 2022-12-16

## 2022-12-16 VITALS
OXYGEN SATURATION: 100 % | HEART RATE: 92 BPM | HEIGHT: 61 IN | DIASTOLIC BLOOD PRESSURE: 70 MMHG | TEMPERATURE: 97 F | BODY MASS INDEX: 33.87 KG/M2 | SYSTOLIC BLOOD PRESSURE: 128 MMHG | WEIGHT: 179.4 LBS

## 2022-12-16 DIAGNOSIS — I10 PRIMARY HYPERTENSION: ICD-10-CM

## 2022-12-16 DIAGNOSIS — I25.10 CORONARY ARTERY CALCIFICATION: ICD-10-CM

## 2022-12-16 DIAGNOSIS — E55.9 VITAMIN D DEFICIENCY: ICD-10-CM

## 2022-12-16 DIAGNOSIS — Z00.00 MEDICARE ANNUAL WELLNESS VISIT, SUBSEQUENT: Primary | ICD-10-CM

## 2022-12-16 DIAGNOSIS — E03.9 ACQUIRED HYPOTHYROIDISM: ICD-10-CM

## 2022-12-16 DIAGNOSIS — I25.84 CORONARY ARTERY CALCIFICATION: ICD-10-CM

## 2022-12-16 DIAGNOSIS — H35.30 MACULAR DEGENERATION OF BOTH EYES, UNSPECIFIED TYPE: ICD-10-CM

## 2022-12-16 DIAGNOSIS — E78.2 MIXED HYPERLIPIDEMIA: ICD-10-CM

## 2022-12-16 DIAGNOSIS — I35.0 NONRHEUMATIC AORTIC VALVE STENOSIS: ICD-10-CM

## 2022-12-16 DIAGNOSIS — M81.0 OSTEOPOROSIS, UNSPECIFIED OSTEOPOROSIS TYPE, UNSPECIFIED PATHOLOGICAL FRACTURE PRESENCE: ICD-10-CM

## 2022-12-16 DIAGNOSIS — R73.03 PREDIABETES: ICD-10-CM

## 2022-12-16 PROBLEM — S81.802A LEG WOUND, LEFT, INITIAL ENCOUNTER: Status: RESOLVED | Noted: 2022-11-18 | Resolved: 2022-12-16

## 2022-12-16 PROBLEM — R06.02 SHORTNESS OF BREATH ON EXERTION: Status: RESOLVED | Noted: 2021-03-24 | Resolved: 2022-12-16

## 2022-12-16 LAB
ALBUMIN SERPL-MCNC: 4.4 G/DL (ref 3.5–5.2)
ALBUMIN/GLOB SERPL: 2.3 G/DL
ALP SERPL-CCNC: 102 U/L (ref 39–117)
ALT SERPL-CCNC: 16 U/L (ref 1–33)
AST SERPL-CCNC: 18 U/L (ref 1–32)
BASOPHILS # BLD AUTO: 0.07 10*3/MM3 (ref 0–0.2)
BASOPHILS NFR BLD AUTO: 0.8 % (ref 0–1.5)
BILIRUB SERPL-MCNC: 1.3 MG/DL (ref 0–1.2)
BUN SERPL-MCNC: 18 MG/DL (ref 8–23)
BUN/CREAT SERPL: 20.7 (ref 7–25)
CALCIUM SERPL-MCNC: 9.9 MG/DL (ref 8.6–10.5)
CHLORIDE SERPL-SCNC: 103 MMOL/L (ref 98–107)
CHOLEST SERPL-MCNC: 129 MG/DL (ref 0–200)
CO2 SERPL-SCNC: 26.3 MMOL/L (ref 22–29)
CREAT SERPL-MCNC: 0.87 MG/DL (ref 0.57–1)
EGFRCR SERPLBLD CKD-EPI 2021: 66.2 ML/MIN/1.73
EOSINOPHIL # BLD AUTO: 0.31 10*3/MM3 (ref 0–0.4)
EOSINOPHIL NFR BLD AUTO: 3.4 % (ref 0.3–6.2)
ERYTHROCYTE [DISTWIDTH] IN BLOOD BY AUTOMATED COUNT: 11.2 % (ref 12.3–15.4)
GLOBULIN SER CALC-MCNC: 1.9 GM/DL
GLUCOSE SERPL-MCNC: 105 MG/DL (ref 65–99)
HBA1C MFR BLD: 6.2 % (ref 4.8–5.6)
HCT VFR BLD AUTO: 36 % (ref 34–46.6)
HDLC SERPL-MCNC: 65 MG/DL (ref 40–60)
HGB BLD-MCNC: 12.3 G/DL (ref 12–15.9)
IMM GRANULOCYTES # BLD AUTO: 0.03 10*3/MM3 (ref 0–0.05)
IMM GRANULOCYTES NFR BLD AUTO: 0.3 % (ref 0–0.5)
LDLC SERPL CALC-MCNC: 48 MG/DL (ref 0–100)
LYMPHOCYTES # BLD AUTO: 2.99 10*3/MM3 (ref 0.7–3.1)
LYMPHOCYTES NFR BLD AUTO: 32.5 % (ref 19.6–45.3)
MCH RBC QN AUTO: 32.4 PG (ref 26.6–33)
MCHC RBC AUTO-ENTMCNC: 34.2 G/DL (ref 31.5–35.7)
MCV RBC AUTO: 94.7 FL (ref 79–97)
MONOCYTES # BLD AUTO: 0.55 10*3/MM3 (ref 0.1–0.9)
MONOCYTES NFR BLD AUTO: 6 % (ref 5–12)
NEUTROPHILS # BLD AUTO: 5.24 10*3/MM3 (ref 1.7–7)
NEUTROPHILS NFR BLD AUTO: 57 % (ref 42.7–76)
NRBC BLD AUTO-RTO: 0.1 /100 WBC (ref 0–0.2)
PLATELET # BLD AUTO: 196 10*3/MM3 (ref 140–450)
POTASSIUM SERPL-SCNC: 5 MMOL/L (ref 3.5–5.2)
PROT SERPL-MCNC: 6.3 G/DL (ref 6–8.5)
RBC # BLD AUTO: 3.8 10*6/MM3 (ref 3.77–5.28)
SODIUM SERPL-SCNC: 138 MMOL/L (ref 136–145)
TRIGL SERPL-MCNC: 85 MG/DL (ref 0–150)
TSH SERPL DL<=0.005 MIU/L-ACNC: 2.83 UIU/ML (ref 0.27–4.2)
VLDLC SERPL CALC-MCNC: 16 MG/DL (ref 5–40)
WBC # BLD AUTO: 9.19 10*3/MM3 (ref 3.4–10.8)

## 2022-12-16 PROCEDURE — 1125F AMNT PAIN NOTED PAIN PRSNT: CPT | Performed by: NURSE PRACTITIONER

## 2022-12-16 PROCEDURE — G0439 PPPS, SUBSEQ VISIT: HCPCS | Performed by: NURSE PRACTITIONER

## 2022-12-16 PROCEDURE — 1170F FXNL STATUS ASSESSED: CPT | Performed by: NURSE PRACTITIONER

## 2022-12-16 PROCEDURE — 1159F MED LIST DOCD IN RCRD: CPT | Performed by: NURSE PRACTITIONER

## 2022-12-16 RX ORDER — MULTIVITAMIN WITH IRON
TABLET ORAL
COMMUNITY

## 2022-12-16 NOTE — PROGRESS NOTES
The ABCs of the Annual Wellness Visit  Subsequent Medicare Wellness Visit    Subjective    Ayana Reyna is a 82 y.o. female who presents for a Subsequent Medicare Wellness Visit.    The following portions of the patient's history were reviewed and   updated as appropriate: allergies, current medications, past family history, past medical history, past social history, past surgical history and problem list.    Compared to one year ago, the patient feels her physical   health is the same.    Compared to one year ago, the patient feels her mental   health is the same.    Recent Hospitalizations:  She was not admitted to the hospital during the last year.       Current Medical Providers:  Patient Care Team:  Adela Sy APRN as PCP - General (Internal Medicine)  Toi Riley MD as Consulting Physician (Ophthalmology)  Monica Nichols MD (Dermatology)  Amanuel Nuñez MD as Consulting Physician (Orthopedic Surgery)  Darryl Joel MD as Consulting Physician (Otolaryngology)  Ana Orozco MD as Consulting Physician (Cardiology)    Outpatient Medications Prior to Visit   Medication Sig Dispense Refill   • amLODIPine (NORVASC) 5 MG tablet Take 1 tablet by mouth Daily. 90 tablet 3   • aspirin (aspirin) 81 MG EC tablet Take 1 tablet by mouth Daily. 90 tablet 0   • atorvastatin (LIPITOR) 20 MG tablet Take 1 tablet by mouth Daily. 90 tablet 3   • cephalexin (KEFLEX) 500 MG capsule      • cholecalciferol (VITAMIN D3) 1000 UNITS tablet Take 1,000 Units by mouth daily.     • levothyroxine (Synthroid) 75 MCG tablet Take 1 tablet by mouth Daily. 90 tablet 1   • Magnesium 250 MG tablet Take  by mouth.     • metoprolol succinate XL (TOPROL-XL) 100 MG 24 hr tablet Take 1 tablet by mouth once daily 90 tablet 0   • Multiple Vitamins-Minerals (PRESERVISION/LUTEIN) capsule Take  by mouth 2 (two) times a day.     • ramipril (ALTACE) 10 MG capsule Take 1 capsule by mouth Every Night. 90 capsule 3   •  spironolactone (ALDACTONE) 25 MG tablet Take 1 tablet by mouth Every Morning. 90 tablet 3   • vitamin B-12 (CYANOCOBALAMIN) 100 MCG tablet Take 50 mcg by mouth daily.     • traZODone (DESYREL) 50 MG tablet 1.5 po qhs 135 tablet 3     No facility-administered medications prior to visit.       No opioid medication identified on active medication list. I have reviewed chart for other potential  high risk medication/s and harmful drug interactions in the elderly.          Aspirin is on active medication list. Aspirin use is indicated based on review of current medical condition/s. Pros and cons of this therapy have been discussed today. Benefits of this medication outweigh potential harm.  Patient has been encouraged to continue taking this medication.  .      Patient Active Problem List   Diagnosis   • Vitamin D deficiency   • Allergic rhinitis   • Prediabetes   • Osteoporosis   • Fatigue   • Hypertension   • Hyperlipemia   • Macular degeneration of both eyes   • Acquired hypothyroidism   • Frequent urination   • Heart murmur   • Intractable chronic paroxysmal hemicrania   • Vision loss, bilateral   • Nonrheumatic aortic valve stenosis   • Nonrheumatic mitral valve regurgitation   • Nonrheumatic aortic valve insufficiency   • Exudative age-related macular degeneration, left eye, with inactive scar (HCC)   • Pulmonary nodules   • Coronary artery calcification   • Age-related nuclear cataract of both eyes   • Open angle with borderline findings, high risk, bilateral   • Bilateral leg cramps   • Encounter for medical examination to establish care     Advance Care Planning  Advance Directive is not on file.  ACP discussion was held with the patient during this visit. Patient does not have an advance directive, information provided.     Objective    Vitals:    12/16/22 0953 12/16/22 1028   BP: 159/88 128/70   BP Location: Left arm Left arm   Patient Position: Sitting Sitting   Cuff Size: Adult Adult   Pulse: 92    Temp: 97  "°F (36.1 °C)    TempSrc: Infrared    SpO2: 100%    Weight: 81.4 kg (179 lb 6.4 oz)    Height: 154.9 cm (61\")    PainSc:   2    PainLoc: Leg      Estimated body mass index is 33.9 kg/m² as calculated from the following:    Height as of this encounter: 154.9 cm (61\").    Weight as of this encounter: 81.4 kg (179 lb 6.4 oz).    BMI is >= 30 and <35. (Class 1 Obesity). The following options were offered after discussion;: exercise counseling/recommendations and nutrition counseling/recommendations      Does the patient have evidence of cognitive impairment? No          HEALTH RISK ASSESSMENT    Smoking Status:  Social History     Tobacco Use   Smoking Status Never   Smokeless Tobacco Never     Alcohol Consumption:  Social History     Substance and Sexual Activity   Alcohol Use Yes   • Alcohol/week: 1.0 standard drink   • Types: 1 Glasses of wine per week    Comment: Less than 1 glass of wine a week//caffeine use:1 cup daily     Fall Risk Screen:    INDUADI Fall Risk Assessment was completed, and patient is at HIGH risk for falls. Assessment completed on:12/16/2022    Depression Screening:  PHQ-2/PHQ-9 Depression Screening 12/16/2022   Retired PHQ-9 Total Score -   Retired Total Score -   Little Interest or Pleasure in Doing Things 0-->not at all   Feeling Down, Depressed or Hopeless 0-->not at all   PHQ-9: Brief Depression Severity Measure Score 0       Health Habits and Functional and Cognitive Screening:  Functional & Cognitive Status 5/28/2021   Do you have difficulty preparing food and eating? No   Do you have difficulty bathing yourself, getting dressed or grooming yourself? No   Do you have difficulty using the toilet? No   Do you have difficulty moving around from place to place? No   Do you have trouble with steps or getting out of a bed or a chair? No   Current Diet Well Balanced Diet   Dental Exam Up to date   Eye Exam Up to date   Exercise (times per week) 0 times per week   Current Exercises Include No " Regular Exercise   Current Exercise Activities Include -   Do you need help using the phone?  No   Are you deaf or do you have serious difficulty hearing?  Yes   Do you need help with transportation? Yes   Do you need help shopping? No   Do you need help preparing meals?  No   Do you need help with housework?  No   Do you need help with laundry? No   Do you need help taking your medications? No   Do you need help managing money? No   Do you ever drive or ride in a car without wearing a seat belt? No   Have you felt unusual stress, anger or loneliness in the last month? Yes   Who do you live with? Spouse   If you need help, do you have trouble finding someone available to you? No   Have you been bothered in the last four weeks by sexual problems? No   Do you have difficulty concentrating, remembering or making decisions? No       Age-appropriate Screening Schedule:  Refer to the list below for future screening recommendations based on patient's age, sex and/or medical conditions. Orders for these recommended tests are listed in the plan section. The patient has been provided with a written plan.    Health Maintenance   Topic Date Due   • DXA SCAN  06/01/2023 (Originally 8/27/2021)   • ZOSTER VACCINE (2 of 3) 06/10/2023 (Originally 3/28/2012)   • LIPID PANEL  06/10/2023   • MAMMOGRAM  06/15/2023   • INFLUENZA VACCINE  Completed   • TDAP/TD VACCINES  Discontinued                CMS Preventative Services Quick Reference  Risk Factors Identified During Encounter  Fall Risk-High or Moderate: Discussed Fall Prevention in the home  Dental Screening Recommended  Vision Screening Recommended  The above risks/problems have been discussed with the patient.  Pertinent information has been shared with the patient in the After Visit Summary.  An After Visit Summary and PPPS were made available to the patient.    Follow Up:   Next Medicare Wellness visit to be scheduled in 1 year.       Additional E&M Note during same encounter  "follows:  Patient has multiple medical problems which are significant and separately identifiable that require additional work above and beyond the Medicare Wellness Visit.      Chief Complaint  Medicare wellness    Subjective        HPI  Ayana Reyna is here for medicare wellness. No additional complaints noted today.          Objective   Vital Signs:  /70 (BP Location: Left arm, Patient Position: Sitting, Cuff Size: Adult)   Pulse 92   Temp 97 °F (36.1 °C) (Infrared)   Ht 154.9 cm (61\")   Wt 81.4 kg (179 lb 6.4 oz)   SpO2 100%   BMI 33.90 kg/m²     Physical Exam  Constitutional:       General: She is awake.      Appearance: Normal appearance. She is obese.   HENT:      Head: Normocephalic and atraumatic.      Right Ear: Hearing and external ear normal.      Left Ear: Hearing and external ear normal.      Nose: Nose normal.      Mouth/Throat:      Lips: Pink.      Mouth: Mucous membranes are moist.      Pharynx: Oropharynx is clear.   Neck:      Vascular: No carotid bruit.   Cardiovascular:      Rate and Rhythm: Normal rate and regular rhythm.      Pulses: Normal pulses.      Heart sounds: Murmur heard.     No friction rub. No gallop.   Pulmonary:      Effort: Pulmonary effort is normal. No respiratory distress.      Breath sounds: Normal breath sounds. No stridor. No wheezing, rhonchi or rales.   Abdominal:      General: Abdomen is flat. Bowel sounds are normal. There is no distension.      Palpations: Abdomen is soft.      Tenderness: There is no abdominal tenderness.   Musculoskeletal:         General: Normal range of motion.      Cervical back: Normal range of motion and neck supple.   Skin:     General: Skin is warm and dry.      Capillary Refill: Capillary refill takes less than 2 seconds.          Neurological:      General: No focal deficit present.      Mental Status: She is alert and oriented to person, place, and time. Mental status is at baseline.      Motor: Motor function is intact.      " Coordination: Coordination is intact.      Gait: Gait is intact.      Deep Tendon Reflexes: Reflexes are normal and symmetric.   Psychiatric:         Attention and Perception: Attention normal.         Mood and Affect: Mood normal.         Speech: Speech normal.         Behavior: Behavior normal. Behavior is cooperative.         Thought Content: Thought content normal.         Cognition and Memory: Cognition normal.         Judgment: Judgment normal.          The following data was reviewed by: TRINI Seaman on 12/16/2022:  Common labs    Common Labs 6/10/22 6/10/22 6/10/22 6/10/22    0848 0848 0848 0848   Glucose  98     BUN  19     Creatinine  0.88     Sodium  143     Potassium  4.9     Chloride  107 (A)     Calcium  9.8     Total Protein  6.2     Albumin  4.1     Total Bilirubin  1.0     Alkaline Phosphatase  87     AST (SGOT)  15     ALT (SGPT)  17     WBC 7.7      Hemoglobin 12.2      Hematocrit 36.4      Platelets 245      Total Cholesterol    120   Triglycerides    58   HDL Cholesterol    66   LDL Cholesterol     41   Hemoglobin A1C   5.8 (A)    (A) Abnormal value       Comments are available for some flowsheets but are not being displayed.                      Assessment and Plan   Diagnoses and all orders for this visit:    1. Medicare annual wellness visit, subsequent (Primary)    2. Acquired hypothyroidism  -     TSH Rfx On Abnormal To Free T4    3. Primary hypertension  Assessment & Plan:  Hypertension is improving with treatment.  Continue current treatment regimen.  Blood pressure will be reassessed at the next regular appointment.    Orders:  -     CBC & Differential  -     Comprehensive metabolic panel    4. Mixed hyperlipidemia  Assessment & Plan:  Continues on statin.     Orders:  -     Lipid panel    5. Coronary artery calcification  Assessment & Plan:  Continues on aspirin.   Following with Dr. Orozco.       6. Vitamin D deficiency  Assessment & Plan:  Continues on vit d3  supplementation.       7. Osteoporosis, unspecified osteoporosis type, unspecified pathological fracture presence  Assessment & Plan:  Continues on vitamin d3 supplementation.   Recommended increased calcium intake.   Continue with weight bearing exercise.       8. Nonrheumatic aortic valve stenosis  Assessment & Plan:  Following with cardiology.       9. Prediabetes  -     Hemoglobin A1c    10. Macular degeneration of both eyes, unspecified type  Assessment & Plan:  Following with optho.              Follow Up   Return in about 6 months (around 6/16/2023) for Recheck chronic conditions.  Patient was given instructions and counseling regarding her condition or for health maintenance advice. Please see specific information pulled into the AVS if appropriate.

## 2022-12-16 NOTE — ASSESSMENT & PLAN NOTE
Continues on vitamin d3 supplementation.   Recommended increased calcium intake.   Continue with weight bearing exercise.

## 2022-12-19 NOTE — PROGRESS NOTES
Please let patient know--  Cbc stable  CMP stable-- very slight elevation in bilirubin which appears chronic in nature.  TSH WNL  A1C at 6.2-- recommend low sugar/glycemic diet choices and exercise.   LDL at goal.     F/u as scheduled

## 2022-12-28 DIAGNOSIS — E78.49 OTHER HYPERLIPIDEMIA: ICD-10-CM

## 2022-12-28 RX ORDER — ATORVASTATIN CALCIUM 20 MG/1
TABLET, FILM COATED ORAL
Qty: 90 TABLET | Refills: 0 | Status: SHIPPED | OUTPATIENT
Start: 2022-12-28 | End: 2022-12-29 | Stop reason: SDUPTHER

## 2022-12-28 RX ORDER — AMLODIPINE BESYLATE 5 MG/1
TABLET ORAL
Qty: 90 TABLET | Refills: 0 | Status: SHIPPED | OUTPATIENT
Start: 2022-12-28 | End: 2022-12-29 | Stop reason: SDUPTHER

## 2022-12-29 ENCOUNTER — TELEPHONE (OUTPATIENT)
Dept: INTERNAL MEDICINE | Facility: CLINIC | Age: 82
End: 2022-12-29

## 2022-12-29 DIAGNOSIS — E78.49 OTHER HYPERLIPIDEMIA: ICD-10-CM

## 2022-12-29 RX ORDER — ATORVASTATIN CALCIUM 20 MG/1
20 TABLET, FILM COATED ORAL DAILY
Qty: 90 TABLET | Refills: 0 | Status: SHIPPED | OUTPATIENT
Start: 2022-12-29 | End: 2023-01-25

## 2022-12-29 RX ORDER — AMLODIPINE BESYLATE 5 MG/1
5 TABLET ORAL DAILY
Qty: 90 TABLET | Refills: 0 | Status: SHIPPED | OUTPATIENT
Start: 2022-12-29 | End: 2023-01-25

## 2022-12-29 RX ORDER — RAMIPRIL 10 MG/1
10 CAPSULE ORAL NIGHTLY
Qty: 90 CAPSULE | Refills: 3 | Status: SHIPPED | OUTPATIENT
Start: 2022-12-29 | End: 2023-01-27

## 2022-12-29 NOTE — TELEPHONE ENCOUNTER
Caller: Angelica Solis     Relationship: DAUGHTER ON [employer]     Best call back number: [phone] - VM OKAY   PATIENT PREFERS TO BE CONTACTED FIRST.     What is your medical concern? BAD SPASMS IN BACK, UNABLE TO WALK WITHOUT GREAT EFFORT, GOT WORSE SINCE YESTERDAY BEEN A FEW DAYS BEFORE    Is your provider already aware of this issue? NO     Have you been treated for this issue? NO

## 2022-12-30 ENCOUNTER — TELEPHONE (OUTPATIENT)
Dept: INTERNAL MEDICINE | Facility: CLINIC | Age: 82
End: 2022-12-30

## 2022-12-30 RX ORDER — METHYLPREDNISOLONE 4 MG/1
TABLET ORAL
Qty: 21 EACH | Refills: 0 | Status: SHIPPED | OUTPATIENT
Start: 2022-12-30

## 2022-12-30 NOTE — TELEPHONE ENCOUNTER
I sent in a medrol pack to her pharmacy. But I do still recommend an ER evaluation-- I do believe she could benefit from imaging due to her hx of osteoporosis and the inability to rule out a compression fracture. Please ask patient if her pain does not improve to go be evaluated.

## 2022-12-30 NOTE — TELEPHONE ENCOUNTER
Ayana's daughter Laura called and I relied the message that you advised her to go to there ER.  Laura states that she won't do that she is afraid to go in there because she might catching something.  I notified her that you didn't have any openings today and she wanted to know if you could just send her in a steroid to help?  She said that she can't hardly move and is bent in half.  Please advise.

## 2023-01-10 DIAGNOSIS — I10 ESSENTIAL HYPERTENSION: ICD-10-CM

## 2023-01-10 DIAGNOSIS — E03.9 ACQUIRED HYPOTHYROIDISM: ICD-10-CM

## 2023-01-10 RX ORDER — METOPROLOL SUCCINATE 100 MG/1
100 TABLET, EXTENDED RELEASE ORAL DAILY
Qty: 90 TABLET | Refills: 1 | Status: SHIPPED | OUTPATIENT
Start: 2023-01-10 | End: 2023-01-25 | Stop reason: ALTCHOICE

## 2023-01-10 RX ORDER — LEVOTHYROXINE SODIUM 0.07 MG/1
75 TABLET ORAL DAILY
Qty: 90 TABLET | Refills: 1 | Status: SHIPPED | OUTPATIENT
Start: 2023-01-10

## 2023-01-10 NOTE — TELEPHONE ENCOUNTER
Caller: [address] [place] [phone] [place] [phone] FX    Relationship: Pharmacy    Best call back number:     Requested Prescriptions:   Requested Prescriptions     Pending Prescriptions Disp Refills   • metoprolol succinate XL (TOPROL-XL) 100 MG 24 hr tablet 90 tablet 0     Sig: Take 1 tablet by mouth Daily.   • levothyroxine (Synthroid) 75 MCG tablet 90 tablet 1     Sig: Take 1 tablet by mouth Daily.        Pharmacy where request should be sent: [address] [place] [phone] [place] [phone] FX     Additional details provided by patient: PHARMACIST STATES THAT THEY HAVE TRIED TO MAKE REFILL REQUESTS FOR A FEW DAYS    Does the patient have less than a 3 day supply:  [x] Yes  [] No    Would you like a call back once the refill request has been completed: [] Yes [x] No    If the office needs to give you a call back, can they leave a voicemail: [] Yes [x] No    Champ Enciso Rep   01/10/23 11:41 EST

## 2023-01-10 NOTE — TELEPHONE ENCOUNTER
Rx Refill Note  Requested Prescriptions     Pending Prescriptions Disp Refills   • metoprolol succinate XL (TOPROL-XL) 100 MG 24 hr tablet 90 tablet 1     Sig: Take 1 tablet by mouth Daily.   • levothyroxine (Synthroid) 75 MCG tablet 90 tablet 1     Sig: Take 1 tablet by mouth Daily.      Last office visit with prescribing clinician: 12/16/2022   Next office visit with prescribing clinician: 6/9/2023

## 2023-01-25 ENCOUNTER — OFFICE VISIT (OUTPATIENT)
Dept: CARDIOLOGY | Facility: CLINIC | Age: 83
End: 2023-01-25
Payer: MEDICARE

## 2023-01-25 VITALS
SYSTOLIC BLOOD PRESSURE: 120 MMHG | HEART RATE: 74 BPM | BODY MASS INDEX: 34.36 KG/M2 | HEIGHT: 61 IN | DIASTOLIC BLOOD PRESSURE: 82 MMHG | WEIGHT: 182 LBS

## 2023-01-25 DIAGNOSIS — E78.49 OTHER HYPERLIPIDEMIA: ICD-10-CM

## 2023-01-25 DIAGNOSIS — E78.2 MIXED HYPERLIPIDEMIA: ICD-10-CM

## 2023-01-25 DIAGNOSIS — R09.89 BILATERAL CAROTID BRUITS: ICD-10-CM

## 2023-01-25 DIAGNOSIS — I10 PRIMARY HYPERTENSION: ICD-10-CM

## 2023-01-25 DIAGNOSIS — I25.10 CORONARY ARTERY CALCIFICATION: Primary | ICD-10-CM

## 2023-01-25 DIAGNOSIS — I34.0 NONRHEUMATIC MITRAL VALVE REGURGITATION: ICD-10-CM

## 2023-01-25 DIAGNOSIS — I35.0 NONRHEUMATIC AORTIC VALVE STENOSIS: ICD-10-CM

## 2023-01-25 DIAGNOSIS — I25.84 CORONARY ARTERY CALCIFICATION: Primary | ICD-10-CM

## 2023-01-25 DIAGNOSIS — I35.1 NONRHEUMATIC AORTIC VALVE INSUFFICIENCY: ICD-10-CM

## 2023-01-25 DIAGNOSIS — I10 ESSENTIAL HYPERTENSION: ICD-10-CM

## 2023-01-25 PROCEDURE — 99214 OFFICE O/P EST MOD 30 MIN: CPT | Performed by: INTERNAL MEDICINE

## 2023-01-25 PROCEDURE — 93000 ELECTROCARDIOGRAM COMPLETE: CPT | Performed by: INTERNAL MEDICINE

## 2023-01-25 RX ORDER — METOPROLOL SUCCINATE 100 MG/1
100 TABLET, EXTENDED RELEASE ORAL NIGHTLY
Qty: 90 TABLET | Refills: 3 | Status: SHIPPED | OUTPATIENT
Start: 2023-01-25

## 2023-01-25 RX ORDER — ATORVASTATIN CALCIUM 20 MG/1
20 TABLET, FILM COATED ORAL NIGHTLY
Qty: 90 TABLET | Refills: 0 | Status: SHIPPED | OUTPATIENT
Start: 2023-01-25

## 2023-01-25 RX ORDER — AMLODIPINE BESYLATE 5 MG/1
5 TABLET ORAL EVERY MORNING
Qty: 90 TABLET | Refills: 3 | Status: SHIPPED | OUTPATIENT
Start: 2023-01-25

## 2023-01-25 NOTE — PROGRESS NOTES
Date of Office Visit: 2023  Encounter Provider: Ana Orozco MD  Place of Service: [facility] CARDIOLOGY  Patient Name: Ayana Parikh  :1940      Patient ID:  Ayana Parikh is a 82 y.o. female is here for  followup for mild CAD.        History of Present Illness    She has a history of hypertension, hyperlipidemia, hypothyroidism, aortic valve calcification, chest pain, mild CAD.     She is , has 4 children and is retired.  She is 1 cup of coffee per day.  She uses no cigarettes, alcohol or drugs.  Her mother had cancer.  Her  was a patient of mine, named Sandoval parikh.     She had dyspnea with exertion starting 2020.  She underwent stress nuclear perfusion study on 2020 showing no evidence of ischemia.  She had an echocardiogram done 2020 which showed ejection fraction 65% with grade 2 diastolic dysfunction, mild concentric left ventricular perjury, mild to moderate aortic stenosis and insufficiency, moderate mitral insufficiency, mild to moderate tricuspid insufficiency with RVSP 43 mmHg.  She had normal left lower extremity venous duplex study on 2020.       In 2021, she had a CT angiogram of her heart.  This showed a 9 mm left upper lobe pulmonary nodule, 2 smaller pleural based nodules at the left upper lobe, linear scarring at the lower and right lobes, and scarring of the medial aspect of the right lower lobe.  Structurally her heart was normal, left main had greater than 50% calcified ostial stenosis, 50% proximal calcified LAD stenosis, 50% mid calcified LAD stenosis, greater than 50% distal mixed plaque stenosis, less than 25% calcified proximal circumflex stenosis, and the RCA was almost nonexistent.  Her total calcium score was 252.     In 2021, she had a stress echocardiogram completed with the following results: EF 62%, grade 2 diastolic dysfunction, mild aortic valve stenosis, moderate aortic valve  calcification, trace aortic regurgitation, mild mitral valve regurgitation, moderate mitral annular calcification, mild tricuspid regurgitation, normal stress portion.  She continued to have symptoms so in March 2021, she underwent cardiac catheterization which showed the following: Left main minimally calcified, LAD mildly calcified in the proximal segment, diagonal branches normal, minimal calcification in the proximal left circumflex, obtuse marginal branches, posterior lateral, and posterior descending branches normal, RCA normal.  Her chest pain and dyspnea was not felt to be a cardiac etiology.    Labs in 12/6/22 show glucose 105, otherwise normal CMP, hemoglobin A1c 6.2%, normal TSH, total cholesterol 129, HDL 65, LDL 48, triglycerides 85, normal CBC.  She has had 2 bad falls in the last 6 months.  She has no hearing in her right ear and uses a hearing aid.  She also has decreased hearing acuity.  She believes that her falls were due to balance issues.  Sometimes she does have dizziness that when she is head upright.  She is had no syncope.  She denies orthopnea or PND.  She has no heart racing or skipping that she can feel.  She has some mild chronic lower extremity edema.  She now lives alone as her  has passed away.  She has been in the same house for 53 years.  She is a [employer] fan and goes to the [employer] women's and men's basketball games.    Past Medical History:   Diagnosis Date   • Acquired hypothyroidism    • Allergic rhinitis    • Aortic stenosis     mild to moderate per echo 2020   • Bilateral leg cramps 1/21/2022   • Coronary artery calcification 03/24/2021   • Fatigue    • Glucose intolerance (impaired glucose tolerance)    • Hearing loss    • Heart murmur    • Heart palpitations    • Hematuria    • Hyperlipemia    • Hypertension    • Insomnia    • Macular degeneration     vision loss of left eye    • Migraine    • Nonrheumatic aortic valve insufficiency 10/19/2020   • Osteoporosis     • Pulmonary nodules 3/24/2021   • Shortness of breath    • Vision loss, bilateral     left eye macular degeneration; right eye etiology unknown   • Vitamin D deficiency    • Wears hearing aid in both ears 12/2019         Past Surgical History:   Procedure Laterality Date   • CARDIAC CATHETERIZATION N/A 3/29/2021    Procedure: Coronary angiography;  Surgeon: Zain Mendoza MD;  Location:  [facility]VAS[facility];  Service: Cardiovascular;  Laterality: N/A;   • CARDIAC CATHETERIZATION N/A 3/29/2021    Procedure: Left heart cath;  Surgeon: Zain Mendoza MD;  Location:  [facility];  Service: Cardiovascular;  Laterality: N/A;   • CARDIAC CATHETERIZATION N/A 3/29/2021    Procedure: Left ventriculography;  Surgeon: Zain Mendoza MD;  Location:  [facility]VAS[facility];  Service: Cardiovascular;  Laterality: N/A;   • CHOLECYSTECTOMY     • COLONOSCOPY  2008   • FRACTURE SURGERY  1975,2014   • HYSTERECTOMY N/A 1971    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   • JOINT REPLACEMENT  1995, 2013   • KNEE SURGERY  1989   • TONSILLECTOMY  1950       Current Outpatient Medications on File Prior to Visit   Medication Sig Dispense Refill   • cholecalciferol (VITAMIN D3) 1000 UNITS tablet Take 1,000 Units by mouth daily.     • levothyroxine (Synthroid) 75 MCG tablet Take 1 tablet by mouth Daily. 90 tablet 1   • Magnesium 250 MG tablet Take  by mouth.     • methylPREDNISolone (MEDROL) 4 MG dose pack Take as directed on package instructions. 21 each 0   • Multiple Vitamins-Minerals (PRESERVISION/LUTEIN) capsule Take  by mouth 2 (two) times a day.     • ramipril (ALTACE) 10 MG capsule Take 1 capsule by mouth Every Night. 90 capsule 3   • spironolactone (ALDACTONE) 25 MG tablet Take 1 tablet by mouth Every Morning. 90 tablet 3   • vitamin B-12 (CYANOCOBALAMIN) 100 MCG tablet Take 50 mcg by mouth daily.     • [DISCONTINUED] amLODIPine (NORVASC) 5 MG tablet Take 1 tablet by mouth Daily. 90 tablet 0   •  "[DISCONTINUED] atorvastatin (LIPITOR) 20 MG tablet Take 1 tablet by mouth Daily. 90 tablet 0   • [DISCONTINUED] aspirin (aspirin) 81 MG EC tablet Take 1 tablet by mouth Daily. 90 tablet 0   • [DISCONTINUED] cephalexin (KEFLEX) 500 MG capsule      • [DISCONTINUED] metoprolol succinate XL (TOPROL-XL) 100 MG 24 hr tablet Take 1 tablet by mouth Daily. 90 tablet 1     No current facility-administered medications on file prior to visit.       Social History     Socioeconomic History   • Marital status:    Tobacco Use   • Smoking status: Never   • Smokeless tobacco: Never   Vaping Use   • Vaping Use: Never used   Substance and Sexual Activity   • Alcohol use: Yes     Alcohol/week: 1.0 standard drink     Types: 1 Glasses of wine per week     Comment: Less than 1 glass of wine a week//caffeine use:1 cup daily   • Drug use: No   • Sexual activity: Never     Partners: Male           ROS    Procedures    ECG 12 Lead    Date/Time: 1/25/2023 9:27 AM  Performed by: Ana Orozco MD  Authorized by: Ana Orozco MD   Comparison: compared with previous ECG   Similar to previous ECG  Rhythm: sinus rhythm    Clinical impression: normal ECG                Objective:      Vitals:    01/25/23 0910   BP: 120/82   Pulse: 74   Weight: 82.6 kg (182 lb)   Height: 154.9 cm (61\")     Body mass index is 34.39 kg/m².    Vitals reviewed.   Constitutional:       General: Not in acute distress.     Appearance: Well-developed. Not diaphoretic.   Eyes:      General: No scleral icterus.     Conjunctiva/sclera: Conjunctivae normal.   HENT:      Head: Normocephalic and atraumatic.   Neck:      Thyroid: No thyromegaly.      Vascular: No carotid bruit or JVD.      Lymphadenopathy: No cervical adenopathy.   Pulmonary:      Effort: Pulmonary effort is normal. No respiratory distress.      Breath sounds: Normal breath sounds. No wheezing. No rhonchi. No rales.   Chest:      Chest wall: Not tender to palpatation.   Cardiovascular:      " Normal rate. Regular rhythm.      Murmurs: There is a grade 2/6 harsh midsystolic murmur at the URSB, radiating to the neck.      No gallop.   Pulses:     Carotid: 2+ with bruit bilaterally.     Radial: 2+ bilaterally.     Dorsalis pedis: 2+ bilaterally.     Posterior tibial: 2+ bilaterally.  Edema:     Thigh: bilateral edema of the thigh.     Pretibial: bilateral edema of the pretibial area.     Ankle: bilateral 1+ edema of the ankle.     Feet: bilateral edema of the feet.  Abdominal:      General: Bowel sounds are normal. There is no distension or abdominal bruit.      Palpations: Abdomen is soft. There is no abdominal mass.      Tenderness: There is no abdominal tenderness.   Musculoskeletal:         General: No deformity.      Extremities: No clubbing present.     Cervical back: Neck supple. Skin:     General: Skin is warm and dry. There is no cyanosis.      Coloration: Skin is not pale.      Findings: No rash.   Neurological:      Mental Status: Alert and oriented to person, place, and time.      Cranial Nerves: No cranial nerve deficit.   Psychiatric:         Judgment: Judgment normal.         Lab Review:       Assessment:      Diagnosis Plan   1. Coronary artery calcification        2. Mixed hyperlipidemia        3. Primary hypertension        4. Nonrheumatic aortic valve insufficiency        5. Nonrheumatic aortic valve stenosis        6. Nonrheumatic mitral valve regurgitation        7. Other hyperlipidemia  atorvastatin (LIPITOR) 20 MG tablet      8. Bilateral carotid bruits  Duplex Carotid Ultrasound CAR      9. Essential hypertension  metoprolol succinate XL (TOPROL-XL) 100 MG 24 hr tablet          1. Left lower extremity edema.  Noncardiac, likely venous insufficiency.  2. Hypertension, goal less than 120/80.  3. Hyperlipidemia, controlled on atorvastatin.  4. Hypothyroidism.  5. Mild aortic stenosis  6. moderate mitral insufficiency.  7. Exertional chest tightness, noncardiac.  8. Mild coronary artery  disease  9. Bilateral carotid bruits, set up carotid duplex study.  10. Falls x2 in the last 6 months.     Plan:       See Aminta in 6 months.  I am worried that at times her blood pressure might get a little too low.  I have advised her to please keep records of her dizziness especially when she is had a bright and if she feels like this is related at all to her medications.  I made no changes today but did order carotid duplex study.  If I were to reduce the medication, it would be her morning amlodipine, reducing from 5 mg to 2.5 mg.

## 2023-01-27 RX ORDER — AMLODIPINE BESYLATE 5 MG/1
TABLET ORAL
Qty: 90 TABLET | Refills: 0 | OUTPATIENT
Start: 2023-01-27

## 2023-01-27 RX ORDER — RAMIPRIL 10 MG/1
CAPSULE ORAL
Qty: 90 CAPSULE | Refills: 0 | Status: SHIPPED | OUTPATIENT
Start: 2023-01-27

## 2023-03-10 ENCOUNTER — HOSPITAL ENCOUNTER (OUTPATIENT)
Dept: CARDIOLOGY | Facility: HOSPITAL | Age: 83
Discharge: HOME | End: 2023-03-10
Admitting: INTERNAL MEDICINE
Payer: MEDICARE

## 2023-03-10 DIAGNOSIS — R09.89 BILATERAL CAROTID BRUITS: ICD-10-CM

## 2023-03-10 LAB
BH CV XLRA MEAS LEFT DIST CCA EDV: 9.6 CM/SEC
BH CV XLRA MEAS LEFT DIST CCA PSV: 72.7 CM/SEC
BH CV XLRA MEAS LEFT DIST ICA EDV: -17.2 CM/SEC
BH CV XLRA MEAS LEFT DIST ICA PSV: -68.3 CM/SEC
BH CV XLRA MEAS LEFT ICA/CCA RATIO: -1.17
BH CV XLRA MEAS LEFT MID CCA EDV: 27.4 CM/SEC
BH CV XLRA MEAS LEFT MID CCA PSV: 90.5 CM/SEC
BH CV XLRA MEAS LEFT MID ICA EDV: -32.4 CM/SEC
BH CV XLRA MEAS LEFT MID ICA PSV: -85.1 CM/SEC
BH CV XLRA MEAS LEFT PROX CCA EDV: 17.8 CM/SEC
BH CV XLRA MEAS LEFT PROX CCA PSV: 83.7 CM/SEC
BH CV XLRA MEAS LEFT PROX ECA PSV: -91.1 CM/SEC
BH CV XLRA MEAS LEFT PROX ICA EDV: -15.4 CM/SEC
BH CV XLRA MEAS LEFT PROX ICA PSV: -56 CM/SEC
BH CV XLRA MEAS LEFT PROX SCLA PSV: 112.7 CM/SEC
BH CV XLRA MEAS LEFT VERTEBRAL A PSV: -52.5 CM/SEC
BH CV XLRA MEAS RIGHT DIST CCA EDV: 15.5 CM/SEC
BH CV XLRA MEAS RIGHT DIST CCA PSV: 55.3 CM/SEC
BH CV XLRA MEAS RIGHT DIST ICA EDV: -18 CM/SEC
BH CV XLRA MEAS RIGHT DIST ICA PSV: -75.2 CM/SEC
BH CV XLRA MEAS RIGHT ICA/CCA RATIO: -1.07
BH CV XLRA MEAS RIGHT MID CCA EDV: -14.9 CM/SEC
BH CV XLRA MEAS RIGHT MID CCA PSV: -58.4 CM/SEC
BH CV XLRA MEAS RIGHT MID ICA EDV: -13.7 CM/SEC
BH CV XLRA MEAS RIGHT MID ICA PSV: -50.3 CM/SEC
BH CV XLRA MEAS RIGHT PROX CCA EDV: -16.2 CM/SEC
BH CV XLRA MEAS RIGHT PROX CCA PSV: -65.2 CM/SEC
BH CV XLRA MEAS RIGHT PROX ECA EDV: -15.5 CM/SEC
BH CV XLRA MEAS RIGHT PROX ECA PSV: -93.8 CM/SEC
BH CV XLRA MEAS RIGHT PROX ICA EDV: -18.6 CM/SEC
BH CV XLRA MEAS RIGHT PROX ICA PSV: -59 CM/SEC
BH CV XLRA MEAS RIGHT PROX SCLA PSV: 164.7 CM/SEC
BH CV XLRA MEAS RIGHT VERTEBRAL A PSV: -42.9 CM/SEC
MAXIMAL PREDICTED HEART RATE: 137 BPM
STRESS TARGET HR: 116 BPM

## 2023-03-10 PROCEDURE — 93880 EXTRACRANIAL BILAT STUDY: CPT

## 2023-03-10 PROCEDURE — 93880 EXTRACRANIAL BILAT STUDY: CPT | Performed by: INTERNAL MEDICINE

## 2023-03-10 NOTE — PROGRESS NOTES
Please call and let her know that she does have some plaque in her carotid arteries but it is not limiting her flow.  It is important for her to continue to take her atorvastatin as this helps to cut down on plaque deposits not only in her heart but in her carotids as well.

## 2023-06-09 ENCOUNTER — OFFICE VISIT (OUTPATIENT)
Dept: INTERNAL MEDICINE | Facility: CLINIC | Age: 83
End: 2023-06-09
Payer: MEDICARE

## 2023-06-09 VITALS
HEIGHT: 61 IN | HEART RATE: 77 BPM | DIASTOLIC BLOOD PRESSURE: 80 MMHG | BODY MASS INDEX: 33.99 KG/M2 | SYSTOLIC BLOOD PRESSURE: 138 MMHG | OXYGEN SATURATION: 96 % | WEIGHT: 180 LBS

## 2023-06-09 DIAGNOSIS — R73.03 PREDIABETES: ICD-10-CM

## 2023-06-09 DIAGNOSIS — H54.3 VISION LOSS, BILATERAL: ICD-10-CM

## 2023-06-09 DIAGNOSIS — E78.2 MIXED HYPERLIPIDEMIA: ICD-10-CM

## 2023-06-09 DIAGNOSIS — H35.30 MACULAR DEGENERATION OF BOTH EYES, UNSPECIFIED TYPE: ICD-10-CM

## 2023-06-09 DIAGNOSIS — M81.0 OSTEOPOROSIS, UNSPECIFIED OSTEOPOROSIS TYPE, UNSPECIFIED PATHOLOGICAL FRACTURE PRESENCE: ICD-10-CM

## 2023-06-09 DIAGNOSIS — I35.0 NONRHEUMATIC AORTIC VALVE STENOSIS: ICD-10-CM

## 2023-06-09 DIAGNOSIS — E55.9 VITAMIN D DEFICIENCY: ICD-10-CM

## 2023-06-09 DIAGNOSIS — E03.9 ACQUIRED HYPOTHYROIDISM: ICD-10-CM

## 2023-06-09 DIAGNOSIS — I10 PRIMARY HYPERTENSION: ICD-10-CM

## 2023-06-09 DIAGNOSIS — R25.2 BILATERAL LEG CRAMPS: Primary | ICD-10-CM

## 2023-06-09 PROBLEM — M54.31 SCIATICA OF RIGHT SIDE: Status: ACTIVE | Noted: 2023-06-09

## 2023-06-09 PROBLEM — R35.0 FREQUENT URINATION: Status: RESOLVED | Noted: 2017-06-12 | Resolved: 2023-06-09

## 2023-06-09 PROBLEM — R91.8 PULMONARY NODULES: Status: RESOLVED | Noted: 2021-03-24 | Resolved: 2023-06-09

## 2023-06-09 LAB
ALBUMIN SERPL-MCNC: 3.9 G/DL (ref 3.5–5.2)
ALBUMIN/GLOB SERPL: 1.7 G/DL
ALP SERPL-CCNC: 104 U/L (ref 39–117)
ALT SERPL-CCNC: 24 U/L (ref 1–33)
AST SERPL-CCNC: 20 U/L (ref 1–32)
BASOPHILS # BLD AUTO: 0.06 10*3/MM3 (ref 0–0.2)
BASOPHILS NFR BLD AUTO: 0.8 % (ref 0–1.5)
BILIRUB SERPL-MCNC: 1.4 MG/DL (ref 0–1.2)
BUN SERPL-MCNC: 21 MG/DL (ref 8–23)
BUN/CREAT SERPL: 23.3 (ref 7–25)
CALCIUM SERPL-MCNC: 10.3 MG/DL (ref 8.6–10.5)
CHLORIDE SERPL-SCNC: 107 MMOL/L (ref 98–107)
CHOLEST SERPL-MCNC: 130 MG/DL (ref 0–200)
CO2 SERPL-SCNC: 26.4 MMOL/L (ref 22–29)
CREAT SERPL-MCNC: 0.9 MG/DL (ref 0.57–1)
EGFRCR SERPLBLD CKD-EPI 2021: 63.2 ML/MIN/1.73
EOSINOPHIL # BLD AUTO: 0.4 10*3/MM3 (ref 0–0.4)
EOSINOPHIL NFR BLD AUTO: 5.2 % (ref 0.3–6.2)
ERYTHROCYTE [DISTWIDTH] IN BLOOD BY AUTOMATED COUNT: 11.1 % (ref 12.3–15.4)
GLOBULIN SER CALC-MCNC: 2.3 GM/DL
GLUCOSE SERPL-MCNC: 119 MG/DL (ref 65–99)
HBA1C MFR BLD: 5.8 % (ref 4.8–5.6)
HCT VFR BLD AUTO: 37.4 % (ref 34–46.6)
HDLC SERPL-MCNC: 67 MG/DL (ref 40–60)
HGB BLD-MCNC: 12.6 G/DL (ref 12–15.9)
IMM GRANULOCYTES # BLD AUTO: 0.01 10*3/MM3 (ref 0–0.05)
IMM GRANULOCYTES NFR BLD AUTO: 0.1 % (ref 0–0.5)
LDLC SERPL CALC-MCNC: 47 MG/DL (ref 0–100)
LYMPHOCYTES # BLD AUTO: 2.74 10*3/MM3 (ref 0.7–3.1)
LYMPHOCYTES NFR BLD AUTO: 35.9 % (ref 19.6–45.3)
MCH RBC QN AUTO: 32.9 PG (ref 26.6–33)
MCHC RBC AUTO-ENTMCNC: 33.7 G/DL (ref 31.5–35.7)
MCV RBC AUTO: 97.7 FL (ref 79–97)
MONOCYTES # BLD AUTO: 0.48 10*3/MM3 (ref 0.1–0.9)
MONOCYTES NFR BLD AUTO: 6.3 % (ref 5–12)
NEUTROPHILS # BLD AUTO: 3.95 10*3/MM3 (ref 1.7–7)
NEUTROPHILS NFR BLD AUTO: 51.7 % (ref 42.7–76)
NRBC BLD AUTO-RTO: 0 /100 WBC (ref 0–0.2)
PLATELET # BLD AUTO: 214 10*3/MM3 (ref 140–450)
POTASSIUM SERPL-SCNC: 5.7 MMOL/L (ref 3.5–5.2)
PROT SERPL-MCNC: 6.2 G/DL (ref 6–8.5)
RBC # BLD AUTO: 3.83 10*6/MM3 (ref 3.77–5.28)
SODIUM SERPL-SCNC: 143 MMOL/L (ref 136–145)
T4 FREE SERPL-MCNC: 1.49 NG/DL (ref 0.93–1.7)
TRIGL SERPL-MCNC: 80 MG/DL (ref 0–150)
TSH SERPL DL<=0.005 MIU/L-ACNC: 3.64 UIU/ML (ref 0.27–4.2)
VLDLC SERPL CALC-MCNC: 16 MG/DL (ref 5–40)
WBC # BLD AUTO: 7.64 10*3/MM3 (ref 3.4–10.8)

## 2023-06-09 NOTE — PROGRESS NOTES
"Chief Complaint  Hyperlipidemia, Hypothyroidism, and Hypertension    Subjective        Ayana Reyna presents to [facility] PRIMARY CARE  History of Present Illness  This is an 84 y/o female presenting to office for f/u with HLD, hypothyroidism, and HTN.     HTN-- continues on amlodipine, metoprolol, ramipril, spironolactone. Denies Chest pain. Not checking BP at home.     HLD-- continues on atorvastatin.     Continues following with cardiology; hx VHD.     Continues following with [facility] specialists-- hx macular degeneration.     Patient reports ongoing right lower leg pain from hip to ankle region. Patient reports she does see orthopedics for her right shoulder; Patient was suggested to see the spine specialist at [facility] [place] [facility] for further work-- reports she is going to do this.     Hx osteoporosis-- continues on vitamin D supplementation. Patient has immobility due to visual loss.     UTD dentist.     Objective   Vital Signs:  /80 (BP Location: Right arm, Patient Position: Sitting, Cuff Size: Adult)   Pulse 77   Ht 154.9 cm (61\")   Wt 81.6 kg (180 lb)   SpO2 96%   BMI 34.01 kg/m²   Estimated body mass index is 34.01 kg/m² as calculated from the following:    Height as of this encounter: 154.9 cm (61\").    Weight as of this encounter: 81.6 kg (180 lb).       BMI is >= 30 and <35. (Class 1 Obesity). The following options were offered after discussion;: exercise counseling/recommendations and nutrition counseling/recommendations      Physical Exam  Constitutional:       Appearance: Normal appearance. She is obese.   HENT:      Head: Normocephalic and atraumatic.      Right Ear: External ear normal.      Left Ear: External ear normal.      Nose: Nose normal.      Mouth/Throat:      Mouth: Mucous membranes are moist.      Pharynx: Oropharynx is clear.   Eyes:      Extraocular Movements: Extraocular movements intact.   Neck:      Vascular: No carotid bruit.   Cardiovascular:      Rate " and Rhythm: Normal rate and regular rhythm.      Heart sounds: Murmur heard.     No friction rub. No gallop.   Pulmonary:      Effort: Pulmonary effort is normal. No respiratory distress.      Breath sounds: Normal breath sounds. No stridor. No wheezing, rhonchi or rales.   Musculoskeletal:         General: Tenderness present.      Left shoulder: Tenderness present. Decreased range of motion.      Cervical back: Normal range of motion and neck supple.      Left hip: Tenderness present.      Left upper leg: Tenderness present.      Left knee: Tenderness present.      Left lower leg: Tenderness present.   Neurological:      General: No focal deficit present.      Mental Status: She is alert and oriented to person, place, and time. Mental status is at baseline.   Psychiatric:         Mood and Affect: Mood normal.         Thought Content: Thought content normal.      Result Review :  The following data was reviewed by: TRINI Seaman on 06/09/2023:  Common labs          12/16/2022    10:44   Common Labs   Glucose 105    BUN 18    Creatinine 0.87    Sodium 138    Potassium 5.0    Chloride 103    Calcium 9.9    Total Protein 6.3    Albumin 4.40    Total Bilirubin 1.3    Alkaline Phosphatase 102    AST (SGOT) 18    ALT (SGPT) 16    WBC 9.19    Hemoglobin 12.3    Hematocrit 36.0    Platelets 196    Total Cholesterol 129    Triglycerides 85    HDL Cholesterol 65    LDL Cholesterol  48    Hemoglobin A1C 6.20                   Assessment and Plan   Diagnoses and all orders for this visit:    1. Bilateral leg cramps (Primary)  Assessment & Plan:  Recommended 6-8 ounces low sugar gatorade daily.       2. Primary hypertension  Assessment & Plan:  Hypertension is improving with treatment.  Continue current treatment regimen.  Dietary sodium restriction.  Weight loss.  Regular aerobic exercise.  Continue current medications.  Blood pressure will be reassessed at the next regular appointment.    Orders:  -     CBC &  Differential; Future  -     Comprehensive metabolic panel; Future    3. Mixed hyperlipidemia  Assessment & Plan:  Lipid abnormalities are improving with treatment.  Nutritional counseling was provided. and Pharmacotherapy as ordered.  Lipids will be reassessed in 6 months.    Orders:  -     TSH; Future  -     T4, free; Future  -     Lipid panel; Future    4. Prediabetes  Assessment & Plan:  Recommended low glycemic diet    Orders:  -     Hemoglobin A1c; Future    5. Vitamin D deficiency  Assessment & Plan:  Continues on vitamin D supplementation      6. Acquired hypothyroidism  Assessment & Plan:  Continues on synthroid  Labs today      7. Vision loss, bilateral  Assessment & Plan:  Following with [facility] eye specialists      8. Macular degeneration of both eyes, unspecified type  Assessment & Plan:  Following with [facility] eye specialists      9. Osteoporosis, unspecified osteoporosis type, unspecified pathological fracture presence  Assessment & Plan:  Continues on vitamin D.   Recommended 150-300 minutes weight bearing exercise.       10. Nonrheumatic aortic valve stenosis  Assessment & Plan:  Following with cardiology               Follow Up   Return in about 6 months (around 12/9/2023) for Medicare Wellness.  Patient was given instructions and counseling regarding her condition or for health maintenance advice. Please see specific information pulled into the AVS if appropriate.

## 2023-06-12 DIAGNOSIS — E87.5 HYPERKALEMIA: Primary | ICD-10-CM

## 2023-06-12 NOTE — PROGRESS NOTES
Please call patient--    CMP showed mildly elevated potassium-- please have patient return this week for recheck, we will check in adult green tube  CBC stable; no anemia  Cholesterol is at goal   Diabetic A1C shows prediabetes-- recommend low glycemic low sugar diet  Thyroid level normal    F/u in lab this week for recheck K+ level

## 2023-07-28 ENCOUNTER — OFFICE VISIT (OUTPATIENT)
Dept: CARDIOLOGY | Facility: CLINIC | Age: 83
End: 2023-07-28
Payer: MEDICARE

## 2023-07-28 VITALS
SYSTOLIC BLOOD PRESSURE: 130 MMHG | HEIGHT: 61 IN | DIASTOLIC BLOOD PRESSURE: 78 MMHG | WEIGHT: 187 LBS | HEART RATE: 76 BPM | BODY MASS INDEX: 35.3 KG/M2

## 2023-07-28 DIAGNOSIS — H35.30 MACULAR DEGENERATION OF BOTH EYES, UNSPECIFIED TYPE: ICD-10-CM

## 2023-07-28 DIAGNOSIS — I35.0 NONRHEUMATIC AORTIC VALVE STENOSIS: ICD-10-CM

## 2023-07-28 DIAGNOSIS — I25.10 CORONARY ARTERY CALCIFICATION: ICD-10-CM

## 2023-07-28 DIAGNOSIS — R42 DIZZINESS: ICD-10-CM

## 2023-07-28 DIAGNOSIS — I10 PRIMARY HYPERTENSION: Primary | ICD-10-CM

## 2023-07-28 DIAGNOSIS — R01.1 HEART MURMUR: ICD-10-CM

## 2023-07-28 DIAGNOSIS — R60.0 LOWER EXTREMITY EDEMA: ICD-10-CM

## 2023-07-28 DIAGNOSIS — G47.9 TROUBLE IN SLEEPING: ICD-10-CM

## 2023-07-28 DIAGNOSIS — H54.3 VISION LOSS, BILATERAL: ICD-10-CM

## 2023-07-28 DIAGNOSIS — I25.84 CORONARY ARTERY CALCIFICATION: ICD-10-CM

## 2023-07-28 DIAGNOSIS — I34.0 NONRHEUMATIC MITRAL VALVE REGURGITATION: ICD-10-CM

## 2023-07-28 PROBLEM — E66.812 CLASS 2 OBESITY WITH ALVEOLAR HYPOVENTILATION WITHOUT SERIOUS COMORBIDITY WITH BODY MASS INDEX (BMI) OF 38.0 TO 38.9 IN ADULT: Status: ACTIVE | Noted: 2023-07-28

## 2023-07-28 PROBLEM — E66.2 CLASS 2 OBESITY WITH ALVEOLAR HYPOVENTILATION WITHOUT SERIOUS COMORBIDITY WITH BODY MASS INDEX (BMI) OF 38.0 TO 38.9 IN ADULT: Status: ACTIVE | Noted: 2023-07-28

## 2023-07-28 PROBLEM — R25.2 BILATERAL LEG CRAMPS: Status: RESOLVED | Noted: 2022-01-21 | Resolved: 2023-07-28

## 2023-07-28 PROCEDURE — 93000 ELECTROCARDIOGRAM COMPLETE: CPT | Performed by: NURSE PRACTITIONER

## 2023-07-28 PROCEDURE — 99214 OFFICE O/P EST MOD 30 MIN: CPT | Performed by: NURSE PRACTITIONER

## 2023-07-28 PROCEDURE — 1160F RVW MEDS BY RX/DR IN RCRD: CPT | Performed by: NURSE PRACTITIONER

## 2023-07-28 PROCEDURE — 1159F MED LIST DOCD IN RCRD: CPT | Performed by: NURSE PRACTITIONER

## 2023-07-28 PROCEDURE — 3078F DIAST BP <80 MM HG: CPT | Performed by: NURSE PRACTITIONER

## 2023-07-28 PROCEDURE — 3075F SYST BP GE 130 - 139MM HG: CPT | Performed by: NURSE PRACTITIONER

## 2023-07-28 RX ORDER — SPIRONOLACTONE 50 MG/1
50 TABLET, FILM COATED ORAL DAILY
Qty: 90 TABLET | Refills: 0 | Status: SHIPPED | OUTPATIENT
Start: 2023-07-28

## 2023-07-28 NOTE — PROGRESS NOTES
Date of Office Visit: 2023  Encounter Provider: TRINI Bangura  Place of Service: [facility] CARDIOLOGY  Patient Name: Ayana Reyna  :1940  Primary Cardiologist: Dr. Ana Orozco      Chief Complaint   Patient presents with    Follow-up    Hypertension    Dizziness   :     HPI: Ayana Reyna is a 83 y.o. female who presents today for 6-month cardiac follow-up.  I have reviewed her medical records.    She has been diagnosed with hypertension, hyperlipidemia, and hypothyroidism.  She has macular degeneration of both eyes and has suffered vision loss bilaterally.    She has a known heart murmur from valvular heart disease which we have followed closely.      In , CT angiogram showed (50% calcified ostial stenosis, 50% proximal calcified LAD stenosis, 50% mid calcified LAD stenosis, greater than 50% distal mixed plaque stenosis, less than 25% calcified proximal circumflex stenosis, and the RCA was almost nonexistent. Her total calcium score was 252.)    In 2021, stress echocardiogram showed the following: EF 62%, grade 2 diastolic dysfunction, mild aortic valve stenosis, moderate aortic valve calcification, trace aortic regurgitation, mild mitral valve regurgitation, moderate mitral annular calcification, mild tricuspid regurgitation, normal stress portion.     In 2021, she underwent cardiac catheterization which showed the following: Left main minimally calcified, LAD mildly calcified in the proximal segment, diagonal branches normal, minimal calcification in the proximal left circumflex, obtuse marginal branches, posterior lateral, and posterior descending branches normal, RCA normal.     In 2023, she saw Dr. Orozco.  She was concerned that her blood pressure may be getting too low and recommended that she keep a diary of her BP and dizziness.  She said if her blood pressures were running low, she would reduce her amlodipine to 2.5 mg  "daily.  She was noted to have bilateral carotid bruits and carotid duplex showed left carotid less than 50% stenosis and right carotid plaque.  Her chest pain and dyspnea were felt to be noncardiac.    She presents today for a follow-up visit.  She has been monitoring her blood pressures at home which are running 130s/80s.  She has not been checking her blood pressure when she feels dizzy.  She presents with worsening lower extremity edema; left greater than right.  She denies chest pain, shortness of air, palpitations, or syncope.  She is having a hard time sleeping and \"turning my mind off\".      Past Medical History:   Diagnosis Date    Acquired hypothyroidism     Allergic rhinitis     Aortic stenosis     mild to moderate per echo 2020    Bilateral leg cramps 1/21/2022    Coronary artery calcification 03/24/2021    Fatigue     Glucose intolerance (impaired glucose tolerance)     Hearing loss     Heart murmur     Heart palpitations     Hematuria     Hyperlipemia     Hypertension     Insomnia     Macular degeneration     vision loss of left eye     Migraine     Nonrheumatic aortic valve insufficiency 10/19/2020    Osteoporosis     Pulmonary nodules 3/24/2021    Shortness of breath     Vision loss, bilateral     left eye macular degeneration; right eye etiology unknown    Vitamin D deficiency     Wears hearing aid in both ears 12/2019       Past Surgical History:   Procedure Laterality Date    CARDIAC CATHETERIZATION N/A 3/29/2021    Procedure: Coronary angiography;  Surgeon: Zain Mendoza MD;  Location:  INVASIVE LOCATION;  Service: Cardiovascular;  Laterality: N/A;    CARDIAC CATHETERIZATION N/A 3/29/2021    Procedure: Left heart cath;  Surgeon: Zain Mendoza MD;  Location:  INVASIVE LOCATION;  Service: Cardiovascular;  Laterality: N/A;    CARDIAC CATHETERIZATION N/A 3/29/2021    Procedure: Left ventriculography;  Surgeon: Zain Mendoza MD;  Location:  INVASIVE " LOCATION;  Service: Cardiovascular;  Laterality: N/A;    CHOLECYSTECTOMY      COLONOSCOPY  2008    FRACTURE SURGERY  1975,2014    HYSTERECTOMY N/A 1971    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    JOINT REPLACEMENT  1995, 2013    KNEE SURGERY  1989    TONSILLECTOMY  1950       Social History     Socioeconomic History    Marital status:    Tobacco Use    Smoking status: Never    Smokeless tobacco: Never   Vaping Use    Vaping Use: Never used   Substance and Sexual Activity    Alcohol use: Yes     Alcohol/week: 1.0 standard drink     Types: 1 Glasses of wine per week     Comment: Less than 1 glass of wine a week//caffeine use:1 cup daily    Drug use: No    Sexual activity: Never     Partners: Male       Family History   Problem Relation Age of Onset    Hodgkin's lymphoma Mother     Cancer Mother        The following portion of the patient's history were reviewed and updated as appropriate: past medical history, past surgical history, past social history, past family history, allergies, current medications, and problem list.    Review of Systems   Constitutional: Negative.   Eyes:  Positive for vision loss in left eye and vision loss in right eye.   Cardiovascular:  Positive for leg swelling.   Respiratory: Negative.     Hematologic/Lymphatic: Bruises/bleeds easily.   Musculoskeletal:  Positive for joint pain and myalgias.   Neurological:  Positive for dizziness.     No Known Allergies      Current Outpatient Medications:     atorvastatin (LIPITOR) 20 MG tablet, Take 1 tablet by mouth Every Night., Disp: 90 tablet, Rfl: 0    cholecalciferol (VITAMIN D3) 1000 UNITS tablet, Take 1 tablet by mouth Daily., Disp: , Rfl:     levothyroxine (Synthroid) 75 MCG tablet, Take 1 tablet by mouth Daily., Disp: 90 tablet, Rfl: 1    Magnesium 250 MG tablet, Take  by mouth., Disp: , Rfl:     metoprolol succinate XL (TOPROL-XL) 100 MG 24 hr tablet, Take 1 tablet by mouth Every Night., Disp: 90 tablet, Rfl: 3    Multiple Vitamins-Minerals  "(PRESERVISION/LUTEIN) capsule, Take  by mouth 2 (two) times a day., Disp: , Rfl:     ramipril (ALTACE) 10 MG capsule, TAKE 1 CAPSULE BY MOUTH ONCE DAILY AT NIGHT, Disp: 90 capsule, Rfl: 0    spironolactone (ALDACTONE) 25 MG tablet, Take 1 tablet by mouth Daily. Stop amlodipine, Disp: 90 tablet, Rfl: 0    vitamin B-12 (CYANOCOBALAMIN) 100 MCG tablet, Take 0.5 tablets by mouth Daily., Disp: , Rfl:    Amlodipine 5 mg 1 tablet daily        Objective:     Vitals:    07/28/23 0908   BP: 130/78   BP Location: Left arm   Patient Position: Sitting   Cuff Size: Adult   Pulse: 76   Weight: 84.8 kg (187 lb)   Height: 154.9 cm (60.98\")     Body mass index is 35.35 kg/m².    PHYSICAL EXAM:    Vitals Reviewed.   General Appearance: No acute distress, well developed and well nourished. Obese.   HENT: No hearing loss noted.    Respiratory: No signs of respiratory distress. Respiration rhythm and depth normal.  Clear to auscultation.   Cardiovascular:  Jugular Venous Pressure: Normal  Heart Rate and Rhythm: Normal, Heart Sounds: Normal S1 and S2. No S3 or S4 noted.  Murmurs:  RUSB grade 1/6 systolic murmur present.  Lower Extremities: Bilateral lower extremity edema; left greater than right.  Musculoskeletal: Normal movement of extremities.  Skin: General appearance normal.    Psychiatric: Patient alert and oriented to person, place, and time. Speech and behavior appropriate. Normal mood and affect.       ECG 12 Lead    Date/Time: 7/28/2023 9:03 AM  Performed by: Aminta Bowles APRN  Authorized by: Aminta Bowles APRN   Comparison: compared with previous ECG from 1/25/2023  Similar to previous ECG  Rhythm: sinus rhythm  Rate: normal  BPM: 76  Conduction: conduction normal  ST Segments: ST segments normal  T Waves: T waves normal  QRS axis: normal    Clinical impression: normal ECG          Assessment:       Diagnosis Plan   1. Primary hypertension  Basic Metabolic Panel      2. Lower extremity edema        3. Dizziness      " "  4. Coronary artery calcification        5. Heart murmur        6. Nonrheumatic aortic valve stenosis        7. Nonrheumatic mitral valve regurgitation        8. Macular degeneration of both eyes, unspecified type        9. Vision loss, bilateral        10. Trouble in sleeping               Plan:       1/2.  Hypertension: Blood pressure well controlled on current medication regimen.  However she has developed lower extremity edema.  I recommend stopping amlodipine and increasing spironolactone to 50 mg daily.  Check blood pressure at home and call with concerns.  Repeat a BMP in 1 week.  Low-salt diet recommended.    3.  Dizziness.  Of unknown etiology.  I recommended that she check her blood pressure when she is feeling dizzy at home.    4.  Coronary artery calcification noted per catheterization in 2021.    5/6/7.  Known heart murmur from mild aortic valve stenosis.  She also has mild mitral valve regurgitation.  Both stable.    8/9.  Macular degeneration and vision loss bilaterally.    10.  She has not been sleeping well at nighttime and has a hard time \"turning my mind off\".  I recommended melatonin 1 to 2 mg nightly.  If her sleep habits do not improve, I recommended follow-up with her PCP.    11.  Obesity. Body mass index is 35.35 kg/m².     12. Follow-up with me in 4 weeks and Dr. Orozco in March 2024 per    As always, it has been a pleasure to participate in your patient's care. Thank you.         Sincerely,         TRINI Honeycutt  [facility] Cardiology      Dictated utilizing Dragon Dictation  I spent 37 minutes reviewing her medical records/testing/previous office notes/labs, face-to-face interaction with patient, physical examination, formulating the plan of care, and discussion of plan of care with patient.       "

## 2023-08-07 ENCOUNTER — LAB (OUTPATIENT)
Dept: LAB | Facility: HOSPITAL | Age: 83
End: 2023-08-07
Payer: MEDICARE

## 2023-08-07 ENCOUNTER — TELEPHONE (OUTPATIENT)
Dept: CARDIOLOGY | Facility: CLINIC | Age: 83
End: 2023-08-07
Payer: MEDICARE

## 2023-08-07 DIAGNOSIS — I10 PRIMARY HYPERTENSION: ICD-10-CM

## 2023-08-07 LAB
ANION GAP SERPL CALCULATED.3IONS-SCNC: 9.7 MMOL/L (ref 5–15)
BUN SERPL-MCNC: 23 MG/DL (ref 8–23)
BUN/CREAT SERPL: 24.5 (ref 7–25)
CALCIUM SPEC-SCNC: 9.6 MG/DL (ref 8.6–10.5)
CHLORIDE SERPL-SCNC: 109 MMOL/L (ref 98–107)
CO2 SERPL-SCNC: 23.3 MMOL/L (ref 22–29)
CREAT SERPL-MCNC: 0.94 MG/DL (ref 0.57–1)
EGFRCR SERPLBLD CKD-EPI 2021: 60 ML/MIN/1.73
GLUCOSE SERPL-MCNC: 102 MG/DL (ref 65–99)
POTASSIUM SERPL-SCNC: 4.8 MMOL/L (ref 3.5–5.2)
SODIUM SERPL-SCNC: 142 MMOL/L (ref 136–145)

## 2023-08-07 PROCEDURE — 80048 BASIC METABOLIC PNL TOTAL CA: CPT

## 2023-08-07 PROCEDURE — 36415 COLL VENOUS BLD VENIPUNCTURE: CPT

## 2023-08-07 NOTE — TELEPHONE ENCOUNTER
Notified patient of results/recommendations. Patient verbalized understanding.    Almita Bacon RN  Triage [facility] [place]

## 2023-08-07 NOTE — PROGRESS NOTES
Please call patient.  BMP showed normal kidney function and potassium level.  Blood sugar and chloride mildly elevated but stable.  I hope she is doing well on the increased dose of spironolactone.  Monitor blood pressures and call with any concerns.  Thank you

## 2023-08-07 NOTE — TELEPHONE ENCOUNTER
----- Message from TRINI Castro sent at 8/7/2023 10:55 AM EDT -----  Please call patient.  BMP showed normal kidney function and potassium level.  Blood sugar and chloride mildly elevated but stable.  I hope she is doing well on the increased dose of spironolactone.  Monitor blood pressures and call with any concerns.  Thank you

## 2023-08-07 NOTE — TELEPHONE ENCOUNTER
Attempted to call Ayana Reyna, no answer.  Left a voicemail for patient to call back.  Will continue to try to reach patient.    Gretel Booker RN  [facility] Triage  08/07/23 11:56 EDT

## 2023-09-08 DIAGNOSIS — E78.49 OTHER HYPERLIPIDEMIA: ICD-10-CM

## 2023-09-08 RX ORDER — ATORVASTATIN CALCIUM 20 MG/1
TABLET, FILM COATED ORAL
Qty: 90 TABLET | Refills: 0 | Status: SHIPPED | OUTPATIENT
Start: 2023-09-08

## 2023-09-18 DIAGNOSIS — E03.9 ACQUIRED HYPOTHYROIDISM: ICD-10-CM

## 2023-09-18 RX ORDER — SPIRONOLACTONE 25 MG/1
TABLET ORAL
Qty: 90 TABLET | Refills: 0 | Status: SHIPPED | OUTPATIENT
Start: 2023-09-18

## 2023-09-18 RX ORDER — LEVOTHYROXINE SODIUM 0.07 MG/1
TABLET ORAL
Qty: 90 TABLET | Refills: 1 | Status: SHIPPED | OUTPATIENT
Start: 2023-09-18

## 2023-10-20 ENCOUNTER — OFFICE VISIT (OUTPATIENT)
Dept: CARDIOLOGY | Facility: CLINIC | Age: 83
End: 2023-10-20
Payer: MEDICARE

## 2023-10-20 VITALS
DIASTOLIC BLOOD PRESSURE: 62 MMHG | BODY MASS INDEX: 33.64 KG/M2 | WEIGHT: 178.2 LBS | HEIGHT: 61 IN | HEART RATE: 70 BPM | SYSTOLIC BLOOD PRESSURE: 138 MMHG

## 2023-10-20 DIAGNOSIS — I10 PRIMARY HYPERTENSION: Primary | ICD-10-CM

## 2023-10-20 DIAGNOSIS — R01.1 HEART MURMUR: ICD-10-CM

## 2023-10-20 DIAGNOSIS — I34.0 NONRHEUMATIC MITRAL VALVE REGURGITATION: ICD-10-CM

## 2023-10-20 DIAGNOSIS — I25.84 CORONARY ARTERY CALCIFICATION: ICD-10-CM

## 2023-10-20 DIAGNOSIS — R60.0 LOWER EXTREMITY EDEMA: ICD-10-CM

## 2023-10-20 DIAGNOSIS — I35.0 NONRHEUMATIC AORTIC VALVE STENOSIS: ICD-10-CM

## 2023-10-20 DIAGNOSIS — R42 DIZZINESS: ICD-10-CM

## 2023-10-20 DIAGNOSIS — E66.2 CLASS 2 OBESITY WITH ALVEOLAR HYPOVENTILATION WITHOUT SERIOUS COMORBIDITY WITH BODY MASS INDEX (BMI) OF 35.0 TO 35.9 IN ADULT: ICD-10-CM

## 2023-10-20 DIAGNOSIS — I25.10 CORONARY ARTERY CALCIFICATION: ICD-10-CM

## 2023-10-20 DIAGNOSIS — I35.1 NONRHEUMATIC AORTIC VALVE INSUFFICIENCY: ICD-10-CM

## 2023-10-20 DIAGNOSIS — G47.9 TROUBLE IN SLEEPING: ICD-10-CM

## 2023-10-20 DIAGNOSIS — H35.3223 EXUDATIVE AGE-RELATED MACULAR DEGENERATION, LEFT EYE, WITH INACTIVE SCAR: ICD-10-CM

## 2023-10-20 PROBLEM — Z00.00 ENCOUNTER FOR MEDICAL EXAMINATION TO ESTABLISH CARE: Status: RESOLVED | Noted: 2022-06-10 | Resolved: 2023-10-20

## 2023-10-20 RX ORDER — SPIRONOLACTONE 50 MG/1
50 TABLET, FILM COATED ORAL DAILY
Qty: 90 TABLET | Refills: 2 | Status: SHIPPED | OUTPATIENT
Start: 2023-10-20

## 2023-10-20 NOTE — PROGRESS NOTES
Date of Office Visit: 10/20/2023  Encounter Provider: TRINI Bangura  Place of Service: [facility] CARDIOLOGY  Patient Name: Ayana Reyna  :1940  Primary Cardiologist: Dr. Ana Orozco      Chief Complaint   Patient presents with    Hypertension    Leg Swelling    Follow-up   :     HPI: Ayana Reyna is a 83 y.o. female who presents today for follow-up on hypertension and lower extremity edema.  I have reviewed her medical records.    She has been diagnosed with hypertension, hyperlipidemia, and hypothyroidism.  She has macular degeneration of both eyes and has suffered vision loss bilaterally.  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    She has a known heart murmur from valvular heart disease which we have followed closely.      In , CT coronary angiogram showed (50% calcified ostial stenosis, 50% proximal calcified LAD stenosis, 50% mid calcified LAD stenosis, greater than 50% distal mixed plaque stenosis, less than 25% calcified proximal circumflex stenosis, and the RCA was almost nonexistent. Her total calcium score was 252.)    In 2021, stress echocardiogram showed the following: EF 62%, grade 2 diastolic dysfunction, mild aortic valve stenosis, moderate aortic valve calcification, trace aortic regurgitation, mild mitral valve regurgitation, moderate mitral annular calcification, mild tricuspid regurgitation, normal stress portion.     In 2021, she underwent cardiac catheterization which showed the following: Left main minimally calcified, LAD mildly calcified in the proximal segment, diagonal branches normal, minimal calcification in the proximal left circumflex, obtuse marginal branches, posterior lateral, and posterior descending branches normal, RCA normal.     In 2023, she was experiencing low blood pressure and dizziness.  Amlodipine was reduced to 2.5 mg daily.   She was noted to have carotid bruits and carotid duplex showed left carotid less than 50% stenosis and right carotid plaque. Her chest pain and dyspnea were felt to be noncardiac.    In July 2023, she followed up with me and her blood pressure was running 130s/80s at home.  She reported worsening lower extremity edema; left greater than right. I recommended stopping amlodipine and increase spironolactone to 50 mg daily.  Her follow-up BMP showed normal kidney function and potassium.    She presents today for follow-up visit.  She says her lower extremity edema has improved on the spironolactone.  Blood pressures been well controlled.  She still has episodes of dizziness versus balance issues which is unchanged.  She experiences dyspnea with exertion which is also unchanged.  She denies chest pain, palpitations, syncope, or bleeding.  Her blood pressure and heart rate are normal today.  She said she is excepted her vision loss and is doing much better mentally.  She still having a hard time sleeping at nighttime and the melatonin did not work.      Past Medical History:   Diagnosis Date    Acquired hypothyroidism     Allergic rhinitis     Aortic stenosis     mild to moderate per echo 2020    Bilateral leg cramps 1/21/2022    Coronary artery calcification 03/24/2021    Fatigue     Glucose intolerance (impaired glucose tolerance)     Hearing loss     Heart murmur     Heart palpitations     Hematuria     Hyperlipemia     Hypertension     Insomnia     Macular degeneration     vision loss of left eye     Migraine     Nonrheumatic aortic valve insufficiency 10/19/2020    Osteoporosis     Pulmonary nodules 3/24/2021    Shortness of breath     Vision loss, bilateral     left eye macular degeneration; right eye etiology unknown    Vitamin D deficiency     Wears hearing aid in both ears 12/2019       Past Surgical History:   Procedure Laterality Date    CARDIAC CATHETERIZATION N/A 3/29/2021    Procedure: Coronary angiography;   Surgeon: Zain Mendoza MD;  Location:  [facility] CATH INVASIVE LOCATION;  Service: Cardiovascular;  Laterality: N/A;    CARDIAC CATHETERIZATION N/A 3/29/2021    Procedure: Left heart cath;  Surgeon: Zain Mendoza MD;  Location:  [facility] CATH INVASIVE LOCATION;  Service: Cardiovascular;  Laterality: N/A;    CARDIAC CATHETERIZATION N/A 3/29/2021    Procedure: Left ventriculography;  Surgeon: Zain Mendoza MD;  Location:  [facility] CATH INVASIVE LOCATION;  Service: Cardiovascular;  Laterality: N/A;    CHOLECYSTECTOMY      COLONOSCOPY  2008    FRACTURE SURGERY  1975,2014    HYSTERECTOMY N/A 1971    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    JOINT REPLACEMENT  1995, 2013    KNEE SURGERY  1989    TONSILLECTOMY  1950       Social History     Socioeconomic History    Marital status:    Tobacco Use    Smoking status: Never    Smokeless tobacco: Never   Vaping Use    Vaping Use: Never used   Substance and Sexual Activity    Alcohol use: Yes     Alcohol/week: 1.0 standard drink of alcohol     Types: 1 Glasses of wine per week     Comment: Less than 1 glass of wine a week//caffeine use:1 cup daily    Drug use: No    Sexual activity: Never     Partners: Male       Family History   Problem Relation Age of Onset    Hodgkin's lymphoma Mother     Cancer Mother        The following portion of the patient's history were reviewed and updated as appropriate: past medical history, past surgical history, past social history, past family history, allergies, current medications, and problem list.    Review of Systems   Constitutional: Negative.   Eyes:  Positive for vision loss in left eye and vision loss in right eye.   Cardiovascular:  Positive for dyspnea on exertion and leg swelling.   Respiratory:  Positive for shortness of breath.    Hematologic/Lymphatic: Bruises/bleeds easily.   Musculoskeletal:  Positive for joint pain and myalgias.   Neurological:  Positive for dizziness and loss of balance.       Allergies   Allergen Reactions     "Amlodipine Swelling     Lower extremity edema         Current Outpatient Medications:     atorvastatin (LIPITOR) 20 MG tablet, Take 1 tablet by mouth Every Night., Disp: 90 tablet, Rfl: 0    cholecalciferol (VITAMIN D3) 1000 UNITS tablet, Take 1 tablet by mouth Daily., Disp: , Rfl:     levothyroxine (Synthroid) 75 MCG tablet, Take 1 tablet by mouth Daily., Disp: 90 tablet, Rfl: 1    Magnesium 250 MG tablet, Take  by mouth., Disp: , Rfl:     metoprolol succinate XL (TOPROL-XL) 100 MG 24 hr tablet, Take 1 tablet by mouth Every Night., Disp: 90 tablet, Rfl: 3    Multiple Vitamins-Minerals (PRESERVISION/LUTEIN) capsule, Take  by mouth 2 (two) times a day., Disp: , Rfl:     ramipril (ALTACE) 10 MG capsule, TAKE 1 CAPSULE BY MOUTH ONCE DAILY AT NIGHT, Disp: 90 capsule, Rfl: 0    spironolactone (ALDACTONE) 25 MG tablet, Take 1 tablet by mouth Daily. Stop amlodipine, Disp: 90 tablet, Rfl: 0    vitamin B-12 (CYANOCOBALAMIN) 100 MCG tablet, Take 0.5 tablets by mouth Daily., Disp: , Rfl:    Amlodipine 5 mg 1 tablet daily        Objective:     Vitals:    10/20/23 0939   BP: 138/62   BP Location: Left arm   Pulse: 70   Weight: 80.8 kg (178 lb 3.2 oz)   Height: 154.9 cm (60.98\")     Body mass index is 33.69 kg/m².    PHYSICAL EXAM:    Vitals Reviewed.   General Appearance: No acute distress, well developed and well nourished. Obese.   HENT: No hearing loss noted.    Respiratory: No signs of respiratory distress. Respiration rhythm and depth normal.  Clear to auscultation.   Cardiovascular:  Jugular Venous Pressure: Normal  Heart Rate and Rhythm: Normal, Heart Sounds: Normal S1 and S2. No S3 or S4 noted.  Murmurs:  RUSB grade 2/6 systolic murmur present.  Lower Extremities: Bilateral lower extremity edema; left greater than right. Improved.  Musculoskeletal: Normal movement of extremities.  Skin: General appearance normal.    Psychiatric: Patient alert and oriented to person, place, and time. Speech and behavior appropriate. " Normal mood and affect.       ECG 12 Lead    Date/Time: 10/20/2023 9:48 AM  Performed by: Aminta Bowles APRN    Authorized by: Aminta Bowles APRN  Comparison: compared with previous ECG from 7/28/2023  Similar to previous ECG  Rhythm: sinus rhythm  Rate: normal  BPM: 70  Conduction: conduction normal  ST Segments: ST segments normal  T Waves: T waves normal  QRS axis: normal    Clinical impression: normal ECG            Assessment:       Diagnosis Plan   1. Primary hypertension        2. Lower extremity edema        3. Dizziness        4. Coronary artery calcification        5. Heart murmur  Adult Transthoracic Echo Complete w/ Color, Spectral and Contrast if Necessary Per Protocol      6. Nonrheumatic aortic valve stenosis  Adult Transthoracic Echo Complete w/ Color, Spectral and Contrast if Necessary Per Protocol      7. Nonrheumatic mitral valve regurgitation  Adult Transthoracic Echo Complete w/ Color, Spectral and Contrast if Necessary Per Protocol      8. Nonrheumatic aortic valve insufficiency        9. Exudative age-related macular degeneration, left eye, with inactive scar        10. Trouble in sleeping        11. Class 2 obesity with alveolar hypoventilation without serious comorbidity with body mass index (BMI) of 35.0 to 35.9 in adult                 Plan:       1.  Hypertension: Blood pressure stable on current medication regimen.  Continue spironolactone.  Follow low-sodium diet.  I stopped her amlodipine because of worsening lower extremity edema and this improved.    2.  Lower Extremity Edema: Improved with stopping amlodipine and increasing spironolactone.  She would benefit from low-sodium diet, leg elevation, and compression stockings.    3.  Dizziness.  Of unknown etiology.  She also reports some balance issues and she wonders if it has something to do with her vision loss.    4.  Coronary artery calcification noted per catheterization in 2021.  Denies angina.  Continue  "atorvastatin.    5-8.  Known heart murmur from mild aortic valve stenosis.  She also has mild mitral valve regurgitation.  Recheck echocardiogram at her convenience.    9.  Macular degeneration and vision loss bilaterally.  She said she has excepted the fact that she has had the vision loss and is doing much better mentally.    10.  She has not been sleeping well at nighttime and has a hard time \"turning my mind off\".  She tried the melatonin, but this did not work.  I recommended further discussion with her PCP.    11.  Obesity. Body mass index is 33.69 kg/m².     12.  She is scheduled to see Dr. Orozco in March 2024 and wants to keep that appointment.  She plans to go to [place] in April and wants to be checked before she goes.    As always, it has been a pleasure to participate in your patient's care. Thank you.         Sincerely,         TRINI Honeycutt  [facility]      Dictated utilizing Dragon Dictation      "

## 2023-10-24 NOTE — ASSESSMENT & PLAN NOTE
Continues on vit d3 supplementation.    Plan: Had long discussion with patient regarding the concern for possible CTCL and need for a work up. Pt is adamant that he has had this skin condition for many years and it always responds to systemic steroids. He defers biopsies at this time. Will check labs today to assess for Sezary Syndrome and in preparation for medication initiation if patient is amendable to a biopsy in the future. Render In Strict Bullet Format?: No Continue Regimen: clobetasol and protopic Detail Level: Zone

## 2023-12-08 ENCOUNTER — HOSPITAL ENCOUNTER (OUTPATIENT)
Dept: CARDIOLOGY | Facility: HOSPITAL | Age: 83
Discharge: HOME | End: 2023-12-08
Payer: MEDICARE

## 2023-12-08 ENCOUNTER — TELEPHONE (OUTPATIENT)
Dept: CARDIOLOGY | Facility: CLINIC | Age: 83
End: 2023-12-08
Payer: MEDICARE

## 2023-12-08 VITALS
BODY MASS INDEX: 34.95 KG/M2 | HEART RATE: 70 BPM | WEIGHT: 178 LBS | DIASTOLIC BLOOD PRESSURE: 80 MMHG | SYSTOLIC BLOOD PRESSURE: 160 MMHG | HEIGHT: 60 IN

## 2023-12-08 DIAGNOSIS — R01.1 HEART MURMUR: ICD-10-CM

## 2023-12-08 DIAGNOSIS — I35.0 NONRHEUMATIC AORTIC VALVE STENOSIS: Primary | ICD-10-CM

## 2023-12-08 DIAGNOSIS — I35.0 NONRHEUMATIC AORTIC VALVE STENOSIS: ICD-10-CM

## 2023-12-08 DIAGNOSIS — I34.0 NONRHEUMATIC MITRAL VALVE REGURGITATION: ICD-10-CM

## 2023-12-08 DIAGNOSIS — I51.89 GRADE II DIASTOLIC DYSFUNCTION: ICD-10-CM

## 2023-12-08 LAB
AORTIC ARCH: 3 CM
AORTIC DIMENSIONLESS INDEX: 0.3 (DI)
ASCENDING AORTA: 2.8 CM
BH CV ECHO MEAS - ACS: 0.94 CM
BH CV ECHO MEAS - AI P1/2T: 357.7 MSEC
BH CV ECHO MEAS - AO MAX PG: 38.7 MMHG
BH CV ECHO MEAS - AO MEAN PG: 23 MMHG
BH CV ECHO MEAS - AO ROOT DIAM: 2.7 CM
BH CV ECHO MEAS - AO V2 MAX: 311 CM/SEC
BH CV ECHO MEAS - AO V2 VTI: 82 CM
BH CV ECHO MEAS - AVA(I,D): 0.89 CM2
BH CV ECHO MEAS - EDV(CUBED): 64 ML
BH CV ECHO MEAS - EDV(MOD-SP2): 86 ML
BH CV ECHO MEAS - EDV(MOD-SP4): 69 ML
BH CV ECHO MEAS - EF(MOD-BP): 64.8 %
BH CV ECHO MEAS - EF(MOD-SP2): 61.6 %
BH CV ECHO MEAS - EF(MOD-SP4): 65.2 %
BH CV ECHO MEAS - ESV(CUBED): 15.8 ML
BH CV ECHO MEAS - ESV(MOD-SP2): 33 ML
BH CV ECHO MEAS - ESV(MOD-SP4): 24 ML
BH CV ECHO MEAS - FS: 37.3 %
BH CV ECHO MEAS - IVS/LVPW: 1 CM
BH CV ECHO MEAS - IVSD: 1.1 CM
BH CV ECHO MEAS - LAT PEAK E' VEL: 6.9 CM/SEC
BH CV ECHO MEAS - LV DIASTOLIC VOL/BSA (35-75): 38.8 CM2
BH CV ECHO MEAS - LV MASS(C)D: 145.6 GRAMS
BH CV ECHO MEAS - LV MAX PG: 3.6 MMHG
BH CV ECHO MEAS - LV MEAN PG: 2 MMHG
BH CV ECHO MEAS - LV SYSTOLIC VOL/BSA (12-30): 13.5 CM2
BH CV ECHO MEAS - LV V1 MAX: 95.1 CM/SEC
BH CV ECHO MEAS - LV V1 VTI: 23.2 CM
BH CV ECHO MEAS - LVIDD: 4 CM
BH CV ECHO MEAS - LVIDS: 2.5 CM
BH CV ECHO MEAS - LVOT AREA: 3.2 CM2
BH CV ECHO MEAS - LVOT DIAM: 2 CM
BH CV ECHO MEAS - LVPWD: 1.1 CM
BH CV ECHO MEAS - MED PEAK E' VEL: 6.9 CM/SEC
BH CV ECHO MEAS - MR MAX PG: 157.2 MMHG
BH CV ECHO MEAS - MR MAX VEL: 626.9 CM/SEC
BH CV ECHO MEAS - MV A DUR: 0.14 SEC
BH CV ECHO MEAS - MV A MAX VEL: 89.1 CM/SEC
BH CV ECHO MEAS - MV DEC SLOPE: 592.6 CM/SEC2
BH CV ECHO MEAS - MV DEC TIME: 0.16 SEC
BH CV ECHO MEAS - MV E MAX VEL: 120 CM/SEC
BH CV ECHO MEAS - MV E/A: 1.35
BH CV ECHO MEAS - MV MAX PG: 9.7 MMHG
BH CV ECHO MEAS - MV MEAN PG: 3.8 MMHG
BH CV ECHO MEAS - MV P1/2T: 72 MSEC
BH CV ECHO MEAS - MV V2 VTI: 37.3 CM
BH CV ECHO MEAS - MVA(P1/2T): 3.1 CM2
BH CV ECHO MEAS - MVA(VTI): 1.96 CM2
BH CV ECHO MEAS - PA ACC TIME: 0.11 SEC
BH CV ECHO MEAS - PA V2 MAX: 134.4 CM/SEC
BH CV ECHO MEAS - PI END-D VEL: 104.5 CM/SEC
BH CV ECHO MEAS - PULM A REVS DUR: 0.13 SEC
BH CV ECHO MEAS - PULM A REVS VEL: 24.4 CM/SEC
BH CV ECHO MEAS - PULM DIAS VEL: 52.7 CM/SEC
BH CV ECHO MEAS - PULM S/D: 0.74
BH CV ECHO MEAS - PULM SYS VEL: 39 CM/SEC
BH CV ECHO MEAS - QP/QS: 0.63
BH CV ECHO MEAS - RAP SYSTOLE: 8 MMHG
BH CV ECHO MEAS - RV MAX PG: 2.3 MMHG
BH CV ECHO MEAS - RV V1 MAX: 75.8 CM/SEC
BH CV ECHO MEAS - RV V1 VTI: 19.4 CM
BH CV ECHO MEAS - RVOT DIAM: 1.74 CM
BH CV ECHO MEAS - RVSP: 53.7 MMHG
BH CV ECHO MEAS - SI(MOD-SP2): 29.8 ML/M2
BH CV ECHO MEAS - SI(MOD-SP4): 25.3 ML/M2
BH CV ECHO MEAS - SUP REN AO DIAM: 1.8 CM
BH CV ECHO MEAS - SV(LVOT): 73.2 ML
BH CV ECHO MEAS - SV(MOD-SP2): 53 ML
BH CV ECHO MEAS - SV(MOD-SP4): 45 ML
BH CV ECHO MEAS - SV(RVOT): 45.9 ML
BH CV ECHO MEAS - TAPSE (>1.6): 2.6 CM
BH CV ECHO MEAS - TR MAX PG: 45.7 MMHG
BH CV ECHO MEAS - TR MAX VEL: 338 CM/SEC
BH CV ECHO MEASUREMENTS AVERAGE E/E' RATIO: 17.39
BH CV XLRA - RV BASE: 3.1 CM
BH CV XLRA - RV LENGTH: 7 CM
BH CV XLRA - RV MID: 2.7 CM
BH CV XLRA - TDI S': 15.4 CM/SEC
LEFT ATRIUM VOLUME INDEX: 39.6 ML/M2
SINUS: 2.8 CM
STJ: 2.6 CM

## 2023-12-08 PROCEDURE — 93306 TTE W/DOPPLER COMPLETE: CPT

## 2023-12-08 RX ORDER — FUROSEMIDE 40 MG/1
40 TABLET ORAL DAILY
Qty: 90 TABLET | Refills: 0 | Status: SHIPPED | OUTPATIENT
Start: 2023-12-08

## 2023-12-08 NOTE — PROGRESS NOTES
Ms. Reyna has known heart murmur and I was following up on aortic stenosis.  Her valvular status has worsened.  Moderate aortic regurgitation, moderate aortic stenosis, moderate mitral regurgitation, moderate tricuspid regurgitation, and moderate pulmonary hypertension.  EF is normal and she has grade 2 diastolic dysfunction.    I spoke with Ms. Reyna about results. She is shortness of breath with walking. I recommended a trial of furosemide 40 mg 1 tablet daily in AM. Check BMP in 1 week at main lab at hospital.

## 2023-12-15 ENCOUNTER — LAB (OUTPATIENT)
Dept: LAB | Facility: HOSPITAL | Age: 83
End: 2023-12-15
Payer: MEDICARE

## 2023-12-15 DIAGNOSIS — I51.89 GRADE II DIASTOLIC DYSFUNCTION: ICD-10-CM

## 2023-12-15 DIAGNOSIS — I34.0 NONRHEUMATIC MITRAL VALVE REGURGITATION: ICD-10-CM

## 2023-12-15 DIAGNOSIS — I35.0 NONRHEUMATIC AORTIC VALVE STENOSIS: ICD-10-CM

## 2023-12-15 LAB
ANION GAP SERPL CALCULATED.3IONS-SCNC: 11.4 MMOL/L (ref 5–15)
BUN SERPL-MCNC: 41 MG/DL (ref 8–23)
BUN/CREAT SERPL: 29.3 (ref 7–25)
CALCIUM SPEC-SCNC: 9.1 MG/DL (ref 8.6–10.5)
CHLORIDE SERPL-SCNC: 107 MMOL/L (ref 98–107)
CO2 SERPL-SCNC: 18.6 MMOL/L (ref 22–29)
CREAT SERPL-MCNC: 1.4 MG/DL (ref 0.57–1)
EGFRCR SERPLBLD CKD-EPI 2021: 37.2 ML/MIN/1.73
GLUCOSE SERPL-MCNC: 98 MG/DL (ref 65–99)
POTASSIUM SERPL-SCNC: 4.7 MMOL/L (ref 3.5–5.2)
SODIUM SERPL-SCNC: 137 MMOL/L (ref 136–145)

## 2023-12-15 PROCEDURE — 80048 BASIC METABOLIC PNL TOTAL CA: CPT

## 2023-12-15 PROCEDURE — 36415 COLL VENOUS BLD VENIPUNCTURE: CPT

## 2023-12-15 NOTE — PROGRESS NOTES
Her shortness of breath has really improved on furosemide.  Unfortunately, her BUN and creatinine have elevated.  I left a message saying if she is feeling dehydrated that she could decrease the furosemide to 20 mg a day and I would recommend drinking more water.  We may just need to go to 20 mg daily.  I left a message I will try her back on Monday or Tuesday.

## 2023-12-21 ENCOUNTER — TELEPHONE (OUTPATIENT)
Dept: CARDIOLOGY | Facility: CLINIC | Age: 83
End: 2023-12-21
Payer: MEDICARE

## 2023-12-21 RX ORDER — FUROSEMIDE 40 MG/1
20 TABLET ORAL DAILY
Start: 2023-12-21

## 2023-12-21 NOTE — PROGRESS NOTES
I spoke with her via phone.  We reviewed the BMP results.  She does feel little dehydrated in the morning.  I recommended taking furosemide 20 mg daily.  I will check with her after New Year's and she will need another BMP if we stay on the current dose of furosemide.  She verbalized understanding.

## 2023-12-22 DIAGNOSIS — E78.49 OTHER HYPERLIPIDEMIA: ICD-10-CM

## 2023-12-22 RX ORDER — ATORVASTATIN CALCIUM 20 MG/1
TABLET, FILM COATED ORAL
Qty: 90 TABLET | Refills: 0 | Status: SHIPPED | OUTPATIENT
Start: 2023-12-22

## 2023-12-29 ENCOUNTER — OFFICE VISIT (OUTPATIENT)
Dept: INTERNAL MEDICINE | Facility: CLINIC | Age: 83
End: 2023-12-29
Payer: MEDICARE

## 2023-12-29 VITALS
WEIGHT: 172 LBS | SYSTOLIC BLOOD PRESSURE: 112 MMHG | HEART RATE: 65 BPM | HEIGHT: 60 IN | DIASTOLIC BLOOD PRESSURE: 62 MMHG | BODY MASS INDEX: 33.77 KG/M2 | OXYGEN SATURATION: 100 %

## 2023-12-29 DIAGNOSIS — Z00.00 MEDICARE ANNUAL WELLNESS VISIT, SUBSEQUENT: Primary | ICD-10-CM

## 2023-12-29 DIAGNOSIS — E55.9 VITAMIN D DEFICIENCY: Chronic | ICD-10-CM

## 2023-12-29 DIAGNOSIS — E78.49 OTHER HYPERLIPIDEMIA: Chronic | ICD-10-CM

## 2023-12-29 DIAGNOSIS — E03.9 ACQUIRED HYPOTHYROIDISM: Chronic | ICD-10-CM

## 2023-12-29 DIAGNOSIS — I25.84 CORONARY ARTERY CALCIFICATION: Chronic | ICD-10-CM

## 2023-12-29 DIAGNOSIS — R73.03 PREDIABETES: Chronic | ICD-10-CM

## 2023-12-29 DIAGNOSIS — I25.10 CORONARY ARTERY CALCIFICATION: Chronic | ICD-10-CM

## 2023-12-29 DIAGNOSIS — M81.0 OSTEOPOROSIS, UNSPECIFIED OSTEOPOROSIS TYPE, UNSPECIFIED PATHOLOGICAL FRACTURE PRESENCE: Chronic | ICD-10-CM

## 2023-12-29 DIAGNOSIS — I35.0 NONRHEUMATIC AORTIC VALVE STENOSIS: Chronic | ICD-10-CM

## 2023-12-29 DIAGNOSIS — I10 PRIMARY HYPERTENSION: Chronic | ICD-10-CM

## 2023-12-29 DIAGNOSIS — H35.30 MACULAR DEGENERATION OF BOTH EYES, UNSPECIFIED TYPE: Chronic | ICD-10-CM

## 2023-12-29 PROBLEM — G44.041 INTRACTABLE CHRONIC PAROXYSMAL HEMICRANIA: Status: RESOLVED | Noted: 2020-09-01 | Resolved: 2023-12-29

## 2023-12-29 PROBLEM — R42 DIZZINESS: Status: RESOLVED | Noted: 2023-07-28 | Resolved: 2023-12-29

## 2023-12-29 PROBLEM — R60.0 LOWER EXTREMITY EDEMA: Status: RESOLVED | Noted: 2019-02-20 | Resolved: 2023-12-29

## 2023-12-29 PROCEDURE — G0439 PPPS, SUBSEQ VISIT: HCPCS | Performed by: NURSE PRACTITIONER

## 2023-12-29 PROCEDURE — 1160F RVW MEDS BY RX/DR IN RCRD: CPT | Performed by: NURSE PRACTITIONER

## 2023-12-29 PROCEDURE — 1170F FXNL STATUS ASSESSED: CPT | Performed by: NURSE PRACTITIONER

## 2023-12-29 PROCEDURE — 1159F MED LIST DOCD IN RCRD: CPT | Performed by: NURSE PRACTITIONER

## 2023-12-29 PROCEDURE — 3074F SYST BP LT 130 MM HG: CPT | Performed by: NURSE PRACTITIONER

## 2023-12-29 PROCEDURE — 3078F DIAST BP <80 MM HG: CPT | Performed by: NURSE PRACTITIONER

## 2023-12-29 RX ORDER — TRAMADOL HYDROCHLORIDE 50 MG/1
TABLET ORAL
Status: ON HOLD | COMMUNITY
Start: 2023-12-08

## 2023-12-29 RX ORDER — LEVOTHYROXINE SODIUM 0.07 MG/1
75 TABLET ORAL DAILY
Qty: 90 TABLET | Refills: 3 | Status: ON HOLD | OUTPATIENT
Start: 2023-12-29

## 2023-12-29 NOTE — PROGRESS NOTES
The ABCs of the Annual Wellness Visit  Subsequent Medicare Wellness Visit    Subjective    Ayana Reyna is a 83 y.o. female who presents for a Subsequent Medicare Wellness Visit.    The following portions of the patient's history were reviewed and   updated as appropriate: allergies, current medications, past family history, past medical history, past social history, past surgical history, and problem list.    Compared to one year ago, the patient feels her physical   health is better.    Compared to one year ago, the patient feels her mental   health is the same.    Recent Hospitalizations:  She was not admitted to the hospital during the last year.       Current Medical Providers:  Patient Care Team:  Adela Sy APRN as PCP - General (Internal Medicine)  Toi Riley MD as Consulting Physician (Ophthalmology)  Monica Nichols MD (Dermatology)  Amanuel Nuñez MD as Consulting Physician (Orthopedic Surgery)  Darryl Joel MD as Consulting Physician (Otolaryngology)  Ana Orozco MD as Cardiologist (Cardiology)    Outpatient Medications Prior to Visit   Medication Sig Dispense Refill    atorvastatin (LIPITOR) 20 MG tablet Take 1 tablet by mouth once daily 90 tablet 0    cholecalciferol (VITAMIN D3) 1000 UNITS tablet Take 1 tablet by mouth Daily.      furosemide (LASIX) 40 MG tablet Take 0.5 tablets by mouth Daily.      metoprolol succinate XL (TOPROL-XL) 100 MG 24 hr tablet Take 1 tablet by mouth Every Night. 90 tablet 3    Multiple Vitamins-Minerals (PRESERVISION/LUTEIN) capsule Take  by mouth 2 (two) times a day.      ramipril (ALTACE) 10 MG capsule TAKE 1 CAPSULE BY MOUTH ONCE DAILY AT NIGHT 90 capsule 0    spironolactone (ALDACTONE) 50 MG tablet Take 1 tablet by mouth Daily. (Patient taking differently: Take 0.5 tablets by mouth Daily.) 90 tablet 2    traMADol (ULTRAM) 50 MG tablet Take 1 tablet every 6 hours by oral route.      vitamin B-12 (CYANOCOBALAMIN) 100 MCG tablet Take 0.5  "tablets by mouth Daily.      levothyroxine (SYNTHROID, LEVOTHROID) 75 MCG tablet Take 1 tablet by mouth once daily 90 tablet 1    Magnesium 250 MG tablet Take  by mouth.       No facility-administered medications prior to visit.       Opioid medication/s are on active medication list.  and I have evaluated her active treatment plan and pain score trends (see table).  There were no vitals filed for this visit.  I have reviewed the chart for potential of high risk medication and harmful drug interactions in the elderly.          Aspirin is not on active medication list.  Aspirin use is not indicated based on review of current medical condition/s. Risk of harm outweighs potential benefits.  .    Patient Active Problem List   Diagnosis    Vitamin D deficiency    Allergic rhinitis    Prediabetes    Osteoporosis    Hypertension    Macular degeneration of both eyes    Acquired hypothyroidism    Vision loss, bilateral    Nonrheumatic aortic valve stenosis    Nonrheumatic mitral valve regurgitation    Nonrheumatic aortic valve insufficiency    Exudative age-related macular degeneration, left eye, with inactive scar    Coronary artery calcification    Age-related nuclear cataract of both eyes    Open angle with borderline findings, high risk, bilateral    Sciatica of right side    Heart murmur    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  ACP discussion was held with the patient during this visit. Patient does not have an advance directive, information provided.     Objective    Vitals:    12/29/23 1019   BP: 112/62   BP Location: Left arm   Patient Position: Sitting   Cuff Size: Adult   Pulse: 65   SpO2: 100%   Weight: 78 kg (172 lb)   Height: 152.4 cm (60\")     Estimated body mass index is 33.59 kg/m² as calculated from the following:    Height as of this " "encounter: 152.4 cm (60\").    Weight as of this encounter: 78 kg (172 lb).           Does the patient have evidence of cognitive impairment? No          HEALTH RISK ASSESSMENT    Smoking Status:  Social History     Tobacco Use   Smoking Status Never   Smokeless Tobacco Never     Alcohol Consumption:  Social History     Substance and Sexual Activity   Alcohol Use Yes    Alcohol/week: 1.0 standard drink of alcohol    Types: 1 Glasses of wine per week    Comment: Less than 1 glass of wine a week//caffeine use:1 cup daily     Fall Risk Screen:    YARITZA Fall Risk Assessment was completed, and patient is at LOW risk for falls.Assessment completed on:2023    Depression Screenin/29/2023    10:27 AM   PHQ-2/PHQ-9 Depression Screening   Little Interest or Pleasure in Doing Things 0-->not at all   Feeling Down, Depressed or Hopeless 0-->not at all   PHQ-9: Brief Depression Severity Measure Score 0       Health Habits and Functional and Cognitive Screenin/29/2023    10:27 AM   Functional & Cognitive Status   Do you have difficulty preparing food and eating? No   Do you have difficulty bathing yourself, getting dressed or grooming yourself? No   Do you have difficulty using the toilet? No   Do you have difficulty moving around from place to place? No   Do you have trouble with steps or getting out of a bed or a chair? No   Current Diet Other   Dental Exam Up to date   Eye Exam Up to date   Exercise (times per week) 0 times per week   Current Exercises Include No Regular Exercise   Do you need help using the phone?  No   Are you deaf or do you have serious difficulty hearing?  No   Do you need help to go to places out of walking distance? No   Do you need help shopping? Yes   Do you need help preparing meals?  No   Do you need help with housework?  No   Do you need help with laundry? No   Do you need help taking your medications? No   Do you need help managing money? No   Do you ever drive or ride in a " car without wearing a seat belt? No   Have you felt unusual stress, anger or loneliness in the last month? No   Who do you live with? Alone   If you need help, do you have trouble finding someone available to you? No   Do you have difficulty concentrating, remembering or making decisions? No       Age-appropriate Screening Schedule:  Refer to the list below for future screening recommendations based on patient's age, sex and/or medical conditions. Orders for these recommended tests are listed in the plan section. The patient has been provided with a written plan.    Health Maintenance   Topic Date Due    DXA SCAN  12/29/2023 (Originally 8/27/2021)    COVID-19 Vaccine (5 - 2023-24 season) 12/31/2023 (Originally 9/1/2023)    ZOSTER VACCINE (2 of 3) 12/29/2024 (Originally 3/28/2012)    LIPID PANEL  06/09/2024    BMI FOLLOWUP  06/09/2024    ANNUAL WELLNESS VISIT  12/29/2024    INFLUENZA VACCINE  Completed    Pneumococcal Vaccine 65+  Completed    TDAP/TD VACCINES  Discontinued    COLORECTAL CANCER SCREENING  Discontinued                  CMS Preventative Services Quick Reference  Risk Factors Identified During Encounter  Immunizations Discussed/Encouraged: COVID19  Inactivity/Sedentary: Patient was advised to exercise at least 150 minutes a week per CDC recommendations.  Dental Screening Recommended  Vision Screening Recommended  The above risks/problems have been discussed with the patient.  Pertinent information has been shared with the patient in the After Visit Summary.  An After Visit Summary and PPPS were made available to the patient.    Follow Up:   Next Medicare Wellness visit to be scheduled in 1 year.       Additional E&M Note during same encounter follows:  Patient has multiple medical problems which are significant and separately identifiable that require additional work above and beyond the Medicare Wellness Visit.      Chief Complaint  Medicare Wellness-subsequent    Subjective        HPI  Ayana Reyna  "denies any acute issues.          Objective   Vital Signs:  /62 (BP Location: Left arm, Patient Position: Sitting, Cuff Size: Adult)   Pulse 65   Ht 152.4 cm (60\")   Wt 78 kg (172 lb)   SpO2 100%   BMI 33.59 kg/m²     Physical Exam  Exam conducted with a chaperone present (daughter here with patient).   Constitutional:       Appearance: Normal appearance.   HENT:      Head: Normocephalic and atraumatic.      Right Ear: External ear normal.      Left Ear: External ear normal.      Nose: Nose normal.      Mouth/Throat:      Mouth: Mucous membranes are moist.      Pharynx: Oropharynx is clear.   Eyes:      Conjunctiva/sclera: Conjunctivae normal.      Pupils: Pupils are equal, round, and reactive to light.   Neck:      Vascular: No carotid bruit.   Cardiovascular:      Rate and Rhythm: Normal rate and regular rhythm.      Pulses: Normal pulses.      Heart sounds: Murmur heard.      Systolic murmur is present with a grade of 2/6.      No friction rub. No gallop.   Pulmonary:      Effort: Pulmonary effort is normal. No respiratory distress.      Breath sounds: Normal breath sounds. No stridor. No wheezing, rhonchi or rales.   Musculoskeletal:      Cervical back: Normal range of motion and neck supple.   Skin:     General: Skin is warm and dry.   Neurological:      Mental Status: She is alert and oriented to person, place, and time. Mental status is at baseline.   Psychiatric:         Mood and Affect: Mood normal.         Thought Content: Thought content normal.         Judgment: Judgment normal.          The following data was reviewed by: TRINI Seaman on 12/29/2023:  Common labs          6/20/2023    09:34 8/7/2023    09:38 12/15/2023    09:42   Common Labs   Glucose 99  102  98    BUN 19  23  41    Creatinine 0.89  0.94  1.40    Sodium 142  142  137    Potassium 4.3  4.8  4.7    Chloride 108  109  107    Calcium 9.1  9.6  9.1                 Assessment and Plan   Diagnoses and all orders for this " visit:    1. Medicare annual wellness visit, subsequent (Primary)    2. Acquired hypothyroidism  Assessment & Plan:  Continues on synthroid  Lab today     Orders:  -     levothyroxine (SYNTHROID, LEVOTHROID) 75 MCG tablet; Take 1 tablet by mouth Daily.  Dispense: 90 tablet; Refill: 3  -     TSH Rfx On Abnormal To Free T4    3. Nonrheumatic aortic valve stenosis  Assessment & Plan:  Continues on lasix  Follows with cardiology       4. Coronary artery calcification  Assessment & Plan:  Following with cardiology  Not on aspirin at this time      5. Prediabetes  Assessment & Plan:  Recommended low glycemic diet choices  Lab today     Orders:  -     Hemoglobin A1c    6. Vitamin D deficiency  Assessment & Plan:  Continues on vit d supplementation      Orders:  -     Vitamin D 25 hydroxy    7. Osteoporosis, unspecified osteoporosis type, unspecified pathological fracture presence  Assessment & Plan:  Continues on vitamin D.   Recommended 150-300 minutes weight bearing exercise.      8. Primary hypertension  Assessment & Plan:  Hypertension is improving with treatment.  Continue current treatment regimen.  Dietary sodium restriction.  Regular aerobic exercise.  Continue current medications.  Blood pressure will be reassessed at the next regular appointment.    Orders:  -     CBC & Differential  -     Comprehensive metabolic panel    9. Macular degeneration of both eyes, unspecified type  Assessment & Plan:  Following with [facility] specialists       10. Other hyperlipidemia  -     Lipid panel             Follow Up   No follow-ups on file.  Patient was given instructions and counseling regarding her condition or for health maintenance advice. Please see specific information pulled into the AVS if appropriate.

## 2023-12-30 ENCOUNTER — APPOINTMENT (OUTPATIENT)
Dept: CT IMAGING | Facility: HOSPITAL | Age: 83
DRG: 086 | End: 2023-12-30
Payer: MEDICARE

## 2023-12-30 ENCOUNTER — HOSPITAL ENCOUNTER (INPATIENT)
Facility: HOSPITAL | Age: 83
LOS: 3 days | Discharge: NURSING, REHAB OR LONG-TERM CARE | DRG: 086 | End: 2024-01-03
Attending: EMERGENCY MEDICINE | Admitting: INTERNAL MEDICINE
Payer: MEDICARE

## 2023-12-30 ENCOUNTER — APPOINTMENT (OUTPATIENT)
Dept: GENERAL RADIOLOGY | Facility: HOSPITAL | Age: 83
DRG: 086 | End: 2023-12-30
Payer: MEDICARE

## 2023-12-30 DIAGNOSIS — I60.9 SAH (SUBARACHNOID HEMORRHAGE): ICD-10-CM

## 2023-12-30 DIAGNOSIS — S42.034A CLOSED NONDISPLACED FRACTURE OF ACROMIAL END OF RIGHT CLAVICLE, INITIAL ENCOUNTER: ICD-10-CM

## 2023-12-30 DIAGNOSIS — S06.5XAA SDH (SUBDURAL HEMATOMA): Primary | ICD-10-CM

## 2023-12-30 DIAGNOSIS — S42.001A CLOSED NONDISPLACED FRACTURE OF RIGHT CLAVICLE, UNSPECIFIED PART OF CLAVICLE, INITIAL ENCOUNTER: ICD-10-CM

## 2023-12-30 DIAGNOSIS — S01.01XA LACERATION OF SCALP, INITIAL ENCOUNTER: ICD-10-CM

## 2023-12-30 DIAGNOSIS — S12.001A CLOSED NONDISPLACED FRACTURE OF FIRST CERVICAL VERTEBRA, UNSPECIFIED FRACTURE MORPHOLOGY, INITIAL ENCOUNTER: ICD-10-CM

## 2023-12-30 PROBLEM — S12.000A C1 CERVICAL FRACTURE: Status: ACTIVE | Noted: 2023-12-30

## 2023-12-30 LAB
25(OH)D3+25(OH)D2 SERPL-MCNC: 38.9 NG/ML (ref 30–100)
ABO GROUP BLD: NORMAL
ALBUMIN SERPL-MCNC: 4.2 G/DL (ref 3.7–4.7)
ALBUMIN/GLOB SERPL: 2.1 {RATIO} (ref 1.2–2.2)
ALP SERPL-CCNC: 94 IU/L (ref 44–121)
ALT SERPL-CCNC: 15 IU/L (ref 0–32)
ANION GAP SERPL CALCULATED.3IONS-SCNC: 10 MMOL/L (ref 5–15)
AST SERPL-CCNC: 18 IU/L (ref 0–40)
BASOPHILS # BLD AUTO: 0.03 10*3/MM3 (ref 0–0.2)
BASOPHILS # BLD AUTO: 0.1 X10E3/UL (ref 0–0.2)
BASOPHILS NFR BLD AUTO: 0.2 % (ref 0–1.5)
BASOPHILS NFR BLD AUTO: 1 %
BILIRUB SERPL-MCNC: 1.1 MG/DL (ref 0–1.2)
BLD GP AB SCN SERPL QL: NEGATIVE
BUN SERPL-MCNC: 38 MG/DL (ref 8–23)
BUN SERPL-MCNC: 40 MG/DL (ref 8–27)
BUN/CREAT SERPL: 26 (ref 12–28)
BUN/CREAT SERPL: 26.6 (ref 7–25)
CALCIUM SERPL-MCNC: 9.9 MG/DL (ref 8.7–10.3)
CALCIUM SPEC-SCNC: 9.5 MG/DL (ref 8.6–10.5)
CHLORIDE SERPL-SCNC: 106 MMOL/L (ref 96–106)
CHLORIDE SERPL-SCNC: 109 MMOL/L (ref 98–107)
CHOLEST SERPL-MCNC: 152 MG/DL (ref 100–199)
CO2 SERPL-SCNC: 18 MMOL/L (ref 20–29)
CO2 SERPL-SCNC: 24 MMOL/L (ref 22–29)
CREAT SERPL-MCNC: 1.43 MG/DL (ref 0.57–1)
CREAT SERPL-MCNC: 1.51 MG/DL (ref 0.57–1)
DEPRECATED RDW RBC AUTO: 42.4 FL (ref 37–54)
EGFRCR SERPLBLD CKD-EPI 2021: 34 ML/MIN/1.73
EGFRCR SERPLBLD CKD-EPI 2021: 36.2 ML/MIN/1.73
EOSINOPHIL # BLD AUTO: 0.04 10*3/MM3 (ref 0–0.4)
EOSINOPHIL # BLD AUTO: 0.5 X10E3/UL (ref 0–0.4)
EOSINOPHIL NFR BLD AUTO: 0.3 % (ref 0.3–6.2)
EOSINOPHIL NFR BLD AUTO: 6 %
ERYTHROCYTE [DISTWIDTH] IN BLOOD BY AUTOMATED COUNT: 11.4 % (ref 11.7–15.4)
ERYTHROCYTE [DISTWIDTH] IN BLOOD BY AUTOMATED COUNT: 11.8 % (ref 12.3–15.4)
GLOBULIN SER CALC-MCNC: 2 G/DL (ref 1.5–4.5)
GLUCOSE SERPL-MCNC: 110 MG/DL (ref 70–99)
GLUCOSE SERPL-MCNC: 130 MG/DL (ref 65–99)
HBA1C MFR BLD: 6 % (ref 4.8–5.6)
HCT VFR BLD AUTO: 33.8 % (ref 34–46.6)
HCT VFR BLD AUTO: 35.1 % (ref 34–46.6)
HDLC SERPL-MCNC: 57 MG/DL
HGB BLD-MCNC: 11.3 G/DL (ref 12–15.9)
HGB BLD-MCNC: 11.6 G/DL (ref 11.1–15.9)
IMM GRANULOCYTES # BLD AUTO: 0 X10E3/UL (ref 0–0.1)
IMM GRANULOCYTES # BLD AUTO: 0.05 10*3/MM3 (ref 0–0.05)
IMM GRANULOCYTES NFR BLD AUTO: 0 %
IMM GRANULOCYTES NFR BLD AUTO: 0.4 % (ref 0–0.5)
LDLC SERPL CALC-MCNC: 80 MG/DL (ref 0–99)
LYMPHOCYTES # BLD AUTO: 1.39 10*3/MM3 (ref 0.7–3.1)
LYMPHOCYTES # BLD AUTO: 3.1 X10E3/UL (ref 0.7–3.1)
LYMPHOCYTES NFR BLD AUTO: 11.1 % (ref 19.6–45.3)
LYMPHOCYTES NFR BLD AUTO: 37 %
MCH RBC QN AUTO: 32.3 PG (ref 26.6–33)
MCH RBC QN AUTO: 33 PG (ref 26.6–33)
MCHC RBC AUTO-ENTMCNC: 33 G/DL (ref 31.5–35.7)
MCHC RBC AUTO-ENTMCNC: 33.4 G/DL (ref 31.5–35.7)
MCV RBC AUTO: 98 FL (ref 79–97)
MCV RBC AUTO: 98.8 FL (ref 79–97)
MONOCYTES # BLD AUTO: 0.4 X10E3/UL (ref 0.1–0.9)
MONOCYTES # BLD AUTO: 0.52 10*3/MM3 (ref 0.1–0.9)
MONOCYTES NFR BLD AUTO: 4.1 % (ref 5–12)
MONOCYTES NFR BLD AUTO: 5 %
NEUTROPHILS # BLD AUTO: 4.4 X10E3/UL (ref 1.4–7)
NEUTROPHILS NFR BLD AUTO: 10.53 10*3/MM3 (ref 1.7–7)
NEUTROPHILS NFR BLD AUTO: 51 %
NEUTROPHILS NFR BLD AUTO: 83.9 % (ref 42.7–76)
NRBC BLD AUTO-RTO: 0 /100 WBC (ref 0–0.2)
PLATELET # BLD AUTO: 146 10*3/MM3 (ref 140–450)
PLATELET # BLD AUTO: 162 X10E3/UL (ref 150–450)
PMV BLD AUTO: 10.8 FL (ref 6–12)
POTASSIUM SERPL-SCNC: 4.9 MMOL/L (ref 3.5–5.2)
POTASSIUM SERPL-SCNC: 5.5 MMOL/L (ref 3.5–5.2)
PROT SERPL-MCNC: 6.2 G/DL (ref 6–8.5)
RBC # BLD AUTO: 3.42 10*6/MM3 (ref 3.77–5.28)
RBC # BLD AUTO: 3.59 X10E6/UL (ref 3.77–5.28)
RH BLD: POSITIVE
SODIUM SERPL-SCNC: 143 MMOL/L (ref 134–144)
SODIUM SERPL-SCNC: 143 MMOL/L (ref 136–145)
T&S EXPIRATION DATE: NORMAL
TRIGL SERPL-MCNC: 75 MG/DL (ref 0–149)
TSH SERPL DL<=0.005 MIU/L-ACNC: 3.73 UIU/ML (ref 0.45–4.5)
VLDLC SERPL CALC-MCNC: 15 MG/DL (ref 5–40)
WBC # BLD AUTO: 8.5 X10E3/UL (ref 3.4–10.8)
WBC NRBC COR # BLD AUTO: 12.56 10*3/MM3 (ref 3.4–10.8)

## 2023-12-30 PROCEDURE — 70450 CT HEAD/BRAIN W/O DYE: CPT

## 2023-12-30 PROCEDURE — G0378 HOSPITAL OBSERVATION PER HR: HCPCS

## 2023-12-30 PROCEDURE — 86901 BLOOD TYPING SEROLOGIC RH(D): CPT | Performed by: PHYSICIAN ASSISTANT

## 2023-12-30 PROCEDURE — 25010000002 ONDANSETRON PER 1 MG: Performed by: EMERGENCY MEDICINE

## 2023-12-30 PROCEDURE — 25010000002 MORPHINE PER 10 MG: Performed by: EMERGENCY MEDICINE

## 2023-12-30 PROCEDURE — 90471 IMMUNIZATION ADMIN: CPT | Performed by: PHYSICIAN ASSISTANT

## 2023-12-30 PROCEDURE — 99291 CRITICAL CARE FIRST HOUR: CPT

## 2023-12-30 PROCEDURE — 86850 RBC ANTIBODY SCREEN: CPT | Performed by: PHYSICIAN ASSISTANT

## 2023-12-30 PROCEDURE — 25010000002 HYDROMORPHONE PER 4 MG: Performed by: INTERNAL MEDICINE

## 2023-12-30 PROCEDURE — 72040 X-RAY EXAM NECK SPINE 2-3 VW: CPT

## 2023-12-30 PROCEDURE — 90715 TDAP VACCINE 7 YRS/> IM: CPT | Performed by: PHYSICIAN ASSISTANT

## 2023-12-30 PROCEDURE — 25010000002 TETANUS-DIPHTH-ACELL PERTUSSIS 5-2.5-18.5 LF-MCG/0.5 SUSPENSION PREFILLED SYRINGE: Performed by: PHYSICIAN ASSISTANT

## 2023-12-30 PROCEDURE — 73130 X-RAY EXAM OF HAND: CPT

## 2023-12-30 PROCEDURE — 99214 OFFICE O/P EST MOD 30 MIN: CPT

## 2023-12-30 PROCEDURE — 72125 CT NECK SPINE W/O DYE: CPT

## 2023-12-30 PROCEDURE — 73030 X-RAY EXAM OF SHOULDER: CPT

## 2023-12-30 PROCEDURE — 80048 BASIC METABOLIC PNL TOTAL CA: CPT | Performed by: PHYSICIAN ASSISTANT

## 2023-12-30 PROCEDURE — 86900 BLOOD TYPING SEROLOGIC ABO: CPT | Performed by: PHYSICIAN ASSISTANT

## 2023-12-30 PROCEDURE — 72170 X-RAY EXAM OF PELVIS: CPT

## 2023-12-30 PROCEDURE — 85025 COMPLETE CBC W/AUTO DIFF WBC: CPT | Performed by: PHYSICIAN ASSISTANT

## 2023-12-30 RX ORDER — BISACODYL 10 MG
10 SUPPOSITORY, RECTAL RECTAL DAILY PRN
Status: DISCONTINUED | OUTPATIENT
Start: 2023-12-30 | End: 2024-01-03 | Stop reason: HOSPADM

## 2023-12-30 RX ORDER — SODIUM CHLORIDE 0.9 % (FLUSH) 0.9 %
10 SYRINGE (ML) INJECTION AS NEEDED
Status: DISCONTINUED | OUTPATIENT
Start: 2023-12-30 | End: 2024-01-03 | Stop reason: HOSPADM

## 2023-12-30 RX ORDER — SODIUM CHLORIDE 9 MG/ML
40 INJECTION, SOLUTION INTRAVENOUS AS NEEDED
Status: DISCONTINUED | OUTPATIENT
Start: 2023-12-30 | End: 2024-01-03 | Stop reason: HOSPADM

## 2023-12-30 RX ORDER — ACETAMINOPHEN 325 MG/1
650 TABLET ORAL EVERY 4 HOURS PRN
Status: DISCONTINUED | OUTPATIENT
Start: 2023-12-30 | End: 2024-01-03 | Stop reason: HOSPADM

## 2023-12-30 RX ORDER — HYDROCODONE BITARTRATE AND ACETAMINOPHEN 5; 325 MG/1; MG/1
1 TABLET ORAL EVERY 4 HOURS PRN
Status: DISCONTINUED | OUTPATIENT
Start: 2023-12-30 | End: 2024-01-03 | Stop reason: HOSPADM

## 2023-12-30 RX ORDER — LABETALOL HYDROCHLORIDE 5 MG/ML
10 INJECTION, SOLUTION INTRAVENOUS EVERY 6 HOURS PRN
Status: DISCONTINUED | OUTPATIENT
Start: 2023-12-30 | End: 2024-01-03 | Stop reason: HOSPADM

## 2023-12-30 RX ORDER — ONDANSETRON 4 MG/1
4 TABLET, ORALLY DISINTEGRATING ORAL EVERY 6 HOURS PRN
Status: DISCONTINUED | OUTPATIENT
Start: 2023-12-30 | End: 2024-01-03 | Stop reason: HOSPADM

## 2023-12-30 RX ORDER — ATORVASTATIN CALCIUM 20 MG/1
20 TABLET, FILM COATED ORAL DAILY
Status: DISCONTINUED | OUTPATIENT
Start: 2023-12-30 | End: 2024-01-03 | Stop reason: HOSPADM

## 2023-12-30 RX ORDER — BISACODYL 5 MG/1
5 TABLET, DELAYED RELEASE ORAL DAILY PRN
Status: DISCONTINUED | OUTPATIENT
Start: 2023-12-30 | End: 2024-01-03 | Stop reason: HOSPADM

## 2023-12-30 RX ORDER — LEVOTHYROXINE SODIUM 0.07 MG/1
75 TABLET ORAL DAILY
Status: DISCONTINUED | OUTPATIENT
Start: 2023-12-30 | End: 2024-01-03 | Stop reason: HOSPADM

## 2023-12-30 RX ORDER — ACETAMINOPHEN 160 MG/5ML
650 SOLUTION ORAL EVERY 4 HOURS PRN
Status: DISCONTINUED | OUTPATIENT
Start: 2023-12-30 | End: 2024-01-03 | Stop reason: HOSPADM

## 2023-12-30 RX ORDER — NALOXONE HCL 0.4 MG/ML
0.4 VIAL (ML) INJECTION
Status: DISCONTINUED | OUTPATIENT
Start: 2023-12-30 | End: 2024-01-03 | Stop reason: HOSPADM

## 2023-12-30 RX ORDER — HYDROMORPHONE HYDROCHLORIDE 1 MG/ML
0.5 INJECTION, SOLUTION INTRAMUSCULAR; INTRAVENOUS; SUBCUTANEOUS
Status: DISCONTINUED | OUTPATIENT
Start: 2023-12-30 | End: 2024-01-03 | Stop reason: HOSPADM

## 2023-12-30 RX ORDER — SODIUM CHLORIDE 0.9 % (FLUSH) 0.9 %
10 SYRINGE (ML) INJECTION EVERY 12 HOURS SCHEDULED
Status: DISCONTINUED | OUTPATIENT
Start: 2023-12-30 | End: 2024-01-03 | Stop reason: HOSPADM

## 2023-12-30 RX ORDER — MORPHINE SULFATE 2 MG/ML
4 INJECTION, SOLUTION INTRAMUSCULAR; INTRAVENOUS ONCE
Status: COMPLETED | OUTPATIENT
Start: 2023-12-30 | End: 2023-12-30

## 2023-12-30 RX ORDER — FUROSEMIDE 20 MG/1
20 TABLET ORAL DAILY
Status: DISCONTINUED | OUTPATIENT
Start: 2023-12-31 | End: 2023-12-31

## 2023-12-30 RX ORDER — AMOXICILLIN 250 MG
2 CAPSULE ORAL 2 TIMES DAILY
Status: DISCONTINUED | OUTPATIENT
Start: 2023-12-30 | End: 2024-01-03 | Stop reason: HOSPADM

## 2023-12-30 RX ORDER — ACETAMINOPHEN 650 MG/1
650 SUPPOSITORY RECTAL EVERY 4 HOURS PRN
Status: DISCONTINUED | OUTPATIENT
Start: 2023-12-30 | End: 2024-01-03 | Stop reason: HOSPADM

## 2023-12-30 RX ORDER — LIDOCAINE HYDROCHLORIDE AND EPINEPHRINE 10; 10 MG/ML; UG/ML
10 INJECTION, SOLUTION INFILTRATION; PERINEURAL ONCE
Status: COMPLETED | OUTPATIENT
Start: 2023-12-30 | End: 2023-12-30

## 2023-12-30 RX ORDER — METOPROLOL SUCCINATE 100 MG/1
100 TABLET, EXTENDED RELEASE ORAL NIGHTLY
Status: DISCONTINUED | OUTPATIENT
Start: 2023-12-30 | End: 2024-01-03 | Stop reason: HOSPADM

## 2023-12-30 RX ORDER — POLYETHYLENE GLYCOL 3350 17 G/17G
17 POWDER, FOR SOLUTION ORAL DAILY PRN
Status: DISCONTINUED | OUTPATIENT
Start: 2023-12-30 | End: 2024-01-03 | Stop reason: HOSPADM

## 2023-12-30 RX ORDER — ONDANSETRON 2 MG/ML
4 INJECTION INTRAMUSCULAR; INTRAVENOUS ONCE
Status: COMPLETED | OUTPATIENT
Start: 2023-12-30 | End: 2023-12-30

## 2023-12-30 RX ORDER — ONDANSETRON 2 MG/ML
4 INJECTION INTRAMUSCULAR; INTRAVENOUS EVERY 6 HOURS PRN
Status: DISCONTINUED | OUTPATIENT
Start: 2023-12-30 | End: 2024-01-03 | Stop reason: HOSPADM

## 2023-12-30 RX ADMIN — MORPHINE SULFATE 4 MG: 2 INJECTION, SOLUTION INTRAMUSCULAR; INTRAVENOUS at 13:33

## 2023-12-30 RX ADMIN — LIDOCAINE HYDROCHLORIDE AND EPINEPHRINE 10 ML: 10; 10 INJECTION, SOLUTION INFILTRATION; PERINEURAL at 11:33

## 2023-12-30 RX ADMIN — HYDROMORPHONE HYDROCHLORIDE 0.5 MG: 1 INJECTION, SOLUTION INTRAMUSCULAR; INTRAVENOUS; SUBCUTANEOUS at 20:37

## 2023-12-30 RX ADMIN — ATORVASTATIN CALCIUM 20 MG: 20 TABLET, FILM COATED ORAL at 16:48

## 2023-12-30 RX ADMIN — LEVOTHYROXINE SODIUM 75 MCG: 75 TABLET ORAL at 16:48

## 2023-12-30 RX ADMIN — TETANUS TOXOID, REDUCED DIPHTHERIA TOXOID AND ACELLULAR PERTUSSIS VACCINE, ADSORBED 0.5 ML: 5; 2.5; 8; 8; 2.5 SUSPENSION INTRAMUSCULAR at 15:05

## 2023-12-30 RX ADMIN — Medication 10 ML: at 20:45

## 2023-12-30 RX ADMIN — METOPROLOL SUCCINATE 100 MG: 100 TABLET, EXTENDED RELEASE ORAL at 20:40

## 2023-12-30 RX ADMIN — ONDANSETRON HYDROCHLORIDE 4 MG: 2 INJECTION, SOLUTION INTRAMUSCULAR; INTRAVENOUS at 13:32

## 2023-12-30 RX ADMIN — SENNOSIDES AND DOCUSATE SODIUM 2 TABLET: 50; 8.6 TABLET ORAL at 20:40

## 2023-12-30 RX ADMIN — HYDROMORPHONE HYDROCHLORIDE 0.5 MG: 1 INJECTION, SOLUTION INTRAMUSCULAR; INTRAVENOUS; SUBCUTANEOUS at 16:18

## 2023-12-30 NOTE — ED PROVIDER NOTES
EMERGENCY DEPARTMENT ENCOUNTER    Room Number:  S514/1  Date of encounter:  12/30/2023  PCP: Adela Sy APRN  Historian: Patient, family  Chronic or social conditions impacting care (social determinants of health): Nothing    HPI:  Chief Complaint: Fall  A complete HPI/ROS/PMH/PSH/SH/FH are unobtainable due to: Nothing    Context: Ayana Reyna is a 83 y.o. female who presents to the ED c/o injury sustained in a fall prior to arrival.  Patient was walking down her attic steps when she lost her balance and fell backwards.  Patient fell down several steps on her back.  She complains of a headache with a laceration to the occipital scalp, as well as the right shoulder pain.  She has 2 skin tears to the right upper extremity.  She does not take any blood thinners.  She was in her normal state of health prior to the fall.    Patient reports she had lab work drawn yesterday at her primary care doctor's office.  Her potassium was found to be 5.5.  Is unclear if this is due to hemolysis.  She was started on spironolactone a couple of months ago.  We will recheck this.    Review of prior external notes (non-ED):   I reviewed internal medicine office visit from yesterday.  Patient being followed for hypothyroidism, coronary artery disease, diabetes, hypertension.    Review of prior external test results outside of this encounter:  I reviewed a CMP from yesterday.  Potassium 5.5, creatinine 1.51.    PAST MEDICAL HISTORY  Active Ambulatory Problems     Diagnosis Date Noted    Vitamin D deficiency 07/05/2016    Allergic rhinitis 07/05/2016    Prediabetes 07/05/2016    Osteoporosis 07/05/2016    Hypertension 07/05/2016    Macular degeneration of both eyes 07/05/2016    Acquired hypothyroidism 10/11/2016    Vision loss, bilateral     Nonrheumatic aortic valve stenosis 10/19/2020    Nonrheumatic mitral valve regurgitation 10/19/2020    Nonrheumatic aortic valve insufficiency 10/19/2020    Exudative age-related macular  degeneration, left eye, with inactive scar 01/05/2021    Coronary artery calcification 03/24/2021    Age-related nuclear cataract of both eyes 04/14/2021    Open angle with borderline findings, high risk, bilateral 04/14/2021    Sciatica of right side 06/09/2023    Heart murmur 07/28/2023    Class 2 obesity with alveolar hypoventilation without serious comorbidity with body mass index (BMI) of 35.0 to 35.9 in adult 07/28/2023     Resolved Ambulatory Problems     Diagnosis Date Noted    Fatigue 07/05/2016    Migraine 07/05/2016    Trouble in sleeping 07/05/2016    Hyperlipemia 07/05/2016    Frequent urination 06/12/2017    Shortness of breath 05/18/2018    Lower extremity edema 02/20/2019    Heart palpitations 02/28/2020    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    Pulmonary nodules 03/24/2021    Chest tightness 03/24/2021    Shortness of breath on exertion 03/24/2021    Bilateral leg cramps 01/21/2022    Encounter for medical examination to establish care 06/10/2022    Leg wound, left, initial encounter 11/18/2022    Dizziness 07/28/2023     Past Medical History:   Diagnosis Date    Aortic stenosis     Glucose intolerance (impaired glucose tolerance)     Hearing loss     Hematuria     Insomnia     Macular degeneration     Wears hearing aid in both ears 12/2019         PAST SURGICAL HISTORY  Past Surgical History:   Procedure Laterality Date    CARDIAC CATHETERIZATION N/A 3/29/2021    Procedure: Coronary angiography;  Surgeon: Zain Mendoza MD;  Location: [facility] INVASIVE LOCATION;  Service: Cardiovascular;  Laterality: N/A;    CARDIAC CATHETERIZATION N/A 3/29/2021    Procedure: Left heart cath;  Surgeon: Zain Mendoza MD;  Location: [facility] CATH INVASIVE LOCATION;  Service: Cardiovascular;  Laterality: N/A;    CARDIAC CATHETERIZATION N/A 3/29/2021    Procedure: Left ventriculography;  Surgeon: Zain Mendoza MD;  Location: [facility] CATH INVASIVE LOCATION;  Service: Cardiovascular;   Laterality: N/A;    CHOLECYSTECTOMY      COLONOSCOPY  2008    FRACTURE SURGERY  1975,2014    HYSTERECTOMY N/A 1971    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    JOINT REPLACEMENT  1995, 2013    KNEE SURGERY  1989    TONSILLECTOMY  1950         FAMILY HISTORY  Family History   Problem Relation Age of Onset    Hodgkin's lymphoma Mother     Cancer Mother          SOCIAL HISTORY  Social History     Socioeconomic History    Marital status:    Tobacco Use    Smoking status: Never    Smokeless tobacco: Never   Vaping Use    Vaping Use: Never used   Substance and Sexual Activity    Alcohol use: Yes     Alcohol/week: 1.0 standard drink of alcohol     Types: 1 Glasses of wine per week     Comment: Less than 1 glass of wine a week//caffeine use:1 cup daily    Drug use: No    Sexual activity: Never     Partners: Male         ALLERGIES  Amlodipine        REVIEW OF SYSTEMS  All systems reviewed and negative except for those discussed in HPI.       PHYSICAL EXAM    I have reviewed the triage vital signs and nursing notes.    ED Triage Vitals   Temp Heart Rate Resp BP SpO2   12/30/23 0929 12/30/23 0929 12/30/23 0929 12/30/23 0929 12/30/23 0929   97.4 °F (36.3 °C) 77 14 118/74 97 %      Temp src Heart Rate Source Patient Position BP Location FiO2 (%)   -- 12/30/23 0957 12/30/23 0957 12/30/23 0957 --    Monitor Lying Left arm        Physical Exam  GENERAL: Alert, oriented, elderly, not distressed  HENT: Laceration to the right parietal scalp without significant hematoma.  Mild cervical tenderness without vertebral step-off.  No dental or nasal injury   EYES: no scleral icterus, EOMI  CV: regular rhythm, regular rate, no murmur  RESPIRATORY: normal effort, CTA  ABDOMEN: soft, nontender  MUSCULOSKELETAL: Mild diffuse tenderness to the right shoulder and distal clavicle without deformity.  2 large skin tears to the right upper extremity.  No other significant bony tenderness to the right upper extremity.  Neurovascularly intact distally.    NEURO:  alert, moves all extremities, normal speech  SKIN: warm, dry        LAB RESULTS  Recent Results (from the past 24 hour(s))   Basic Metabolic Panel    Collection Time: 12/30/23 11:25 AM    Specimen: Blood   Result Value Ref Range    Glucose 130 (H) 65 - 99 mg/dL    BUN 38 (H) 8 - 23 mg/dL    Creatinine 1.43 (H) 0.57 - 1.00 mg/dL    Sodium 143 136 - 145 mmol/L    Potassium 4.9 3.5 - 5.2 mmol/L    Chloride 109 (H) 98 - 107 mmol/L    CO2 24.0 22.0 - 29.0 mmol/L    Calcium 9.5 8.6 - 10.5 mg/dL    BUN/Creatinine Ratio 26.6 (H) 7.0 - 25.0    Anion Gap 10.0 5.0 - 15.0 mmol/L    eGFR 36.2 (L) >60.0 mL/min/1.73   CBC Auto Differential    Collection Time: 12/30/23  1:16 PM    Specimen: Blood   Result Value Ref Range    WBC 12.56 (H) 3.40 - 10.80 10*3/mm3    RBC 3.42 (L) 3.77 - 5.28 10*6/mm3    Hemoglobin 11.3 (L) 12.0 - 15.9 g/dL    Hematocrit 33.8 (L) 34.0 - 46.6 %    MCV 98.8 (H) 79.0 - 97.0 fL    MCH 33.0 26.6 - 33.0 pg    MCHC 33.4 31.5 - 35.7 g/dL    RDW 11.8 (L) 12.3 - 15.4 %    RDW-SD 42.4 37.0 - 54.0 fl    MPV 10.8 6.0 - 12.0 fL    Platelets 146 140 - 450 10*3/mm3    Neutrophil % 83.9 (H) 42.7 - 76.0 %    Lymphocyte % 11.1 (L) 19.6 - 45.3 %    Monocyte % 4.1 (L) 5.0 - 12.0 %    Eosinophil % 0.3 0.3 - 6.2 %    Basophil % 0.2 0.0 - 1.5 %    Immature Grans % 0.4 0.0 - 0.5 %    Neutrophils, Absolute 10.53 (H) 1.70 - 7.00 10*3/mm3    Lymphocytes, Absolute 1.39 0.70 - 3.10 10*3/mm3    Monocytes, Absolute 0.52 0.10 - 0.90 10*3/mm3    Eosinophils, Absolute 0.04 0.00 - 0.40 10*3/mm3    Basophils, Absolute 0.03 0.00 - 0.20 10*3/mm3    Immature Grans, Absolute 0.05 0.00 - 0.05 10*3/mm3    nRBC 0.0 0.0 - 0.2 /100 WBC   Type & Screen    Collection Time: 12/30/23  1:16 PM    Specimen: Blood   Result Value Ref Range    ABO Type A     RH type Positive     Antibody Screen Negative     T&S Expiration Date 1/2/2024 11:59:59 PM        Ordered the above labs and independently reviewed the results.        RADIOLOGY  XR Shoulder 2+ View  Right, XR Hand 3+ View Right    Result Date: 12/30/2023  TWO VIEWS OF THE RIGHT SHOULDER AND THREE VIEWS OF THE RIGHT HAND  HISTORY: 83-year-old female status post fall with right hand and shoulder pain.  FINDINGS: PA, oblique and lateral radiographs of the right hand were obtained and demonstrate diffuse osteopenia. There is 1st CMC joint degenerative arthritic change with subchondral sclerosis. Evidence of prior plate and screw fixation of the distal radius is noted. An acute fracture is not visualized. There is no malalignment.  AP internal and external rotation radiographs of the right shoulder were obtained and demonstrate a transverse obliquely oriented fracture of the distal clavicle. The AC joint appears normal in alignment. Diffuse osteopenia is noted. There is severe joint space narrowing and subchondral sclerosis with osteophytic change involving the glenohumeral joint. Mild overlying soft tissue swelling is noted.      1. Transverse obliquely oriented nondisplaced distal right clavicular fracture. 2. No acute abnormality of the right hand is appreciated. 3. Diffuse osteopenia. 4. Severe right shoulder degenerative arthritis.  This report was finalized on 12/30/2023 2:15 PM by Dr. Hong Alfonso M.D on Workstation: [id number]      CT Head Without Contrast, CT Cervical Spine Without Contrast    Result Date: 12/30/2023  CT HEAD WO CONTRAST-, CT CERVICAL SPINE WO CONTRAST-  INDICATIONS: Trauma  TECHNIQUE: Radiation dose reduction techniques were utilized, including automated exposure control and exposure modulation based on body size. Noncontrast head CT, cervical spine CT  COMPARISON: 9/15/2020  FINDINGS:  Head CT:  5 mm thickness right parafalcine subdural hematoma is present anteriorly and posteriorly, with extension to the upper surface of the right tentorium cerebelli. Small subarachnoid hemorrhage is seen at the right frontoparietal region, for example axial image 40.  No midline shift or mass  effect. No acute territorial infarct is identified.  Mild periventricular hypodensities suggest chronic small vessel ischemic change in a patient this age.  Acute calcifications are seen at the base of the brain.  Ventricles, cisterns, cerebral sulci are unremarkable for patient age.  The visualized paranasal sinuses, orbits, mastoid air cells are unremarkable. Subcutaneous scalp hematoma is seen at the upper right aspect of the head, subcutaneous soft tissue gas suggesting laceration or infection, correlate clinically.   Cervical spine CT:  A nondisplaced fracture is seen at the anterior right aspect of the ring of C1, for example axial image 49, with as much as about 3 mm separation of fracture fragments apparent. No other cervical fractures are identified.  Facet and uncovertebral joint hypertrophy contribute to neuroforaminal narrowing, more prominent on the right at C5/6, and on the left at C6/7.  Mild anterolisthesis of C3 on C4.  Bilateral carotid arterial calcifications are present.  Mildly impacted, comminuted fracture is seen at the medial end of the right clavicle.         Critical test result:  1. Predominantly right parafalcine subdural hematoma, 5 mm thickness. Small right frontoparietal subarachnoid hemorrhage. Close follow-up recommended to characterize change.  2. Fracture of C1 vertebra.  3. Medial right clavicle fracture.  Critical test results discussed by telephone with Noah Freeman at 1245, 12/30/2023.      This report was finalized on 12/30/2023 12:48 PM by Dr. Fredo Leiva M.D on Workstation: [id number]      XR Pelvis 1 or 2 View    Result Date: 12/30/2023  XR PELVIS 1 OR 2 VW-  INDICATIONS: Trauma  TECHNIQUE: FRONTAL VIEWS OF THE PELVIS  COMPARISON: None available  FINDINGS:  The pelvic ring appears grossly intact. No erosion, or dislocation is identified. No obvious displaced fracture is seen. If there is remaining clinical suspicion for fracture, additional views and/or cross-sectional  imaging could be obtained. Minimal to mild right more than left hip joint space narrowing. Degenerative changes are seen in the partly included lumbar spine.       As described.    This report was finalized on 12/30/2023 11:10 AM by Dr. Fredo Leiva M.D on Workstation: [id number]       I ordered the above noted radiological studies. Reviewed by me and discussed with radiologist.  See dictation for official radiology interpretation.      MEDICATIONS GIVEN IN ER    Medications   sodium chloride 0.9 % flush 10 mL (has no administration in time range)   sodium chloride 0.9 % flush 10 mL (has no administration in time range)   sodium chloride 0.9 % flush 10 mL (has no administration in time range)   sodium chloride 0.9 % flush 10 mL (has no administration in time range)   sodium chloride 0.9 % infusion 40 mL (has no administration in time range)   sennosides-docusate (PERICOLACE) 8.6-50 MG per tablet 2 tablet (has no administration in time range)     And   polyethylene glycol (MIRALAX) packet 17 g (has no administration in time range)     And   bisacodyl (DULCOLAX) EC tablet 5 mg (has no administration in time range)     And   bisacodyl (DULCOLAX) suppository 10 mg (has no administration in time range)   acetaminophen (TYLENOL) tablet 650 mg (has no administration in time range)     Or   acetaminophen (TYLENOL) 160 MG/5ML oral solution 650 mg (has no administration in time range)     Or   acetaminophen (TYLENOL) suppository 650 mg (has no administration in time range)   HYDROcodone-acetaminophen (NORCO) 5-325 MG per tablet 1 tablet (has no administration in time range)   HYDROmorphone (DILAUDID) injection 0.5 mg (has no administration in time range)     And   naloxone (NARCAN) injection 0.4 mg (has no administration in time range)   ondansetron ODT (ZOFRAN-ODT) disintegrating tablet 4 mg (has no administration in time range)     Or   ondansetron (ZOFRAN) injection 4 mg (has no administration in time range)    lidocaine 1% - EPINEPHrine [id number] (XYLOCAINE W/EPI) 1 %-1:168004 injection 10 mL (10 mL Injection Given by Other 12/30/23 1133)   morphine injection 4 mg (4 mg Intravenous Given 12/30/23 1333)   ondansetron (ZOFRAN) injection 4 mg (4 mg Intravenous Given 12/30/23 1332)   Tetanus-Diphth-Acell Pertussis (BOOSTRIX) injection 0.5 mL (0.5 mL Intramuscular Given 12/30/23 1505)     Laceration Repair    Date/Time: 12/30/2023 3:59 PM    Performed by: Noah Freeman PA  Authorized by: Cooper Leung MD    Consent:     Consent obtained:  Verbal    Consent given by:  Patient  Anesthesia:     Anesthesia method:  None  Laceration details:     Location:  Scalp    Scalp location:  R parietal    Length (cm):  3.2  Pre-procedure details:     Preparation:  Patient was prepped and draped in usual sterile fashion and imaging obtained to evaluate for foreign bodies  Exploration:     Hemostasis achieved with:  Direct pressure  Treatment:     Area cleansed with:  Chlorhexidine    Amount of cleaning:  Standard    Irrigation solution:  Sterile saline  Skin repair:     Repair method:  Tissue adhesive  Approximation:     Approximation:  Close  Repair type:     Repair type:  Simple  Post-procedure details:     Procedure completion:  Tolerated well, no immediate complications    Patient placed in sling by myself.  Neurovascularly intact post application.    ADDITIONAL ORDERS CONSIDERED BUT NOT ORDERED:  Considered ICU however patient does not take any blood thinners.      PROGRESS, DATA ANALYSIS, CONSULTS, AND MEDICAL DECISION MAKING    All labs have been independently interpreted by myself.  All radiology studies have been independently interpreted by myself and discussed with radiologist dictating the report.   EKG's independently interpreted by myself.  Discussion below represents my analysis of pertinent findings related to patient's condition, differential diagnosis, treatment plan and final disposition.    I have discussed case with  Dr. Leung, emergency room physician.  He has performed his own bedside examination and agrees with treatment plan.    ED Course as of 12/30/23 1558   Sat Dec 30, 2023   1015 Patient presents with injury sustained in a fall prior to arrival.  Patient reportedly slipped down wooden steps.  Patient hit the back of her head.  No blood thinners.  She has acute on chronic right shoulder pain, skin tear to the right upper extremity. [EE]   1021 In reviewing old records patient did have labs drawn yesterday which showed a potassium of 5.5.  She is on spironolactone.  We will recheck this. [EE]   1108 XR Hand 3+ View Right  My independent interpretation of the right hand x-ray is no gross fracture [TR]   1155 Potassium: 4.9 [TR]   1250 I discussed CT head and cervical spine findings with Dr. Leiva.  Patient has a 5 mm right parafalcine hemorrhage, as well as a C1 fracture. [EE]   1301 Recheck of patient.  She is neurologically in tact.  She has a hard cervical collar on.  They understand plan for admission. [EE]   1339 I discussed the patient with Dr. Moreno, neurosurgery.  He has reviewed CT head and cervical spine images.  The patient may go to a telemetry bed.  He will follow-up with the patient [EE]   1347 I discussed case with Dr. Cao, [facility].  He agrees to admit. [EE]      ED Course User Index  [EE] Noah Freeman PA  [TR] Cooper Leung MD       AS OF 15:58 EST VITALS:    BP - 155/59  HR - 80  TEMP - 97.4 °F (36.3 °C)  O2 SATS - 99%        DIAGNOSIS  Final diagnoses:   SDH (subdural hematoma)   SAH (subarachnoid hemorrhage)   Closed nondisplaced fracture of first cervical vertebra, unspecified fracture morphology, initial encounter   Closed nondisplaced fracture of acromial end of right clavicle, initial encounter   Laceration of scalp, initial encounter         DISPOSITION  Admitted      Dictated utilizing Dragon dictation     Noah Freeman PA  12/30/23 1600

## 2023-12-30 NOTE — H&P
Patient Name:  Ayana Reyna  YOB: 1940  MRN:  [id number]  Admit Date:  12/30/2023  Patient Care Team:  Adela Sy APRN as PCP - General (Internal Medicine)  Toi Riley MD as Consulting Physician (Ophthalmology)  Monica Nichols MD (Dermatology)  Amanuel Nuñez MD as Consulting Physician (Orthopedic Surgery)  Darryl Joel MD as Consulting Physician (Otolaryngology)  Ana Orozco MD as Cardiologist (Cardiology)      Subjective   History Present Illness     Chief Complaint   Patient presents with    Fall    Laceration       Ms. Reyna is a 83 y.o. with a history of hypertension, hyperlipidemia, macular degeneration and vision loss, hypothyroidism, valvular heart disease and prior edema who presents to [facility] complaining of fall down about 8 stairs where she lost her balance.  She did hit her right shoulder and the back of her head.  She did not lose consciousness.  She was having pain earlier but is feeling a bit better now that she has had some medication.  She is in a hard collar due to cervical fracture and has a head laceration.  She is not reporting any headache currently.  No chest pain palpitations or shortness of breath.  No nausea vomiting diarrhea or abdominal pain currently.  She did have elevated creatinine and hyperkalemia on prior labs.  Looking through records she also had a increased dose of Aldactone due to worsened peripheral edema at cardiology clinic.    Review of Systems   Constitutional:  Negative for chills and fever.   HENT:  Negative for sore throat and trouble swallowing.    Eyes:  Negative for photophobia and pain.   Respiratory:  Negative for cough, shortness of breath and wheezing.    Cardiovascular:  Negative for chest pain and palpitations.   Gastrointestinal:  Negative for diarrhea, nausea and vomiting.   Endocrine: Negative for cold intolerance and heat intolerance.   Genitourinary:  Negative for difficulty  urinating and dysuria.   Musculoskeletal:  Positive for neck pain and neck stiffness.   Skin:  Negative for pallor and rash.   Allergic/Immunologic: Negative for environmental allergies and food allergies.   Neurological:  Negative for seizures and syncope.   Hematological:  Negative for adenopathy. Does not bruise/bleed easily.   Psychiatric/Behavioral:  Negative for agitation and confusion.         Personal History     Past Medical History:   Diagnosis Date    Acquired hypothyroidism     Allergic rhinitis     Aortic stenosis     mild to moderate per echo 2020    Bilateral leg cramps 1/21/2022    Coronary artery calcification 03/24/2021    Fatigue     Glucose intolerance (impaired glucose tolerance)     Hearing loss     Heart murmur     Heart palpitations     Hematuria     Hyperlipemia     Hypertension     Insomnia     Macular degeneration     vision loss of left eye     Migraine     Nonrheumatic aortic valve insufficiency 10/19/2020    Osteoporosis     Pulmonary nodules 3/24/2021    Shortness of breath     Vision loss, bilateral     left eye macular degeneration; right eye etiology unknown    Vitamin D deficiency     Wears hearing aid in both ears 12/2019     Past Surgical History:   Procedure Laterality Date    CARDIAC CATHETERIZATION N/A 3/29/2021    Procedure: Coronary angiography;  Surgeon: Zain Mendoza MD;  Location:  [facility];  Service: Cardiovascular;  Laterality: N/A;    CARDIAC CATHETERIZATION N/A 3/29/2021    Procedure: Left heart cath;  Surgeon: Zain Mendoza MD;  Location:  [facility];  Service: Cardiovascular;  Laterality: N/A;    CARDIAC CATHETERIZATION N/A 3/29/2021    Procedure: Left ventriculography;  Surgeon: Zain Mendoza MD;  Location:  [facility];  Service: Cardiovascular;  Laterality: N/A;    CHOLECYSTECTOMY      COLONOSCOPY  2008    FRACTURE SURGERY  1975,2014    HYSTERECTOMY N/A 1971    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    JOINT  REPLACEMENT  1995, 2013    KNEE SURGERY  1989    TONSILLECTOMY  1950     Family History   Problem Relation Age of Onset    Hodgkin's lymphoma Mother     Cancer Mother      Social History     Tobacco Use    Smoking status: Never    Smokeless tobacco: Never   Vaping Use    Vaping Use: Never used   Substance Use Topics    Alcohol use: Yes     Alcohol/week: 1.0 standard drink of alcohol     Types: 1 Glasses of wine per week     Comment: Less than 1 glass of wine a week//caffeine use:1 cup daily    Drug use: No     No current facility-administered medications on file prior to encounter.     Current Outpatient Medications on File Prior to Encounter   Medication Sig Dispense Refill    atorvastatin (LIPITOR) 20 MG tablet Take 1 tablet by mouth once daily 90 tablet 0    cholecalciferol (VITAMIN D3) 1000 UNITS tablet Take 1 tablet by mouth Daily.      furosemide (LASIX) 40 MG tablet Take 0.5 tablets by mouth Daily.      levothyroxine (SYNTHROID, LEVOTHROID) 75 MCG tablet Take 1 tablet by mouth Daily. 90 tablet 3    metoprolol succinate XL (TOPROL-XL) 100 MG 24 hr tablet Take 1 tablet by mouth Every Night. 90 tablet 3    Multiple Vitamins-Minerals (PRESERVISION/LUTEIN) capsule Take  by mouth 2 (two) times a day.      ramipril (ALTACE) 10 MG capsule TAKE 1 CAPSULE BY MOUTH ONCE DAILY AT NIGHT 90 capsule 0    spironolactone (ALDACTONE) 50 MG tablet Take 1 tablet by mouth Daily. (Patient taking differently: Take 0.5 tablets by mouth Daily.) 90 tablet 2    traMADol (ULTRAM) 50 MG tablet Take 1 tablet every 6 hours by oral route.      vitamin B-12 (CYANOCOBALAMIN) 100 MCG tablet Take 0.5 tablets by mouth Daily.       Allergies   Allergen Reactions    Amlodipine Swelling     Lower extremity edema       Objective    Objective     Vital Signs  Temp:  [97.4 °F (36.3 °C)] 97.4 °F (36.3 °C)  Heart Rate:  [70-86] 84  Resp:  [14-18] 18  BP: (118-153)/(39-74) 153/62  SpO2:  [97 %-99 %] 99 %  on   ;   Device (Oxygen Therapy): room air  Body  mass index is 33.59 kg/m².    Physical Exam  Vitals and nursing note reviewed.   Constitutional:       General: She is not in acute distress.     Appearance: She is not diaphoretic.   HENT:      Head:      Comments: Head laceration  Eyes:      Conjunctiva/sclera: Conjunctivae normal.      Pupils: Pupils are equal, round, and reactive to light.   Neck:      Comments: Hard collar  Cardiovascular:      Rate and Rhythm: Normal rate and regular rhythm.      Pulses: Normal pulses.   Pulmonary:      Effort: Pulmonary effort is normal.      Breath sounds: No wheezing or rhonchi.   Abdominal:      General: There is no distension.      Palpations: Abdomen is soft.      Tenderness: There is no abdominal tenderness. There is no guarding or rebound.   Musculoskeletal:         General: Tenderness and signs of injury present.   Skin:     General: Skin is warm and dry.   Neurological:      Mental Status: She is alert.      Cranial Nerves: No cranial nerve deficit.      Comments: Except for known chronic vision change   Psychiatric:         Mood and Affect: Mood normal.         Behavior: Behavior normal.         Results Review:  I reviewed the patient's new clinical results.  I reviewed the patient's new imaging results and agree with the interpretation.  I reviewed the patient's other test results and agree with the interpretation  I personally viewed and interpreted the patient's EKG/Telemetry data  I reviewed prior records.    Lab Results (last 24 hours)       Procedure Component Value Units Date/Time    Basic Metabolic Panel [[id number]]  (Abnormal) Collected: 12/30/23 1125    Specimen: Blood Updated: 12/30/23 1154     Glucose 130 mg/dL      BUN 38 mg/dL      Creatinine 1.43 mg/dL      Sodium 143 mmol/L      Potassium 4.9 mmol/L      Chloride 109 mmol/L      CO2 24.0 mmol/L      Calcium 9.5 mg/dL      BUN/Creatinine Ratio 26.6     Anion Gap 10.0 mmol/L      eGFR 36.2 mL/min/1.73     Narrative:      GFR Normal >60  Chronic Kidney  Disease <60  Kidney Failure <15    The GFR formula is only valid for adults with stable renal function between ages 18 and 70.    CBC & Differential [[id number]]  (Abnormal) Collected: 12/30/23 1316    Specimen: Blood Updated: 12/30/23 1325    Narrative:      The following orders were created for panel order CBC & Differential.  Procedure                               Abnormality         Status                     ---------                               -----------         ------                     CBC Auto Differential[[id number]]        Abnormal            Final result                 Please view results for these tests on the individual orders.    CBC Auto Differential [[id number]]  (Abnormal) Collected: 12/30/23 1316    Specimen: Blood Updated: 12/30/23 1325     WBC 12.56 10*3/mm3      RBC 3.42 10*6/mm3      Hemoglobin 11.3 g/dL      Hematocrit 33.8 %      MCV 98.8 fL      MCH 33.0 pg      MCHC 33.4 g/dL      RDW 11.8 %      RDW-SD 42.4 fl      MPV 10.8 fL      Platelets 146 10*3/mm3      Neutrophil % 83.9 %      Lymphocyte % 11.1 %      Monocyte % 4.1 %      Eosinophil % 0.3 %      Basophil % 0.2 %      Immature Grans % 0.4 %      Neutrophils, Absolute 10.53 10*3/mm3      Lymphocytes, Absolute 1.39 10*3/mm3      Monocytes, Absolute 0.52 10*3/mm3      Eosinophils, Absolute 0.04 10*3/mm3      Basophils, Absolute 0.03 10*3/mm3      Immature Grans, Absolute 0.05 10*3/mm3      nRBC 0.0 /100 WBC             Imaging Results (Last 24 Hours)       Procedure Component Value Units Date/Time    CT Head Without Contrast [[id number]] Collected: 12/30/23 1236     Updated: 12/30/23 1251    Narrative:      CT HEAD WO CONTRAST-, CT CERVICAL SPINE WO CONTRAST-     INDICATIONS: Trauma     TECHNIQUE: Radiation dose reduction techniques were utilized, including  automated exposure control and exposure modulation based on body size.  Noncontrast head CT, cervical spine CT     COMPARISON: 9/15/2020     FINDINGS:     Head CT:     5 mm  thickness right parafalcine subdural hematoma is present anteriorly  and posteriorly, with extension to the upper surface of the right  tentorium cerebelli. Small subarachnoid hemorrhage is seen at the right  frontoparietal region, for example axial image 40.     No midline shift or mass effect. No acute territorial infarct is  identified.     Mild periventricular hypodensities suggest chronic small vessel ischemic  change in a patient this age.     Acute calcifications are seen at the base of the brain.     Ventricles, cisterns, cerebral sulci are unremarkable for patient age.     The visualized paranasal sinuses, orbits, mastoid air cells are  unremarkable. Subcutaneous scalp hematoma is seen at the upper right  aspect of the head, subcutaneous soft tissue gas suggesting laceration  or infection, correlate clinically.        Cervical spine CT:     A nondisplaced fracture is seen at the anterior right aspect of the ring  of C1, for example axial image 49, with as much as about 3 mm separation  of fracture fragments apparent. No other cervical fractures are  identified.     Facet and uncovertebral joint hypertrophy contribute to neuroforaminal  narrowing, more prominent on the right at C5/6, and on the left at C6/7.     Mild anterolisthesis of C3 on C4.     Bilateral carotid arterial calcifications are present.     Mildly impacted, comminuted fracture is seen at the medial end of the  right clavicle.          Impression:            Critical test result:     1. Predominantly right parafalcine subdural hematoma, 5 mm thickness.  Small right frontoparietal subarachnoid hemorrhage. Close follow-up  recommended to characterize change.     2. Fracture of C1 vertebra.     3. Medial right clavicle fracture.     Critical test results discussed by telephone with Noah Freeman at 1245,  12/30/2023.                 This report was finalized on 12/30/2023 12:48 PM by Dr. Fredo Leiva M.D on Workstation: [email or web address]  Cervical Spine Without Contrast [[id number]] Collected: 12/30/23 1236     Updated: 12/30/23 1251    Narrative:      CT HEAD WO CONTRAST-, CT CERVICAL SPINE WO CONTRAST-     INDICATIONS: Trauma     TECHNIQUE: Radiation dose reduction techniques were utilized, including  automated exposure control and exposure modulation based on body size.  Noncontrast head CT, cervical spine CT     COMPARISON: 9/15/2020     FINDINGS:     Head CT:     5 mm thickness right parafalcine subdural hematoma is present anteriorly  and posteriorly, with extension to the upper surface of the right  tentorium cerebelli. Small subarachnoid hemorrhage is seen at the right  frontoparietal region, for example axial image 40.     No midline shift or mass effect. No acute territorial infarct is  identified.     Mild periventricular hypodensities suggest chronic small vessel ischemic  change in a patient this age.     Acute calcifications are seen at the base of the brain.     Ventricles, cisterns, cerebral sulci are unremarkable for patient age.     The visualized paranasal sinuses, orbits, mastoid air cells are  unremarkable. Subcutaneous scalp hematoma is seen at the upper right  aspect of the head, subcutaneous soft tissue gas suggesting laceration  or infection, correlate clinically.        Cervical spine CT:     A nondisplaced fracture is seen at the anterior right aspect of the ring  of C1, for example axial image 49, with as much as about 3 mm separation  of fracture fragments apparent. No other cervical fractures are  identified.     Facet and uncovertebral joint hypertrophy contribute to neuroforaminal  narrowing, more prominent on the right at C5/6, and on the left at C6/7.     Mild anterolisthesis of C3 on C4.     Bilateral carotid arterial calcifications are present.     Mildly impacted, comminuted fracture is seen at the medial end of the  right clavicle.          Impression:            Critical test result:     1. Predominantly right  parafalcine subdural hematoma, 5 mm thickness.  Small right frontoparietal subarachnoid hemorrhage. Close follow-up  recommended to characterize change.     2. Fracture of C1 vertebra.     3. Medial right clavicle fracture.     Critical test results discussed by telephone with Noah Pete at 1245,  12/30/2023.                 This report was finalized on 12/30/2023 12:48 PM by Dr. Fredo Leiva M.D on Workstation: [email or web address]       XR Shoulder 2+ View Right [[id number]] Collected: 12/30/23 1156     Updated: 12/30/23 1156    Narrative:      TWO VIEWS OF THE RIGHT SHOULDER AND THREE VIEWS OF THE RIGHT HAND     HISTORY: 83-year-old female status post fall with right hand and  shoulder pain.      FINDINGS: PA, oblique and lateral radiographs of the right hand were  obtained and demonstrate diffuse osteopenia. There is 1st CMC joint  degenerative arthritic change with subchondral sclerosis. Evidence of  prior plate and screw fixation of the distal radius is noted. An acute  fracture is not visualized. There is no malalignment.     AP internal and external rotation radiographs of the right shoulder were  obtained and demonstrate a transverse obliquely oriented fracture of the  distal clavicle. The AC joint appears normal in alignment. Diffuse  osteopenia is noted. There is severe joint space narrowing and  subchondral sclerosis with osteophytic change involving the glenohumeral  joint. Mild overlying soft tissue swelling is noted.       Impression:      1. Transverse obliquely oriented nondisplaced distal right clavicular  fracture.   2. No acute abnormality of the right hand is appreciated.  3. Diffuse osteopenia.  4. Severe right shoulder degenerative arthritis.       XR Hand 3+ View Right [[id number]] Collected: 12/30/23 1156     Updated: 12/30/23 1156    Narrative:      TWO VIEWS OF THE RIGHT SHOULDER AND THREE VIEWS OF THE RIGHT HAND     HISTORY: 83-year-old female status post fall with right hand and  shoulder  pain.      FINDINGS: PA, oblique and lateral radiographs of the right hand were  obtained and demonstrate diffuse osteopenia. There is 1st CMC joint  degenerative arthritic change with subchondral sclerosis. Evidence of  prior plate and screw fixation of the distal radius is noted. An acute  fracture is not visualized. There is no malalignment.     AP internal and external rotation radiographs of the right shoulder were  obtained and demonstrate a transverse obliquely oriented fracture of the  distal clavicle. The AC joint appears normal in alignment. Diffuse  osteopenia is noted. There is severe joint space narrowing and  subchondral sclerosis with osteophytic change involving the glenohumeral  joint. Mild overlying soft tissue swelling is noted.       Impression:      1. Transverse obliquely oriented nondisplaced distal right clavicular  fracture.   2. No acute abnormality of the right hand is appreciated.  3. Diffuse osteopenia.  4. Severe right shoulder degenerative arthritis.       XR Pelvis 1 or 2 View [[id number]] Collected: 12/30/23 1108     Updated: 12/30/23 1113    Narrative:      XR PELVIS 1 OR 2 VW-     INDICATIONS: Trauma     TECHNIQUE: FRONTAL VIEWS OF THE PELVIS     COMPARISON: None available     FINDINGS:     The pelvic ring appears grossly intact. No erosion, or dislocation is  identified. No obvious displaced fracture is seen. If there is remaining  clinical suspicion for fracture, additional views and/or cross-sectional  imaging could be obtained. Minimal to mild right more than left hip  joint space narrowing. Degenerative changes are seen in the partly  included lumbar spine.       Impression:         As described.           This report was finalized on 12/30/2023 11:10 AM by Dr. Fredo Leiva M.D on Workstation: [id number]               Results for orders placed during the hospital encounter of 12/08/23    Adult Transthoracic Echo Complete w/ Color, Spectral and Contrast if Necessary Per  Protocol    Interpretation Summary    Left ventricular systolic function is normal. Calculated left ventricular EF = 64.8%    Left ventricular diastolic function is consistent with (grade II w/high LAP) pseudonormalization.    The left atrial cavity is mildly dilated.    There is moderate calcification of the aortic valve. Moderate aortic valve regurgitation is present. Moderate aortic valve stenosis is present. Aortic valve area is 0.89 cm2. Peak velocity of the flow distal to the aortic valve is 311 cm/s. Aortic valve maximum pressure gradient is 38.7 mmHg. Aortic valve mean pressure gradient is 23.0 mmHg. Aortic valve dimensionless index is 0.30 .    Moderate mitral annular calcification is present. There is moderate, bileaflet mitral valve thickening present. Moderate mitral valve regurgitation is present. No significant mitral valve stenosis is present.    The tricuspid valve is thickened. Moderate tricuspid valve regurgitation is present. Estimated right ventricular systolic pressure from tricuspid regurgitation is moderately elevated (45-55 mmHg). Calculated right ventricular systolic pressure from tricuspid regurgitation is 53.7 mmHg.      No orders to display        Assessment/Plan     Active Hospital Problems    Diagnosis  POA    **SDH (subdural hematoma) [S06.5XAA]  Yes    C1 cervical fracture [S12.000A]  Yes    Subarachnoid hemorrhage [I60.9]  Yes    Subdural hematoma [S06.5XAA]  Yes    Nonrheumatic aortic valve insufficiency [I35.1]  Yes    Acquired hypothyroidism [E03.9]  Yes    Hypertension [I10]  Yes    Macular degeneration of both eyes [H35.30]  Yes      Resolved Hospital Problems   No resolved problems to display.       Ms. Reyna is a 83 y.o.     SDH/SAH/C1 fracture: Continue supportive care.  Neurosurgery consulting.  Repeat head CT planned to monitor the bleed.  Continue hard collar.  Valvular heart disease: GERA present but her potassium is okay.  Will tentatively plan to hold Aldactone and  monitor.  Hypertension: See above.  Labetalol if needed.  Macular degeneration/vision loss  Hypothyroidism: Synthroid  Prediabetes: Check A1c  Right clavicle fracture: Continue sling  PPx: SCD  I discussed the patient's findings and my recommendations with patient, family, and ED provider.      David Cao MD  [facility]  12/30/23  14:16 EST    Dictated portions of note using dragon dictation software.

## 2023-12-30 NOTE — ED NOTES
Nursing report ED to floor  Ayana Reyna  83 y.o.  female    HPI :   Chief Complaint   Patient presents with    Fall    Laceration       Admitting doctor:   David Cao MD    Admitting diagnosis:   The primary encounter diagnosis was SDH (subdural hematoma). Diagnoses of SAH (subarachnoid hemorrhage), Closed nondisplaced fracture of first cervical vertebra, unspecified fracture morphology, initial encounter, Closed nondisplaced fracture of acromial end of right clavicle, initial encounter, and Laceration of scalp, initial encounter were also pertinent to this visit.    Code status:   Current Code Status       Date Active Code Status Order ID Comments User Context       12/30/2023 1415 CPR (Attempt to Resuscitate) [id number]  David Cao MD ED        Question Answer    Code Status (Patient has no pulse and is not breathing) CPR (Attempt to Resuscitate)    Medical Interventions (Patient has pulse or is breathing) Full Support    Comments please confirm with patient/medical decision maker                    Allergies:   Amlodipine    Isolation:   No active isolations    Intake and Output  No intake or output data in the 24 hours ending 12/30/23 1418    Weight:       12/30/23  0928   Weight: 78 kg (172 lb)       Most recent vitals:   Vitals:    12/30/23 1131 12/30/23 1231 12/30/23 1301 12/30/23 1330   BP: 132/48 (!) 130/39 144/60 153/62   BP Location:       Patient Position:       Pulse: 70 73 86 84   Resp:       Temp:       SpO2: 97% 98% 98% 99%   Weight:       Height:           Active LDAs/IV Access:   Lines, Drains & Airways       Active LDAs       Name Placement date Placement time Site Days    Peripheral IV 12/30/23 1316 Anterior;Left Forearm 12/30/23  1316  Forearm  less than 1                    Labs (abnormal labs have a star):   Labs Reviewed   BASIC METABOLIC PANEL - Abnormal; Notable for the following components:       Result Value    Glucose 130 (*)     BUN 38 (*)     Creatinine 1.43 (*)      Chloride 109 (*)     BUN/Creatinine Ratio 26.6 (*)     eGFR 36.2 (*)     All other components within normal limits    Narrative:     GFR Normal >60  Chronic Kidney Disease <60  Kidney Failure <15    The GFR formula is only valid for adults with stable renal function between ages 18 and 70.   CBC WITH AUTO DIFFERENTIAL - Abnormal; Notable for the following components:    WBC 12.56 (*)     RBC 3.42 (*)     Hemoglobin 11.3 (*)     Hematocrit 33.8 (*)     MCV 98.8 (*)     RDW 11.8 (*)     Neutrophil % 83.9 (*)     Lymphocyte % 11.1 (*)     Monocyte % 4.1 (*)     Neutrophils, Absolute 10.53 (*)     All other components within normal limits   TYPE AND SCREEN   CBC AND DIFFERENTIAL    Narrative:     The following orders were created for panel order CBC & Differential.  Procedure                               Abnormality         Status                     ---------                               -----------         ------                     CBC Auto Differential[[id number]]        Abnormal            Final result                 Please view results for these tests on the individual orders.       EKG:   No orders to display       Meds given in ED:   Medications   sodium chloride 0.9 % flush 10 mL (has no administration in time range)   sodium chloride 0.9 % flush 10 mL (has no administration in time range)   Tetanus-Diphth-Acell Pertussis (BOOSTRIX) injection 0.5 mL (has no administration in time range)   sodium chloride 0.9 % flush 10 mL (has no administration in time range)   sodium chloride 0.9 % flush 10 mL (has no administration in time range)   sodium chloride 0.9 % infusion 40 mL (has no administration in time range)   sennosides-docusate (PERICOLACE) 8.6-50 MG per tablet 2 tablet (has no administration in time range)     And   polyethylene glycol (MIRALAX) packet 17 g (has no administration in time range)     And   bisacodyl (DULCOLAX) EC tablet 5 mg (has no administration in time range)     And   bisacodyl (DULCOLAX)  suppository 10 mg (has no administration in time range)   acetaminophen (TYLENOL) tablet 650 mg (has no administration in time range)     Or   acetaminophen (TYLENOL) 160 MG/5ML oral solution 650 mg (has no administration in time range)     Or   acetaminophen (TYLENOL) suppository 650 mg (has no administration in time range)   HYDROcodone-acetaminophen (NORCO) 5-325 MG per tablet 1 tablet (has no administration in time range)   HYDROmorphone (DILAUDID) injection 0.5 mg (has no administration in time range)     And   naloxone (NARCAN) injection 0.4 mg (has no administration in time range)   ondansetron ODT (ZOFRAN-ODT) disintegrating tablet 4 mg (has no administration in time range)     Or   ondansetron (ZOFRAN) injection 4 mg (has no administration in time range)   lidocaine 1% - EPINEPHrine 1:[id number] (XYLOCAINE W/EPI) 1 %-1:358627 injection 10 mL (10 mL Injection Given by Other 12/30/23 1133)   morphine injection 4 mg (4 mg Intravenous Given 12/30/23 1333)   ondansetron (ZOFRAN) injection 4 mg (4 mg Intravenous Given 12/30/23 1332)       Imaging results:  CT Head Without Contrast    Result Date: 12/30/2023    Critical test result:  1. Predominantly right parafalcine subdural hematoma, 5 mm thickness. Small right frontoparietal subarachnoid hemorrhage. Close follow-up recommended to characterize change.  2. Fracture of C1 vertebra.  3. Medial right clavicle fracture.  Critical test results discussed by telephone with Noah Freeman at 1245, 12/30/2023.      This report was finalized on 12/30/2023 12:48 PM by Dr. Fredo Leiva M.D on Workstation: [id number]      CT Cervical Spine Without Contrast    Result Date: 12/30/2023    Critical test result:  1. Predominantly right parafalcine subdural hematoma, 5 mm thickness. Small right frontoparietal subarachnoid hemorrhage. Close follow-up recommended to characterize change.  2. Fracture of C1 vertebra.  3. Medial right clavicle fracture.  Critical test results discussed by  telephone with Noah Freeman at 1245, 12/30/2023.      This report was finalized on 12/30/2023 12:48 PM by Dr. Fredo Leiva M.D on Workstation: [id number]      XR Shoulder 2+ View Right    Result Date: 12/30/2023  1. Transverse obliquely oriented nondisplaced distal right clavicular fracture. 2. No acute abnormality of the right hand is appreciated. 3. Diffuse osteopenia. 4. Severe right shoulder degenerative arthritis.      XR Hand 3+ View Right    Result Date: 12/30/2023  1. Transverse obliquely oriented nondisplaced distal right clavicular fracture. 2. No acute abnormality of the right hand is appreciated. 3. Diffuse osteopenia. 4. Severe right shoulder degenerative arthritis.      XR Pelvis 1 or 2 View    Result Date: 12/30/2023   As described.    This report was finalized on 12/30/2023 11:10 AM by Dr. Fredo Leiva M.D on Workstation: [id number]       Ambulatory status:   - assist    Social issues:   Social History     Socioeconomic History    Marital status:    Tobacco Use    Smoking status: Never    Smokeless tobacco: Never   Vaping Use    Vaping Use: Never used   Substance and Sexual Activity    Alcohol use: Yes     Alcohol/week: 1.0 standard drink of alcohol     Types: 1 Glasses of wine per week     Comment: Less than 1 glass of wine a week//caffeine use:1 cup daily    Drug use: No    Sexual activity: Never     Partners: Male       NIH Stroke Scale:       Kee Benson RN  12/30/23 14:18 EST

## 2023-12-30 NOTE — CONSULTS
[facility], [place], operated by [facility] NEUROSURGERY CONSULT NOTE    Patient name: Ayana Reyna  Referring Provider:    Noah Freeman PA      Reason for Consultation: Traumatic fall with subdural hematoma, C1 ring fracture    Patient Care Team:  Adela Sy APRN as PCP - General (Internal Medicine)  Toi Riley MD as Consulting Physician (Ophthalmology)  Monica Nichols MD (Dermatology)  Amanuel Nuñez MD as Consulting Physician (Orthopedic Surgery)  Darryl Joel MD as Consulting Physician (Otolaryngology)  Ana Orozco MD as Cardiologist (Cardiology)    Chief complaint: Fall downstairs, neck pain    Subjective .     History of present illness:    Patient is a 83 y.o.  female who presents to the ED after a fall down about 8 steps.  She lost her balance and fell down striking the right side of her body and back of her head.  She states she did not lose consciousness or experience any couple episode prior to fall.  She currently denies any significant headache.  She does have a little bit of neck pain.  She denies any numbness or weakness.  However, she does have some issues raising her right leg due to right hip pain.  She does not appear to have any drift in any extremity.  She denies use of blood thinners.  Review of Systems  Review of Systems   Eyes:  Negative for visual disturbance (Does have baseline macular degeneration).   Musculoskeletal:  Positive for neck pain.   Neurological:  Positive for headaches (Previously but hardly any headache at this time). Negative for syncope, speech difficulty, weakness and numbness.   Psychiatric/Behavioral:  Negative for confusion.        History  PAST MEDICAL HISTORY  Past Medical History:   Diagnosis Date    Acquired hypothyroidism     Allergic rhinitis     Aortic stenosis     mild to moderate per echo 2020    Bilateral leg cramps 1/21/2022    Coronary artery calcification 03/24/2021    Fatigue     Glucose intolerance (impaired glucose tolerance)     Hearing loss      Heart murmur     Heart palpitations     Hematuria     Hyperlipemia     Hypertension     Insomnia     Macular degeneration     vision loss of left eye     Migraine     Nonrheumatic aortic valve insufficiency 10/19/2020    Osteoporosis     Pulmonary nodules 3/24/2021    Shortness of breath     Vision loss, bilateral     left eye macular degeneration; right eye etiology unknown    Vitamin D deficiency     Wears hearing aid in both ears 12/2019       PAST SURGICAL HISTORY  Past Surgical History:   Procedure Laterality Date    CARDIAC CATHETERIZATION N/A 3/29/2021    Procedure: Coronary angiography;  Surgeon: Zain Mendoza MD;  Location:  [facility];  Service: Cardiovascular;  Laterality: N/A;    CARDIAC CATHETERIZATION N/A 3/29/2021    Procedure: Left heart cath;  Surgeon: Zain Mendoza MD;  Location:  [facility];  Service: Cardiovascular;  Laterality: N/A;    CARDIAC CATHETERIZATION N/A 3/29/2021    Procedure: Left ventriculography;  Surgeon: Zain Mendoza MD;  Location:  [facility];  Service: Cardiovascular;  Laterality: N/A;    CHOLECYSTECTOMY      COLONOSCOPY  2008    FRACTURE SURGERY  1975,2014    HYSTERECTOMY N/A 1971    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    JOINT REPLACEMENT  1995, 2013    KNEE SURGERY  1989    TONSILLECTOMY  1950       FAMILY HISTORY  Family History   Problem Relation Age of Onset    Hodgkin's lymphoma Mother     Cancer Mother        SOCIAL HISTORY  Social History     Tobacco Use    Smoking status: Never    Smokeless tobacco: Never   Vaping Use    Vaping Use: Never used   Substance Use Topics    Alcohol use: Yes     Alcohol/week: 1.0 standard drink of alcohol     Types: 1 Glasses of wine per week     Comment: Less than 1 glass of wine a week//caffeine use:1 cup daily    Drug use: No     , lives alone    Allergies:  Amlodipine    MEDICATIONS:  (Not in a hospital admission)      Objective     Results Review:  LABS:    Admission on  12/30/2023   Component Date Value Ref Range Status    Glucose 12/30/2023 130 (H)  65 - 99 mg/dL Final    BUN 12/30/2023 38 (H)  8 - 23 mg/dL Final    Creatinine 12/30/2023 1.43 (H)  0.57 - 1.00 mg/dL Final    Sodium 12/30/2023 143  136 - 145 mmol/L Final    Potassium 12/30/2023 4.9  3.5 - 5.2 mmol/L Final    Chloride 12/30/2023 109 (H)  98 - 107 mmol/L Final    CO2 12/30/2023 24.0  22.0 - 29.0 mmol/L Final    Calcium 12/30/2023 9.5  8.6 - 10.5 mg/dL Final    BUN/Creatinine Ratio 12/30/2023 26.6 (H)  7.0 - 25.0 Final    Anion Gap 12/30/2023 10.0  5.0 - 15.0 mmol/L Final    eGFR 12/30/2023 36.2 (L)  >60.0 mL/min/1.73 Final    WBC 12/30/2023 12.56 (H)  3.40 - 10.80 10*3/mm3 Final    RBC 12/30/2023 3.42 (L)  3.77 - 5.28 10*6/mm3 Final    Hemoglobin 12/30/2023 11.3 (L)  12.0 - 15.9 g/dL Final    Hematocrit 12/30/2023 33.8 (L)  34.0 - 46.6 % Final    MCV 12/30/2023 98.8 (H)  79.0 - 97.0 fL Final    MCH 12/30/2023 33.0  26.6 - 33.0 pg Final    MCHC 12/30/2023 33.4  31.5 - 35.7 g/dL Final    RDW 12/30/2023 11.8 (L)  12.3 - 15.4 % Final    RDW-SD 12/30/2023 42.4  37.0 - 54.0 fl Final    MPV 12/30/2023 10.8  6.0 - 12.0 fL Final    Platelets 12/30/2023 146  140 - 450 10*3/mm3 Final    Neutrophil % 12/30/2023 83.9 (H)  42.7 - 76.0 % Final    Lymphocyte % 12/30/2023 11.1 (L)  19.6 - 45.3 % Final    Monocyte % 12/30/2023 4.1 (L)  5.0 - 12.0 % Final    Eosinophil % 12/30/2023 0.3  0.3 - 6.2 % Final    Basophil % 12/30/2023 0.2  0.0 - 1.5 % Final    Immature Grans % 12/30/2023 0.4  0.0 - 0.5 % Final    Neutrophils, Absolute 12/30/2023 10.53 (H)  1.70 - 7.00 10*3/mm3 Final    Lymphocytes, Absolute 12/30/2023 1.39  0.70 - 3.10 10*3/mm3 Final    Monocytes, Absolute 12/30/2023 0.52  0.10 - 0.90 10*3/mm3 Final    Eosinophils, Absolute 12/30/2023 0.04  0.00 - 0.40 10*3/mm3 Final    Basophils, Absolute 12/30/2023 0.03  0.00 - 0.20 10*3/mm3 Final    Immature Grans, Absolute 12/30/2023 0.05  0.00 - 0.05 10*3/mm3 Final    nRBC 12/30/2023 0.0   0.0 - 0.2 /100 WBC Final       DIAGNOSTICS:  CT head without contrast 12/30/2023:  Shows a 5 mm thickness right parafalcine subdural hematoma anteriorly and posteriorly with extension to the upper surface of the right tentorium cerebelli.  There is small subarachnoid hemorrhage seen at the right frontal parietal region.  No midline shift or mass effect seen.  No acute infarct noted.  Trickles unremarkable.  Subcutaneous scalp hematoma at the upper right aspect of the head with some gas in the area clean from laceration.  CT cervical spine without contrast 12/30/2023:  Shows a nondisplaced fracture at the anterior right aspect of the ring of C1.  No other cervical fractures are identified.  There is facet and uncovertebral joint hypertrophy which contributes to neuroforaminal narrowing more prominent on the right at C5-6 on the left at C6-7.  There is mild anterolisthesis of C3 on C4.  Right clavicular fracture seen.      Results Review:   I reviewed the patient's new clinical results.  I personally viewed and interpreted the patient's labs, imaging studies, medications and chart    Vital Signs   Temp:  [97.4 °F (36.3 °C)] 97.4 °F (36.3 °C)  Heart Rate:  [70-86] 84  Resp:  [14-18] 18  BP: (118-153)/(39-74) 153/62    Physical Exam:  Physical Exam  Vitals reviewed.   Constitutional:       General: She is not in acute distress.     Appearance: Normal appearance. She is not ill-appearing, toxic-appearing or diaphoretic.   HENT:      Head: Normocephalic.      Nose: Nose normal.      Mouth/Throat:      Mouth: Mucous membranes are moist.      Pharynx: Oropharynx is clear.   Eyes:      Extraocular Movements: Extraocular movements intact.      Conjunctiva/sclera: Conjunctivae normal.      Pupils: Pupils are equal, round, and reactive to light.   Cardiovascular:      Rate and Rhythm: Normal rate.   Pulmonary:      Effort: Pulmonary effort is normal.   Musculoskeletal:         General: Normal range of motion.      Cervical  back: Normal range of motion.      Comments: in hard cervical collar   Skin:     General: Skin is warm.   Neurological:      General: No focal deficit present.      Mental Status: She is alert and oriented to person, place, and time. Mental status is at baseline.      Cranial Nerves: Cranial nerves 2-12 are intact.      Coordination: Finger-Nose-Finger Test and Heel to Shin Test normal.   Psychiatric:         Mood and Affect: Mood normal.         Speech: Speech normal.         Behavior: Behavior normal.         Thought Content: Thought content normal.         Judgment: Judgment normal.       Neurologic Exam     Mental Status   Oriented to person, place, and time.   Attention: normal. Concentration: normal.   Speech: speech is normal   Level of consciousness: alert  Knowledge: consistent with education.     Cranial Nerves   Cranial nerves II through XII intact.     CN III, IV, VI   Pupils are equal, round, and reactive to light.  Right pupil: Size: 3 mm. Shape: regular. Reactivity: brisk.   Left pupil: Size: 3 mm. Shape: regular. Reactivity: brisk.     CN V   Facial sensation intact.     CN VIII   Hearing: intact    Motor Exam   Muscle bulk: normal  Overall muscle tone: normal  Right arm tone: normal  Left arm tone: normal  Right arm pronator drift: absent  Left arm pronator drift: absent  Right leg tone: normal  Left leg tone: normalBilateral hand  strength and plantarflexion and dorsiflexion is 5/5.  No signs of upper or lower extremity drift     Sensory Exam   Light touch normal.     Gait, Coordination, and Reflexes     Gait  Gait: (Gait deferred)    Coordination   Finger to nose coordination: normal  Heel to shin coordination: normal      Assessment & Plan       SDH (subdural hematoma)    C1 cervical fracture    Subarachnoid hemorrhage    Subdural hematoma    83-year-old female who fell down steps and sustained a fall right parafalcine subdural hematoma with small subarachnoid hemorrhage and right  "frontoparietal lobe.  She also sustained a C1 ring fracture.  She is to remain in the cervical hard collar at all times.  We will repeat at CT head in the morning.  Continue neurologic checks.  We will obtain standing cervical x-rays as well.      PLAN:       Cervical hard collar at all times  Obtain standing cervical x-ray  CT head in a.m.  Neuroccks      I discussed the patient's findings and my recommendations with patient, family, consulting provider, and Dr. Moreno.    During patient visit, I utilized appropriate personal protective equipment including mask and gloves.  Mask used was standard procedure mask. Appropriate PPE was worn during the entire visit.  Hand hygiene was completed before and after.     Mark Fry, APRN  12/30/23  14:10 EST    \"Dictated utilizing Dragon dictation\".    "

## 2023-12-30 NOTE — ED PROVIDER NOTES
MD ATTESTATION NOTE    The VIDAL and I have discussed this patient's history, physical exam, and treatment plan.  I have reviewed the documentation and personally had a face to face interaction with the patient. I affirm the documentation and agree with the treatment and plan.  The attached note describes my personal findings.    I provided a substantive portion of the care of this patient. I personally performed the physical exam, in its entirety.    Independent Historians: Patient, EMS family    A complete HPI/ROS/PMH/PSH/SH/FH are unobtainable due to: Nothing    Chronic or social conditions impacting patient care (social determinants of health): None    Ayana Reyna is a 83 y.o. female who presents to the ED c/o acute fall.  The patient reports that she was walking down her steps and lost her balance today.  She struck her head.  She reports injuring her right arm as well as her neck and her head.  She reports a wound to her scalp as well as wounds to her right arm.  She does report she had blood work drawn yesterday and has not had the results yet.      Review of prior external notes (non-ED) -and- Review of prior external test results outside of this encounter: Laboratory evaluation dated yesterday showed a CMP with a creatinine of 1.5 and a potassium of 5.5.  Previous on 12/15/2023 her creatinine is 1.4 and her potassium was 4.7.    Prescription drug monitoring program review:        On exam:  GENERAL: Awake, alert, no acute distress  SKIN: Warm, dry  HENT: Normocephalic, laceration to her right parietal scalp with evidence of recent bleeding.  EYES: no scleral icterus  CV: regular rhythm, regular rate  RESPIRATORY: normal effort, lungs clear  ABDOMEN: soft, nontender, nondistended  MUSCULOSKELETAL: no deformity.  There is paraspinal tenderness in her cervical spine without midline tenderness.  She has right forearm skin tears which are hemostatic.  She has some tenderness in her right hand.  NEURO: alert, moves  all extremities, follows commands                                                             Labs  Recent Results (from the past 24 hour(s))   Basic Metabolic Panel    Collection Time: 12/30/23 11:25 AM    Specimen: Blood   Result Value Ref Range    Glucose 130 (H) 65 - 99 mg/dL    BUN 38 (H) 8 - 23 mg/dL    Creatinine 1.43 (H) 0.57 - 1.00 mg/dL    Sodium 143 136 - 145 mmol/L    Potassium 4.9 3.5 - 5.2 mmol/L    Chloride 109 (H) 98 - 107 mmol/L    CO2 24.0 22.0 - 29.0 mmol/L    Calcium 9.5 8.6 - 10.5 mg/dL    BUN/Creatinine Ratio 26.6 (H) 7.0 - 25.0    Anion Gap 10.0 5.0 - 15.0 mmol/L    eGFR 36.2 (L) >60.0 mL/min/1.73   CBC Auto Differential    Collection Time: 12/30/23  1:16 PM    Specimen: Blood   Result Value Ref Range    WBC 12.56 (H) 3.40 - 10.80 10*3/mm3    RBC 3.42 (L) 3.77 - 5.28 10*6/mm3    Hemoglobin 11.3 (L) 12.0 - 15.9 g/dL    Hematocrit 33.8 (L) 34.0 - 46.6 %    MCV 98.8 (H) 79.0 - 97.0 fL    MCH 33.0 26.6 - 33.0 pg    MCHC 33.4 31.5 - 35.7 g/dL    RDW 11.8 (L) 12.3 - 15.4 %    RDW-SD 42.4 37.0 - 54.0 fl    MPV 10.8 6.0 - 12.0 fL    Platelets 146 140 - 450 10*3/mm3    Neutrophil % 83.9 (H) 42.7 - 76.0 %    Lymphocyte % 11.1 (L) 19.6 - 45.3 %    Monocyte % 4.1 (L) 5.0 - 12.0 %    Eosinophil % 0.3 0.3 - 6.2 %    Basophil % 0.2 0.0 - 1.5 %    Immature Grans % 0.4 0.0 - 0.5 %    Neutrophils, Absolute 10.53 (H) 1.70 - 7.00 10*3/mm3    Lymphocytes, Absolute 1.39 0.70 - 3.10 10*3/mm3    Monocytes, Absolute 0.52 0.10 - 0.90 10*3/mm3    Eosinophils, Absolute 0.04 0.00 - 0.40 10*3/mm3    Basophils, Absolute 0.03 0.00 - 0.20 10*3/mm3    Immature Grans, Absolute 0.05 0.00 - 0.05 10*3/mm3    nRBC 0.0 0.0 - 0.2 /100 WBC   Type & Screen    Collection Time: 12/30/23  1:16 PM    Specimen: Blood   Result Value Ref Range    ABO Type A     RH type Positive     Antibody Screen Negative     T&S Expiration Date 1/2/2024 11:59:59 PM        Radiology  CT Head Without Contrast, CT Cervical Spine Without Contrast    Result  Date: 12/30/2023  CT HEAD WO CONTRAST-, CT CERVICAL SPINE WO CONTRAST-  INDICATIONS: Trauma  TECHNIQUE: Radiation dose reduction techniques were utilized, including automated exposure control and exposure modulation based on body size. Noncontrast head CT, cervical spine CT  COMPARISON: 9/15/2020  FINDINGS:  Head CT:  5 mm thickness right parafalcine subdural hematoma is present anteriorly and posteriorly, with extension to the upper surface of the right tentorium cerebelli. Small subarachnoid hemorrhage is seen at the right frontoparietal region, for example axial image 40.  No midline shift or mass effect. No acute territorial infarct is identified.  Mild periventricular hypodensities suggest chronic small vessel ischemic change in a patient this age.  Acute calcifications are seen at the base of the brain.  Ventricles, cisterns, cerebral sulci are unremarkable for patient age.  The visualized paranasal sinuses, orbits, mastoid air cells are unremarkable. Subcutaneous scalp hematoma is seen at the upper right aspect of the head, subcutaneous soft tissue gas suggesting laceration or infection, correlate clinically.   Cervical spine CT:  A nondisplaced fracture is seen at the anterior right aspect of the ring of C1, for example axial image 49, with as much as about 3 mm separation of fracture fragments apparent. No other cervical fractures are identified.  Facet and uncovertebral joint hypertrophy contribute to neuroforaminal narrowing, more prominent on the right at C5/6, and on the left at C6/7.  Mild anterolisthesis of C3 on C4.  Bilateral carotid arterial calcifications are present.  Mildly impacted, comminuted fracture is seen at the medial end of the right clavicle.         Critical test result:  1. Predominantly right parafalcine subdural hematoma, 5 mm thickness. Small right frontoparietal subarachnoid hemorrhage. Close follow-up recommended to characterize change.  2. Fracture of C1 vertebra.  3. Medial  right clavicle fracture.  Critical test results discussed by telephone with Noah Freeman at 1245, 12/30/2023.      This report was finalized on 12/30/2023 12:48 PM by Dr. Fredo Leiva M.D on Workstation: [id number]      XR Shoulder 2+ View Right, XR Hand 3+ View Right    Result Date: 12/30/2023  TWO VIEWS OF THE RIGHT SHOULDER AND THREE VIEWS OF THE RIGHT HAND  HISTORY: 83-year-old female status post fall with right hand and shoulder pain.  FINDINGS: PA, oblique and lateral radiographs of the right hand were obtained and demonstrate diffuse osteopenia. There is 1st CMC joint degenerative arthritic change with subchondral sclerosis. Evidence of prior plate and screw fixation of the distal radius is noted. An acute fracture is not visualized. There is no malalignment.  AP internal and external rotation radiographs of the right shoulder were obtained and demonstrate a transverse obliquely oriented fracture of the distal clavicle. The AC joint appears normal in alignment. Diffuse osteopenia is noted. There is severe joint space narrowing and subchondral sclerosis with osteophytic change involving the glenohumeral joint. Mild overlying soft tissue swelling is noted.      1. Transverse obliquely oriented nondisplaced distal right clavicular fracture. 2. No acute abnormality of the right hand is appreciated. 3. Diffuse osteopenia. 4. Severe right shoulder degenerative arthritis.      XR Pelvis 1 or 2 View    Result Date: 12/30/2023  XR PELVIS 1 OR 2 VW-  INDICATIONS: Trauma  TECHNIQUE: FRONTAL VIEWS OF THE PELVIS  COMPARISON: None available  FINDINGS:  The pelvic ring appears grossly intact. No erosion, or dislocation is identified. No obvious displaced fracture is seen. If there is remaining clinical suspicion for fracture, additional views and/or cross-sectional imaging could be obtained. Minimal to mild right more than left hip joint space narrowing. Degenerative changes are seen in the partly included lumbar spine.        As described.    This report was finalized on 12/30/2023 11:10 AM by Dr. Fredo Leiva M.D on Workstation: [email or web address]       Medical Decision Making:  ED Course as of 12/30/23 1415   Sat Dec 30, 2023   1015 Patient presents with injury sustained in a fall prior to arrival.  Patient reportedly slipped down wooden steps.  Patient hit the back of her head.  No blood thinners.  She has acute on chronic right shoulder pain, skin tear to the right upper extremity. [EE]   1021 In reviewing old records patient did have labs drawn yesterday which showed a potassium of 5.5.  She is on spironolactone.  We will recheck this. [EE]   1108 XR Hand 3+ View Right  My independent interpretation of the right hand x-ray is no gross fracture [TR]   1155 Potassium: 4.9 [TR]   1250 I discussed CT head and cervical spine findings with Dr. Leiva.  Patient has a 5 mm right parafalcine hemorrhage, as well as a C1 fracture. [EE]   1301 Recheck of patient.  She is neurologically in tact.  She has a hard cervical collar on.  They understand plan for admission. [EE]   1339 I discussed the patient with Dr. Moreno, neurosurgery.  He has reviewed CT head and cervical spine images.  The patient may go to a telemetry bed.  He will follow-up with the patient [EE]   1347 I discussed case with Dr. Cao, [facility].  He agrees to admit. [EE]      ED Course User Index  [EE] Noah Freeman PA  [TR] Cooper Leung MD       Clinical Scores:              The patient had a acute mechanical fall just prior to arrival.  Plan to obtain imaging of her right upper extremity as well as her head and cervical spine.  We will check a BMP given her elevated potassium yesterday.  She did recently start spironolactone and this could be the cause.  My differential diagnosis includes intracranial hemorrhage, spine fracture, forearm fracture, laceration, and others.    Procedures:  Procedures        Critical care provider statement:     Critical care time (minutes):  45.    Critical care time was exclusive of:  Separately billable procedures and treating other patients    Critical care was necessary to treat or prevent imminent or life-threatening deterioration of the following conditions:,    Critical care was time spent personally by me on the following activities:  Development of treatment plan with patient or surrogate, discussions with consultants, evaluation of patient's response to treatment, examination of patient, obtaining history from patient or surrogate, ordering and performing treatments and interventions, ordering and review of laboratory studies, ordering and review of radiographic studies, pulse oximetry, re-evaluation of patient's condition and review of old charts          Diagnosis  Final diagnoses:   SDH (subdural hematoma)   SAH (subarachnoid hemorrhage)   Closed nondisplaced fracture of first cervical vertebra, unspecified fracture morphology, initial encounter   Closed nondisplaced fracture of acromial end of right clavicle, initial encounter   Laceration of scalp, initial encounter       Note Disclaimer: At [facility], we believe that sharing information builds trust and better relationships. You are receiving this note because you recently visited [facility]. It is possible you will see health information before a provider has talked with you about it. This kind of information can be easy to misunderstand. To help you fully understand what it means for your health, we urge you to discuss this note with your provider.       Cooper Leung MD  12/30/23 1109       Cooper Leung MD  12/30/23 4904       Cooper Leung MD  12/30/23 3779

## 2023-12-31 ENCOUNTER — APPOINTMENT (OUTPATIENT)
Dept: CT IMAGING | Facility: HOSPITAL | Age: 83
DRG: 086 | End: 2023-12-31
Payer: MEDICARE

## 2023-12-31 LAB
ALBUMIN SERPL-MCNC: 3.4 G/DL (ref 3.5–5.2)
ALBUMIN/GLOB SERPL: 2.1 G/DL
ALP SERPL-CCNC: 79 U/L (ref 39–117)
ALT SERPL W P-5'-P-CCNC: 20 U/L (ref 1–33)
ANION GAP SERPL CALCULATED.3IONS-SCNC: 10 MMOL/L (ref 5–15)
AST SERPL-CCNC: 28 U/L (ref 1–32)
BASOPHILS # BLD AUTO: 0.04 10*3/MM3 (ref 0–0.2)
BASOPHILS NFR BLD AUTO: 0.5 % (ref 0–1.5)
BILIRUB SERPL-MCNC: 1.7 MG/DL (ref 0–1.2)
BUN SERPL-MCNC: 36 MG/DL (ref 8–23)
BUN/CREAT SERPL: 27.5 (ref 7–25)
CALCIUM SPEC-SCNC: 9 MG/DL (ref 8.6–10.5)
CHLORIDE SERPL-SCNC: 106 MMOL/L (ref 98–107)
CO2 SERPL-SCNC: 22 MMOL/L (ref 22–29)
CREAT SERPL-MCNC: 1.31 MG/DL (ref 0.57–1)
DEPRECATED RDW RBC AUTO: 41.6 FL (ref 37–54)
EGFRCR SERPLBLD CKD-EPI 2021: 40.3 ML/MIN/1.73
EOSINOPHIL # BLD AUTO: 0.2 10*3/MM3 (ref 0–0.4)
EOSINOPHIL NFR BLD AUTO: 2.3 % (ref 0.3–6.2)
ERYTHROCYTE [DISTWIDTH] IN BLOOD BY AUTOMATED COUNT: 11.7 % (ref 12.3–15.4)
GLOBULIN UR ELPH-MCNC: 1.6 GM/DL
GLUCOSE SERPL-MCNC: 111 MG/DL (ref 65–99)
HBA1C MFR BLD: 5.8 % (ref 4.8–5.6)
HCT VFR BLD AUTO: 27.7 % (ref 34–46.6)
HGB BLD-MCNC: 9.3 G/DL (ref 12–15.9)
IMM GRANULOCYTES # BLD AUTO: 0.02 10*3/MM3 (ref 0–0.05)
IMM GRANULOCYTES NFR BLD AUTO: 0.2 % (ref 0–0.5)
LYMPHOCYTES # BLD AUTO: 2.65 10*3/MM3 (ref 0.7–3.1)
LYMPHOCYTES NFR BLD AUTO: 30.9 % (ref 19.6–45.3)
MAGNESIUM SERPL-MCNC: 1.5 MG/DL (ref 1.6–2.4)
MCH RBC QN AUTO: 32.9 PG (ref 26.6–33)
MCHC RBC AUTO-ENTMCNC: 33.6 G/DL (ref 31.5–35.7)
MCV RBC AUTO: 97.9 FL (ref 79–97)
MONOCYTES # BLD AUTO: 0.55 10*3/MM3 (ref 0.1–0.9)
MONOCYTES NFR BLD AUTO: 6.4 % (ref 5–12)
NEUTROPHILS NFR BLD AUTO: 5.12 10*3/MM3 (ref 1.7–7)
NEUTROPHILS NFR BLD AUTO: 59.7 % (ref 42.7–76)
NRBC BLD AUTO-RTO: 0.1 /100 WBC (ref 0–0.2)
PLATELET # BLD AUTO: 127 10*3/MM3 (ref 140–450)
PMV BLD AUTO: 11.4 FL (ref 6–12)
POTASSIUM SERPL-SCNC: 4.5 MMOL/L (ref 3.5–5.2)
PROT SERPL-MCNC: 5 G/DL (ref 6–8.5)
QT INTERVAL: 317 MS
QTC INTERVAL: 474 MS
RBC # BLD AUTO: 2.83 10*6/MM3 (ref 3.77–5.28)
SODIUM SERPL-SCNC: 138 MMOL/L (ref 136–145)
WBC NRBC COR # BLD AUTO: 8.58 10*3/MM3 (ref 3.4–10.8)

## 2023-12-31 PROCEDURE — 36415 COLL VENOUS BLD VENIPUNCTURE: CPT | Performed by: INTERNAL MEDICINE

## 2023-12-31 PROCEDURE — 83735 ASSAY OF MAGNESIUM: CPT | Performed by: INTERNAL MEDICINE

## 2023-12-31 PROCEDURE — 25010000002 HYDROMORPHONE PER 4 MG: Performed by: INTERNAL MEDICINE

## 2023-12-31 PROCEDURE — 70450 CT HEAD/BRAIN W/O DYE: CPT

## 2023-12-31 PROCEDURE — 99213 OFFICE O/P EST LOW 20 MIN: CPT

## 2023-12-31 PROCEDURE — 97530 THERAPEUTIC ACTIVITIES: CPT

## 2023-12-31 PROCEDURE — 93005 ELECTROCARDIOGRAM TRACING: CPT | Performed by: INTERNAL MEDICINE

## 2023-12-31 PROCEDURE — 85025 COMPLETE CBC W/AUTO DIFF WBC: CPT | Performed by: INTERNAL MEDICINE

## 2023-12-31 PROCEDURE — 80053 COMPREHEN METABOLIC PANEL: CPT | Performed by: INTERNAL MEDICINE

## 2023-12-31 PROCEDURE — 93010 ELECTROCARDIOGRAM REPORT: CPT | Performed by: INTERNAL MEDICINE

## 2023-12-31 PROCEDURE — 83036 HEMOGLOBIN GLYCOSYLATED A1C: CPT | Performed by: INTERNAL MEDICINE

## 2023-12-31 PROCEDURE — 97162 PT EVAL MOD COMPLEX 30 MIN: CPT

## 2023-12-31 PROCEDURE — 25810000003 LACTATED RINGERS PER 1000 ML: Performed by: STUDENT IN AN ORGANIZED HEALTH CARE EDUCATION/TRAINING PROGRAM

## 2023-12-31 RX ORDER — SODIUM CHLORIDE, SODIUM LACTATE, POTASSIUM CHLORIDE, CALCIUM CHLORIDE 600; 310; 30; 20 MG/100ML; MG/100ML; MG/100ML; MG/100ML
75 INJECTION, SOLUTION INTRAVENOUS CONTINUOUS
Status: DISCONTINUED | OUTPATIENT
Start: 2023-12-31 | End: 2024-01-02

## 2023-12-31 RX ADMIN — Medication 10 ML: at 20:05

## 2023-12-31 RX ADMIN — HYDROCODONE BITARTRATE AND ACETAMINOPHEN 1 TABLET: 5; 325 TABLET ORAL at 11:38

## 2023-12-31 RX ADMIN — FUROSEMIDE 20 MG: 20 TABLET ORAL at 09:54

## 2023-12-31 RX ADMIN — HYDROCODONE BITARTRATE AND ACETAMINOPHEN 1 TABLET: 5; 325 TABLET ORAL at 20:03

## 2023-12-31 RX ADMIN — Medication 10 ML: at 09:55

## 2023-12-31 RX ADMIN — HYDROCODONE BITARTRATE AND ACETAMINOPHEN 1 TABLET: 5; 325 TABLET ORAL at 00:49

## 2023-12-31 RX ADMIN — LEVOTHYROXINE SODIUM 75 MCG: 75 TABLET ORAL at 09:54

## 2023-12-31 RX ADMIN — SENNOSIDES AND DOCUSATE SODIUM 2 TABLET: 50; 8.6 TABLET ORAL at 20:03

## 2023-12-31 RX ADMIN — HYDROMORPHONE HYDROCHLORIDE 0.5 MG: 1 INJECTION, SOLUTION INTRAMUSCULAR; INTRAVENOUS; SUBCUTANEOUS at 09:54

## 2023-12-31 RX ADMIN — HYDROMORPHONE HYDROCHLORIDE 0.5 MG: 1 INJECTION, SOLUTION INTRAMUSCULAR; INTRAVENOUS; SUBCUTANEOUS at 03:36

## 2023-12-31 RX ADMIN — HYDROMORPHONE HYDROCHLORIDE 0.5 MG: 1 INJECTION, SOLUTION INTRAMUSCULAR; INTRAVENOUS; SUBCUTANEOUS at 23:25

## 2023-12-31 RX ADMIN — HYDROMORPHONE HYDROCHLORIDE 0.5 MG: 1 INJECTION, SOLUTION INTRAMUSCULAR; INTRAVENOUS; SUBCUTANEOUS at 13:43

## 2023-12-31 RX ADMIN — METOPROLOL SUCCINATE 100 MG: 100 TABLET, EXTENDED RELEASE ORAL at 20:03

## 2023-12-31 RX ADMIN — Medication 10 ML: at 03:28

## 2023-12-31 RX ADMIN — POLYETHYLENE GLYCOL 3350 17 G: 17 POWDER, FOR SOLUTION ORAL at 17:42

## 2023-12-31 RX ADMIN — SODIUM CHLORIDE, POTASSIUM CHLORIDE, SODIUM LACTATE AND CALCIUM CHLORIDE 75 ML/HR: 600; 310; 30; 20 INJECTION, SOLUTION INTRAVENOUS at 11:38

## 2023-12-31 RX ADMIN — HYDROMORPHONE HYDROCHLORIDE 0.5 MG: 1 INJECTION, SOLUTION INTRAMUSCULAR; INTRAVENOUS; SUBCUTANEOUS at 15:45

## 2023-12-31 RX ADMIN — ATORVASTATIN CALCIUM 20 MG: 20 TABLET, FILM COATED ORAL at 09:54

## 2023-12-31 NOTE — PLAN OF CARE
Goal Outcome Evaluation:   Pt VSS. Pt A&Ox4. Pain controlled by prn medication. IV fluids started. C-collar in place. Dressing changed on R arm.

## 2023-12-31 NOTE — PLAN OF CARE
Problem: Adult Inpatient Plan of Care  Goal: Plan of Care Review  Recent Flowsheet Documentation  Taken 12/31/2023 1134 by Melida Aguirre PT  Plan of Care Reviewed With:   patient   daughter  Outcome Evaluation: Pt is an 82 yo female admitted from home following a fall resulting in SDH, SAH, R clavicular fx, C1 fx. Pt to wear hard cervical collar and is in RUE sling. PT maintained NWB to RUE today. Pt lives alone, is independent with functional mobility without AD. Pt has 6 steps to enter and lives in a multilevel home. Pt reports her daughters encouraged her to sleep on the main level to minimize stair climbing but she slept upstairs for one night and fell down the step the next morning when coming downstairs. Today, pt demonstrates generalized weakness, impaired balance, and decreased activity tolerance below her baseline. She required mod A for supine<>sit and cga sitting EOB due to swaying. Pt reports some dizziness, MD present in room completing assessment. Deferred further out of bed mobility due to dizziness. PT recommending SNF based on current presentation.       Anticipated Discharge Disposition (PT): home with home health, skilled nursing facility

## 2023-12-31 NOTE — PROGRESS NOTES
[facility], [place], operated by [facility] NEUROSURGERY INTRACRANIAL PROGRESS NOTE    PATIENT IDENTIFICATION:   Name:  Ayana Reyna      MRN:  [id number]     83 y.o.  female               CC: Neck pain, fall downstairs      Subjective     Interval History: No acute issues overnight besides a mild panic attack episode.  Otherwise patient is doing well today.  Only has mild headache    ROS:  Constitutional: No fever, chills  HEENT: Very mild headache, no vision changes, no tinnitus, no hearing difficulties  Neck: Neck pain as expected  GI: No nausea, vomiting, no swallow difficulties  Neuro: No numbness, tingling, or weakness, no speech difficulties, no balance difficulties    Objective     Vital signs in last 24 hours:  Temp:  [98.1 °F (36.7 °C)-99.1 °F (37.3 °C)] 98.1 °F (36.7 °C)  Heart Rate:  [73-95] 76  Resp:  [16-18] 18  BP: ()/(34-75) 155/34      Intake/Output this shift:  No intake/output data recorded.      Intake/Output last 3 shifts:  I/O last 3 completed shifts:  In: -   Out: 400 [Urine:400]    LABS:  .  Results from last 7 days   Lab Units 12/31/23  0531 12/30/23  1316 12/29/23  1111   WBC 10*3/mm3 8.58 12.56* 8.5   HEMOGLOBIN g/dL 9.3* 11.3* 11.6   HEMATOCRIT % 27.7* 33.8* 35.1   PLATELETS 10*3/mm3 127* 146 162     .  Results from last 7 days   Lab Units 12/31/23  0531 12/30/23  1125 12/29/23  1111   SODIUM mmol/L 138 143 143   POTASSIUM mmol/L 4.5 4.9 5.5*   CHLORIDE mmol/L 106 109* 106   CO2 mmol/L 22.0 24.0 18*   BUN mg/dL 36* 38* 40*   CREATININE mg/dL 1.31* 1.43* 1.51*   CALCIUM mg/dL 9.0 9.5 9.9   BILIRUBIN mg/dL 1.7*  --  1.1   ALK PHOS U/L 79  --  94   ALT (SGPT) U/L 20  --  15   AST (SGOT) U/L 28  --  18   GLUCOSE mg/dL 111* 130* 110*          IMAGING STUDIES:  CT head without contrast 12/31/2023:  Shows right parafalcine subdural hematoma measuring up to 5 mm in thickness.  Left parafalcine subdural hematoma has developed measuring 4 mm in thickness.  Right frontal parietal region subarachnoid hemorrhage does not appear  significantly changed.  There is no midline shift or mass effect or any acute infarct seen.    X-ray cervical spine standing x-rays 12/30/2023:  Shows mild prevertebral soft tissue swelling at level of C1.  There is normal alignment of the cervical spine.  There is moderate bilateral multi level facet hypertrophy.  There is a known fracture of C1 ring but is not visualized.    I personally viewed and interpreted the patient's labs, imaging studies, medications and chart.    Meds reviewed/changed: Yes    Current Facility-Administered Medications:     acetaminophen (TYLENOL) tablet 650 mg, 650 mg, Oral, Q4H PRN **OR** acetaminophen (TYLENOL) 160 MG/5ML oral solution 650 mg, 650 mg, Oral, Q4H PRN **OR** acetaminophen (TYLENOL) suppository 650 mg, 650 mg, Rectal, Q4H PRN, David Cao MD    atorvastatin (LIPITOR) tablet 20 mg, 20 mg, Oral, Daily, David Cao MD, 20 mg at 12/31/23 0954    sennosides-docusate (PERICOLACE) 8.6-50 MG per tablet 2 tablet, 2 tablet, Oral, BID, 2 tablet at 12/30/23 2040 **AND** polyethylene glycol (MIRALAX) packet 17 g, 17 g, Oral, Daily PRN **AND** bisacodyl (DULCOLAX) EC tablet 5 mg, 5 mg, Oral, Daily PRN **AND** bisacodyl (DULCOLAX) suppository 10 mg, 10 mg, Rectal, Daily PRN, David Cao MD    HYDROcodone-acetaminophen (NORCO) 5-325 MG per tablet 1 tablet, 1 tablet, Oral, Q4H PRN, David Cao MD, 1 tablet at 12/31/23 1138    HYDROmorphone (DILAUDID) injection 0.5 mg, 0.5 mg, Intravenous, Q2H PRN, 0.5 mg at 12/31/23 0954 **AND** naloxone (NARCAN) injection 0.4 mg, 0.4 mg, Intravenous, Q5 Min PRN, David Cao MD    labetalol (NORMODYNE,TRANDATE) injection 10 mg, 10 mg, Intravenous, Q6H PRN, David Cao MD    lactated ringers infusion, 75 mL/hr, Intravenous, Continuous, Robbie Soto MD, Last Rate: 75 mL/hr at 12/31/23 1138, 75 mL/hr at 12/31/23 1138    levothyroxine (SYNTHROID, LEVOTHROID) tablet 75 mcg, 75 mcg, Oral, Daily, David Cao,  MD, 75 mcg at 12/31/23 0954    metoprolol succinate XL (TOPROL-XL) 24 hr tablet 100 mg, 100 mg, Oral, Nightly, David Cao MD, 100 mg at 12/30/23 2040    ondansetron ODT (ZOFRAN-ODT) disintegrating tablet 4 mg, 4 mg, Oral, Q6H PRN **OR** ondansetron (ZOFRAN) injection 4 mg, 4 mg, Intravenous, Q6H PRN, David Cao MD    [COMPLETED] Insert Peripheral IV, , , Once **AND** sodium chloride 0.9 % flush 10 mL, 10 mL, Intravenous, PRN, Noah Freeman PA    [COMPLETED] Insert Peripheral IV, , , Once **AND** sodium chloride 0.9 % flush 10 mL, 10 mL, Intravenous, PRN, Noah Freeman PA    sodium chloride 0.9 % flush 10 mL, 10 mL, Intravenous, Q12H, David Cao MD, 10 mL at 12/31/23 0955    sodium chloride 0.9 % flush 10 mL, 10 mL, Intravenous, PRN, David Cao MD    sodium chloride 0.9 % infusion 40 mL, 40 mL, Intravenous, PRN, David Cao MD      Physical Exam:    General:   Awake, alert, oriented x3. Speech clear with no aphasia  HEENT:    Cervical hard collar in place   neck:    supple  CN II:    Visual fields full to confrontation  CN III IV VI:   Extraocular movements are full , PERRL 3 mm bilaterally risk to light  CN VII:    Facial movements are symmetric. No weakness.  Motor:    Normal muscle strength, bulk and tone in upper and lower extremities.  No fasciculations, rigidity, spasticity, or abnormal movements..  Strength is somewhat hard to assess given right clavicular fracture.  Reflexes:   Plantar responses are flexor.   Sensation:   Normal to light touch; no extinction  Station and Gait:  Gait deferred  Coordination:   Finger-to-nose and heel-to-shin test shows no dysmetria.  Rapid alternating movements are normal. Able to tandem walk. Romberg negative.  Extremities:   Wearing SCD  No signs of upper or lower extremity drift.  Assessment & Plan     ASSESSMENT:      SDH (subdural hematoma)    Hypertension    Macular degeneration of both eyes    Acquired hypothyroidism     "Nonrheumatic aortic valve insufficiency    C1 cervical fracture    Subarachnoid hemorrhage    Subdural hematoma    Closed fracture of right clavicle    This is a pleasant 83-year-old female who unfortunately fell down a flight of stairs and fractured C1 ring and developed a subdural hematoma.  Neurologic examination today is stable with fairly stable CT head scan.  There does appear to be some new subdural blood.  We will follow-up in 2 weeks with a CT head scan in 8 weeks with repeat cervical upright x-rays.  She is to remain in cervical hard collar at all times.  She will call if she develops any new or worsening neck pain or new neurologic deficits.        PLAN:     Neurosurgery signing off  Neurosurgical follow-up in 2 weeks with repeat CT head scan  Neurosurgery follow-up in 8 weeks for repeat cervical x-ray  Remain in cervical hard collar at all times    I discussed the patient's findings and my recommendations with patient, family, and Dr. Moreno    During patient visit, I utilized appropriate personal protective equipment including mask and gloves.  Mask used was standard procedure mask. Appropriate PPE was worn during the entire visit.  Hand hygiene was completed before and after.      LOS: 0 days       Mark Fry, APRN  12/31/2023  12:08 EST    \"Dictated utilizing Dragon dictation\".    "

## 2023-12-31 NOTE — PROGRESS NOTES
Name: Ayana Reyna ADMIT: 2023   : 1940  PCP: Yossi AdelaTRINI chacon    MRN: [id number] LOS: 0 days   AGE/SEX: 83 y.o. female  ROOM: [facility]     Subjective   Subjective   Patient sitting up in bed working with physical therapy.  Visibly appears dizziness and swaying in the bed.  Some mild nausea but no vomiting.    Review of Systems   Constitutional:  Negative for chills and fever.   Respiratory:  Negative for cough and shortness of breath.    Cardiovascular:  Negative for chest pain and leg swelling.   Gastrointestinal:  Negative for abdominal pain, diarrhea and nausea.   Musculoskeletal:         Right shoulder pain   Neurological:  Positive for headaches.     As above     Objective   Objective   Vital Signs  Temp:  [97.8 °F (36.6 °C)-99.1 °F (37.3 °C)] 97.8 °F (36.6 °C)  Heart Rate:  [74-95] 74  Resp:  [16-18] 16  BP: ()/(34-75) 143/50  SpO2:  [91 %-100 %] 100 %  on  Flow (L/min):  [2] 2;   Device (Oxygen Therapy): room air  Body mass index is 33.59 kg/m².  Physical Exam  Constitutional:       General: She is not in acute distress.     Appearance: She is ill-appearing.      Comments: Elderly   Cardiovascular:      Rate and Rhythm: Normal rate and regular rhythm.   Pulmonary:      Effort: Pulmonary effort is normal. No respiratory distress.   Abdominal:      General: Abdomen is flat. There is no distension.      Tenderness: There is no abdominal tenderness.   Musculoskeletal:         General: No swelling or deformity. Normal range of motion.      Comments: Right arm in sling, shoulder bruising   Skin:     General: Skin is warm and dry.      Comments: Right posterior arm skin tear from fall, bandaged   Neurological:      General: No focal deficit present.      Mental Status: She is alert. Mental status is at baseline.         Results Review     I reviewed the patient's new clinical results.  Results from last 7 days   Lab Units 23  0531 23  1316 23  1111   WBC 10*3/mm3 8.58  12.56* 8.5   HEMOGLOBIN g/dL 9.3* 11.3* 11.6   PLATELETS 10*3/mm3 127* 146 162     Results from last 7 days   Lab Units 12/31/23  0531 12/30/23  1125 12/29/23  1111   SODIUM mmol/L 138 143 143   POTASSIUM mmol/L 4.5 4.9 5.5*   CHLORIDE mmol/L 106 109* 106   CO2 mmol/L 22.0 24.0 18*   BUN mg/dL 36* 38* 40*   CREATININE mg/dL 1.31* 1.43* 1.51*   GLUCOSE mg/dL 111* 130* 110*   Estimated Creatinine Clearance: 29.5 mL/min (A) (by C-G formula based on SCr of 1.31 mg/dL (H)).  Results from last 7 days   Lab Units 12/31/23  0531 12/29/23  1111   ALBUMIN g/dL 3.4* 4.2   BILIRUBIN mg/dL 1.7* 1.1   ALK PHOS U/L 79 94   AST (SGOT) U/L 28 18   ALT (SGPT) U/L 20 15     Results from last 7 days   Lab Units 12/31/23  0531 12/30/23  1125 12/29/23  1111   CALCIUM mg/dL 9.0 9.5 9.9   ALBUMIN g/dL 3.4*  --  4.2   MAGNESIUM mg/dL 1.5*  --   --        SARS-CoV-2 PCR   Date Value Ref Range Status   03/26/2021 Not Detected Not Detected Final     Comment:     Nucleic acid specific to SARS-CoV-2 (COVID-19) virus was not detected in  this sample by the TaqPath (TM) COVID-19 Combo Kit.          SARS-CoV-2 (COVID-19) nucleic acid testing performed using [employer] Aptima (R) SARS-CoV-2 Assay or [employer] TaqPath (TM)  COVID-19 Combo Kit as indicated above under Emergency Use Authorization (EUA) from the FDA. Aptima (R) and TaqPath (TM) test performance  characteristics verified by [facility] in accordance with the FDAs Guidance Document (Policy for Diagnostic Tests for Coronavirus Disease-2019  during the Public Health Emergency) issued on March 16, 2020. The laboratory is regulated under CLIA as qualified to perform high-complexity testing  Unless otherwise noted, all testing was performed at [facility], CLIA No. [id number], [facility] Licensee No. [id number]   01/30/2021 Not Detected Not Detected Final     Comment:     Nucleic acid specific to SARS-CoV-2 (COVID-19) virus was not detected in  this sample by the SUPRPath (TM)  COVID-19 Combo Kit.          SARS-CoV-2 (COVID-19) nucleic acid testing performed using [employer] Aptima (R) SARS-CoV-2 Assay or [employer] TaqPath (TM)  COVID-19 Combo Kit as indicated above under Emergency Use Authorization (EUA) from the FDA. Aptima (R) and TaqPath (TM) test performance  characteristics verified by [facility] in accordance with the FDAs Guidance Document (Policy for Diagnostic Tests for Coronavirus Disease-2019  during the Public Health Emergency) issued on March 16, 2020. The laboratory is regulated under CLIA as qualified to perform high-complexity testing  Unless otherwise noted, all testing was performed at [facility], CLIA No. [id number], [place] State Licensee No. [id number]     Hemoglobin A1C   Date/Time Value Ref Range Status   12/31/2023 0531 5.80 (H) 4.80 - 5.60 % Final   12/29/2023 1111 6.0 (H) 4.8 - 5.6 % Final     Comment:              Prediabetes: 5.7 - 6.4           Diabetes: >6.4           Glycemic control for adults with diabetes: <7.0         CT Head Without Contrast  Narrative: CT HEAD WO CONTRAST-     INDICATIONS: Intracranial hemorrhage, follow-up     TECHNIQUE: Radiation dose reduction techniques were utilized, including  automated exposure control and exposure modulation based on body size.  Noncontrast head CT     COMPARISON: 12/30/2023     FINDINGS:     Right parafalcine subdural hematoma measures up to 5 mm thickness,  coronal image 56, previously 5 mm. Left parafalcine subdural hematoma  has developed, 4 mm thickness on coronal image 37. Right frontal  parietal region subarachnoid hemorrhage does not appear significantly  changed.        No midline shift or mass effect. No acute territorial infarct is  identified.     Mild periventricular hypodensities suggest chronic small vessel ischemic  change in a patient this age.     Arterial calcifications are seen at the base of the brain.     Ventricles, cisterns, cerebral sulci are unremarkable for patient age.      The visualized paranasal sinuses, orbits, mastoid air cells are  unremarkable. Subcutaneous soft tissue swelling/hematoma is seen  superiorly on the right.        Impression:    Previous intracranial hemorrhage appears little changed. New left  parafalcine subdural hematoma measures 4 mm thickness. Continued  follow-up advised.           This report was finalized on 12/31/2023 10:46 AM by Dr. Fredo Leiva M.D on Workstation: [email or web address]       I reviewed the patient's daily medications.  Scheduled Medications  atorvastatin, 20 mg, Oral, Daily  levothyroxine, 75 mcg, Oral, Daily  metoprolol succinate XL, 100 mg, Oral, Nightly  senna-docusate sodium, 2 tablet, Oral, BID  sodium chloride, 10 mL, Intravenous, Q12H    Infusions  lactated ringers, 75 mL/hr, Last Rate: 75 mL/hr (12/31/23 1138)    Diet  Diet: Cardiac Diets; Healthy Heart (2-3 Na+); Texture: Regular Texture (IDDSI 7); Fluid Consistency: Thin (IDDSI 0)         I have personally reviewed:  [x]  Laboratory   []  Microbiology   [x]  Radiology   []  EKG/Telemetry   [x]  Cardiology/Vascular   []  Pathology     Records   [x]  Assessment/Plan     Active Hospital Problems    Diagnosis  POA    **SDH (subdural hematoma) [S06.5XAA]  Yes    C1 cervical fracture [S12.000A]  Yes    Subarachnoid hemorrhage [I60.9]  Yes    Subdural hematoma [S06.5XAA]  Yes    Closed fracture of right clavicle [S42.001A]  Yes    Nonrheumatic aortic valve insufficiency [I35.1]  Yes    Acquired hypothyroidism [E03.9]  Yes    Hypertension [I10]  Yes    Macular degeneration of both eyes [H35.30]  Yes      Resolved Hospital Problems   No resolved problems to display.       83 y.o. female admitted with SDH (subdural hematoma).    Mechanical fall  Subdural hematoma  Subarachnoid hemorrhage  C1 cervical fracture  -Neurosurgery consulted, recommend follow-up CT scan and neurosurgery appointment in 2 weeks.  Neurosurgery follow-up in 8 weeks for repeat cervical x-ray.  C-collar and to follow-up  cervical x-rays    Right clavicle fracture  -Ortho consulted, patient recommendations  -In sling    Orthostatic hypotension  Hypertension  -Dizziness with sitting up, patient denies dizziness at the time of her fall at home  -Hold home Aldactone, ramipril, Lasix  -Continue metoprolol     Hyperlipidemia-continue home Lipitor    GERA over CKD  -Start fluids, hold Lasix, ramipril, Aldactone  -Trend creatinine    Anemia  -Iron studies, B12, folate pending      SCDs for DVT prophylaxis.  Full code.  Discussed with patient, family, and nursing staff.  Anticipate discharge to SNU facility in 2-3 days.    Expected discharge date/ time has not been documented.      Robbie Soto MD  [facility]  12/31/23  13:52 EST

## 2023-12-31 NOTE — PLAN OF CARE
Goal Outcome Evaluation:         Plan of care reviewed with: Patient and family  Progress: On-going   Shift Evaluation: Patient alert and oriented x 4 on room air. Patient went for x ray this shift. Received PRN pain medication for pain at 7-8/10. EKG obtained as patient rhythm changed to A-fib early AM. Rhythm now sinus tachy. Falls precaution maintained, bed in low position and call light within reach.

## 2023-12-31 NOTE — THERAPY EVALUATION
Patient Name: Ayana Reyna  : 1940    MRN: [id number]                              Today's Date: 2023       Admit Date: 2023    Visit Dx:     ICD-10-CM ICD-9-CM   1. SDH (subdural hematoma)  S06.5XAA 432.1   2. SAH (subarachnoid hemorrhage)  I60.9 430   3. Closed nondisplaced fracture of first cervical vertebra, unspecified fracture morphology, initial encounter  S12.001A 805.01   4. Closed nondisplaced fracture of acromial end of right clavicle, initial encounter  S42.034A 810.03   5. Laceration of scalp, initial encounter  S01.01XA 873.0     Patient Active Problem List   Diagnosis    Vitamin D deficiency    Allergic rhinitis    Prediabetes    Osteoporosis    Hypertension    Macular degeneration of both eyes    Acquired hypothyroidism    Vision loss, bilateral    Nonrheumatic aortic valve stenosis    Nonrheumatic mitral valve regurgitation    Nonrheumatic aortic valve insufficiency    Exudative age-related macular degeneration, left eye, with inactive scar    Coronary artery calcification    Age-related nuclear cataract of both eyes    Open angle with borderline findings, high risk, bilateral    Sciatica of right side    Heart murmur    Class 2 obesity with alveolar hypoventilation without serious comorbidity with body mass index (BMI) of 35.0 to 35.9 in adult    SDH (subdural hematoma)    C1 cervical fracture    Subarachnoid hemorrhage    Subdural hematoma    Closed fracture of right clavicle     Past Medical History:   Diagnosis Date    Acquired hypothyroidism     Allergic rhinitis     Aortic stenosis     mild to moderate per echo     Bilateral leg cramps 2022    Coronary artery calcification 2021    Fatigue     Glucose intolerance (impaired glucose tolerance)     Hearing loss     Heart murmur     Heart palpitations     Hematuria     Hyperlipemia     Hypertension     Insomnia     Macular degeneration     vision loss of left eye     Migraine     Nonrheumatic aortic valve  insufficiency 10/19/2020    Osteoporosis     Pulmonary nodules 3/24/2021    Shortness of breath     Vision loss, bilateral     left eye macular degeneration; right eye etiology unknown    Vitamin D deficiency     Wears hearing aid in both ears 12/2019     Past Surgical History:   Procedure Laterality Date    CARDIAC CATHETERIZATION N/A 3/29/2021    Procedure: Coronary angiography;  Surgeon: Zain Mendoza MD;  Location:  [facility];  Service: Cardiovascular;  Laterality: N/A;    CARDIAC CATHETERIZATION N/A 3/29/2021    Procedure: Left heart cath;  Surgeon: Zain Mendoza MD;  Location:  [facility];  Service: Cardiovascular;  Laterality: N/A;    CARDIAC CATHETERIZATION N/A 3/29/2021    Procedure: Left ventriculography;  Surgeon: Zain Mendoza MD;  Location:  [facility];  Service: Cardiovascular;  Laterality: N/A;    CHOLECYSTECTOMY      COLONOSCOPY  2008    FRACTURE SURGERY  1975,2014    HYSTERECTOMY N/A 1971    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    JOINT REPLACEMENT  1995, 2013    KNEE SURGERY  1989    TONSILLECTOMY  1950      General Information       Row Name 12/31/23 1131          Physical Therapy Time and Intention    Document Type evaluation  -CW     Mode of Treatment individual therapy;physical therapy  -CW       Row Name 12/31/23 1131          General Information    Patient Profile Reviewed yes  -CW     Prior Level of Function independent:;transfer;all household mobility;bed mobility  no AD baseline, has walker sleeps on first level  -CW     Existing Precautions/Restrictions other (see comments);cervical collar  R UE in sling 2/2 clavicle fx, hard cervical collar  -CW     Barriers to Rehab none identified  -CW       Row Name 12/31/23 1131          Living Environment    People in Home alone  -CW       Row Name 12/31/23 1131          Home Main Entrance    Number of Stairs, Main Entrance six  -CW       Row Name 12/31/23 1131          Stairs Within Home,  Primary    Number of Stairs, Within Home, Primary none;other (see comments)  sleeps on first level  -CW       Row Name 12/31/23 1131          Cognition    Orientation Status (Cognition) oriented x 4  -CW       Row Name 12/31/23 1131          Safety Issues, Functional Mobility    Impairments Affecting Function (Mobility) balance;endurance/activity tolerance;strength;pain  -CW               User Key  (r) = Recorded By, (t) = Taken By, (c) = Cosigned By      Initials Name Provider Type    Melida Pena PT Physical Therapist                   Mobility       Row Name 12/31/23 1133          Bed Mobility    Bed Mobility supine-sit;sit-supine;scooting/bridging  -CW     Scooting/Bridging [place] (Bed Mobility) maximum assist (25% patient effort);2 person assist;verbal cues  -CW     Supine-Sit [place] (Bed Mobility) moderate assist (50% patient effort);verbal cues;nonverbal cues (demo/gesture)  -CW     Sit-Supine [place] (Bed Mobility) moderate assist (50% patient effort);verbal cues;nonverbal cues (demo/gesture)  -CW       Row Name 12/31/23 1133          Transfers    Comment, (Transfers) deferred out of bed mobility, pt dizzy sitting EOB - obervable lateral and anterior/posterior sway noted  -CW               User Key  (r) = Recorded By, (t) = Taken By, (c) = Cosigned By      Initials Name Provider Type    Melida Pena PT Physical Therapist                   Obj/Interventions       Row Name 12/31/23 1134          Range of Motion Comprehensive    General Range of Motion bilateral lower extremity ROM WFL  -CW       Row Name 12/31/23 1134          Strength Comprehensive (MMT)    Comment, General Manual Muscle Testing (MMT) Assessment Generalized weakness present  -CW       Row Name 12/31/23 1134          Balance    Balance Assessment sitting static balance  -CW     Static Sitting Balance contact guard  -CW     Position, Sitting Balance sitting edge of bed  -CW               User Key  (r) =  Recorded By, (t) = Taken By, (c) = Cosigned By      Initials Name Provider Type    CW Melida Aguirre, PT Physical Therapist                   Goals/Plan       Row Name 12/31/23 1141          Bed Mobility Goal 1 (PT)    Activity/Assistive Device (Bed Mobility Goal 1, PT) bed mobility activities, all  -CW     [place] Level/Cues Needed (Bed Mobility Goal 1, PT) modified independence  -CW     Time Frame (Bed Mobility Goal 1, PT) 2 weeks  -CW       Row Name 12/31/23 1141          Transfer Goal 1 (PT)    Activity/Assistive Device (Transfer Goal 1, PT) sit-to-stand/stand-to-sit;bed-to-chair/chair-to-bed  -CW     [place] Level/Cues Needed (Transfer Goal 1, PT) standby assist  -CW     Time Frame (Transfer Goal 1, PT) 2 weeks  -CW       Row Name 12/31/23 1141          Gait Training Goal 1 (PT)    Activity/Assistive Device (Gait Training Goal 1, PT) gait (walking locomotion);walker, rolling  -CW     [place] Level (Gait Training Goal 1, PT) contact guard required  -CW     Distance (Gait Training Goal 1, PT) 80'  -CW     Time Frame (Gait Training Goal 1, PT) 2 weeks  -CW       Row Name 12/31/23 1141          Therapy Assessment/Plan (PT)    Planned Therapy Interventions (PT) balance training;bed mobility training;gait training;ROM (range of motion);strengthening;patient/family education;transfer training  -CW               User Key  (r) = Recorded By, (t) = Taken By, (c) = Cosigned By      Initials Name Provider Type    Melida Pena PT Physical Therapist                   Clinical Impression       Row Name 12/31/23 1134          Pain    Pretreatment Pain Rating 5/10  -CW     Posttreatment Pain Rating 5/10  -CW     Pain Location - Side/Orientation Right  -CW     Pain Location - head;neck;extremity  -CW     Pre/Posttreatment Pain Comment RUE, posterior headache, neck pain  -CW     Pain Intervention(s) Repositioned;Rest  -CW       Row Name 12/31/23 1134          Plan of Care Review    Plan of Care Reviewed  With patient;daughter  -CW     Outcome Evaluation Pt is an 84 yo female admitted from home following a fall resulting in SDH, SAH, R clavicular fx, C1 fx. Pt to wear hard cervical collar and is in RUE sling. PT maintained NWB to RUE today. Pt lives alone, is independent with functional mobility without AD. Pt has 6 steps to enter and lives in a multilevel home. Pt reports her daughters encouraged her to sleep on the main level to minimize stair climbing but she slept upstairs for one night and fell down the step the next morning when coming downstairs. Today, pt demonstrates generalized weakness, impaired balance, and decreased activity tolerance below her baseline. She required mod A for supine<>sit and cga sitting EOB due to swaying. Pt reports some dizziness, MD present in room completed assessment. PT recommending SNF based on current presentation.  -CW       Row Name 12/31/23 1134          Therapy Assessment/Plan (PT)    Rehab Potential (PT) good, to achieve stated therapy goals  -CW     Criteria for Skilled Interventions Met (PT) yes  -CW     Therapy Frequency (PT) 6 times/wk  -CW       Row Name 12/31/23 1134          Vital Signs    O2 Delivery Pre Treatment room air  -CW     O2 Delivery Intra Treatment room air  -CW     O2 Delivery Post Treatment room air  -CW       Row Name 12/31/23 1134          Positioning and Restraints    Pre-Treatment Position in bed  -CW     Post Treatment Position bed  -CW     In Bed notified nsg;call light within reach;encouraged to call for assist;exit alarm on;supine;with nsg;with family/caregiver  -CW               User Key  (r) = Recorded By, (t) = Taken By, (c) = Cosigned By      Initials Name Provider Type    CW Melida Aguirre, PT Physical Therapist                   Outcome Measures       Row Name 12/31/23 1143 12/31/23 0815       How much help from another person do you currently need...    Turning from your back to your side while in flat bed without using bedrails? 3   -CW 3  -TL    Moving from lying on back to sitting on the side of a flat bed without bedrails? 2  -CW 3  -TL    Moving to and from a bed to a chair (including a wheelchair)? 2  -CW 3  -TL    Standing up from a chair using your arms (e.g., wheelchair, bedside chair)? 2  -CW 2  -TL    Climbing 3-5 steps with a railing? 1  -CW 2  -TL    To walk in hospital room? 2  -CW 2  -TL    AM-PAC 6 Clicks Score (PT) 12  -CW 15  -TL    Highest Level of Mobility Goal 4 --> Transfer to chair/commode  -CW 4 --> Transfer to chair/commode  -TL      Row Name 12/31/23 1143          Functional Assessment    Outcome Measure Options AM-PAC 6 Clicks Basic Mobility (PT)  -CW               User Key  (r) = Recorded By, (t) = Taken By, (c) = Cosigned By      Initials Name Provider Type    CW Melida Aguirre, [facility] Physical Therapist    Anatoly Taveras RN Registered Nurse                                 Physical Therapy Education       Title: PT OT SLP Therapies (In Progress)       Topic: Physical Therapy (In Progress)       Point: Mobility training (Done)       Learning Progress Summary             Patient Acceptance, E, VU,NR by  at 12/31/2023 1143                         Point: Home exercise program (Not Started)       Learner Progress:  Not documented in this visit.              Point: Body mechanics (Not Started)       Learner Progress:  Not documented in this visit.              Point: Precautions (Not Started)       Learner Progress:  Not documented in this visit.                              User Key       Initials Effective Dates Name Provider Type Discipline     12/13/22 -  Melida Aguirre PT Physical Therapist PT                  PT Recommendation and Plan  Planned Therapy Interventions (PT): balance training, bed mobility training, gait training, ROM (range of motion), strengthening, patient/family education, transfer training  Plan of Care Reviewed With: patient, daughter  Outcome Evaluation: Pt is an 82 yo female admitted from  home following a fall resulting in SDH, SAH, R clavicular fx, C1 fx. Pt to wear hard cervical collar and is in RUE sling. PT maintained NWB to RUE today. Pt lives alone, is independent with functional mobility without AD. Pt has 6 steps to enter and lives in a multilevel home. Pt reports her daughters encouraged her to sleep on the main level to minimize stair climbing but she slept upstairs for one night and fell down the step the next morning when coming downstairs. Today, pt demonstrates generalized weakness, impaired balance, and decreased activity tolerance below her baseline. She required mod A for supine<>sit and cga sitting EOB due to swaying. Pt reports some dizziness, MD present in room completed assessment. PT recommending SNF based on current presentation.     Time Calculation:         PT Charges       Row Name 12/31/23 1129             Time Calculation    Start Time 1055  -CW      Stop Time 1120  -CW      Time Calculation (min) 25 min  -CW      PT Received On 12/31/23  -CW      PT - Next Appointment 01/01/24  -CW      PT Goal Re-Cert Due Date 01/14/24  -CW         Time Calculation- PT    Total Timed Code Minutes- PT 20 minute(s)  -CW         Timed Charges    81628 - PT Therapeutic Activity Minutes 20  -CW         Total Minutes    Timed Charges Total Minutes 20  -CW       Total Minutes 20  -CW                User Key  (r) = Recorded By, (t) = Taken By, (c) = Cosigned By      Initials Name Provider Type    CW Melida Aguirre, PT Physical Therapist                  Therapy Charges for Today       Code Description Service Date Service Provider Modifiers Qty    71439333307 HC PT EVAL MOD COMPLEXITY 3 12/31/2023 Melida Aguirre, PT GP 1    [id number]  PT THERAPEUTIC ACT EA 15 MIN 12/31/2023 Melida Aguirre, PT GP 1            PT G-Codes  Outcome Measure Options: AM-PAC 6 Clicks Basic Mobility (PT)  AM-PAC 6 Clicks Score (PT): 12  PT Discharge Summary  Anticipated Discharge Disposition (PT): home with  home health, skilled nursing facility    Melida Aguirre, PT  12/31/2023

## 2024-01-01 LAB
ANION GAP SERPL CALCULATED.3IONS-SCNC: 9.5 MMOL/L (ref 5–15)
BUN SERPL-MCNC: 30 MG/DL (ref 8–23)
BUN/CREAT SERPL: 28.3 (ref 7–25)
CALCIUM SPEC-SCNC: 8.6 MG/DL (ref 8.6–10.5)
CHLORIDE SERPL-SCNC: 102 MMOL/L (ref 98–107)
CO2 SERPL-SCNC: 19.5 MMOL/L (ref 22–29)
CREAT SERPL-MCNC: 1.06 MG/DL (ref 0.57–1)
DEPRECATED RDW RBC AUTO: 42 FL (ref 37–54)
EGFRCR SERPLBLD CKD-EPI 2021: 51.9 ML/MIN/1.73
ERYTHROCYTE [DISTWIDTH] IN BLOOD BY AUTOMATED COUNT: 11.7 % (ref 12.3–15.4)
FERRITIN SERPL-MCNC: 487 NG/ML (ref 13–150)
FOLATE SERPL-MCNC: 12.8 NG/ML (ref 4.78–24.2)
GEN 5 2HR TROPONIN T REFLEX: 19 NG/L
GLUCOSE SERPL-MCNC: 91 MG/DL (ref 65–99)
HCT VFR BLD AUTO: 27 % (ref 34–46.6)
HGB BLD-MCNC: 9.1 G/DL (ref 12–15.9)
IRON 24H UR-MRATE: 28 MCG/DL (ref 37–145)
IRON SATN MFR SERPL: 14 % (ref 20–50)
MAGNESIUM SERPL-MCNC: 1.7 MG/DL (ref 1.6–2.4)
MCH RBC QN AUTO: 33.2 PG (ref 26.6–33)
MCHC RBC AUTO-ENTMCNC: 33.7 G/DL (ref 31.5–35.7)
MCV RBC AUTO: 98.5 FL (ref 79–97)
PLATELET # BLD AUTO: 128 10*3/MM3 (ref 140–450)
PMV BLD AUTO: 10.9 FL (ref 6–12)
POTASSIUM SERPL-SCNC: 4.4 MMOL/L (ref 3.5–5.2)
POTASSIUM SERPL-SCNC: 4.5 MMOL/L (ref 3.5–5.2)
RBC # BLD AUTO: 2.74 10*6/MM3 (ref 3.77–5.28)
SODIUM SERPL-SCNC: 131 MMOL/L (ref 136–145)
TIBC SERPL-MCNC: 203 MCG/DL (ref 298–536)
TRANSFERRIN SERPL-MCNC: 136 MG/DL (ref 200–360)
TROPONIN T DELTA: 0 NG/L
TROPONIN T SERPL HS-MCNC: 19 NG/L
VIT B12 BLD-MCNC: 1780 PG/ML (ref 211–946)
WBC NRBC COR # BLD AUTO: 8.34 10*3/MM3 (ref 3.4–10.8)

## 2024-01-01 PROCEDURE — 84132 ASSAY OF SERUM POTASSIUM: CPT | Performed by: NURSE PRACTITIONER

## 2024-01-01 PROCEDURE — 97166 OT EVAL MOD COMPLEX 45 MIN: CPT

## 2024-01-01 PROCEDURE — 82728 ASSAY OF FERRITIN: CPT | Performed by: STUDENT IN AN ORGANIZED HEALTH CARE EDUCATION/TRAINING PROGRAM

## 2024-01-01 PROCEDURE — 82607 VITAMIN B-12: CPT | Performed by: STUDENT IN AN ORGANIZED HEALTH CARE EDUCATION/TRAINING PROGRAM

## 2024-01-01 PROCEDURE — 97530 THERAPEUTIC ACTIVITIES: CPT

## 2024-01-01 PROCEDURE — 93005 ELECTROCARDIOGRAM TRACING: CPT | Performed by: NURSE PRACTITIONER

## 2024-01-01 PROCEDURE — 25810000003 LACTATED RINGERS SOLUTION: Performed by: STUDENT IN AN ORGANIZED HEALTH CARE EDUCATION/TRAINING PROGRAM

## 2024-01-01 PROCEDURE — 97535 SELF CARE MNGMENT TRAINING: CPT

## 2024-01-01 PROCEDURE — 84484 ASSAY OF TROPONIN QUANT: CPT | Performed by: NURSE PRACTITIONER

## 2024-01-01 PROCEDURE — 85027 COMPLETE CBC AUTOMATED: CPT | Performed by: STUDENT IN AN ORGANIZED HEALTH CARE EDUCATION/TRAINING PROGRAM

## 2024-01-01 PROCEDURE — 83735 ASSAY OF MAGNESIUM: CPT | Performed by: NURSE PRACTITIONER

## 2024-01-01 PROCEDURE — 83540 ASSAY OF IRON: CPT | Performed by: STUDENT IN AN ORGANIZED HEALTH CARE EDUCATION/TRAINING PROGRAM

## 2024-01-01 PROCEDURE — 82746 ASSAY OF FOLIC ACID SERUM: CPT | Performed by: STUDENT IN AN ORGANIZED HEALTH CARE EDUCATION/TRAINING PROGRAM

## 2024-01-01 PROCEDURE — 25810000003 SODIUM CHLORIDE 0.9 % SOLUTION 1,000 ML FLEX CONT: Performed by: INTERNAL MEDICINE

## 2024-01-01 PROCEDURE — 84466 ASSAY OF TRANSFERRIN: CPT | Performed by: STUDENT IN AN ORGANIZED HEALTH CARE EDUCATION/TRAINING PROGRAM

## 2024-01-01 PROCEDURE — 25810000003 LACTATED RINGERS PER 1000 ML: Performed by: STUDENT IN AN ORGANIZED HEALTH CARE EDUCATION/TRAINING PROGRAM

## 2024-01-01 PROCEDURE — 80048 BASIC METABOLIC PNL TOTAL CA: CPT | Performed by: STUDENT IN AN ORGANIZED HEALTH CARE EDUCATION/TRAINING PROGRAM

## 2024-01-01 PROCEDURE — 93010 ELECTROCARDIOGRAM REPORT: CPT | Performed by: INTERNAL MEDICINE

## 2024-01-01 RX ORDER — FERROUS SULFATE 325(65) MG
325 TABLET ORAL
Status: DISCONTINUED | OUTPATIENT
Start: 2024-01-01 | End: 2024-01-03 | Stop reason: HOSPADM

## 2024-01-01 RX ADMIN — HYDROCODONE BITARTRATE AND ACETAMINOPHEN 1 TABLET: 5; 325 TABLET ORAL at 01:48

## 2024-01-01 RX ADMIN — Medication 10 ML: at 09:04

## 2024-01-01 RX ADMIN — ATORVASTATIN CALCIUM 20 MG: 20 TABLET, FILM COATED ORAL at 09:04

## 2024-01-01 RX ADMIN — SODIUM CHLORIDE, POTASSIUM CHLORIDE, SODIUM LACTATE AND CALCIUM CHLORIDE 75 ML/HR: 600; 310; 30; 20 INJECTION, SOLUTION INTRAVENOUS at 20:56

## 2024-01-01 RX ADMIN — FERROUS SULFATE TAB 325 MG (65 MG ELEMENTAL FE) 325 MG: 325 (65 FE) TAB at 13:27

## 2024-01-01 RX ADMIN — HYDROCODONE BITARTRATE AND ACETAMINOPHEN 1 TABLET: 5; 325 TABLET ORAL at 09:04

## 2024-01-01 RX ADMIN — SODIUM CHLORIDE, POTASSIUM CHLORIDE, SODIUM LACTATE AND CALCIUM CHLORIDE 500 ML: 600; 310; 30; 20 INJECTION, SOLUTION INTRAVENOUS at 10:57

## 2024-01-01 RX ADMIN — SENNOSIDES AND DOCUSATE SODIUM 2 TABLET: 50; 8.6 TABLET ORAL at 20:56

## 2024-01-01 RX ADMIN — Medication 10 ML: at 21:38

## 2024-01-01 RX ADMIN — LEVOTHYROXINE SODIUM 75 MCG: 75 TABLET ORAL at 09:03

## 2024-01-01 RX ADMIN — METOPROLOL SUCCINATE 100 MG: 100 TABLET, EXTENDED RELEASE ORAL at 21:38

## 2024-01-01 RX ADMIN — HYDROCODONE BITARTRATE AND ACETAMINOPHEN 1 TABLET: 5; 325 TABLET ORAL at 17:25

## 2024-01-01 RX ADMIN — SODIUM CHLORIDE 40 ML: 9 INJECTION, SOLUTION INTRAVENOUS at 01:06

## 2024-01-01 RX ADMIN — SENNOSIDES AND DOCUSATE SODIUM 2 TABLET: 50; 8.6 TABLET ORAL at 09:04

## 2024-01-01 RX ADMIN — HYDROCODONE BITARTRATE AND ACETAMINOPHEN 1 TABLET: 5; 325 TABLET ORAL at 13:27

## 2024-01-01 RX ADMIN — SODIUM CHLORIDE, POTASSIUM CHLORIDE, SODIUM LACTATE AND CALCIUM CHLORIDE 75 ML/HR: 600; 310; 30; 20 INJECTION, SOLUTION INTRAVENOUS at 01:08

## 2024-01-01 RX ADMIN — HYDROCODONE BITARTRATE AND ACETAMINOPHEN 1 TABLET: 5; 325 TABLET ORAL at 21:38

## 2024-01-01 NOTE — CONSULTS
Orthopaedic Consultation      Patient: Ayana Reyna    Date of Admission: 12/30/2023  9:27 AM    YOB: 1940    Medical Record Number: [id number]    Attending Physician:  Robbie Soto MD    Consulting Physician:  Michael Hanna MD        Chief Complaints: Right distal clavicle injury.    History of Present Illness: 83 y.o. female admitted to [facility] with cervical spine injury, distal clavicle injury, after fall. I was consulted for further evaluation and treatment of the right-sided distal clavicle fracture.  The patient reports pain in the right shoulder with motion.  It is better with rest.  She describes a dull ache.  She does have longstanding right shoulder arthritis with history of rotator cuff repair years ago.  She is a patient of Dr. Nuñez's.      Allergies:   Allergies   Allergen Reactions    Amlodipine Swelling     Lower extremity edema       Medications:   Home Medications:  Medications Prior to Admission   Medication Sig Dispense Refill Last Dose    atorvastatin (LIPITOR) 20 MG tablet Take 1 tablet by mouth once daily 90 tablet 0 12/29/2023    cholecalciferol (VITAMIN D3) 1000 UNITS tablet Take 1 tablet by mouth Daily.   12/29/2023    furosemide (LASIX) 40 MG tablet Take 0.5 tablets by mouth Daily.   12/29/2023    levothyroxine (SYNTHROID, LEVOTHROID) 75 MCG tablet Take 1 tablet by mouth Daily. 90 tablet 3 12/29/2023    metoprolol succinate XL (TOPROL-XL) 100 MG 24 hr tablet Take 1 tablet by mouth Every Night. 90 tablet 3 12/29/2023    Multiple Vitamins-Minerals (PRESERVISION/LUTEIN) capsule Take  by mouth 2 (two) times a day.   12/29/2023    ramipril (ALTACE) 10 MG capsule TAKE 1 CAPSULE BY MOUTH ONCE DAILY AT NIGHT 90 capsule 0 12/29/2023    spironolactone (ALDACTONE) 50 MG tablet Take 1 tablet by mouth Daily. (Patient taking differently: Take 0.5 tablets by mouth Daily.) 90 tablet 2 12/29/2023    traMADol (ULTRAM) 50 MG tablet Take 1 tablet every 6 hours by oral  route.   12/29/2023    vitamin B-12 (CYANOCOBALAMIN) 100 MCG tablet Take 0.5 tablets by mouth Daily.   12/29/2023       Current Medications:  Scheduled Meds:atorvastatin, 20 mg, Oral, Daily  ferrous sulfate, 325 mg, Oral, Daily With Breakfast  levothyroxine, 75 mcg, Oral, Daily  metoprolol succinate XL, 100 mg, Oral, Nightly  senna-docusate sodium, 2 tablet, Oral, BID  sodium chloride, 10 mL, Intravenous, Q12H      Continuous Infusions:lactated ringers, 75 mL/hr, Last Rate: 75 mL/hr (01/01/24 0108)      PRN Meds:.  acetaminophen **OR** acetaminophen **OR** acetaminophen    senna-docusate sodium **AND** polyethylene glycol **AND** bisacodyl **AND** bisacodyl    HYDROcodone-acetaminophen    HYDROmorphone **AND** naloxone    labetalol    ondansetron ODT **OR** ondansetron    [COMPLETED] Insert Peripheral IV **AND** sodium chloride    [COMPLETED] Insert Peripheral IV **AND** sodium chloride    sodium chloride    sodium chloride    Past Medical History:   Diagnosis Date    Acquired hypothyroidism     Allergic rhinitis     Aortic stenosis     mild to moderate per echo 2020    Bilateral leg cramps 1/21/2022    Coronary artery calcification 03/24/2021    Fatigue     Glucose intolerance (impaired glucose tolerance)     Hearing loss     Heart murmur     Heart palpitations     Hematuria     Hyperlipemia     Hypertension     Insomnia     Macular degeneration     vision loss of left eye     Migraine     Nonrheumatic aortic valve insufficiency 10/19/2020    Osteoporosis     Pulmonary nodules 3/24/2021    Shortness of breath     Vision loss, bilateral     left eye macular degeneration; right eye etiology unknown    Vitamin D deficiency     Wears hearing aid in both ears 12/2019     Past Surgical History:   Procedure Laterality Date    CARDIAC CATHETERIZATION N/A 3/29/2021    Procedure: Coronary angiography;  Surgeon: Zain Mendoza MD;  Location: [facility] INVASIVE LOCATION;  Service: Cardiovascular;  Laterality: N/A;     CARDIAC CATHETERIZATION N/A 3/29/2021    Procedure: Left heart cath;  Surgeon: Zain Mendoza MD;  Location:  [facility] CATH INVASIVE LOCATION;  Service: Cardiovascular;  Laterality: N/A;    CARDIAC CATHETERIZATION N/A 3/29/2021    Procedure: Left ventriculography;  Surgeon: Zain Mendoza MD;  Location:  [facility] CATH INVASIVE LOCATION;  Service: Cardiovascular;  Laterality: N/A;    CHOLECYSTECTOMY      COLONOSCOPY  2008    FRACTURE SURGERY  1975,2014    HYSTERECTOMY N/A 1971    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    JOINT REPLACEMENT  1995, 2013    KNEE SURGERY  1989    TONSILLECTOMY  1950     Social History     Occupational History    Not on file   Tobacco Use    Smoking status: Never    Smokeless tobacco: Never   Vaping Use    Vaping Use: Never used   Substance and Sexual Activity    Alcohol use: Yes     Alcohol/week: 1.0 standard drink of alcohol     Types: 1 Glasses of wine per week     Comment: Less than 1 glass of wine a week//caffeine use:1 cup daily    Drug use: No    Sexual activity: Never     Partners: Male      Social History     Social History Narrative    Not on file     Family History   Problem Relation Age of Onset    Hodgkin's lymphoma Mother     Cancer Mother          Review of Systems:   No other pertinent positives or negatives other than what is mentioned in the HPI and below.  Constitutional: Negative for fatigue, fever, or weight loss  HEENT: No active headache.  Pulmonary: Patient denies SOA.  Cardiovascular: Patient denies any chest pain.  Gastrointestinal:  Patient denies active vomiting or diarrhea.  Musculoskeletal: Positive for right shoulder pain.  Neurological: Patient denies active dizziness or loss of consciousness.  Skin: Patient denies any active bleeding.    Vital signs in last 24 hours:  Temp:  [98.1 °F (36.7 °C)-100.2 °F (37.9 °C)] 100.2 °F (37.9 °C)  Heart Rate:  [74-80] 79  Resp:  [16-18] 18  BP: (142-194)/(40-82) 151/41  Vitals:    01/01/24 0930 01/01/24 0933 01/01/24 0936 01/01/24 1316    BP: (!) 194/40 179/60 178/80 151/41   BP Location: Left leg Left leg Left leg Left leg   Patient Position: Lying Sitting Standing Lying   Pulse:    79   Resp:    18   Temp:    100.2 °F (37.9 °C)   TempSrc:    Oral   SpO2:    100%   Weight:       Height:              Physical Exam: 83 y.o. female         General Appearance:  Alert, cooperative, in no acute distress    HEENT:    Atraumatic, Pupils are equal   Neck:   Cervical spine midline, no appreciable JVD   Lungs:     Breathing non-labored and chest rise symmetric    Heart:   Abdomen:     Rectal:       Extremities:   Pulses  Neurovascular:   Skin:   Musculoskeletal:      Pulse regular    Soft, Non-tender or distended    Deferred        No clubbing, cyanosis, or edema    Intact    Cranial nerves 2 - 12 grossly intact, sensation intact    No skin lesions  Examination of the right upper extremity reveals some ecchymosis over the shoulder.  Well-healed anterior scar of the shoulder.  Mild tenderness to palpation.  Limited motion of the shoulder.  Tolerates elbow and wrist motion.  Hand warm and well-perfused.  Skin abrasion noted with dressing in place over the forearm.  Gross motor and sensory exam intact.     Diagnostic Tests:    Results from last 7 days   Lab Units 01/01/24  0757   WBC 10*3/mm3 8.34   HEMOGLOBIN g/dL 9.1*   HEMATOCRIT % 27.0*   PLATELETS 10*3/mm3 128*     Results from last 7 days   Lab Units 01/01/24  0757   SODIUM mmol/L 131*   POTASSIUM mmol/L 4.5   CHLORIDE mmol/L 102   CO2 mmol/L 19.5*   BUN mg/dL 30*   CREATININE mg/dL 1.06*   GLUCOSE mg/dL 91   CALCIUM mg/dL 8.6             Assessment:    SDH (subdural hematoma)    Hypertension    Macular degeneration of both eyes    Acquired hypothyroidism    Nonrheumatic aortic valve insufficiency    C1 cervical fracture    Subarachnoid hemorrhage    Subdural hematoma    Closed fracture of right clavicle        Plan: I discussed the findings with the patient and her family at the bedside.  We will  continue with conservative management with utilization of a sling.  Generally she will see an improvement over the next 2 to 4 weeks with increasing ability to use the arm.  We would have her follow-up with us in 2 to 4 weeks for repeat x-ray to ensure healing.  I discussed with her that she is more than welcome to see me or follow back up with Dr. Nuñez in the office.  She voiced understanding.  She will remain nonweightbearing on the right upper extremity.  Ice packs may be applied to the shoulder as needed.  Again, she will use a sling as needed after 2 to 4-weeks.  All of her questions were answered.    Please call with questions.    Date: 1/1/2024  Michael Hanna MD

## 2024-01-01 NOTE — PLAN OF CARE
Goal Outcome Evaluation:  Plan of Care Reviewed With: patient, daughter           Outcome Evaluation: Pt participated with PT this morning. Pt continues to report RUE/shoulder pain, neck pain and headache. Orthostatics BP assessed during session, BP taken from L calf - RN aware of inconsistent readings from cuff. Pt stood with min A x2 and tolerated standing several mins for multiple BP readings. Pt reports mild dizziness in both sitting and standing that does not worsen or improve with time upright. Pt able to take a few lateral steps towards HOB with min A x2. Pt's functional mobility remains below her baseline, anticipate d/c to SNF prior to return home.      Anticipated Discharge Disposition (PT): skilled nursing facility

## 2024-01-01 NOTE — PLAN OF CARE
Problem: Asthma Comorbidity  Goal: Maintenance of Asthma Control  Outcome: Ongoing, Progressing  Intervention: Maintain Asthma Symptom Control  Recent Flowsheet Documentation  Taken 1/1/2024 1400 by Chapo Rodriguez RN  Medication Review/Management: medications reviewed  Taken 1/1/2024 0800 by Chapo Rodriguez RN  Medication Review/Management: medications reviewed     Problem: Behavioral Health Comorbidity  Goal: Maintenance of Behavioral Health Symptom Control  Outcome: Ongoing, Progressing  Intervention: Maintain Behavioral Health Symptom Control  Recent Flowsheet Documentation  Taken 1/1/2024 1400 by Chapo Rodriguez RN  Medication Review/Management: medications reviewed  Taken 1/1/2024 0800 by Chapo Rodriguez RN  Medication Review/Management: medications reviewed     Problem: COPD (Chronic Obstructive Pulmonary Disease) Comorbidity  Goal: Maintenance of COPD Symptom Control  Outcome: Ongoing, Progressing  Intervention: Maintain COPD-Symptom Control  Recent Flowsheet Documentation  Taken 1/1/2024 1400 by Chapo Rodriguez RN  Medication Review/Management: medications reviewed  Taken 1/1/2024 0800 by Chapo Rodriguez RN  Medication Review/Management: medications reviewed     Problem: Diabetes Comorbidity  Goal: Blood Glucose Level Within Targeted Range  Outcome: Ongoing, Progressing     Problem: Heart Failure Comorbidity  Goal: Maintenance of Heart Failure Symptom Control  Outcome: Ongoing, Progressing  Intervention: Maintain Heart Failure-Management  Recent Flowsheet Documentation  Taken 1/1/2024 1400 by Chapo Rodriguez RN  Medication Review/Management: medications reviewed  Taken 1/1/2024 0800 by Chapo Rodriguez RN  Medication Review/Management: medications reviewed     Problem: Hypertension Comorbidity  Goal: Blood Pressure in Desired Range  Outcome: Ongoing, Progressing  Intervention: Maintain Blood Pressure Management  Recent Flowsheet Documentation  Taken 1/1/2024 1400 by Chapo Rodriguez RN  Medication  Review/Management: medications reviewed  Taken 1/1/2024 0800 by Chapo Rodriguez RN  Medication Review/Management: medications reviewed     Problem: Obstructive Sleep Apnea Risk or Actual Comorbidity Management  Goal: Unobstructed Breathing During Sleep  Outcome: Ongoing, Progressing     Problem: Osteoarthritis Comorbidity  Goal: Maintenance of Osteoarthritis Symptom Control  Outcome: Ongoing, Progressing  Intervention: Maintain Osteoarthritis Symptom Control  Recent Flowsheet Documentation  Taken 1/1/2024 1400 by Chapo Rodriguez RN  Activity Management: activity encouraged  Medication Review/Management: medications reviewed  Taken 1/1/2024 0800 by Chapo Rodriguez RN  Activity Management: activity encouraged  Medication Review/Management: medications reviewed     Problem: Pain Chronic (Persistent) (Comorbidity Management)  Goal: Acceptable Pain Control and Functional Ability  Outcome: Ongoing, Progressing  Intervention: Manage Persistent Pain  Recent Flowsheet Documentation  Taken 1/1/2024 1400 by Chapo Rodriguez RN  Medication Review/Management: medications reviewed  Taken 1/1/2024 0800 by Chapo Rodriguez RN  Medication Review/Management: medications reviewed  Intervention: Develop Pain Management Plan  Recent Flowsheet Documentation  Taken 1/1/2024 0800 by Chapo Rodriguez RN  Pain Management Interventions: see MAR  Intervention: Optimize Psychosocial Wellbeing  Recent Flowsheet Documentation  Taken 1/1/2024 1400 by Chapo Rodriguez RN  Diversional Activities: television  Taken 1/1/2024 0800 by Chapo Rodriguez RN  Diversional Activities: television     Problem: Seizure Disorder Comorbidity  Goal: Maintenance of Seizure Control  Outcome: Ongoing, Progressing     Problem: Skin Injury Risk Increased  Goal: Skin Health and Integrity  Outcome: Ongoing, Progressing  Intervention: Optimize Skin Protection  Recent Flowsheet Documentation  Taken 1/1/2024 1400 by Chapo Rodriguez RN  Pressure Reduction Techniques:   frequent  weight shift encouraged   weight shift assistance provided  Head of Bed (HOB) Positioning: HOB at 20 degrees  Pressure Reduction Devices:   alternating pressure pump (ADD)   positioning supports utilized  Skin Protection: adhesive use limited  Taken 1/1/2024 0800 by Chapo Rodriguez RN  Pressure Reduction Techniques:   frequent weight shift encouraged   weight shift assistance provided  Head of Bed (HOB) Positioning: HOB at 30-45 degrees  Pressure Reduction Devices:   alternating pressure pump (ADD)   positioning supports utilized     Problem: Fall Injury Risk  Goal: Absence of Fall and Fall-Related Injury  Outcome: Ongoing, Progressing  Intervention: Identify and Manage Contributors  Recent Flowsheet Documentation  Taken 1/1/2024 1400 by Chapo Rodriguez RN  Medication Review/Management: medications reviewed  Taken 1/1/2024 0800 by Chapo Rodriguez RN  Medication Review/Management: medications reviewed  Intervention: Promote Injury-Free Environment  Recent Flowsheet Documentation  Taken 1/1/2024 1400 by Chapo Rodriguez RN  Safety Promotion/Fall Prevention:   safety round/check completed   room organization consistent  Taken 1/1/2024 0800 by Chapo Rodriguez RN  Safety Promotion/Fall Prevention:   safety round/check completed   room organization consistent   Goal Outcome Evaluation:

## 2024-01-01 NOTE — PROGRESS NOTES
Name: Ayana Reyna ADMIT: 2023   : 1940  PCP: Yossi Adela TRINI    MRN: [id number] LOS: 1 days   AGE/SEX: 83 y.o. female  ROOM: [facility]     Subjective   Subjective   Lying comfortably in bed.  Family at bedside.  Just worked with physical therapy.    Review of Systems   Constitutional:  Negative for chills and fever.   Respiratory:  Negative for cough and shortness of breath.    Cardiovascular:  Negative for chest pain and leg swelling.   Gastrointestinal:  Negative for abdominal pain, diarrhea and nausea.   Musculoskeletal:         Right shoulder pain   Neurological:  Positive for headaches.     As above     Objective   Objective   Vital Signs  Temp:  [97.8 °F (36.6 °C)-99.1 °F (37.3 °C)] 99.1 °F (37.3 °C)  Heart Rate:  [74-80] 74  Resp:  [16] 16  BP: (142-194)/(40-82) 178/80  SpO2:  [93 %-100 %] 100 %  on  Flow (L/min):  [1] 1;   Device (Oxygen Therapy): room air  Body mass index is 33.59 kg/m².  Physical Exam  Constitutional:       General: She is not in acute distress.     Appearance: She is ill-appearing.      Comments: Elderly   Cardiovascular:      Rate and Rhythm: Normal rate and regular rhythm.   Pulmonary:      Effort: Pulmonary effort is normal. No respiratory distress.   Abdominal:      General: Abdomen is flat. There is no distension.      Tenderness: There is no abdominal tenderness.   Musculoskeletal:         General: No swelling or deformity. Normal range of motion.      Comments: Right arm in sling, shoulder bruising   Skin:     General: Skin is warm and dry.      Comments: Right posterior arm skin tear from fall, bandaged   Neurological:      General: No focal deficit present.      Mental Status: She is alert. Mental status is at baseline.         Results Review     I reviewed the patient's new clinical results.  Results from last 7 days   Lab Units 24  0757 23  0531 23  1316 23  1111   WBC 10*3/mm3 8.34 8.58 12.56* 8.5   HEMOGLOBIN g/dL 9.1* 9.3* 11.3*  11.6   PLATELETS 10*3/mm3 128* 127* 146 162     Results from last 7 days   Lab Units 01/01/24  0757 12/31/23  0531 12/30/23  1125 12/29/23  1111   SODIUM mmol/L 131* 138 143 143   POTASSIUM mmol/L 4.5 4.5 4.9 5.5*   CHLORIDE mmol/L 102 106 109* 106   CO2 mmol/L 19.5* 22.0 24.0 18*   BUN mg/dL 30* 36* 38* 40*   CREATININE mg/dL 1.06* 1.31* 1.43* 1.51*   GLUCOSE mg/dL 91 111* 130* 110*   Estimated Creatinine Clearance: 36.5 mL/min (A) (by C-G formula based on SCr of 1.06 mg/dL (H)).  Results from last 7 days   Lab Units 12/31/23  0531 12/29/23  1111   ALBUMIN g/dL 3.4* 4.2   BILIRUBIN mg/dL 1.7* 1.1   ALK PHOS U/L 79 94   AST (SGOT) U/L 28 18   ALT (SGPT) U/L 20 15     Results from last 7 days   Lab Units 01/01/24  0757 12/31/23  0531 12/30/23  1125 12/29/23  1111   CALCIUM mg/dL 8.6 9.0 9.5 9.9   ALBUMIN g/dL  --  3.4*  --  4.2   MAGNESIUM mg/dL  --  1.5*  --   --        SARS-CoV-2 PCR   Date Value Ref Range Status   03/26/2021 Not Detected Not Detected Final     Comment:     Nucleic acid specific to SARS-CoV-2 (COVID-19) virus was not detected in  this sample by the TaqPath (TM) COVID-19 Combo Kit.          SARS-CoV-2 (COVID-19) nucleic acid testing performed using [employer] Aptima (R) SARS-CoV-2 Assay or [employer] TaqPath (TM)  COVID-19 Combo Kit as indicated above under Emergency Use Authorization (EUA) from the FDA. Aptima (R) and TaqPath (TM) test performance  characteristics verified by [facility] in accordance with the FDAs Guidance Document (Policy for Diagnostic Tests for Coronavirus Disease-2019  during the Public Health Emergency) issued on March 16, 2020. The laboratory is regulated under CLIA as qualified to perform high-complexity testing  Unless otherwise noted, all testing was performed at [facility], CLIA No. [id number], [place] State Licensee No. [id number]   01/30/2021 Not Detected Not Detected Final     Comment:     Nucleic acid specific to SARS-CoV-2 (COVID-19) virus was not detected  in  this sample by the TaqPath (TM) COVID-19 Combo Kit.          SARS-CoV-2 (COVID-19) nucleic acid testing performed using [employer] Aptima (R) SARS-CoV-2 Assay or [employer] TaqPath (TM)  COVID-19 Combo Kit as indicated above under Emergency Use Authorization (EUA) from the FDA. Aptima (R) and TaqPath (TM) test performance  characteristics verified by [facility] in accordance with the FDAs Guidance Document (Policy for Diagnostic Tests for Coronavirus Disease-2019  during the Public Health Emergency) issued on March 16, 2020. The laboratory is regulated under CLIA as qualified to perform high-complexity testing  Unless otherwise noted, all testing was performed at [facility], CLIA No. [id number], [place] State Licensee No. [id number]     Hemoglobin A1C   Date/Time Value Ref Range Status   12/31/2023 0531 5.80 (H) 4.80 - 5.60 % Final       CT Head Without Contrast  Narrative: CT HEAD WO CONTRAST-     INDICATIONS: Intracranial hemorrhage, follow-up     TECHNIQUE: Radiation dose reduction techniques were utilized, including  automated exposure control and exposure modulation based on body size.  Noncontrast head CT     COMPARISON: 12/30/2023     FINDINGS:     Right parafalcine subdural hematoma measures up to 5 mm thickness,  coronal image 56, previously 5 mm. Left parafalcine subdural hematoma  has developed, 4 mm thickness on coronal image 37. Right frontal  parietal region subarachnoid hemorrhage does not appear significantly  changed.        No midline shift or mass effect. No acute territorial infarct is  identified.     Mild periventricular hypodensities suggest chronic small vessel ischemic  change in a patient this age.     Arterial calcifications are seen at the base of the brain.     Ventricles, cisterns, cerebral sulci are unremarkable for patient age.     The visualized paranasal sinuses, orbits, mastoid air cells are  unremarkable. Subcutaneous soft tissue swelling/hematoma is seen  superiorly  on the right.        Impression:    Previous intracranial hemorrhage appears little changed. New left  parafalcine subdural hematoma measures 4 mm thickness. Continued  follow-up advised.           This report was finalized on 12/31/2023 10:46 AM by Dr. Fredo Leiva M.D on Workstation: [id number]       I reviewed the patient's daily medications.  Scheduled Medications  atorvastatin, 20 mg, Oral, Daily  lactated ringers, 500 mL, Intravenous, Once  levothyroxine, 75 mcg, Oral, Daily  metoprolol succinate XL, 100 mg, Oral, Nightly  senna-docusate sodium, 2 tablet, Oral, BID  sodium chloride, 10 mL, Intravenous, Q12H    Infusions  lactated ringers, 75 mL/hr, Last Rate: 75 mL/hr (01/01/24 0108)    Diet  Diet: Cardiac Diets; Healthy Heart (2-3 Na+); Texture: Regular Texture (IDDSI 7); Fluid Consistency: Thin (IDDSI 0)         I have personally reviewed:  [x]  Laboratory   []  Microbiology   [x]  Radiology   []  EKG/Telemetry   [x]  Cardiology/Vascular   []  Pathology     Records   [x]  Assessment/Plan     Active Hospital Problems    Diagnosis  POA    **SDH (subdural hematoma) [S06.5XAA]  Yes    C1 cervical fracture [S12.000A]  Yes    Subarachnoid hemorrhage [I60.9]  Yes    Subdural hematoma [S06.5XAA]  Yes    Closed fracture of right clavicle [S42.001A]  Yes    Nonrheumatic aortic valve insufficiency [I35.1]  Yes    Acquired hypothyroidism [E03.9]  Yes    Hypertension [I10]  Yes    Macular degeneration of both eyes [H35.30]  Yes      Resolved Hospital Problems   No resolved problems to display.       83 y.o. female admitted with SDH (subdural hematoma).    Mechanical fall  Subdural hematoma  Subarachnoid hemorrhage  C1 cervical fracture  -Neurosurgery consulted, recommend follow-up CT scan and neurosurgery appointment in 2 weeks.  Neurosurgery follow-up in 8 weeks for repeat cervical x-ray.  C-collar and to follow-up cervical x-rays    Right clavicle fracture  -Ortho consulted, appreciate recommendations  -In  sling    Orthostatic hypotension  Hypertension  -Dizziness with sitting up and standing, patient denies dizziness at the time of her fall at home  -Hold home Aldactone, ramipril, Lasix  -Continue metoprolol   -Additional bolus, IVF    Hyperlipidemia-continue home Lipitor    GERA, improving  -hold Lasix, ramipril, Aldactone  -Trend creatinine    Anemia  Iron deficiency  -Start iron supplementation      SCDs for DVT prophylaxis.  Full code.  Discussed with patient, family, and nursing staff.  Anticipate discharge to SNU facility in 1-2 days.    Expected discharge date/ time has not been documented.      Robbie Soto MD  [facility]  01/01/24  12:22 EST

## 2024-01-01 NOTE — PLAN OF CARE
Goal Outcome Evaluation:  Plan of Care Reviewed With: patient, daughter           Outcome Evaluation: Patient is an 83 year old female admitted to [facility] after sustaining a fall at home resulting in a subdural hematoma and closed nondisplaced fracture of C1 vertebrae, and closed nondisplaced fx of R clavicle. Patient lives at home alone, and does not use AD for mobility, was previously independent with ADLs. Patient presents today with generalized weakness and pain in RUE, neck  hindering her ability to participate in ADLs and functional mobility. Patient performed bed mobility with mod Ac2, min A x2 for sit to stand transfers, and min A with dynamic standing balance. Patient will benefit from skilled OT to address current functional deficits, anticipate need for SNF at [facility].      Anticipated Discharge Disposition (OT): skilled nursing facility, inpatient rehabilitation facility

## 2024-01-01 NOTE — PLAN OF CARE
Goal Outcome Evaluation:   Patient pain level did not meet goal all through the shift, she complained of pain each time my rounds where made, daughter by her bedside has no new complain at this moment, IV fluid still ongoing. Right hand sing still in use at the moment.

## 2024-01-01 NOTE — THERAPY TREATMENT NOTE
Patient Name: Ayana Reyna  : 1940    MRN: [id number]                              Today's Date: 2024       Admit Date: 2023    Visit Dx:     ICD-10-CM ICD-9-CM   1. SDH (subdural hematoma)  S06.5XAA 432.1   2. SAH (subarachnoid hemorrhage)  I60.9 430   3. Closed nondisplaced fracture of first cervical vertebra, unspecified fracture morphology, initial encounter  S12.001A 805.01   4. Closed nondisplaced fracture of acromial end of right clavicle, initial encounter  S42.034A 810.03   5. Laceration of scalp, initial encounter  S01.01XA 873.0     Patient Active Problem List   Diagnosis    Vitamin D deficiency    Allergic rhinitis    Prediabetes    Osteoporosis    Hypertension    Macular degeneration of both eyes    Acquired hypothyroidism    Vision loss, bilateral    Nonrheumatic aortic valve stenosis    Nonrheumatic mitral valve regurgitation    Nonrheumatic aortic valve insufficiency    Exudative age-related macular degeneration, left eye, with inactive scar    Coronary artery calcification    Age-related nuclear cataract of both eyes    Open angle with borderline findings, high risk, bilateral    Sciatica of right side    Heart murmur    Class 2 obesity with alveolar hypoventilation without serious comorbidity with body mass index (BMI) of 35.0 to 35.9 in adult    SDH (subdural hematoma)    C1 cervical fracture    Subarachnoid hemorrhage    Subdural hematoma    Closed fracture of right clavicle     Past Medical History:   Diagnosis Date    Acquired hypothyroidism     Allergic rhinitis     Aortic stenosis     mild to moderate per echo     Bilateral leg cramps 2022    Coronary artery calcification 2021    Fatigue     Glucose intolerance (impaired glucose tolerance)     Hearing loss     Heart murmur     Heart palpitations     Hematuria     Hyperlipemia     Hypertension     Insomnia     Macular degeneration     vision loss of left eye     Migraine     Nonrheumatic aortic valve  insufficiency 10/19/2020    Osteoporosis     Pulmonary nodules 3/24/2021    Shortness of breath     Vision loss, bilateral     left eye macular degeneration; right eye etiology unknown    Vitamin D deficiency     Wears hearing aid in both ears 12/2019     Past Surgical History:   Procedure Laterality Date    CARDIAC CATHETERIZATION N/A 3/29/2021    Procedure: Coronary angiography;  Surgeon: Zain Mendoza MD;  Location:  [facility];  Service: Cardiovascular;  Laterality: N/A;    CARDIAC CATHETERIZATION N/A 3/29/2021    Procedure: Left heart cath;  Surgeon: Zain Mendoza MD;  Location:  [facility];  Service: Cardiovascular;  Laterality: N/A;    CARDIAC CATHETERIZATION N/A 3/29/2021    Procedure: Left ventriculography;  Surgeon: Zain Mendoza MD;  Location:  [facility];  Service: Cardiovascular;  Laterality: N/A;    CHOLECYSTECTOMY      COLONOSCOPY  2008    FRACTURE SURGERY  1975,2014    HYSTERECTOMY N/A 1971    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    JOINT REPLACEMENT  1995, 2013    KNEE SURGERY  1989    TONSILLECTOMY  1950      General Information       Row Name 01/01/24 1117          Physical Therapy Time and Intention    Document Type therapy note (daily note)  -     Mode of Treatment physical therapy  -       Row Name 01/01/24 1117          General Information    Patient Profile Reviewed yes  -CW     Existing Precautions/Restrictions cervical collar  RUE nwb in sling, ortho consult remains pending  -CW       Row Name 01/01/24 1117          Cognition    Orientation Status (Cognition) oriented x 4  -CW       Row Name 01/01/24 1117          Safety Issues, Functional Mobility    Impairments Affecting Function (Mobility) balance;endurance/activity tolerance;strength;pain;range of motion (ROM)  -CW               User Key  (r) = Recorded By, (t) = Taken By, (c) = Cosigned By      Initials Name Provider Type    CW Melida Aguirre, PT Physical Therapist                    Mobility       Row Name 01/01/24 1118          Bed Mobility    Bed Mobility supine-sit;sit-supine  -CW     Supine-Sit [place] (Bed Mobility) moderate assist (50% patient effort);verbal cues  -CW     Sit-Supine [place] (Bed Mobility) moderate assist (50% patient effort);2 person assist;verbal cues  -CW     Assistive Device (Bed Mobility) head of bed elevated;draw sheet;bed rails  -CW       Row Name 01/01/24 1118          Sit-Stand Transfer    Sit-Stand [place] (Transfers) minimum assist (75% patient effort);2 person assist;verbal cues  -CW     Comment, (Sit-Stand Transfer) HHA on L, stood 3-4 mins for orthostatic BP readings  -CW       Row Name 01/01/24 1118          Gait/Stairs (Locomotion)    [place] Level (Gait) minimum assist (75% patient effort);2 person assist;verbal cues  -CW     Assistive Device (Gait) other (see comments)  HHA  -CW     Distance in Feet (Gait) 3 lateral steps towards HOB  -CW     Deviations/Abnormal Patterns (Gait) juanita decreased;gait speed decreased;stride length decreased  -CW               User Key  (r) = Recorded By, (t) = Taken By, (c) = Cosigned By      Initials Name Provider Type    Melida Pena PT Physical Therapist                   Obj/Interventions       Row Name 01/01/24 1119          Balance    Balance Assessment standing static balance;standing dynamic balance  -CW     Static Standing Balance minimal assist  -CW     Dynamic Standing Balance minimal assist;2-person assist  -CW     Comment, Balance HHA  -CW               User Key  (r) = Recorded By, (t) = Taken By, (c) = Cosigned By      Initials Name Provider Type    Melida Pena PT Physical Therapist                   Goals/Plan    No documentation.                  Clinical Impression       Row Name 01/01/24 1120          Pain    Pretreatment Pain Rating 5/10  -CW     Posttreatment Pain Rating 5/10  -CW     Pain Location - Side/Orientation Right  -CW     Pain Location upper   -CW     Pain Location - extremity;head;neck  -CW     Pain Intervention(s) Repositioned;Rest;Ambulation/increased activity  -CW       Row Name 01/01/24 1120          Plan of Care Review    Plan of Care Reviewed With patient;daughter  -CW     Outcome Evaluation Pt participated with PT this morning. Pt continues to report RUE/shoulder pain, neck pain and headache. Orthostatics BP assessed during session, BP taken from L calf - RN aware of inconsistent readings from cuff. Pt stood with min A x2 and tolerated standing several mins for multiple BP readings. Pt reports mild dizziness in both sitting and standing that does not worsen or improve with time upright. Pt able to take a few lateral steps towards HOB with min A x2. Pt's functional mobility remains below her baseline, anticipate d/c to SNF prior to return home.  -CW       Row Name 01/01/24 1120          Vital Signs    Pre Systolic BP Rehab 194  questionable reliability of reading, BP taken from L calf with multiple artifacts during reading. RN aware  -CW     Pre Treatment Diastolic BP 40  -CW     Intra Systolic BP Rehab 179  sitting  -CW     Intra Treatment Diastolic BP 63  -CW     Post Systolic BP Rehab 178  -CW     Post Treatment Diastolic BP 80  -CW     O2 Delivery Pre Treatment room air  -CW     O2 Delivery Intra Treatment room air  -CW     O2 Delivery Post Treatment room air  -CW       Row Name 01/01/24 1120          Positioning and Restraints    Pre-Treatment Position in bed  -CW     Post Treatment Position bed  -CW     In Bed notified nsg;call light within reach;encouraged to call for assist;exit alarm on;supine;with brace  -CW               User Key  (r) = Recorded By, (t) = Taken By, (c) = Cosigned By      Initials Name Provider Type    CW Melida Aguirre PT Physical Therapist                   Outcome Measures       Row Name 01/01/24 1119 01/01/24 0800       How much help from another person do you currently need...    Turning from your back to your  side while in flat bed without using bedrails? 3  -CW 3  -LB    Moving from lying on back to sitting on the side of a flat bed without bedrails? 3  -CW 3  -LB    Moving to and from a bed to a chair (including a wheelchair)? 2  -CW 2  -LB    Standing up from a chair using your arms (e.g., wheelchair, bedside chair)? 2  -CW 2  -LB    Climbing 3-5 steps with a railing? 1  -CW 2  -LB    To walk in hospital room? 2  -CW 2  -LB    AM-PAC 6 Clicks Score (PT) 13  -CW 14  -LB    Highest Level of Mobility Goal 4 --> Transfer to chair/commode  -CW 4 --> Transfer to chair/commode  -LB      Row Name 01/01/24 0000          How much help from another person do you currently need...    Turning from your back to your side while in flat bed without using bedrails? 3  -OE     Moving from lying on back to sitting on the side of a flat bed without bedrails? 3  -OE     Moving to and from a bed to a chair (including a wheelchair)? 2  -OE     Standing up from a chair using your arms (e.g., wheelchair, bedside chair)? 2  -OE     Climbing 3-5 steps with a railing? 2  -OE     To walk in hospital room? 2  -OE     AM-PAC 6 Clicks Score (PT) 14  -OE     Highest Level of Mobility Goal 4 --> Transfer to chair/commode  -OE       Row Name 01/01/24 1119          Functional Assessment    Outcome Measure Options AM-PAC 6 Clicks Basic Mobility (PT)  -CW               User Key  (r) = Recorded By, (t) = Taken By, (c) = Cosigned By      Initials Name Provider Type    CW Melida Aguirre, [facility] Physical Therapist    Chapo Brooks, RN Registered Nurse    Hernando Bryant, RN Registered Nurse                                 Physical Therapy Education       Title: PT OT SLP Therapies (In Progress)       Topic: Physical Therapy (In Progress)       Point: Mobility training (Done)       Learning Progress Summary             Patient Acceptance, E, VU,NR by CW at 1/1/2024 1119    Acceptance, E, VU,NR by CW at 12/31/2023 1143                         Point: Home  exercise program (Not Started)       Learner Progress:  Not documented in this visit.              Point: Body mechanics (Not Started)       Learner Progress:  Not documented in this visit.              Point: Precautions (Done)       Learning Progress Summary             Patient Acceptance, E, VU,NR by CW at 1/1/2024 1119                                         User Key       Initials Effective Dates Name Provider Type Discipline     12/13/22 -  Melida Aguirre PT Physical Therapist PT                  PT Recommendation and Plan  Planned Therapy Interventions (PT): balance training, bed mobility training, gait training, ROM (range of motion), strengthening, patient/family education, transfer training  Plan of Care Reviewed With: patient, daughter  Outcome Evaluation: Pt participated with PT this morning. Pt continues to report RUE/shoulder pain, neck pain and headache. Orthostatics BP assessed during session, BP taken from L calf - RN aware of inconsistent readings from cuff. Pt stood with min A x2 and tolerated standing several mins for multiple BP readings. Pt reports mild dizziness in both sitting and standing that does not worsen or improve with time upright. Pt able to take a few lateral steps towards HOB with min A x2. Pt's functional mobility remains below her baseline, anticipate d/c to SNF prior to return home.     Time Calculation:         PT Charges       Row Name 01/01/24 1115             Time Calculation    Start Time 0920  -CW      Stop Time 0951  -CW      Time Calculation (min) 31 min  -CW      PT Received On 01/01/24  -      PT - Next Appointment 01/02/24  -                User Key  (r) = Recorded By, (t) = Taken By, (c) = Cosigned By      Initials Name Provider Type    CW Melida Aguirre PT Physical Therapist                  Therapy Charges for Today       Code Description Service Date Service Provider Modifiers Qty    27872184224 HC PT EVAL MOD COMPLEXITY 3 12/31/2023 Timothy  Melida, PT GP 1    32453129393 HC PT THERAPEUTIC ACT EA 15 MIN 12/31/2023 Melida Aguirre, PT GP 1    07868643570 HC PT THERAPEUTIC ACT EA 15 MIN 1/1/2024 Melida Aguirre, PT GP 2            PT G-Codes  Outcome Measure Options: AM-PAC 6 Clicks Basic Mobility (PT)  AM-PAC 6 Clicks Score (PT): 13  PT Discharge Summary  Anticipated Discharge Disposition (PT): skilled nursing facility    Melida Aguirre, PT  1/1/2024

## 2024-01-01 NOTE — THERAPY EVALUATION
Patient Name: Ayana Reyna  : 1940    MRN: [id number]                              Today's Date: 2024       Admit Date: 2023    Visit Dx:     ICD-10-CM ICD-9-CM   1. SDH (subdural hematoma)  S06.5XAA 432.1   2. SAH (subarachnoid hemorrhage)  I60.9 430   3. Closed nondisplaced fracture of first cervical vertebra, unspecified fracture morphology, initial encounter  S12.001A 805.01   4. Closed nondisplaced fracture of acromial end of right clavicle, initial encounter  S42.034A 810.03   5. Laceration of scalp, initial encounter  S01.01XA 873.0     Patient Active Problem List   Diagnosis    Vitamin D deficiency    Allergic rhinitis    Prediabetes    Osteoporosis    Hypertension    Macular degeneration of both eyes    Acquired hypothyroidism    Vision loss, bilateral    Nonrheumatic aortic valve stenosis    Nonrheumatic mitral valve regurgitation    Nonrheumatic aortic valve insufficiency    Exudative age-related macular degeneration, left eye, with inactive scar    Coronary artery calcification    Age-related nuclear cataract of both eyes    Open angle with borderline findings, high risk, bilateral    Sciatica of right side    Heart murmur    Class 2 obesity with alveolar hypoventilation without serious comorbidity with body mass index (BMI) of 35.0 to 35.9 in adult    SDH (subdural hematoma)    C1 cervical fracture    Subarachnoid hemorrhage    Subdural hematoma    Closed fracture of right clavicle     Past Medical History:   Diagnosis Date    Acquired hypothyroidism     Allergic rhinitis     Aortic stenosis     mild to moderate per echo     Bilateral leg cramps 2022    Coronary artery calcification 2021    Fatigue     Glucose intolerance (impaired glucose tolerance)     Hearing loss     Heart murmur     Heart palpitations     Hematuria     Hyperlipemia     Hypertension     Insomnia     Macular degeneration     vision loss of left eye     Migraine     Nonrheumatic aortic valve  insufficiency 10/19/2020    Osteoporosis     Pulmonary nodules 3/24/2021    Shortness of breath     Vision loss, bilateral     left eye macular degeneration; right eye etiology unknown    Vitamin D deficiency     Wears hearing aid in both ears 12/2019     Past Surgical History:   Procedure Laterality Date    CARDIAC CATHETERIZATION N/A 3/29/2021    Procedure: Coronary angiography;  Surgeon: Zain Mendoza MD;  Location:  [facility];  Service: Cardiovascular;  Laterality: N/A;    CARDIAC CATHETERIZATION N/A 3/29/2021    Procedure: Left heart cath;  Surgeon: Zain Mendoza MD;  Location:  [facility];  Service: Cardiovascular;  Laterality: N/A;    CARDIAC CATHETERIZATION N/A 3/29/2021    Procedure: Left ventriculography;  Surgeon: Zain Mendoza MD;  Location:  [facility];  Service: Cardiovascular;  Laterality: N/A;    CHOLECYSTECTOMY      COLONOSCOPY  2008    FRACTURE SURGERY  1975,2014    HYSTERECTOMY N/A 1971    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    JOINT REPLACEMENT  1995, 2013    KNEE SURGERY  1989    TONSILLECTOMY  1950      General Information       [facility] Name 01/01/24 1450          OT Time and Intention    Document Type evaluation  -     Mode of Treatment occupational therapy;physical therapy;co-treatment  -       Row Name 01/01/24 1450          General Information    Patient Profile Reviewed yes  -     Prior Level of Function independent:  -     Existing Precautions/Restrictions cervical collar;fall;weight bearing  NWB RUE in sling  -     Barriers to Rehab medically complex  -       Row Name 01/01/24 1450          Cognition    Orientation Status (Cognition) oriented x 4  -       Row Name 01/01/24 1450          Safety Issues, Functional Mobility    Safety Issues Affecting Function (Mobility) ability to follow commands;insight into deficits/self-awareness;safety precautions follow-through/compliance;problem-solving  -     Impairments Affecting  Function (Mobility) balance;endurance/activity tolerance;strength;pain;range of motion (ROM)  -     Comment, Safety Issues/Impairments (Mobility) PT/OT cotreatment medically appropriate and necessary due to patient acuity level, to maximize therapeutic benefit due to impaired act tolerance, and for safety of patient and staff. Treatment focused on progression of care and goals established in POC.  -               User Key  (r) = Recorded By, (t) = Taken By, (c) = Cosigned By      Initials Name Provider Type     Talisha Diaz OT Occupational Therapist                     Mobility/ADL's       Row Name 01/01/24 1451          Bed Mobility    Bed Mobility supine-sit;sit-supine  -     Scooting/Bridging [place] (Bed Mobility) maximum assist (25% patient effort);2 person assist;verbal cues  -     Supine-Sit [place] (Bed Mobility) moderate assist (50% patient effort);verbal cues  -     Sit-Supine [place] (Bed Mobility) moderate assist (50% patient effort);2 person assist;verbal cues  [facility]     Assistive Device (Bed Mobility) head of bed elevated;draw sheet;bed rails  -[facility] Name 01/01/24 1451          Transfers    Transfers stand-sit transfer;sit-stand transfer  -[facility] Name 01/01/24 1451          Sit-Stand Transfer    Sit-Stand [place] (Transfers) minimum assist (75% patient effort);2 person assist;verbal cues  -     Comment, (Sit-Stand Transfer) HHA L UE  -[facility] Name 01/01/24 1451          Stand-Sit Transfer    Stand-Sit [place] (Transfers) minimum assist (75% patient effort);2 person assist;verbal cues  -[facility] Name 01/01/24 1451          Functional Mobility    Patient was able to Ambulate yes  -[facility] Name 01/01/24 1451          Mobility    Extremity Weight-bearing Status right upper extremity  -     Right Upper Extremity (Weight-bearing Status) --  In sling, proceded today as if NWB. Awaiting clarification from ortho  -               User Key  (r)  = Recorded By, (t) = Taken By, (c) = Cosigned By      Initials Name Provider Type     Talisha Diaz OT Occupational Therapist                   Obj/Interventions       [facility] Name 01/01/24 1453          Vision Assessment/Intervention    Visual Impairment/Limitations --  Macular degeneration  -[facility] Name 01/01/24 1453          Range of Motion Comprehensive    Comment, General Range of Motion LUE WFL, RUE NT  -[facility] Name 01/01/24 1453          Strength Comprehensive (MMT)    Comment, General Manual Muscle Testing (MMT) Assessment Generalized weakness  -[facility] Name 01/01/24 1453          Motor Skills    Motor Skills functional endurance  -     Functional Endurance Fair  -[facility] Name 01/01/24 1453          Balance    Balance Assessment sitting static balance;sit to stand dynamic balance;sitting dynamic balance;standing static balance  -     Static Sitting Balance contact guard  -     Dynamic Sitting Balance contact guard  -     Position, Sitting Balance sitting edge of bed;unsupported  -     Sit to Stand Dynamic Balance minimal assist;2-person assist  -     Static Standing Balance minimal assist  -     Dynamic Standing Balance minimal assist;2-person assist  -     Balance Interventions sitting;standing;occupation based/functional task  -               User Key  (r) = Recorded By, (t) = Taken By, (c) = Cosigned By      Initials Name Provider Type     Talisha Diaz OT Occupational Therapist                   Goals/Plan       [facility] Name 01/01/24 1501          Bed Mobility Goal 1 (OT)    Activity/Assistive Device (Bed Mobility Goal 1, OT) bed mobility activities, all  -     [place] Level/Cues Needed (Bed Mobility Goal 1, OT) modified independence  -     Time Frame (Bed Mobility Goal 1, OT) short term goal (STG);2 weeks  -[facility] Name 01/01/24 1501          Transfer Goal 1 (OT)    Activity/Assistive Device (Transfer Goal 1, OT) transfers, all  -     [place]  Level/Cues Needed (Transfer Goal 1, OT) modified independence  [facility]     Time Frame (Transfer Goal 1, OT) short term goal (STG);2 weeks  -     Progress/Outcome (Transfer Goal 1, OT) new [facility] Name 01/01/24 1501          Dressing Goal 1 (OT)    Activity/Device (Dressing Goal 1, OT) dressing skills, all;upper body dressing;lower body dressing  -     [place]/Cues Needed (Dressing Goal 1, OT) modified independence  [facility]     Time Frame (Dressing Goal 1, OT) short term goal (STG);2 weeks  -     Progress/Outcome (Dressing Goal 1, OT) new [facility] Name 01/01/24 1501          Toileting Goal 1 (OT)    Activity/Device (Toileting Goal 1, OT) toileting skills, all  -     [place] Level/Cues Needed (Toileting Goal 1, OT) modified independence  [facility]     Time Frame (Toileting Goal 1, OT) short term goal (STG);2 weeks  -     Progress/Outcome (Toileting Goal 1, OT) new [facility]  -[facility] Name 01/01/24 1501          Grooming Goal 1 (OT)    Activity/Device (Grooming Goal 1, OT) grooming skills, all  -     [place] (Grooming Goal 1, OT) modified [facility]     Time Frame (Grooming Goal 1, OT) short term goal (STG);2 weeks  -     Progress/Outcome (Grooming Goal 1, OT) new [facility]       Row Name 01/01/24 1501          Therapy Assessment/Plan (OT)    Planned Therapy Interventions (OT) activity tolerance training;BADL retraining;functional balance retraining;occupation/activity based interventions;patient/caregiver education/training;ROM/therapeutic exercise;strengthening exercise;transfer/mobility retraining  -               User Key  (r) = Recorded By, (t) = Taken By, (c) = Cosigned By      Initials Name Provider Type     Talisha Diaz, OT Occupational Therapist                   Clinical Impression       Row Name 01/01/24 0334          Pain Assessment    Pretreatment Pain Rating 5/10  -     Posttreatment Pain Rating 5/10  [facility]     Pain Location - Side/Orientation Right  -      Pain Location upper  -     Pain Location - extremity  -     Pain Intervention(s) Repositioned;Rest;Ambulation/increased activity  -       Row Name 01/01/24 2634          Plan of Care Review    Plan of Care Reviewed With patient;daughter  -     Outcome Evaluation Patient is an 83 year old female admitted to [facility] after sustaining a fall at home resulting in a subdural hematoma and closed nondisplaced fracture of C1 vertebrae, and closed nondisplaced fx of R clavicle. Patient lives at home alone, and does not use AD for mobility, was previously independent with ADLs. Patient presents today with generalized weakness and pain in RUE, neck  hindering her ability to participate in ADLs and functional mobility. Patient performed bed mobility with mod Ac2, min A x2 for sit to stand transfers, and min A with dynamic standing balance. Patient will benefit from skilled OT to address current functional deficits, anticipate need for SNF at acute [place].  -       Row Name 01/01/24 2822          Therapy Assessment/Plan (OT)    Rehab Potential (OT) good, to achieve stated therapy goals  -     Criteria for Skilled Therapeutic Interventions Met (OT) yes;meets criteria;skilled treatment is necessary  -     Therapy Frequency (OT) 5 times/wk  -       Row Name 01/01/24 0571          Therapy Plan Review/Discharge Plan (OT)    Anticipated Discharge Disposition (OT) skilled nursing facility;inpatient rehabilitation facility  -               User Key  (r) = Recorded By, (t) = Taken By, (c) = Cosigned By      Initials Name Provider Type     Talisha Diaz, OT Occupational Therapist                   Outcome Measures       Row Name 01/01/24 3182          How much help from another is currently needed...    Putting on and taking off regular lower body clothing? 1  -     Bathing (including washing, rinsing, and drying) 2  -     Toileting (which includes using toilet bed pan or urinal) 2  -LH     Putting on and taking off  regular upper body clothing 2  -LH     Taking care of personal grooming (such as brushing teeth) 2  -LH     Eating meals 3  -     AM-PAC 6 Clicks Score (OT) 12  -       Row Name 01/01/24 1119 01/01/24 0800       How much help from another person do you currently need...    Turning from your back to your side while in flat bed without using bedrails? 3  -CW 3  -LB    Moving from lying on back to sitting on the side of a flat bed without bedrails? 3  -CW 3  -LB    Moving to and from a bed to a chair (including a wheelchair)? 2  -CW 2  -LB    Standing up from a chair using your arms (e.g., wheelchair, bedside chair)? 2  -CW 2  -LB    Climbing 3-5 steps with a railing? 1  -CW 2  -LB    To walk in hospital room? 2  -CW 2  -LB    AM-PAC 6 Clicks Score (PT) 13  -CW 14  -LB    Highest Level of Mobility Goal 4 --> Transfer to chair/commode  -CW 4 --> Transfer to chair/commode  -LB      Row Name 01/01/24 1501 01/01/24 1119       Functional Assessment    Outcome Measure Options AM-PAC 6 Clicks Daily Activity (OT)  - AM-PAC 6 Clicks Basic Mobility (PT)  -CW              User Key  (r) = Recorded By, (t) = Taken By, (c) = Cosigned By      Initials Name Provider Type    Melida Pena, PT Physical Therapist    Chapo Brooks, RN Registered Nurse     Talisha Diaz OT Occupational Therapist                    Occupational Therapy Education       Title: PT OT SLP Therapies (In Progress)       Topic: Occupational Therapy (Done)       Point: ADL training (Done)       Description:   Instruct learner(s) on proper safety adaptation and remediation techniques during self care or transfers.   Instruct in proper use of assistive devices.                  Learning Progress Summary             Patient Acceptance, E,TB, VU by  at 1/1/2024 1502    Comment: Instructed in role of OT, discharge planning, home safety, fall prevention, and NWB RUE                         Point: Home exercise program (Done)       Description:    Instruct learner(s) on appropriate technique for monitoring, assisting and/or progressing therapeutic exercises/activities.                  Learning Progress Summary             Patient Acceptance, E,TB, VU by  at 1/1/2024 1502    Comment: Instructed in role of OT, discharge planning, home safety, fall prevention, and NWB RUE                         Point: Precautions (Done)       Description:   Instruct learner(s) on prescribed precautions during self-care and functional transfers.                  Learning Progress Summary             Patient Acceptance, E,TB, VU by  at 1/1/2024 1502    Comment: Instructed in role of OT, discharge planning, home safety, fall prevention, and NWB RUE                         Point: Body mechanics (Done)       Description:   Instruct learner(s) on proper positioning and spine alignment during self-care, functional mobility activities and/or exercises.                  Learning Progress Summary             Patient Acceptance, E,TB, VU by  at 1/1/2024 1502    Comment: Instructed in role of OT, discharge planning, home safety, fall prevention, and NWB RUE                                         User Key       Initials Effective Dates Name Provider Type Discipline     08/31/23 -  Talisha Daiz OT Occupational Therapist OT                  OT Recommendation and Plan  Planned Therapy Interventions (OT): activity tolerance training, BADL retraining, functional balance retraining, occupation/activity based interventions, patient/caregiver education/training, ROM/therapeutic exercise, strengthening exercise, transfer/mobility retraining  Therapy Frequency (OT): 5 times/wk  Plan of Care Review  Plan of Care Reviewed With: patient, daughter  Outcome Evaluation: Patient is an 83 year old female admitted to [facility] after sustaining a fall at home resulting in a subdural hematoma and closed nondisplaced fracture of C1 vertebrae, and closed nondisplaced fx of R clavicle. Patient lives at home  alone, and does not use AD for mobility, was previously independent with ADLs. Patient presents today with generalized weakness and pain in RUE, neck  hindering her ability to participate in ADLs and functional mobility. Patient performed bed mobility with mod Ac2, min A x2 for sit to stand transfers, and min A with dynamic standing balance. Patient will benefit from skilled OT to address current functional deficits, anticipate need for SNF at acute [place].     Time Calculation:   Evaluation Complexity (OT)  Review Occupational Profile/Medical/Therapy History Complexity: expanded/moderate complexity  Assessment, Occupational Performance/Identification of Deficit Complexity: 3-5 performance deficits  Clinical Decision Making Complexity (OT): detailed assessment/moderate complexity  Overall Complexity of Evaluation (OT): moderate complexity     Time Calculation- OT       Row Name 01/01/24 1503             Time Calculation- OT    OT Start Time 0922  -      OT Stop Time 0950  -      OT Time Calculation (min) 28 min  -      Total Timed Code Minutes- OT 20 minute(s)  -      OT Non-Billable Time (min) 8 min  -      OT Received On 01/01/24  -      OT - Next Appointment 01/02/24  -      OT Goal Re-Cert Due Date 01/15/24  -         Timed Charges    75040 - OT Self Care/Mgmt Minutes 20  -LH         Untimed Charges    OT Eval/Re-eval Minutes 10  -LH         Total Minutes    Timed Charges Total Minutes 20  -LH      Untimed Charges Total Minutes 10  -LH       Total Minutes 30  -LH                User Key  (r) = Recorded By, (t) = Taken By, (c) = Cosigned By      Initials Name Provider Type     Talisha Diaz, OT Occupational Therapist                  Therapy Charges for Today       Code Description Service Date Service Provider Modifiers Qty    99608390916 HC OT SELF CARE/MGMT/TRAIN EA 15 MIN 1/1/2024 Talisha Diaz, OT GO 1    [id number] HC OT EVAL MOD COMPLEXITY 3 1/1/2024 Talisha Diaz, OT GO 1                 Talisha  Joe, OT  1/1/2024

## 2024-01-02 LAB
ANION GAP SERPL CALCULATED.3IONS-SCNC: 9 MMOL/L (ref 5–15)
BUN SERPL-MCNC: 23 MG/DL (ref 8–23)
BUN/CREAT SERPL: 26.4 (ref 7–25)
CALCIUM SPEC-SCNC: 8.3 MG/DL (ref 8.6–10.5)
CHLORIDE SERPL-SCNC: 106 MMOL/L (ref 98–107)
CO2 SERPL-SCNC: 21 MMOL/L (ref 22–29)
CREAT SERPL-MCNC: 0.87 MG/DL (ref 0.57–1)
DEPRECATED RDW RBC AUTO: 38.2 FL (ref 37–54)
EGFRCR SERPLBLD CKD-EPI 2021: 65.8 ML/MIN/1.73
ERYTHROCYTE [DISTWIDTH] IN BLOOD BY AUTOMATED COUNT: 11.1 % (ref 12.3–15.4)
GLUCOSE SERPL-MCNC: 99 MG/DL (ref 65–99)
HCT VFR BLD AUTO: 23.4 % (ref 34–46.6)
HGB BLD-MCNC: 7.6 G/DL (ref 12–15.9)
MCH RBC QN AUTO: 30.9 PG (ref 26.6–33)
MCHC RBC AUTO-ENTMCNC: 32.5 G/DL (ref 31.5–35.7)
MCV RBC AUTO: 95.1 FL (ref 79–97)
PLATELET # BLD AUTO: 136 10*3/MM3 (ref 140–450)
PMV BLD AUTO: 10.6 FL (ref 6–12)
POTASSIUM SERPL-SCNC: 4.5 MMOL/L (ref 3.5–5.2)
QT INTERVAL: 388 MS
QTC INTERVAL: 448 MS
RBC # BLD AUTO: 2.46 10*6/MM3 (ref 3.77–5.28)
SODIUM SERPL-SCNC: 136 MMOL/L (ref 136–145)
WBC NRBC COR # BLD AUTO: 7.44 10*3/MM3 (ref 3.4–10.8)

## 2024-01-02 PROCEDURE — 97530 THERAPEUTIC ACTIVITIES: CPT

## 2024-01-02 PROCEDURE — 85027 COMPLETE CBC AUTOMATED: CPT | Performed by: STUDENT IN AN ORGANIZED HEALTH CARE EDUCATION/TRAINING PROGRAM

## 2024-01-02 PROCEDURE — 25010000002 HYDROMORPHONE PER 4 MG: Performed by: INTERNAL MEDICINE

## 2024-01-02 PROCEDURE — 97116 GAIT TRAINING THERAPY: CPT

## 2024-01-02 PROCEDURE — 80048 BASIC METABOLIC PNL TOTAL CA: CPT | Performed by: STUDENT IN AN ORGANIZED HEALTH CARE EDUCATION/TRAINING PROGRAM

## 2024-01-02 RX ORDER — POLYETHYLENE GLYCOL 3350 17 G/17G
17 POWDER, FOR SOLUTION ORAL DAILY
Status: DISCONTINUED | OUTPATIENT
Start: 2024-01-02 | End: 2024-01-03 | Stop reason: HOSPADM

## 2024-01-02 RX ADMIN — HYDROMORPHONE HYDROCHLORIDE 0.5 MG: 1 INJECTION, SOLUTION INTRAMUSCULAR; INTRAVENOUS; SUBCUTANEOUS at 13:59

## 2024-01-02 RX ADMIN — SENNOSIDES AND DOCUSATE SODIUM 2 TABLET: 50; 8.6 TABLET ORAL at 08:33

## 2024-01-02 RX ADMIN — SENNOSIDES AND DOCUSATE SODIUM 2 TABLET: 50; 8.6 TABLET ORAL at 20:42

## 2024-01-02 RX ADMIN — ATORVASTATIN CALCIUM 20 MG: 20 TABLET, FILM COATED ORAL at 08:34

## 2024-01-02 RX ADMIN — FERROUS SULFATE TAB 325 MG (65 MG ELEMENTAL FE) 325 MG: 325 (65 FE) TAB at 08:34

## 2024-01-02 RX ADMIN — HYDROMORPHONE HYDROCHLORIDE 0.5 MG: 1 INJECTION, SOLUTION INTRAMUSCULAR; INTRAVENOUS; SUBCUTANEOUS at 08:34

## 2024-01-02 RX ADMIN — METOPROLOL SUCCINATE 100 MG: 100 TABLET, EXTENDED RELEASE ORAL at 20:42

## 2024-01-02 RX ADMIN — HYDROCODONE BITARTRATE AND ACETAMINOPHEN 1 TABLET: 5; 325 TABLET ORAL at 20:50

## 2024-01-02 RX ADMIN — HYDROCODONE BITARTRATE AND ACETAMINOPHEN 1 TABLET: 5; 325 TABLET ORAL at 02:11

## 2024-01-02 RX ADMIN — HYDROMORPHONE HYDROCHLORIDE 0.5 MG: 1 INJECTION, SOLUTION INTRAMUSCULAR; INTRAVENOUS; SUBCUTANEOUS at 17:49

## 2024-01-02 RX ADMIN — HYDROCODONE BITARTRATE AND ACETAMINOPHEN 1 TABLET: 5; 325 TABLET ORAL at 06:11

## 2024-01-02 RX ADMIN — Medication 10 ML: at 20:43

## 2024-01-02 RX ADMIN — HYDROCODONE BITARTRATE AND ACETAMINOPHEN 1 TABLET: 5; 325 TABLET ORAL at 11:14

## 2024-01-02 RX ADMIN — POLYETHYLENE GLYCOL 3350 17 G: 17 POWDER, FOR SOLUTION ORAL at 11:14

## 2024-01-02 RX ADMIN — LEVOTHYROXINE SODIUM 75 MCG: 75 TABLET ORAL at 08:33

## 2024-01-02 NOTE — PLAN OF CARE
Problem: Asthma Comorbidity  Goal: Maintenance of Asthma Control  Outcome: Ongoing, Progressing  Intervention: Maintain Asthma Symptom Control  Recent Flowsheet Documentation  Taken 1/2/2024 1400 by Chapo Rodriguez RN  Medication Review/Management: medications reviewed  Taken 1/2/2024 1200 by Chapo Rodriguez RN  Medication Review/Management: medications reviewed  Taken 1/2/2024 1000 by Chapo Rodriguez RN  Medication Review/Management: medications reviewed  Taken 1/2/2024 0800 by Chapo Rodriguez RN  Medication Review/Management: medications reviewed     Problem: Behavioral Health Comorbidity  Goal: Maintenance of Behavioral Health Symptom Control  Outcome: Ongoing, Progressing  Intervention: Maintain Behavioral Health Symptom Control  Recent Flowsheet Documentation  Taken 1/2/2024 1400 by Chapo Rodriguez RN  Medication Review/Management: medications reviewed  Taken 1/2/2024 1200 by Chapo Rodriguez RN  Medication Review/Management: medications reviewed  Taken 1/2/2024 1000 by Chapo Rodriguez RN  Medication Review/Management: medications reviewed  Taken 1/2/2024 0800 by Chapo Rodriguez RN  Medication Review/Management: medications reviewed     Problem: COPD (Chronic Obstructive Pulmonary Disease) Comorbidity  Goal: Maintenance of COPD Symptom Control  Outcome: Ongoing, Progressing  Intervention: Maintain COPD-Symptom Control  Recent Flowsheet Documentation  Taken 1/2/2024 1400 by Chapo Rodriguez RN  Medication Review/Management: medications reviewed  Taken 1/2/2024 1200 by Chapo Rodriguez RN  Medication Review/Management: medications reviewed  Taken 1/2/2024 1000 by Chapo Rodriguez RN  Medication Review/Management: medications reviewed  Taken 1/2/2024 0800 by Chapo Rodriguez RN  Medication Review/Management: medications reviewed     Problem: Diabetes Comorbidity  Goal: Blood Glucose Level Within Targeted Range  Outcome: Ongoing, Progressing     Problem: Heart Failure Comorbidity  Goal: Maintenance of Heart Failure  Symptom Control  Outcome: Ongoing, Progressing  Intervention: Maintain Heart Failure-Management  Recent Flowsheet Documentation  Taken 1/2/2024 1400 by Chapo Rodriguez RN  Medication Review/Management: medications reviewed  Taken 1/2/2024 1200 by Chapo Rodriguez RN  Medication Review/Management: medications reviewed  Taken 1/2/2024 1000 by Chapo Rodriguez RN  Medication Review/Management: medications reviewed  Taken 1/2/2024 0800 by Chapo Rodriguez RN  Medication Review/Management: medications reviewed     Problem: Hypertension Comorbidity  Goal: Blood Pressure in Desired Range  Outcome: Ongoing, Progressing  Intervention: Maintain Blood Pressure Management  Recent Flowsheet Documentation  Taken 1/2/2024 1400 by Chapo Rodriguez RN  Medication Review/Management: medications reviewed  Taken 1/2/2024 1200 by Chapo oRdriguez RN  Medication Review/Management: medications reviewed  Taken 1/2/2024 1000 by Chapo Rodriguez RN  Medication Review/Management: medications reviewed  Taken 1/2/2024 0800 by Chapo Rodriguez RN  Medication Review/Management: medications reviewed     Problem: Obstructive Sleep Apnea Risk or Actual Comorbidity Management  Goal: Unobstructed Breathing During Sleep  Outcome: Ongoing, Progressing     Problem: Osteoarthritis Comorbidity  Goal: Maintenance of Osteoarthritis Symptom Control  Outcome: Ongoing, Progressing  Intervention: Maintain Osteoarthritis Symptom Control  Recent Flowsheet Documentation  Taken 1/2/2024 1400 by Chapo Rodriguez RN  Medication Review/Management: medications reviewed  Taken 1/2/2024 1200 by Chapo Rodriguez RN  Medication Review/Management: medications reviewed  Taken 1/2/2024 1000 by Chapo Rodriguez RN  Medication Review/Management: medications reviewed  Taken 1/2/2024 0800 by Chapo Rodriguez RN  Medication Review/Management: medications reviewed     Problem: Pain Chronic (Persistent) (Comorbidity Management)  Goal: Acceptable Pain Control and Functional Ability  Outcome:  Ongoing, Progressing  Intervention: Manage Persistent Pain  Recent Flowsheet Documentation  Taken 1/2/2024 1400 by Chapo Rodriguez RN  Medication Review/Management: medications reviewed  Taken 1/2/2024 1200 by Chapo Rodriguez RN  Medication Review/Management: medications reviewed  Taken 1/2/2024 1000 by Chapo Rodriguez RN  Medication Review/Management: medications reviewed  Taken 1/2/2024 0800 by Chapo Rodriguez RN  Medication Review/Management: medications reviewed  Intervention: Develop Pain Management Plan  Recent Flowsheet Documentation  Taken 1/2/2024 0800 by Chapo Rodriguez RN  Pain Management Interventions:   see MAR   pillow support provided  Intervention: Optimize Psychosocial Wellbeing  Recent Flowsheet Documentation  Taken 1/2/2024 1400 by Chapo Rodriguez RN  Diversional Activities: television  Taken 1/2/2024 0800 by Chapo Rodriguez RN  Diversional Activities: television     Problem: Seizure Disorder Comorbidity  Goal: Maintenance of Seizure Control  Outcome: Ongoing, Progressing     Problem: Skin Injury Risk Increased  Goal: Skin Health and Integrity  Outcome: Ongoing, Progressing  Intervention: Optimize Skin Protection  Recent Flowsheet Documentation  Taken 1/2/2024 1400 by Chapo Rodriguez RN  Head of Bed (HOB) Positioning: HOB at 20-30 degrees  Taken 1/2/2024 0800 by Chapo Rodriguez RN  Pressure Reduction Techniques:   frequent weight shift encouraged   weight shift assistance provided  Head of Bed (HOB) Positioning: HOB elevated  Pressure Reduction Devices:   alternating pressure pump (ADD)   positioning supports utilized  Skin Protection: adhesive use limited     Problem: Fall Injury Risk  Goal: Absence of Fall and Fall-Related Injury  Outcome: Ongoing, Progressing  Intervention: Identify and Manage Contributors  Recent Flowsheet Documentation  Taken 1/2/2024 1400 by Chapo Rodriguez RN  Medication Review/Management: medications reviewed  Taken 1/2/2024 1200 by Chapo Rodriguez RN  Medication  Review/Management: medications reviewed  Taken 1/2/2024 1000 by Chapo Rodriguez RN  Medication Review/Management: medications reviewed  Taken 1/2/2024 0800 by Chapo Rodriguez RN  Medication Review/Management: medications reviewed  Intervention: Promote Injury-Free Environment  Recent Flowsheet Documentation  Taken 1/2/2024 1400 by Chapo Rodriguez RN  Safety Promotion/Fall Prevention:   safety round/check completed   room organization consistent  Taken 1/2/2024 1200 by Chapo Rodriguez RN  Safety Promotion/Fall Prevention:   safety round/check completed   room organization consistent  Taken 1/2/2024 1000 by Chapo Rodriguez RN  Safety Promotion/Fall Prevention:   safety round/check completed   room organization consistent  Taken 1/2/2024 0800 by Chapo Rodriguez RN  Safety Promotion/Fall Prevention:   safety round/check completed   room organization consistent   Goal Outcome Evaluation:

## 2024-01-02 NOTE — THERAPY TREATMENT NOTE
Patient Name: Ayana Reyna  : 1940    MRN: [id number]                              Today's Date: 2024       Admit Date: 2023    Visit Dx:     ICD-10-CM ICD-9-CM   1. SDH (subdural hematoma)  S06.5XAA 432.1   2. SAH (subarachnoid hemorrhage)  I60.9 430   3. Closed nondisplaced fracture of first cervical vertebra, unspecified fracture morphology, initial encounter  S12.001A 805.01   4. Closed nondisplaced fracture of acromial end of right clavicle, initial encounter  S42.034A 810.03   5. Laceration of scalp, initial encounter  S01.01XA 873.0     Patient Active Problem List   Diagnosis    Vitamin D deficiency    Allergic rhinitis    Prediabetes    Osteoporosis    Hypertension    Macular degeneration of both eyes    Acquired hypothyroidism    Vision loss, bilateral    Nonrheumatic aortic valve stenosis    Nonrheumatic mitral valve regurgitation    Nonrheumatic aortic valve insufficiency    Exudative age-related macular degeneration, left eye, with inactive scar    Coronary artery calcification    Age-related nuclear cataract of both eyes    Open angle with borderline findings, high risk, bilateral    Sciatica of right side    Heart murmur    Class 2 obesity with alveolar hypoventilation without serious comorbidity with body mass index (BMI) of 35.0 to 35.9 in adult    SDH (subdural hematoma)    C1 cervical fracture    Subarachnoid hemorrhage    Subdural hematoma    Closed fracture of right clavicle     Past Medical History:   Diagnosis Date    Acquired hypothyroidism     Allergic rhinitis     Aortic stenosis     mild to moderate per echo     Bilateral leg cramps 2022    Coronary artery calcification 2021    Fatigue     Glucose intolerance (impaired glucose tolerance)     Hearing loss     Heart murmur     Heart palpitations     Hematuria     Hyperlipemia     Hypertension     Insomnia     Macular degeneration     vision loss of left eye     Migraine     Nonrheumatic aortic valve  insufficiency 10/19/2020    Osteoporosis     Pulmonary nodules 3/24/2021    Shortness of breath     Vision loss, bilateral     left eye macular degeneration; right eye etiology unknown    Vitamin D deficiency     Wears hearing aid in both ears 12/2019     Past Surgical History:   Procedure Laterality Date    CARDIAC CATHETERIZATION N/A 3/29/2021    Procedure: Coronary angiography;  Surgeon: Zain Mendoza MD;  Location:  [facility];  Service: Cardiovascular;  Laterality: N/A;    CARDIAC CATHETERIZATION N/A 3/29/2021    Procedure: Left heart cath;  Surgeon: Zain Mendoza MD;  Location:  [facility];  Service: Cardiovascular;  Laterality: N/A;    CARDIAC CATHETERIZATION N/A 3/29/2021    Procedure: Left ventriculography;  Surgeon: Zain Mendoza MD;  Location:  [facility];  Service: Cardiovascular;  Laterality: N/A;    CHOLECYSTECTOMY      COLONOSCOPY  2008    FRACTURE SURGERY  1975,2014    HYSTERECTOMY N/A 1971    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    JOINT REPLACEMENT  1995, 2013    KNEE SURGERY  1989    TONSILLECTOMY  1950      General Information       Row Name 01/02/24 1600          Physical Therapy Time and Intention    Document Type therapy note (daily note)  -DJ     Mode of Treatment individual therapy;physical therapy  -DJ       Row Name 01/02/24 1600          General Information    Patient Profile Reviewed yes  -DJ     Existing Precautions/Restrictions cervical collar;fall;weight bearing  -DJ       Row Name 01/02/24 1600          Cognition    Orientation Status (Cognition) oriented x 4  -DJ       Row Name 01/02/24 1600          Safety Issues, Functional Mobility    Comment, Safety Issues/Impairments (Mobility) gt belt, nonskid socks, R sling, hard cervical collar  -DJ               User Key  (r) = Recorded By, (t) = Taken By, (c) = Cosigned By      Initials Name Provider Type    Sujatha Hernandez, PT Physical Therapist                   Mobility       Row  Name 01/02/24 1601          Bed Mobility    Bed Mobility supine-sit;sit-supine  -DJ     Supine-Sit [place] (Bed Mobility) moderate assist (50% patient effort)  -DJ     Sit-Supine [place] (Bed Mobility) moderate assist (50% patient effort);2 person assist;verbal cues  -DJ     Assistive Device (Bed Mobility) head of bed elevated;draw sheet;bed rails  -DJ     Comment, (Bed Mobility) able to assist some with repositioning in bed  -DJ       Row Name 01/02/24 1601          Transfers    Comment, (Transfers) sit/stand from EOB  -DJ       Row Name 01/02/24 1601          Bed-Chair Transfer    Bed-Chair [place] (Transfers) not tested  -DJ       Row Name 01/02/24 1601          Sit-Stand Transfer    Sit-Stand [place] (Transfers) minimum assist (75% patient effort);2 person assist;verbal cues  -DJ       Row Name 01/02/24 1601          Gait/Stairs (Locomotion)    [place] Level (Gait) minimum assist (75% patient effort);2 person assist;verbal cues;moderate assist (50% patient effort)  -DJ     Assistive Device (Gait) other (see comments)  HHA  -DJ     Distance in Feet (Gait) 20'  -DJ     Deviations/Abnormal Patterns (Gait) juanita decreased;gait speed decreased;stride length decreased  -DJ     Bilateral Gait Deviations forward flexed posture  -DJ     [place] Level (Stairs) not tested  -DJ     Comment, (Gait/Stairs) Pt amb 20' with min A of 2 - slow pace, guarded posture, sig c/o dizziness and unsteady balance, endurance limited by dizziness  -DJ       Row Name 01/02/24 1601          Mobility    Extremity Weight-bearing Status right upper extremity  -DJ     Right Upper Extremity (Weight-bearing Status) non weight-bearing (NWB)  -DJ               User Key  (r) = Recorded By, (t) = Taken By, (c) = Cosigned By      Initials Name Provider Type    Sujatha Hernandez, PT Physical Therapist                   Obj/Interventions       Row Name 01/02/24 1604          Motor Skills    Motor Skills functional endurance   -DJ     Functional Endurance limited by dizziness  -DJ       Row Name 01/02/24 1604          Balance    Balance Assessment standing static balance;standing dynamic balance  -DJ     Static Standing Balance minimal assist  -DJ     Dynamic Standing Balance minimal assist;2-person assist;verbal cues  -DJ     Position/Device Used, Standing Balance supported  -DJ     Balance Interventions sitting;standing;sit to stand;supported;weight shifting activity  -DJ     Comment, Balance unsteady due to dizziness  -DJ               User Key  (r) = Recorded By, (t) = Taken By, (c) = Cosigned By      Initials Name Provider Type    Sujatha Hernandez, PT Physical Therapist                   Goals/Plan    No documentation.                  Clinical Impression       Row Name 01/02/24 1604          Pain    Pain Location - Side/Orientation Right  -DJ     Pre/Posttreatment Pain Comment c/o R scapular discomfort  -DJ     Pain Intervention(s) Repositioned;Rest  -DJ       Row Name 01/02/24 1604          Plan of Care Review    Plan of Care Reviewed With patient;daughter  -DJ     Progress improving  -DJ     Outcome Evaluation Pt resting in bed in NAD, ken present and helpful. Pt req mod A for bed mobility to sit EOB. She stood from EOB with min A of 2. Pt amb 20' with min A of 2 - slow pace, guarded posture, sig c/o dizziness and unsteady balance, endurance limited by dizziness. Pt req mod A of 2 to return supine in bed - bp taken L LE (not (+) for orthostatic hypotension). Pt assisted slightly with repositioning in bed. Pt instructed in supine LE ther ex. Pt dem increased amb distance to day but very unsteady and unsafe due to dizziness. Cont PT to address functional deficits and prepare for d/c to rehab as appropriate  -DJ       Row Name 01/02/24 1604          Therapy Assessment/Plan (PT)    Patient/Family Therapy Goals Statement (PT) rehab  -DJ     Criteria for Skilled Interventions Met (PT) skilled treatment is necessary  -DJ       Row Name  01/02/24 1604          Vital Signs    Post Systolic BP Rehab 183  -DJ     Post Treatment Diastolic BP 53  [place]  -DJ     Pre SpO2 (%) 100  -DJ     O2 Delivery Pre Treatment supplemental O2  -DJ     O2 Delivery Intra Treatment room air  -DJ     O2 Delivery Post Treatment supplemental O2  -DJ     Pre Patient Position Supine  -DJ     Intra Patient Position Standing  -DJ     Post Patient Position Supine  -DJ       Row Name 01/02/24 1604          Positioning and Restraints    Pre-Treatment Position in bed  -DJ     Post Treatment Position bed  -DJ     In Bed notified nsg;supine;call light within reach;encouraged to call for assist;with family/caregiver  -DJ               User Key  (r) = Recorded By, (t) = Taken By, (c) = Cosigned By      Initials Name Provider Type    Sujatha Hernandez, [facility] Physical Therapist                   Outcome Measures       Row Name 01/02/24 1608 01/02/24 0800       How much help from another person do you currently need...    Turning from your back to your side while in flat bed without using bedrails? 3  -DJ 3  -LB    Moving from lying on back to sitting on the side of a flat bed without bedrails? 3  -DJ 3  -LB    Moving to and from a bed to a chair (including a wheelchair)? 2  -DJ 2  -LB    Standing up from a chair using your arms (e.g., wheelchair, bedside chair)? 3  -DJ 2  -LB    Climbing 3-5 steps with a railing? 2  -DJ 2  -LB    To walk in hospital room? 2  -DJ 1  -LB    AM-PAC 6 Clicks Score (PT) 15  -DJ 13  -LB    Highest Level of Mobility Goal 4 --> Transfer to chair/commode  -DJ 4 --> Transfer to chair/commode  -LB      Row Name 01/02/24 1608          Functional Assessment    Outcome Measure Options AM-PAC 6 Clicks Basic Mobility (PT)  -DJ               User Key  (r) = Recorded By, (t) = Taken By, (c) = Cosigned By      Initials Name Provider Type    Sujatha Hernandez, [facility] Physical Therapist    Chapo Brooks, RN Registered Nurse                                 Physical Therapy Education        Title: PT OT SLP Therapies (Done)       Topic: Physical Therapy (Done)       Point: Mobility training (Done)       Learning Progress Summary             Patient Acceptance, E, VU,NR by DJ at 1/2/2024 1610    Acceptance, E, VU,NR by CW at 1/1/2024 1119    Acceptance, E, VU,NR by CW at 12/31/2023 1143   Family Acceptance, E, VU,NR by DJ at 1/2/2024 1610                         Point: Home exercise program (Done)       Learning Progress Summary             Patient Acceptance, E, VU,NR by DJ at 1/2/2024 1610   Family Acceptance, E, VU,NR by DJ at 1/2/2024 1610                         Point: Body mechanics (Done)       Learning Progress Summary             Patient Acceptance, E, VU,NR by DJ at 1/2/2024 1610   Family Acceptance, E, VU,NR by DJ at 1/2/2024 1610                         Point: Precautions (Done)       Learning Progress Summary             Patient Acceptance, E, VU,NR by DJ at 1/2/2024 1610    Acceptance, E, VU,NR by CW at 1/1/2024 1119   Family Acceptance, E, VU,NR by DJ at 1/2/2024 1610                                         User Key       Initials Effective Dates Name Provider Type Discipline     10/25/19 -  Sujatha Rae, PT Physical Therapist PT    CW 12/13/22 -  Melida Aguirre PT Physical Therapist PT                  PT Recommendation and Plan     Plan of Care Reviewed With: patient, daughter  Progress: improving  Outcome Evaluation: Pt resting in bed in NAD, ken present and helpful. Pt req mod A for bed mobility to sit EOB. She stood from EOB with min A of 2. Pt amb 20' with min A of 2 - slow pace, guarded posture, sig c/o dizziness and unsteady balance, endurance limited by dizziness. Pt req mod A of 2 to return supine in bed - bp taken L LE (not (+) for orthostatic hypotension). Pt assisted slightly with repositioning in bed. Pt instructed in supine LE ther ex. Pt dem increased amb distance to day but very unsteady and unsafe due to dizziness. Cont PT to address functional deficits and  prepare for d/c to rehab as appropriate     Time Calculation:         PT Charges       Row Name 01/02/24 1610             Time Calculation    Start Time 1430  -DJ      Stop Time 1459  -DJ      Time Calculation (min) 29 min  -DJ      PT Non-Billable Time (min) 10 min  -DJ      PT Received On 01/02/24  -DJ      PT - Next Appointment 01/03/24  -DJ                User Key  (r) = Recorded By, (t) = Taken By, (c) = Cosigned By      Initials Name Provider Type    Sujatha Hernandez, [facility] Physical Therapist                  Therapy Charges for Today       Code Description Service Date Service Provider Modifiers Qty    54167837697 HC PT THERAPEUTIC ACT EA 15 MIN 1/2/2024 Sujatha Rae, PT GP 1    88750143701 HC GAIT TRAINING EA 15 MIN 1/2/2024 Sujatha Rae, PT GP 1    36403848619 HC PT THER SUPP EA 15 MIN 1/2/2024 Sujatha Rae, PT GP 2            PT G-Codes  Outcome Measure Options: AM-PAC 6 Clicks Basic Mobility (PT)  AM-PAC 6 Clicks Score (PT): 15  AM-PAC 6 Clicks Score (OT): 12  Modified Montgomery Scale: 4 - Moderately severe disability.  Unable to walk without assistance, and unable to attend to own bodily needs without assistance.  PT Discharge Summary  Anticipated Discharge Disposition (PT): skilled nursing facility    Sujatha Rae PT  1/2/2024

## 2024-01-02 NOTE — PLAN OF CARE
Goal Outcome Evaluation:  Plan of Care Reviewed With: patient, daughter        Progress: improving  Outcome Evaluation: Pt resting in bed in NAD, ken present and helpful. Pt req mod A for bed mobility to sit EOB. She stood from EOB with min A of 2. Pt amb 20' with min A of 2 - slow pace, guarded posture, sig c/o dizziness and unsteady balance, endurance limited by dizziness. Pt req mod A of 2 to return supine in bed - bp taken L LE (not (+) for orthostatic hypotension). Pt assisted slightly with repositioning in bed. Pt instructed in supine LE ther ex. Pt dem increased amb distance to day but very unsteady and unsafe due to dizziness. Cont PT to address functional deficits and prepare for d/c to rehab as appropriate      Anticipated Discharge Disposition (PT): skilled nursing facility

## 2024-01-02 NOTE — PLAN OF CARE
Goal Outcome Evaluation:      Plan of care reviewed with: Patient and daughter  Progress: On going  Shift Evaluation: Patient alert and oriented, on room air, denies and chest pain. PRN medications received this shift for pain 8/10. Daughter at bedside. Falls precaution maintained, bed in low position and call light within reach.

## 2024-01-02 NOTE — DISCHARGE PLACEMENT REQUEST
"MaribellSudheertamara VARGAS \"Denise\" (83 y.o. Female)       Date of Birth   1940    Social Security Number       Address   [address] [place] [address]    Home Phone   [phone]    MRN   [id number]       Gnosticism   Sabianist    Marital Status                               Admission Date   12/30/23    Admission Type   Emergency    Admitting Provider   David Cao MD    Attending Provider   Robbie Soto MD    Department, Room/Bed   [address], S[address]/1       Discharge Date       Discharge Disposition       Discharge Destination                                 Attending Provider: Robbie Soto MD    Allergies: Amlodipine    Isolation: None   Infection: None   Code Status: CPR    Ht: 152.4 cm (60\")   Wt: 78 kg (172 lb)    Admission Cmt: None   Principal Problem: SDH (subdural hematoma) [S06.5XAA]                   Active Insurance as of 12/30/2023       Primary Coverage       Payor Plan Insurance Group Employer/Plan Group    MEDICARE MEDICARE A & B        Payor Plan Address Payor Plan Phone Number Payor Plan Fax Number Effective Dates    [address] [phone]  5/1/2004 - None Entered    [facility] [id number]         Subscriber Name Subscriber Birth Date Member ID       AYANA SAINI 1940 [id number]               Secondary Coverage       Payor Plan Insurance Group Employer/Plan Group    [employer] HE[employer] OPTIONS [id number]       Payor Plan Address Payor Plan Phone Number Payor Plan Fax Number Effective Dates    [facility] [phone]  1/1/2016 - None Entered    [address]       [facility] [address]         Subscriber Name Subscriber Birth Date Member ID       AYANA SAINI SAM 1940 [id number]                     Emergency Contacts        (Rel.) Home Phone Work Phone Mobile Phone    Angelica Solis (Daughter) [phone] -- --    lilysavannah pal (Daughter) -- -- [phone]                "

## 2024-01-02 NOTE — PROGRESS NOTES
Name: Ayana Reyna ADMIT: 2023   : 1940  PCP: Adela Sy TRINI    MRN: [id number] LOS: 2 days   AGE/SEX: 83 y.o. female  ROOM: [facility]     Subjective   Subjective   Lying comfortably in bed.  Family at bedside.      Review of Systems   Constitutional:  Negative for chills and fever.   Respiratory:  Negative for cough and shortness of breath.    Cardiovascular:  Negative for chest pain and leg swelling.   Gastrointestinal:  Negative for abdominal pain, diarrhea and nausea.     As above     Objective   Objective   Vital Signs  Temp:  [98.2 °F (36.8 °C)-100.2 °F (37.9 °C)] 98.6 °F (37 °C)  Heart Rate:  [79-90] 79  Resp:  [18] 18  BP: (100-151)/(39-51) 150/51  SpO2:  [97 %-100 %] 97 %  on  Flow (L/min):  [1] 1;   Device (Oxygen Therapy): humidified;nasal cannula  Body mass index is 33.59 kg/m².  Physical Exam  Constitutional:       General: She is not in acute distress.     Appearance: She is ill-appearing.      Comments: Elderly   Cardiovascular:      Rate and Rhythm: Normal rate and regular rhythm.   Pulmonary:      Effort: Pulmonary effort is normal. No respiratory distress.   Abdominal:      General: Abdomen is flat. There is no distension.      Tenderness: There is no abdominal tenderness.   Musculoskeletal:         General: No swelling or deformity. Normal range of motion.      Comments: Right arm in sling, shoulder bruising   Skin:     General: Skin is warm and dry.      Comments: Right posterior arm skin tear from fall, bandaged   Neurological:      General: No focal deficit present.      Mental Status: She is alert. Mental status is at baseline.         Results Review     I reviewed the patient's new clinical results.  Results from last 7 days   Lab Units 24  0428 24  0757 23  0531 23  1316   WBC 10*3/mm3 7.44 8.34 8.58 12.56*   HEMOGLOBIN g/dL 7.6* 9.1* 9.3* 11.3*   PLATELETS 10*3/mm3 136* 128* 127* 146     Results from last 7 days   Lab Units 24  0428  01/01/24 2037 01/01/24  0757 12/31/23  0531 12/30/23  1125   SODIUM mmol/L 136  --  131* 138 143   POTASSIUM mmol/L 4.5 4.4 4.5 4.5 4.9   CHLORIDE mmol/L 106  --  102 106 109*   CO2 mmol/L 21.0*  --  19.5* 22.0 24.0   BUN mg/dL 23  --  30* 36* 38*   CREATININE mg/dL 0.87  --  1.06* 1.31* 1.43*   GLUCOSE mg/dL 99  --  91 111* 130*   Estimated Creatinine Clearance: 44.5 mL/min (by C-G formula based on SCr of 0.87 mg/dL).  Results from last 7 days   Lab Units 12/31/23 0531 12/29/23  1111   ALBUMIN g/dL 3.4* 4.2   BILIRUBIN mg/dL 1.7* 1.1   ALK PHOS U/L 79 94   AST (SGOT) U/L 28 18   ALT (SGPT) U/L 20 15     Results from last 7 days   Lab Units 01/02/24  0428 01/01/24 2037 01/01/24  0757 12/31/23  0531 12/30/23  1125 12/29/23  1111   CALCIUM mg/dL 8.3*  --  8.6 9.0 9.5 9.9   ALBUMIN g/dL  --   --   --  3.4*  --  4.2   MAGNESIUM mg/dL  --  1.7  --  1.5*  --   --        SARS-CoV-2 PCR   Date Value Ref Range Status   03/26/2021 Not Detected Not Detected Final     Comment:     Nucleic acid specific to SARS-CoV-2 (COVID-19) virus was not detected in  this sample by the TaqPath (TM) COVID-19 Combo Kit.          SARS-CoV-2 (COVID-19) nucleic acid testing performed using [employer] Aptima (R) SARS-CoV-2 Assay or [employer] TaqPath (TM)  COVID-19 Combo Kit as indicated above under Emergency Use Authorization (EUA) from the FDA. Aptima (R) and TaqPath (TM) test performance  characteristics verified by [email or web address] in accordance with the FDAs Guidance Document (Policy for Diagnostic Tests for Coronavirus Disease-2019  during the Public Health Emergency) issued on March 16, 2020. The laboratory is regulated under CLIA as qualified to perform high-complexity testing  Unless otherwise noted, all testing was performed at [email or web address], CLIA No. [id number], [place] State Licensee No. [id number]   01/30/2021 Not Detected Not Detected Final     Comment:     Nucleic acid specific to SARS-CoV-2 (COVID-19) virus was not detected  in  this sample by the TaqPath (TM) COVID-19 Combo Kit.          SARS-CoV-2 (COVID-19) nucleic acid testing performed using [employer] Aptima (R) SARS-CoV-2 Assay or [employer] TaqPath (TM)  COVID-19 Combo Kit as indicated above under Emergency Use Authorization (EUA) from the FDA. Aptima (R) and TaqPath (TM) test performance  characteristics verified by [facility] in accordance with the FDAs Guidance Document (Policy for Diagnostic Tests for Coronavirus Disease-2019  during the Public Health Emergency) issued on March 16, 2020. The laboratory is regulated under CLIA as qualified to perform high-complexity testing  Unless otherwise noted, all testing was performed at [facility], CLIA No. [id number], [place] State Licensee No. [id number]     Hemoglobin A1C   Date/Time Value Ref Range Status   12/31/2023 0531 5.80 (H) 4.80 - 5.60 % Final       CT Head Without Contrast  Narrative: CT HEAD WO CONTRAST-     INDICATIONS: Intracranial hemorrhage, follow-up     TECHNIQUE: Radiation dose reduction techniques were utilized, including  automated exposure control and exposure modulation based on body size.  Noncontrast head CT     COMPARISON: 12/30/2023     FINDINGS:     Right parafalcine subdural hematoma measures up to 5 mm thickness,  coronal image 56, previously 5 mm. Left parafalcine subdural hematoma  has developed, 4 mm thickness on coronal image 37. Right frontal  parietal region subarachnoid hemorrhage does not appear significantly  changed.        No midline shift or mass effect. No acute territorial infarct is  identified.     Mild periventricular hypodensities suggest chronic small vessel ischemic  change in a patient this age.     Arterial calcifications are seen at the base of the brain.     Ventricles, cisterns, cerebral sulci are unremarkable for patient age.     The visualized paranasal sinuses, orbits, mastoid air cells are  unremarkable. Subcutaneous soft tissue swelling/hematoma is seen  superiorly  on the right.        Impression:    Previous intracranial hemorrhage appears little changed. New left  parafalcine subdural hematoma measures 4 mm thickness. Continued  follow-up advised.           This report was finalized on 12/31/2023 10:46 AM by Dr. Fredo Leiva M.D on Workstation: [id number]       I reviewed the patient's daily medications.  Scheduled Medications  atorvastatin, 20 mg, Oral, Daily  ferrous sulfate, 325 mg, Oral, Daily With Breakfast  levothyroxine, 75 mcg, Oral, Daily  metoprolol succinate XL, 100 mg, Oral, Nightly  polyethylene glycol, 17 g, Oral, Daily  senna-docusate sodium, 2 tablet, Oral, BID  sodium chloride, 10 mL, Intravenous, Q12H    Infusions     Diet  Diet: Cardiac Diets; Healthy Heart (2-3 Na+); Texture: Regular Texture (IDDSI 7); Fluid Consistency: Thin (IDDSI 0)         I have personally reviewed:  [x]  Laboratory   []  Microbiology   [x]  Radiology   []  EKG/Telemetry   [x]  Cardiology/Vascular   []  Pathology     Records   []  Assessment/Plan     Active Hospital Problems    Diagnosis  POA    **SDH (subdural hematoma) [S06.5XAA]  Yes    C1 cervical fracture [S12.000A]  Yes    Subarachnoid hemorrhage [I60.9]  Yes    Subdural hematoma [S06.5XAA]  Yes    Closed fracture of right clavicle [S42.001A]  Yes    Nonrheumatic aortic valve insufficiency [I35.1]  Yes    Acquired hypothyroidism [E03.9]  Yes    Hypertension [I10]  Yes    Macular degeneration of both eyes [H35.30]  Yes      Resolved Hospital Problems   No resolved problems to display.       83 y.o. female admitted with SDH (subdural hematoma).    Mechanical fall  Subdural hematoma  Subarachnoid hemorrhage  C1 cervical fracture  -Neurosurgery consulted, recommend follow-up CT scan and neurosurgery appointment in 2 weeks.  Neurosurgery follow-up in 8 weeks for repeat cervical x-ray.  C-collar and to follow-up cervical x-rays    Right clavicle fracture  -Ortho consulted-patient to follow-up in 2 to 4 weeks with her primary  Ortho  For x-ray to ensure healing.  -In sling    Orthostatic hypotension  Hypertension  -Dizziness with sitting up and standing, patient denies dizziness at the time of her fall at home  -Hold home Aldactone, ramipril, Lasix  -Continue metoprolol   -Discontinue fluids, orthostatic blood pressure measurements negative    Hyperlipidemia-continue home Lipitor    GERA, resolved  -hold Lasix, ramipril, Aldactone  -Trend creatinine    Anemia  Iron deficiency  -Continue iron supplementation  -Hemoglobin 7.6, some bleeding where she fell and skinned her arm otherwise no sites of bleeding.  -Trend hemoglobin      SCDs for DVT prophylaxis.  Full code.  Discussed with patient, family, and nursing staff.  Anticipate discharge to SNU facility in 1-2 days.    Expected Discharge Date: 1/3/2024; Expected Discharge Time:       Robbie Soto MD  [facility]  01/02/24  12:32 EST

## 2024-01-02 NOTE — PROGRESS NOTES
"Physicians Statement of Medical Necessity for  Ambulance Transportation    GENERAL INFORMATION     Name: Ayana Reyna  YOB: 1940  Medicare #: [id number]  Transport Date:    Origin: [facility]   Destination:   Is the Patient's stay covered under Medicare Part A (PPS/DRG?)Yes  Closest appropriate facility? Yes  If this a hosp-hosp transfer? No  Is this a hospice patient? No    MEDICAL NECESSITY QUESTIONAIRE    Ambulance Transportation is medically necessary only if other means of transportation are contraindicated or would be potentially harmful to the patient.  To meet this requirement, the patient must be either \"bed confined\" or suffer from a condition such that transport by means other than an ambulance is contraindicated by the patient's condition.  The following questions must be answered by the healthcare professional signing below for this form to be valid:     1) Describe the MEDICAL CONDITION (physical and/or mental) of this patient AT THE TIME OF AMBULANCE TRANSPORT that requires the patient to be transported in an ambulance, and why transport by other means is contraindicated by the patient's condition:   Patient Active Problem List    Diagnosis     *SDH (subdural hematoma) [S06.5XAA]     C1 cervical fracture [S12.000A]     Subarachnoid hemorrhage [I60.9]     Subdural hematoma [S06.5XAA]     Closed fracture of right clavicle [S42.001A]     Heart murmur [R01.1]     Class 2 obesity with alveolar hypoventilation without serious comorbidity with body mass index (BMI) of 35.0 to 35.9 in adult [E66.2, Z68.35]     Sciatica of right side [M54.31]     Age-related nuclear cataract of both eyes [H25.13]     Open angle with borderline findings, high risk, 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]     Coronary artery calcification [I25.10, I25.84]     Exudative age-related macular degeneration, left eye, with inactive scar [H35.3223]     Nonrheumatic aortic valve stenosis [I35.0]     Nonrheumatic mitral valve " regurgitation [I34.0]     Nonrheumatic aortic valve insufficiency [I35.1]     Vision loss, bilateral [H54.3]     Acquired hypothyroidism [E03.9]     Vitamin D deficiency [E55.9]     Allergic rhinitis [J30.9]     Prediabetes [R73.03]     Osteoporosis [M81.0]     Hypertension [I10]     Macular degeneration of both eyes [H35.30]        Past Medical History:   Diagnosis Date    Acquired hypothyroidism     Allergic rhinitis     Aortic stenosis     mild to moderate per echo 2020    Bilateral leg cramps 1/21/2022    Coronary artery calcification 03/24/2021    Fatigue     Glucose intolerance (impaired glucose tolerance)     Hearing loss     Heart murmur     Heart palpitations     Hematuria     Hyperlipemia     Hypertension     Insomnia     Macular degeneration     vision loss of left eye     Migraine     Nonrheumatic aortic valve insufficiency 10/19/2020    Osteoporosis     Pulmonary nodules 3/24/2021    Shortness of breath     Vision loss, bilateral     left eye macular degeneration; right eye etiology unknown    Vitamin D deficiency     Wears hearing aid in both ears 12/2019      Past Surgical History:   Procedure Laterality Date    CARDIAC CATHETERIZATION N/A 3/29/2021    Procedure: Coronary angiography;  Surgeon: Zain Mendoza MD;  Location:  [facility];  Service: Cardiovascular;  Laterality: N/A;    CARDIAC CATHETERIZATION N/A 3/29/2021    Procedure: Left heart cath;  Surgeon: Zain Mendoza MD;  Location:  [facility];  Service: Cardiovascular;  Laterality: N/A;    CARDIAC CATHETERIZATION N/A 3/29/2021    Procedure: Left ventriculography;  Surgeon: Zain Mendoza MD;  Location:  [facility];  Service: Cardiovascular;  Laterality: N/A;    CHOLECYSTECTOMY      COLONOSCOPY  2008    FRACTURE SURGERY  1975,2014    HYSTERECTOMY N/A 1971    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    JOINT REPLACEMENT  1995, 2013    KNEE SURGERY  1989    TONSILLECTOMY  1950      2) Is this  "patient \"bed confined\" as defined below?Yes   To be \"bed confined\" the patient must satisfy all three of the following criteria:  (1) unable to get up from bed without assistance; AND (2) unable to ambulate;  AND (3) unable to sit in a chair or wheelchair.  3) Can this patient safely be transported by car or wheelchair van (I.e., may safely sit during transport, without an attendant or monitoring?)No   4. In addition to completing questions 1-3 above, please check any of the following conditions that apply*:          *Note: supporting documentation for any boxes checked must be maintained in the patient's medical records Non-healed fractures, Medical attendant required, Requires oxygen - unable to self administer, and Unable to tolerate seated position for time needed to transport      SIGNATURE OF PHYSICIAN OR OTHER AUTHORIZED HEALTHCARE PROFESSIONAL    I certify that the above information is true and correct based on my evaluation of this patient, and represent that the patient requires transport by ambulance and that other forms of transport are contraindicated.  I understand that this information will be used by the Centers for Medicare and Medicaid Services (CMS) to support the determiniation of medical necessity for ambulance services, and I represent that I have personal knowledge of the patient's condition at the time of transport.       If this box is checked, I also certify that the patient is physically or mentally incapable of signing the ambulance service's claim form and that the institution with which I am affiliated has furnished care, services or assistance to the patient.  My signature below is made on behalf of the patient pursuant to 42 .36(b)(4). In accordance with 42 .37, the specific reason(s) that the patient is physically or mentally incapable of signing the claim for is as follows:    Signature of Physician or Healthcare Professional  Date/Time:        (For Scheduled " repetitive transport, this form is not valid for transports performed more than 60 days after this date).                                                                                                                                            --------------------------------------------------------------------------------------------  Printed Name and Credentials of Physician or Authorized Healthcare Professional     *Form must be signed by patient's attending physician for scheduled, repetitive transports,.  For non-repetitive ambulance transports, if unable to obtain the signature of the attending physician, any of the following may sign (please select below):     Physician  Clinical Nurse Specialist  Registered Nurse     Physician Assistant  Discharge Planner  Licensed Practical Nurse     Nurse Practitioner

## 2024-01-03 VITALS
OXYGEN SATURATION: 100 % | RESPIRATION RATE: 18 BRPM | DIASTOLIC BLOOD PRESSURE: 56 MMHG | HEIGHT: 60 IN | BODY MASS INDEX: 33.77 KG/M2 | SYSTOLIC BLOOD PRESSURE: 171 MMHG | HEART RATE: 78 BPM | TEMPERATURE: 98.5 F | WEIGHT: 172 LBS

## 2024-01-03 PROBLEM — D50.9 IRON DEFICIENCY ANEMIA: Status: ACTIVE | Noted: 2024-01-03

## 2024-01-03 PROBLEM — N17.9 AKI (ACUTE KIDNEY INJURY): Status: ACTIVE | Noted: 2024-01-03

## 2024-01-03 PROBLEM — N17.9 AKI (ACUTE KIDNEY INJURY): Status: RESOLVED | Noted: 2024-01-03 | Resolved: 2024-01-03

## 2024-01-03 LAB
ANION GAP SERPL CALCULATED.3IONS-SCNC: 8 MMOL/L (ref 5–15)
BUN SERPL-MCNC: 19 MG/DL (ref 8–23)
BUN/CREAT SERPL: 22.9 (ref 7–25)
CALCIUM SPEC-SCNC: 8.4 MG/DL (ref 8.6–10.5)
CHLORIDE SERPL-SCNC: 102 MMOL/L (ref 98–107)
CO2 SERPL-SCNC: 24 MMOL/L (ref 22–29)
CREAT SERPL-MCNC: 0.83 MG/DL (ref 0.57–1)
DEPRECATED RDW RBC AUTO: 41 FL (ref 37–54)
EGFRCR SERPLBLD CKD-EPI 2021: 69.6 ML/MIN/1.73
ERYTHROCYTE [DISTWIDTH] IN BLOOD BY AUTOMATED COUNT: 11.6 % (ref 12.3–15.4)
GLUCOSE SERPL-MCNC: 131 MG/DL (ref 65–99)
HCT VFR BLD AUTO: 23.4 % (ref 34–46.6)
HGB BLD-MCNC: 8 G/DL (ref 12–15.9)
MCH RBC QN AUTO: 33.2 PG (ref 26.6–33)
MCHC RBC AUTO-ENTMCNC: 34.2 G/DL (ref 31.5–35.7)
MCV RBC AUTO: 97.1 FL (ref 79–97)
PLATELET # BLD AUTO: 170 10*3/MM3 (ref 140–450)
PMV BLD AUTO: 10.4 FL (ref 6–12)
POTASSIUM SERPL-SCNC: 4.6 MMOL/L (ref 3.5–5.2)
RBC # BLD AUTO: 2.41 10*6/MM3 (ref 3.77–5.28)
SODIUM SERPL-SCNC: 134 MMOL/L (ref 136–145)
WBC NRBC COR # BLD AUTO: 6.18 10*3/MM3 (ref 3.4–10.8)

## 2024-01-03 PROCEDURE — 25010000002 HYDROMORPHONE PER 4 MG: Performed by: INTERNAL MEDICINE

## 2024-01-03 PROCEDURE — 85027 COMPLETE CBC AUTOMATED: CPT | Performed by: STUDENT IN AN ORGANIZED HEALTH CARE EDUCATION/TRAINING PROGRAM

## 2024-01-03 PROCEDURE — 97530 THERAPEUTIC ACTIVITIES: CPT

## 2024-01-03 PROCEDURE — 80048 BASIC METABOLIC PNL TOTAL CA: CPT | Performed by: STUDENT IN AN ORGANIZED HEALTH CARE EDUCATION/TRAINING PROGRAM

## 2024-01-03 RX ORDER — AMOXICILLIN 250 MG
2 CAPSULE ORAL DAILY
Start: 2024-01-03

## 2024-01-03 RX ORDER — FERROUS SULFATE 325(65) MG
325 TABLET ORAL
Start: 2024-01-04

## 2024-01-03 RX ORDER — HYDROCODONE BITARTRATE AND ACETAMINOPHEN 5; 325 MG/1; MG/1
1 TABLET ORAL EVERY 4 HOURS PRN
Qty: 15 TABLET | Refills: 0 | Status: SHIPPED | OUTPATIENT
Start: 2024-01-03 | End: 2024-01-06

## 2024-01-03 RX ORDER — POLYETHYLENE GLYCOL 3350 17 G/17G
17 POWDER, FOR SOLUTION ORAL DAILY
Start: 2024-01-04

## 2024-01-03 RX ORDER — SPIRONOLACTONE 50 MG/1
25 TABLET, FILM COATED ORAL DAILY
Start: 2024-01-03 | End: 2024-01-04

## 2024-01-03 RX ADMIN — HYDROCODONE BITARTRATE AND ACETAMINOPHEN 1 TABLET: 5; 325 TABLET ORAL at 11:16

## 2024-01-03 RX ADMIN — FERROUS SULFATE TAB 325 MG (65 MG ELEMENTAL FE) 325 MG: 325 (65 FE) TAB at 07:46

## 2024-01-03 RX ADMIN — HYDROMORPHONE HYDROCHLORIDE 0.5 MG: 1 INJECTION, SOLUTION INTRAMUSCULAR; INTRAVENOUS; SUBCUTANEOUS at 07:43

## 2024-01-03 RX ADMIN — SENNOSIDES AND DOCUSATE SODIUM 2 TABLET: 50; 8.6 TABLET ORAL at 07:46

## 2024-01-03 RX ADMIN — Medication 10 ML: at 07:46

## 2024-01-03 RX ADMIN — HYDROMORPHONE HYDROCHLORIDE 0.5 MG: 1 INJECTION, SOLUTION INTRAMUSCULAR; INTRAVENOUS; SUBCUTANEOUS at 18:58

## 2024-01-03 RX ADMIN — ATORVASTATIN CALCIUM 20 MG: 20 TABLET, FILM COATED ORAL at 07:46

## 2024-01-03 RX ADMIN — POLYETHYLENE GLYCOL 3350 17 G: 17 POWDER, FOR SOLUTION ORAL at 07:45

## 2024-01-03 RX ADMIN — HYDROMORPHONE HYDROCHLORIDE 0.5 MG: 1 INJECTION, SOLUTION INTRAMUSCULAR; INTRAVENOUS; SUBCUTANEOUS at 01:11

## 2024-01-03 RX ADMIN — HYDROCODONE BITARTRATE AND ACETAMINOPHEN 1 TABLET: 5; 325 TABLET ORAL at 15:01

## 2024-01-03 RX ADMIN — HYDROMORPHONE HYDROCHLORIDE 0.5 MG: 1 INJECTION, SOLUTION INTRAMUSCULAR; INTRAVENOUS; SUBCUTANEOUS at 12:20

## 2024-01-03 RX ADMIN — LEVOTHYROXINE SODIUM 75 MCG: 75 TABLET ORAL at 07:46

## 2024-01-03 NOTE — PLAN OF CARE
Problem: Asthma Comorbidity  Goal: Maintenance of Asthma Control  1/3/2024 1621 by Chapo Rodriguez RN  Outcome: Adequate for Care Transition  1/3/2024 1448 by Chapo Rodriguez RN  Outcome: Ongoing, Progressing  Intervention: Maintain Asthma Symptom Control  Recent Flowsheet Documentation  Taken 1/3/2024 1400 by Chapo Rodriguez RN  Medication Review/Management: medications reviewed  Taken 1/3/2024 1200 by Chapo Rodriguez RN  Medication Review/Management: medications reviewed  Taken 1/3/2024 1000 by Chapo Rodriguez RN  Medication Review/Management: medications reviewed  Taken 1/3/2024 0800 by Chapo Rodriguez RN  Medication Review/Management: medications reviewed     Problem: Behavioral Health Comorbidity  Goal: Maintenance of Behavioral Health Symptom Control  1/3/2024 1621 by Chapo Rodriguez RN  Outcome: Adequate for Care Transition  1/3/2024 1448 by Chapo Rodriguez RN  Outcome: Ongoing, Progressing  Intervention: Maintain Behavioral Health Symptom Control  Recent Flowsheet Documentation  Taken 1/3/2024 1400 by Chapo Rodriguez RN  Medication Review/Management: medications reviewed  Taken 1/3/2024 1200 by Chapo Rodriguez RN  Medication Review/Management: medications reviewed  Taken 1/3/2024 1000 by Chapo Rodriguez RN  Medication Review/Management: medications reviewed  Taken 1/3/2024 0800 by Chapo Rodriguez RN  Medication Review/Management: medications reviewed     Problem: COPD (Chronic Obstructive Pulmonary Disease) Comorbidity  Goal: Maintenance of COPD Symptom Control  1/3/2024 1621 by Chapo Rodriguez RN  Outcome: Adequate for Care Transition  1/3/2024 1448 by Chapo Rodriguez RN  Outcome: Ongoing, Progressing  Intervention: Maintain COPD-Symptom Control  Recent Flowsheet Documentation  Taken 1/3/2024 1400 by Chapo Rodriguez RN  Medication Review/Management: medications reviewed  Taken 1/3/2024 1200 by Chapo Rodriguez RN  Medication Review/Management: medications reviewed  Taken 1/3/2024 1000 by Chapo Rodriguez  RN  Medication Review/Management: medications reviewed  Taken 1/3/2024 0800 by Chapo Rodriguez RN  Medication Review/Management: medications reviewed     Problem: Diabetes Comorbidity  Goal: Blood Glucose Level Within Targeted Range  1/3/2024 1621 by Chapo Rodriguez RN  Outcome: Adequate for Care Transition  1/3/2024 1448 by Chapo Rodriguez RN  Outcome: Ongoing, Progressing     Problem: Heart Failure Comorbidity  Goal: Maintenance of Heart Failure Symptom Control  1/3/2024 1621 by Chapo Rodriguez RN  Outcome: Adequate for Care Transition  1/3/2024 1448 by Chapo Rodriguez RN  Outcome: Ongoing, Progressing  Intervention: Maintain Heart Failure-Management  Recent Flowsheet Documentation  Taken 1/3/2024 1400 by Chapo Rodriguez RN  Medication Review/Management: medications reviewed  Taken 1/3/2024 1200 by Chapo Rodriguez RN  Medication Review/Management: medications reviewed  Taken 1/3/2024 1000 by Chapo Rodriguez RN  Medication Review/Management: medications reviewed  Taken 1/3/2024 0800 by Chapo Rodriguez RN  Medication Review/Management: medications reviewed     Problem: Hypertension Comorbidity  Goal: Blood Pressure in Desired Range  1/3/2024 1621 by Chapo Rodriguez RN  Outcome: Adequate for Care Transition  1/3/2024 1448 by Chapo Rodriguez RN  Outcome: Ongoing, Progressing  Intervention: Maintain Blood Pressure Management  Recent Flowsheet Documentation  Taken 1/3/2024 1400 by Chapo Rodriguez RN  Medication Review/Management: medications reviewed  Taken 1/3/2024 1200 by Chapo Rodriguez RN  Medication Review/Management: medications reviewed  Taken 1/3/2024 1000 by Chapo Rodriguez RN  Medication Review/Management: medications reviewed  Taken 1/3/2024 0800 by Chapo Rodriguez RN  Medication Review/Management: medications reviewed     Problem: Obstructive Sleep Apnea Risk or Actual Comorbidity Management  Goal: Unobstructed Breathing During Sleep  1/3/2024 1621 by Chapo Rodriguez RN  Outcome: Adequate for Care  Transition  1/3/2024 1448 by Chapo Rodriguez RN  Outcome: Ongoing, Progressing     Problem: Osteoarthritis Comorbidity  Goal: Maintenance of Osteoarthritis Symptom Control  1/3/2024 1621 by Chapo Rodriguez RN  Outcome: Adequate for Care Transition  1/3/2024 1448 by Chapo Rodriguez RN  Outcome: Ongoing, Progressing  Intervention: Maintain Osteoarthritis Symptom Control  Recent Flowsheet Documentation  Taken 1/3/2024 1400 by Chapo Rodriguez RN  Medication Review/Management: medications reviewed  Taken 1/3/2024 1200 by Chapo Rodriguez RN  Medication Review/Management: medications reviewed  Taken 1/3/2024 1000 by Chapo Rodriguez RN  Medication Review/Management: medications reviewed  Taken 1/3/2024 0800 by Chapo Rodriguez RN  Medication Review/Management: medications reviewed     Problem: Pain Chronic (Persistent) (Comorbidity Management)  Goal: Acceptable Pain Control and Functional Ability  1/3/2024 1621 by Chapo Rodriguez RN  Outcome: Adequate for Care Transition  1/3/2024 1448 by Chapo Rodriguez RN  Outcome: Ongoing, Progressing  Intervention: Manage Persistent Pain  Recent Flowsheet Documentation  Taken 1/3/2024 1400 by Chapo Rodriguez RN  Medication Review/Management: medications reviewed  Taken 1/3/2024 1200 by Chapo Rodriguez RN  Medication Review/Management: medications reviewed  Taken 1/3/2024 1000 by Chapo Rodriguez RN  Medication Review/Management: medications reviewed  Taken 1/3/2024 0800 by Chapo Rodriguez RN  Medication Review/Management: medications reviewed  Intervention: Develop Pain Management Plan  Recent Flowsheet Documentation  Taken 1/3/2024 1400 by Chapo Rodriguez RN  Pain Management Interventions:   position adjusted   pillow support provided  Taken 1/3/2024 0800 by Chapo Rodriguez RN  Pain Management Interventions: see MAR  Intervention: Optimize Psychosocial Wellbeing  Recent Flowsheet Documentation  Taken 1/3/2024 1400 by Chapo Rodriguez RN  Diversional Activities: television  Taken 1/3/2024  0800 by Chapo Rodriguez RN  Diversional Activities: television     Problem: Seizure Disorder Comorbidity  Goal: Maintenance of Seizure Control  1/3/2024 1621 by Chapo Rodriguez RN  Outcome: Adequate for Care Transition  1/3/2024 1448 by Chapo Rodriguez RN  Outcome: Ongoing, Progressing     Problem: Skin Injury Risk Increased  Goal: Skin Health and Integrity  1/3/2024 1621 by Chapo Rodriguez RN  Outcome: Adequate for Care Transition  1/3/2024 1448 by Chapo Rodriguez RN  Outcome: Ongoing, Progressing  Intervention: Optimize Skin Protection  Recent Flowsheet Documentation  Taken 1/3/2024 1400 by Chapo Rodriguez RN  Pressure Reduction Techniques:   frequent weight shift encouraged   weight shift assistance provided  Head of Bed (HOB) Positioning: HOB elevated  Pressure Reduction Devices:   alternating pressure pump (ADD)   positioning supports utilized  Skin Protection: adhesive use limited  Taken 1/3/2024 0800 by Chapo Rodriguez RN  Pressure Reduction Techniques:   frequent weight shift encouraged   weight shift assistance provided  Head of Bed (HOB) Positioning: HOB elevated  Pressure Reduction Devices:   alternating pressure pump (ADD)   pressure-redistributing mattress utilized  Skin Protection: adhesive use limited     Problem: Fall Injury Risk  Goal: Absence of Fall and Fall-Related Injury  1/3/2024 1621 by Chapo Rodriguez RN  Outcome: Adequate for Care Transition  1/3/2024 1448 by Chapo Rodriguez RN  Outcome: Ongoing, Progressing  Intervention: Identify and Manage Contributors  Recent Flowsheet Documentation  Taken 1/3/2024 1400 by Chapo Rodriguez RN  Medication Review/Management: medications reviewed  Taken 1/3/2024 1200 by Chapo Rodriguez RN  Medication Review/Management: medications reviewed  Taken 1/3/2024 1000 by Chapo Rodriguez RN  Medication Review/Management: medications reviewed  Taken 1/3/2024 0800 by Chapo Rodriguez RN  Medication Review/Management: medications reviewed  Intervention: Promote  Injury-Free Environment  Recent Flowsheet Documentation  Taken 1/3/2024 1400 by Chapo Rodriguez, RN  Safety Promotion/Fall Prevention:   room organization consistent   safety round/check completed  Taken 1/3/2024 1200 by Chapo Rodriguez, RN  Safety Promotion/Fall Prevention:   safety round/check completed   room organization consistent  Taken 1/3/2024 1000 by Chapo Rodriguez, RN  Safety Promotion/Fall Prevention:   safety round/check completed   room organization consistent  Taken 1/3/2024 0800 by Chapo Rodriguez, RN  Safety Promotion/Fall Prevention:   safety round/check completed   room organization consistent   Goal Outcome Evaluation:

## 2024-01-03 NOTE — NURSING NOTE
Report called to [facility], IV removed.     Packet will be given to EMS when they arrive.     Belongings gathered and sent with EMS.     Awaiting EMS arrival for D/c, will be discharged per MD order.

## 2024-01-03 NOTE — THERAPY TREATMENT NOTE
Patient Name: Ayana Reyna  : 1940    MRN: [id number]                              Today's Date: 1/3/2024       Admit Date: 2023    Visit Dx:     ICD-10-CM ICD-9-CM   1. SDH (subdural hematoma)  S06.5XAA 432.1   2. SAH (subarachnoid hemorrhage)  I60.9 430   3. Closed nondisplaced fracture of first cervical vertebra, unspecified fracture morphology, initial encounter  S12.001A 805.01   4. Closed nondisplaced fracture of acromial end of right clavicle, initial encounter  S42.034A 810.03   5. Laceration of scalp, initial encounter  S01.01XA 873.0   6. Closed nondisplaced fracture of right clavicle, unspecified part of clavicle, initial encounter  S42.001A 810.00     Patient Active Problem List   Diagnosis    Vitamin D deficiency    Allergic rhinitis    Prediabetes    Osteoporosis    Hypertension    Macular degeneration of both eyes    Acquired hypothyroidism    Vision loss, bilateral    Nonrheumatic aortic valve stenosis    Nonrheumatic mitral valve regurgitation    Nonrheumatic aortic valve insufficiency    Exudative age-related macular degeneration, left eye, with inactive scar    Coronary artery calcification    Age-related nuclear cataract of both eyes    Open angle with borderline findings, high risk, bilateral    Sciatica of right side    Heart murmur    Class 2 obesity with alveolar hypoventilation without serious comorbidity with body mass index (BMI) of 35.0 to 35.9 in adult    SDH (subdural hematoma)    C1 cervical fracture    Subarachnoid hemorrhage    Subdural hematoma    Closed fracture of right clavicle    Iron deficiency anemia     Past Medical History:   Diagnosis Date    Acquired hypothyroidism     Allergic rhinitis     Aortic stenosis     mild to moderate per echo     Bilateral leg cramps 2022    Coronary artery calcification 2021    Fatigue     Glucose intolerance (impaired glucose tolerance)     Hearing loss     Heart murmur     Heart palpitations     Hematuria      Hyperlipemia     Hypertension     Insomnia     Macular degeneration     vision loss of left eye     Migraine     Nonrheumatic aortic valve insufficiency 10/19/2020    Osteoporosis     Pulmonary nodules 3/24/2021    Shortness of breath     Vision loss, bilateral     left eye macular degeneration; right eye etiology unknown    Vitamin D deficiency     Wears hearing aid in both ears 12/2019     Past Surgical History:   Procedure Laterality Date    CARDIAC CATHETERIZATION N/A 3/29/2021    Procedure: Coronary angiography;  Surgeon: Zain Mendoza MD;  Location:  [facility];  Service: Cardiovascular;  Laterality: N/A;    CARDIAC CATHETERIZATION N/A 3/29/2021    Procedure: Left heart cath;  Surgeon: Zain Mendoza MD;  Location:  [facility];  Service: Cardiovascular;  Laterality: N/A;    CARDIAC CATHETERIZATION N/A 3/29/2021    Procedure: Left ventriculography;  Surgeon: Zain Mendoza MD;  Location:  [facility];  Service: Cardiovascular;  Laterality: N/A;    CHOLECYSTECTOMY      COLONOSCOPY  2008    FRACTURE SURGERY  1975,2014    HYSTERECTOMY N/A 1971    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    JOINT REPLACEMENT  1995, 2013    KNEE SURGERY  1989    TONSILLECTOMY  1950      General Information       [facility] Name 01/03/24 1329          OT Time and Intention    Document Type therapy note (daily note)  -     Mode of Treatment individual therapy;occupational therapy  -       Row Name 01/03/24 1329          General Information    Patient Profile Reviewed yes  -     Prior Level of Function independent:  -     Existing Precautions/Restrictions cervical collar;fall;weight bearing  -     Barriers to Rehab medically complex  -       Row Name 01/03/24 1329          Cognition    Orientation Status (Cognition) oriented x 4  -       Row Name 01/03/24 1329          Safety Issues, Functional Mobility    Impairments Affecting Function (Mobility) balance;endurance/activity  tolerance;strength;pain;range of motion (ROM)  -               User Key  (r) = Recorded By, (t) = Taken By, (c) = Cosigned By      Initials Name Provider Type     Talisha Diaz OT Occupational Therapist                     Mobility/ADL's       Row Name 01/03/24 1330          Bed Mobility    Bed Mobility supine-sit;sit-supine  -     Scooting/Bridging [place] (Bed Mobility) maximum assist (25% patient effort);2 person assist;verbal cues  -     Supine-Sit [place] (Bed Mobility) moderate assist (50% patient effort)  -     Sit-Supine [place] (Bed Mobility) moderate assist (50% patient effort);2 person assist;verbal cues  -     Assistive Device (Bed Mobility) head of bed elevated;draw sheet;bed rails  -       Row Name 01/03/24 1330          Transfers    Transfers stand-sit transfer;sit-stand transfer  -       Row Name 01/03/24 1330          Bed-Chair Transfer    Bed-Chair [place] (Transfers) minimum assist (75% patient effort);verbal cues  -       Row Name 01/03/24 1330          Sit-Stand Transfer    Sit-Stand [place] (Transfers) minimum assist (75% patient effort);verbal cues  -     Comment, (Sit-Stand Transfer) TRINA CLEMENTS  -       Row Name 01/03/24 1330          Stand-Sit Transfer    Stand-Sit [place] (Transfers) minimum assist (75% patient effort);verbal cues  -       Row Name 01/03/24 1330          Functional Mobility    Functional Mobility- Safety Issues balance decreased during turns;step length decreased;weight-shifting ability decreased;supplemental O2  -     Patient was able to Ambulate yes  -       Row Name 01/03/24 1330          Mobility    Extremity Weight-bearing Status right upper extremity  -     Right Upper Extremity (Weight-bearing Status) non weight-bearing (NWB)  -               User Key  (r) = Recorded By, (t) = Taken By, (c) = Cosigned By      Initials Name Provider Type     Talisha Diaz OT Occupational Therapist                    Obj/Interventions       Row Name 01/03/24 1330          Sensory Assessment (Somatosensory)    Sensory Assessment (Somatosensory) sensation intact  -       Row Name 01/03/24 1330          Vision Assessment/Intervention    Visual Impairment/Limitations WFL  -[facility] Name 01/03/24 1330          Range of Motion Comprehensive    General Range of Motion bilateral upper extremity ROM WNL  -       Row Name 01/03/24 1330          Strength Comprehensive (MMT)    Comment, General Manual Muscle Testing (MMT) Assessment Generalized weakness  -       Row Name 01/03/24 1330          Motor Skills    Motor Skills functional endurance  -     Functional Endurance Fair  -       Row Name 01/03/24 1330          Balance    Balance Assessment sitting static balance;sitting dynamic balance;sit to stand dynamic balance;standing static balance;standing dynamic balance  -     Static Sitting Balance contact guard  -     Dynamic Sitting Balance contact guard  -     Position, Sitting Balance unsupported;sitting edge of bed  -     Sit to Stand Dynamic Balance minimal assist  -     Static Standing Balance minimal assist;contact guard  -     Dynamic Standing Balance minimal assist  -     Position/Device Used, Standing Balance supported  -     Balance Interventions sitting;standing;occupation based/functional task  -               User Key  (r) = Recorded By, (t) = Taken By, (c) = Cosigned By      Initials Name Provider Type     Talisha Diaz, OT Occupational Therapist                   Goals/Plan    No documentation.                  Clinical Impression       Row Name 01/03/24 1332          Pain Assessment    Pretreatment Pain Rating 6/10  -     Posttreatment Pain Rating 6/10  -     Pain Location - Side/Orientation Right  -     Pain Location upper  -     Pain Location - extremity;head;neck  -     Pre/Posttreatment Pain Comment Patient complains of R UE, neck, and headache at 6/10, achy with no relief  -      Pain Intervention(s) Repositioned  -       Row Name 01/03/24 8826          Plan of Care Review    Plan of Care Reviewed With patient;daughter  -     Progress improving  -     Outcome Evaluation Patient reports severe pain today in R UE, neck, and headache that is unrelenting and constant. Patient performed bed mobility today with mod A to transition to EOB, able to sit unsupported with CGA. Patient able to sit EOB x5 min with SBA/CGA. Patient reports feeling unsure if she can stand, and able to stand with min A, holding OT's elbow and OT holding gait belt for stability. Patient performed bed <> chair transfer with min A and verbal cues for guiding to chair. Patient reports dizziness with mobility. Recommend dc to rehab.  -       Row Name 01/03/24 1778          Therapy Assessment/Plan (OT)    Rehab Potential (OT) good, to achieve stated therapy goals  -     Criteria for Skilled Therapeutic Interventions Met (OT) yes;meets criteria;skilled treatment is necessary  -     Therapy Frequency (OT) 5 times/wk  -       Row Name 01/03/24 5961          Therapy Plan Review/Discharge Plan (OT)    Anticipated Discharge Disposition (OT) skilled nursing facility;inpatient rehabilitation facility  -       Row Name 01/03/24 7556          Positioning and Restraints    Pre-Treatment Position in bed  -     Post Treatment Position chair  -     In Chair reclined;call light within reach;encouraged to call for assist;exit alarm on;with family/caregiver  -               User Key  (r) = Recorded By, (t) = Taken By, (c) = Cosigned By      Initials Name Provider Type     Talisha Diaz, OT Occupational Therapist                   Outcome Measures       Row Name 01/03/24 9537          How much help from another is currently needed...    Putting on and taking off regular lower body clothing? 1  -     Bathing (including washing, rinsing, and drying) 2  -     Toileting (which includes using toilet bed pan or urinal) 2   -     Putting on and taking off regular upper body clothing 2  -     Taking care of personal grooming (such as brushing teeth) 2  -     Eating meals 3  -     AM-PAC 6 Clicks Score (OT) 12  -       Row Name 01/03/24 0800          How much help from another person do you currently need...    Turning from your back to your side while in flat bed without using bedrails? 3  -LB     Moving from lying on back to sitting on the side of a flat bed without bedrails? 3  -LB     Moving to and from a bed to a chair (including a wheelchair)? 2  -LB     Standing up from a chair using your arms (e.g., wheelchair, bedside chair)? 2  -LB     Climbing 3-5 steps with a railing? 2  -LB     To walk in hospital room? 2  -LB     AM-PAC 6 Clicks Score (PT) 14  -LB     Highest Level of Mobility Goal 4 --> Transfer to chair/commode  -       Row Name 01/03/24 1335          Functional Assessment    Outcome Measure Options AM-PAC 6 Clicks Daily Activity (OT)  -               User Key  (r) = Recorded By, (t) = Taken By, (c) = Cosigned By      Initials Name Provider Type     Chapo Rodriguez, RN Registered Nurse     Talisha Diaz OT Occupational Therapist                    Occupational Therapy Education       Title: PT OT SLP Therapies (Done)       Topic: Occupational Therapy (Done)       Point: ADL training (Done)       Description:   Instruct learner(s) on proper safety adaptation and remediation techniques during self care or transfers.   Instruct in proper use of assistive devices.                  Learning Progress Summary             Patient Acceptance, E,TB, VU by  at 1/1/2024 1502    Comment: Instructed in role of OT, discharge planning, home safety, fall prevention, and NWB RUE                         Point: Home exercise program (Done)       Description:   Instruct learner(s) on appropriate technique for monitoring, assisting and/or progressing therapeutic exercises/activities.                  Learning Progress  Summary             Patient Acceptance, E,TB, VU by  at 1/1/2024 1502    Comment: Instructed in role of OT, discharge planning, home safety, fall prevention, and NWB RUE                         Point: Precautions (Done)       Description:   Instruct learner(s) on prescribed precautions during self-care and functional transfers.                  Learning Progress Summary             Patient Acceptance, E,TB, VU by  at 1/1/2024 1502    Comment: Instructed in role of OT, discharge planning, home safety, fall prevention, and NWB RUE                         Point: Body mechanics (Done)       Description:   Instruct learner(s) on proper positioning and spine alignment during self-care, functional mobility activities and/or exercises.                  Learning Progress Summary             Patient Acceptance, E,TB, VU by  at 1/1/2024 1502    Comment: Instructed in role of OT, discharge planning, home safety, fall prevention, and NWB RUE                                         User Key       Initials Effective Dates Name Provider Type Discipline     08/31/23 -  Talisha Diaz OT Occupational Therapist OT                  OT Recommendation and Plan  Planned Therapy Interventions (OT): activity tolerance training, BADL retraining, functional balance retraining, occupation/activity based interventions, patient/caregiver education/training, ROM/therapeutic exercise, strengthening exercise, transfer/mobility retraining  Therapy Frequency (OT): 5 times/wk  Plan of Care Review  Plan of Care Reviewed With: patient, daughter  Progress: improving  Outcome Evaluation: Patient reports severe pain today in R UE, neck, and headache that is unrelenting and constant. Patient performed bed mobility today with mod A to transition to EOB, able to sit unsupported with CGA. Patient able to sit EOB x5 min with SBA/CGA. Patient reports feeling unsure if she can stand, and able to stand with min A, holding OT's elbow and OT holding gait belt  for stability. Patient performed bed <> chair transfer with min A and verbal cues for guiding to chair. Patient reports dizziness with mobility. Recommend dc to rehab.     Time Calculation:   Evaluation Complexity (OT)  Review Occupational Profile/Medical/Therapy History Complexity: expanded/moderate complexity  Assessment, Occupational Performance/Identification of Deficit Complexity: 3-5 performance deficits  Clinical Decision Making Complexity (OT): detailed assessment/moderate complexity  Overall Complexity of Evaluation (OT): moderate complexity     Time Calculation- OT       Row Name 01/03/24 1336             Time Calculation- OT    OT Start Time 1114  -      OT Stop Time 1137  -      OT Time Calculation (min) 23 min  -      Total Timed Code Minutes- OT 23 minute(s)  -      OT Received On 01/03/24  -      OT - Next Appointment 01/04/24  -         Timed Charges    85611 - OT Therapeutic Activity Minutes 23  -LH         Total Minutes    Timed Charges Total Minutes 23  -       Total Minutes 23  -LH                User Key  (r) = Recorded By, (t) = Taken By, (c) = Cosigned By      Initials Name Provider Type     Talisha Diaz, OT Occupational Therapist                  Therapy Charges for Today       Code Description Service Date Service Provider Modifiers Qty    [id number]  OT THERAPEUTIC ACT EA 15 MIN 1/3/2024 Talisha Diaz OT GO 2                 Talisha Diaz OT  1/3/2024

## 2024-01-03 NOTE — PLAN OF CARE
Goal Outcome Evaluation:  Plan of Care Reviewed With: patient, daughter        Progress: improving  Outcome Evaluation: Patient reports severe pain today in R UE, neck, and headache that is unrelenting and constant. Patient performed bed mobility today with mod A to transition to EOB, able to sit unsupported with CGA. Patient able to sit EOB x5 min with SBA/CGA. Patient reports feeling unsure if she can stand, and able to stand with min A, holding OT's elbow and OT holding gait belt for stability. Patient performed bed <> chair transfer with min A and verbal cues for guiding to chair. Patient reports dizziness with mobility. Recommend dc to rehab.      Anticipated Discharge Disposition (OT): skilled nursing facility, inpatient rehabilitation facility

## 2024-01-03 NOTE — CASE MANAGEMENT/SOCIAL WORK
Continued Stay Note  [facility]     Patient Name: Ayana Reyna  MRN: [id number]  Today's Date: 1/3/2024    Admit Date: 12/30/2023    Plan: [facility]   Discharge Plan       Row Name 01/03/24 1219       Plan    Plan [facility]    Plan Comments S/W [facility]/ [facility] - pt is accepted at [facility] and bed is avaiable today.  S/W pt's svitlana Quintero who is in agreement / [facility].  MD notified. ........Karyn LACEY/ WILLARD                   Discharge Codes    No documentation.                 Expected Discharge Date and Time       Expected Discharge Date Expected Discharge Time    Cj 3, 2024               Karyn Rinaldi RN

## 2024-01-03 NOTE — PLAN OF CARE
Goal Outcome Evaluation:      Plan of care reviewed with: Patient and family  Progress: On-going  Shift Evaluation: Patient alert and oriented, on 2L of oxygen. PRN medications given this shift for pain management. Falls precaution maintained and call light within reach.

## 2024-01-03 NOTE — DISCHARGE SUMMARY
Patient Name: Ayana Reyna  : 1940  MRN: [id number]    Date of Admission: 2023  Date of Discharge:  1/3/2024  Primary Care Physician: Adela Sy APRN      Chief Complaint:   Fall and Laceration      Discharge Diagnoses     Active Hospital Problems    Diagnosis  POA    **SDH (subdural hematoma) [S06.5XAA]  Yes    Iron deficiency anemia [D50.9]  Yes    C1 cervical fracture [S12.000A]  Yes    Subarachnoid hemorrhage [I60.9]  Yes    Closed fracture of right clavicle [S42.001A]  Yes    Nonrheumatic aortic valve insufficiency [I35.1]  Yes    Acquired hypothyroidism [E03.9]  Yes    Hypertension [I10]  Yes    Macular degeneration of both eyes [H35.30]  Yes      Resolved Hospital Problems    Diagnosis Date Resolved POA    GERA (acute kidney injury) [N17.9] 2024 Yes        Hospital Course     Ms. Reyna is a 83 y.o. female with a history of HTN, hypothyroidism and macular degeneration who presented to [facility] after a fall.  Please see the admitting history and physical for further details.  She hit her right shoulder and the back of her head and did sustain a head laceration.  CT head revealed subarachnoid hemorrhage and area of subdural hematoma as well.  She was noted to have a nondisplaced distal right clavicular fracture on x-ray.  She was seen by neurosurgery who recommended serial CT scans which were done the following day and did not show any worsening of the previously noted areas of blood although there was a new area of SDH measuring 4 mm in the parafalcine area.  Neurosurgery did not recommend any additional imaging at this time and is planning to follow-up in 2 weeks.  Orthopedics consult was obtained and they recommended repeat x-ray in 2 to 4 weeks as an outpatient.  She also had a C1 vertebral fracture on initial imaging and has been placed in a hard cervical collar and will follow-up with neurosurgery for this as well.  From a medical standpoint she had some mild GERA  on admission which has now resolved.  We will be resuming one of her to oral diuretics at discharge and the Lasix can be resumed if felt indicated after discharge to skilled nursing facility.  She had some anemia with no overt signs of bleeding.  She is slightly iron deficient and was started on oral replacement.  She is going to need skilled nursing placement at this time and this has been obtained this afternoon.      Day of Discharge     Subjective:  Feeling okay, no new complaints    Physical Exam:  Temp:  [98.1 °F (36.7 °C)-98.5 °F (36.9 °C)] 98.5 °F (36.9 °C)  Heart Rate:  [78-87] 78  Resp:  [18] 18  BP: (149-189)/(46-61) 171/56  Body mass index is 33.59 kg/m².  Physical Exam  Vitals reviewed.   Constitutional:       General: She is not in acute distress.  Neck:      Comments: Patient in Aspen collar  Cardiovascular:      Rate and Rhythm: Normal rate.   Pulmonary:      Effort: No respiratory distress.      Breath sounds: Normal breath sounds.   Abdominal:      Palpations: Abdomen is soft.   Musculoskeletal:      Right lower leg: No edema.      Left lower leg: No edema.      Comments: Left upper extremity with some bruising related to IV site but no warmth or induration.  Small bulla of the medial left upper arm filled with clear fluid   Skin:     General: Skin is warm and dry.   Neurological:      Mental Status: She is alert and oriented to person, place, and time.   Psychiatric:         Mood and Affect: Mood normal.         Behavior: Behavior normal.         Consultants     Consult Orders (all) (From admission, onward)       Start     Ordered    12/31/23 1110  Inpatient Orthopedic Surgery Consult  Once        Specialty:  Orthopedic Surgery  Provider:  Amanuel Nuñez MD    12/31/23 1109    12/30/23 1415  Inpatient Neurosurgery Consult  Once        Specialty:  Neurosurgery  Provider:  Noah Moreno MD    12/30/23 1415    12/30/23 1339  [facility] (on-call MD unless specified) Details  Once        Specialty:   Hospitalist  Provider:  (Not yet assigned)    12/30/23 1339    12/30/23 1325  Neurosurgery (on-call MD unless specified)  Once        Specialty:  Neurosurgery  Provider:  (Not yet assigned)    12/30/23 1324                  Procedures       Imaging Results (All)       Procedure Component Value Units Date/Time    CT Head Without Contrast [[id number]] Collected: 12/31/23 1040     Updated: 12/31/23 1049    Narrative:      CT HEAD WO CONTRAST-     INDICATIONS: Intracranial hemorrhage, follow-up     TECHNIQUE: Radiation dose reduction techniques were utilized, including  automated exposure control and exposure modulation based on body size.  Noncontrast head CT     COMPARISON: 12/30/2023     FINDINGS:     Right parafalcine subdural hematoma measures up to 5 mm thickness,  coronal image 56, previously 5 mm. Left parafalcine subdural hematoma  has developed, 4 mm thickness on coronal image 37. Right frontal  parietal region subarachnoid hemorrhage does not appear significantly  changed.        No midline shift or mass effect. No acute territorial infarct is  identified.     Mild periventricular hypodensities suggest chronic small vessel ischemic  change in a patient this age.     Arterial calcifications are seen at the base of the brain.     Ventricles, cisterns, cerebral sulci are unremarkable for patient age.     The visualized paranasal sinuses, orbits, mastoid air cells are  unremarkable. Subcutaneous soft tissue swelling/hematoma is seen  superiorly on the right.          Impression:         Previous intracranial hemorrhage appears little changed. New left  parafalcine subdural hematoma measures 4 mm thickness. Continued  follow-up advised.           This report was finalized on 12/31/2023 10:46 AM by Dr. Fredo Leiva M.D on Workstation: [id number]       XR Spine Cervical 2 or 3 View [[id number]] Collected: 12/30/23 1933     Updated: 12/30/23 2007    Narrative:      EXAMINATION: THREE VIEWS OF THE CERVICAL SPINE      HISTORY: 83-year-old female with a C1 fracture.     FINDINGS: AP, lateral and swimmer's lateral radiographs of the cervical  spine were obtained with the patient in a cervical collar. There is  normal alignment of the cervical spine. Moderate bilateral multilevel  facet hypertrophic degenerative change is noted. The known fracture is  not visualized. Mild prevertebral soft tissue swelling at the level of  C1 is noted.       Impression:      Mild prevertebral soft tissue swelling at the level of C1.  The known fracture is not visualized on the current examination.  Alignment is within normal limits.     This report was finalized on 12/30/2023 8:04 PM by Dr. Hong Alfonso M.D on Workstation: [id number]       XR Shoulder 2+ View Right [[id number]] Collected: 12/30/23 1156     Updated: 12/30/23 1418    Narrative:      TWO VIEWS OF THE RIGHT SHOULDER AND THREE VIEWS OF THE RIGHT HAND     HISTORY: 83-year-old female status post fall with right hand and  shoulder pain.      FINDINGS: PA, oblique and lateral radiographs of the right hand were  obtained and demonstrate diffuse osteopenia. There is 1st CMC joint  degenerative arthritic change with subchondral sclerosis. Evidence of  prior plate and screw fixation of the distal radius is noted. An acute  fracture is not visualized. There is no malalignment.     AP internal and external rotation radiographs of the right shoulder were  obtained and demonstrate a transverse obliquely oriented fracture of the  distal clavicle. The AC joint appears normal in alignment. Diffuse  osteopenia is noted. There is severe joint space narrowing and  subchondral sclerosis with osteophytic change involving the glenohumeral  joint. Mild overlying soft tissue swelling is noted.       Impression:      1. Transverse obliquely oriented nondisplaced distal right clavicular  fracture.   2. No acute abnormality of the right hand is appreciated.  3. Diffuse osteopenia.  4. Severe right shoulder  degenerative arthritis.     This report was finalized on 12/30/2023 2:15 PM by Dr. Hong Alfonso M.D on Workstation: [id number]       XR Hand 3+ View Right [[id number]] Collected: 12/30/23 1156     Updated: 12/30/23 1418    Narrative:      TWO VIEWS OF THE RIGHT SHOULDER AND THREE VIEWS OF THE RIGHT HAND     HISTORY: 83-year-old female status post fall with right hand and  shoulder pain.      FINDINGS: PA, oblique and lateral radiographs of the right hand were  obtained and demonstrate diffuse osteopenia. There is 1st CMC joint  degenerative arthritic change with subchondral sclerosis. Evidence of  prior plate and screw fixation of the distal radius is noted. An acute  fracture is not visualized. There is no malalignment.     AP internal and external rotation radiographs of the right shoulder were  obtained and demonstrate a transverse obliquely oriented fracture of the  distal clavicle. The AC joint appears normal in alignment. Diffuse  osteopenia is noted. There is severe joint space narrowing and  subchondral sclerosis with osteophytic change involving the glenohumeral  joint. Mild overlying soft tissue swelling is noted.       Impression:      1. Transverse obliquely oriented nondisplaced distal right clavicular  fracture.   2. No acute abnormality of the right hand is appreciated.  3. Diffuse osteopenia.  4. Severe right shoulder degenerative arthritis.     This report was finalized on 12/30/2023 2:15 PM by Dr. Hong Alfonso M.D on Workstation: [id number]       CT Head Without Contrast [[id number]] Collected: 12/30/23 1236     Updated: 12/30/23 1251    Narrative:      CT HEAD WO CONTRAST-, CT CERVICAL SPINE WO CONTRAST-     INDICATIONS: Trauma     TECHNIQUE: Radiation dose reduction techniques were utilized, including  automated exposure control and exposure modulation based on body size.  Noncontrast head CT, cervical spine CT     COMPARISON: 9/15/2020     FINDINGS:     Head CT:     5 mm thickness  right parafalcine subdural hematoma is present anteriorly  and posteriorly, with extension to the upper surface of the right  tentorium cerebelli. Small subarachnoid hemorrhage is seen at the right  frontoparietal region, for example axial image 40.     No midline shift or mass effect. No acute territorial infarct is  identified.     Mild periventricular hypodensities suggest chronic small vessel ischemic  change in a patient this age.     Acute calcifications are seen at the base of the brain.     Ventricles, cisterns, cerebral sulci are unremarkable for patient age.     The visualized paranasal sinuses, orbits, mastoid air cells are  unremarkable. Subcutaneous scalp hematoma is seen at the upper right  aspect of the head, subcutaneous soft tissue gas suggesting laceration  or infection, correlate clinically.        Cervical spine CT:     A nondisplaced fracture is seen at the anterior right aspect of the ring  of C1, for example axial image 49, with as much as about 3 mm separation  of fracture fragments apparent. No other cervical fractures are  identified.     Facet and uncovertebral joint hypertrophy contribute to neuroforaminal  narrowing, more prominent on the right at C5/6, and on the left at C6/7.     Mild anterolisthesis of C3 on C4.     Bilateral carotid arterial calcifications are present.     Mildly impacted, comminuted fracture is seen at the medial end of the  right clavicle.          Impression:            Critical test result:     1. Predominantly right parafalcine subdural hematoma, 5 mm thickness.  Small right frontoparietal subarachnoid hemorrhage. Close follow-up  recommended to characterize change.     2. Fracture of C1 vertebra.     3. Medial right clavicle fracture.     Critical test results discussed by telephone with Noah Freeman at 1245,  12/30/2023.                 This report was finalized on 12/30/2023 12:48 PM by Dr. Fredo Leiva M.D on Workstation: [id number]       CT Cervical  Spine Without Contrast [[id number]] Collected: 12/30/23 1236     Updated: 12/30/23 1251    Narrative:      CT HEAD WO CONTRAST-, CT CERVICAL SPINE WO CONTRAST-     INDICATIONS: Trauma     TECHNIQUE: Radiation dose reduction techniques were utilized, including  automated exposure control and exposure modulation based on body size.  Noncontrast head CT, cervical spine CT     COMPARISON: 9/15/2020     FINDINGS:     Head CT:     5 mm thickness right parafalcine subdural hematoma is present anteriorly  and posteriorly, with extension to the upper surface of the right  tentorium cerebelli. Small subarachnoid hemorrhage is seen at the right  frontoparietal region, for example axial image 40.     No midline shift or mass effect. No acute territorial infarct is  identified.     Mild periventricular hypodensities suggest chronic small vessel ischemic  change in a patient this age.     Acute calcifications are seen at the base of the brain.     Ventricles, cisterns, cerebral sulci are unremarkable for patient age.     The visualized paranasal sinuses, orbits, mastoid air cells are  unremarkable. Subcutaneous scalp hematoma is seen at the upper right  aspect of the head, subcutaneous soft tissue gas suggesting laceration  or infection, correlate clinically.        Cervical spine CT:     A nondisplaced fracture is seen at the anterior right aspect of the ring  of C1, for example axial image 49, with as much as about 3 mm separation  of fracture fragments apparent. No other cervical fractures are  identified.     Facet and uncovertebral joint hypertrophy contribute to neuroforaminal  narrowing, more prominent on the right at C5/6, and on the left at C6/7.     Mild anterolisthesis of C3 on C4.     Bilateral carotid arterial calcifications are present.     Mildly impacted, comminuted fracture is seen at the medial end of the  right clavicle.          Impression:            Critical test result:     1. Predominantly right parafalcine  subdural hematoma, 5 mm thickness.  Small right frontoparietal subarachnoid hemorrhage. Close follow-up  recommended to characterize change.     2. Fracture of C1 vertebra.     3. Medial right clavicle fracture.     Critical test results discussed by telephone with Noah Freeman at 1245,  12/30/2023.                 This report was finalized on 12/30/2023 12:48 PM by Dr. Fredo Leiva M.D on Workstation: [email or web address]       XR Pelvis 1 or 2 View [[id number]] Collected: 12/30/23 1108     Updated: 12/30/23 1113    Narrative:      XR PELVIS 1 OR 2 VW-     INDICATIONS: Trauma     TECHNIQUE: FRONTAL VIEWS OF THE PELVIS     COMPARISON: None available     FINDINGS:     The pelvic ring appears grossly intact. No erosion, or dislocation is  identified. No obvious displaced fracture is seen. If there is remaining  clinical suspicion for fracture, additional views and/or cross-sectional  imaging could be obtained. Minimal to mild right more than left hip  joint space narrowing. Degenerative changes are seen in the partly  included lumbar spine.       Impression:         As described.           This report was finalized on 12/30/2023 11:10 AM by Dr. Fredo Leiva M.D on Workstation: [email or web address]                 Pertinent Labs     Results from last 7 days   Lab Units 01/03/24  0500 01/02/24 0428 01/01/24 0757 12/31/23  0531   WBC 10*3/mm3 6.18 7.44 8.34 8.58   HEMOGLOBIN g/dL 8.0* 7.6* 9.1* 9.3*   PLATELETS 10*3/mm3 170 136* 128* 127*     Results from last 7 days   Lab Units 01/03/24  0500 01/02/24  0428 01/01/24 2037 01/01/24 0757 12/31/23  0531   SODIUM mmol/L 134* 136  --  131* 138   POTASSIUM mmol/L 4.6 4.5 4.4 4.5 4.5   CHLORIDE mmol/L 102 106  --  102 106   CO2 mmol/L 24.0 21.0*  --  19.5* 22.0   BUN mg/dL 19 23  --  30* 36*   CREATININE mg/dL 0.83 0.87  --  1.06* 1.31*   GLUCOSE mg/dL 131* 99  --  91 111*   EGFR mL/min/1.73 69.6 65.8  --  51.9* 40.3*     Results from last 7 days   Lab Units 12/31/23  0528  12/29/23  1111   ALBUMIN g/dL 3.4* 4.2   BILIRUBIN mg/dL 1.7* 1.1   ALK PHOS U/L 79 94   AST (SGOT) U/L 28 18   ALT (SGPT) U/L 20 15     Results from last 7 days   Lab Units 01/03/24  0500 01/02/24  0428 01/01/24 2037 01/01/24  0757 12/31/23  0531 12/30/23  1125 12/29/23  1111   CALCIUM mg/dL 8.4* 8.3*  --  8.6 9.0   < > 9.9   ALBUMIN g/dL  --   --   --   --  3.4*  --  4.2   MAGNESIUM mg/dL  --   --  1.7  --  1.5*  --   --     < > = values in this interval not displayed.       Results from last 7 days   Lab Units 01/01/24 2247 01/01/24 2037   HSTROP T ng/L 19* 19*       Results from last 7 days   Lab Units 12/29/23  1111   TRIGLYCERIDES mg/dL 75   HDL CHOL mg/dL 57   LDL CHOL mg/dL 80             Test Results Pending at Discharge       Discharge Details        Discharge Medications        New Medications        Instructions Start Date   ferrous sulfate 325 (65 FE) MG tablet   325 mg, Oral, Daily With Breakfast   Start Date: January 4, 2024     HYDROcodone-acetaminophen 5-325 MG per tablet  Commonly known as: NORCO   1 tablet, Oral, Every 4 Hours PRN      polyethylene glycol 17 g packet  Commonly known as: MIRALAX   17 g, Oral, Daily   Start Date: January 4, 2024     sennosides-docusate 8.6-50 MG per tablet  Commonly known as: PERICOLACE   2 tablets, Oral, Daily             Continue These Medications        Instructions Start Date   atorvastatin 20 MG tablet  Commonly known as: LIPITOR   Take 1 tablet by mouth once daily      cholecalciferol 25 MCG (1000 UT) tablet  Commonly known as: VITAMIN D3   1,000 Units, Oral, Daily      levothyroxine 75 MCG tablet  Commonly known as: SYNTHROID, LEVOTHROID   75 mcg, Oral, Daily      metoprolol succinate  MG 24 hr tablet  Commonly known as: TOPROL-XL   100 mg, Oral, Nightly      PreserVision/Lutein capsule   Oral, 2 Times Daily      ramipril 10 MG capsule  Commonly known as: ALTACE   TAKE 1 CAPSULE BY MOUTH ONCE DAILY AT NIGHT      spironolactone 50 MG tablet  Commonly  known as: ALDACTONE   25 mg, Oral, Daily      vitamin B-12 100 MCG tablet  Commonly known as: CYANOCOBALAMIN   50 mcg, Oral, Daily             Stop These Medications      furosemide 40 MG tablet  Commonly known as: LASIX     traMADol 50 MG tablet  Commonly known as: ULTRAM              Allergies   Allergen Reactions    Amlodipine Swelling     Lower extremity edema       Discharge Disposition:  Skilled Nursing Facility (DC - External)      Discharge Diet:  Diet Order   Procedures    Diet: Cardiac Diets; Healthy Heart (2-3 Na+); Texture: Regular Texture (IDDSI 7); Fluid Consistency: Thin (IDDSI 0)       Discharge Activity:       CODE STATUS:    Code Status and Medical Interventions:   Ordered at: 12/30/23 1415     Code Status (Patient has no pulse and is not breathing):    CPR (Attempt to Resuscitate)     Medical Interventions (Patient has pulse or is breathing):    Full Support     Comments:    please confirm with patient/medical decision maker       Future Appointments   Date Time Provider Department Center   3/28/2024 10:20 AM Ana Orozco MD [facility]   7/5/2024  8:30 AM Adela Sy APRN [facility]     Additional Instructions for the Follow-ups that You Need to Schedule       Discharge Follow-up with Specialty: neurosurg 2 weeks with CT head   As directed      Specialty: neurosurg 2 weeks with CT head               Contact information for follow-up providers       Adela Sy APRN Follow up in 3 week(s).    Specialties: Internal Medicine, Nurse Practitioner  Contact information:  [address] [address] [phone]                       Contact information for after-discharge care       Destination       [facility] .    Service: Skilled Nursing  Contact information:  [address] [address]  [phone]                                   Additional Instructions for the Follow-ups that You Need to Schedule       Discharge  Follow-up with Specialty: neurosurg 2 weeks with CT head   As directed      Specialty: neurosurg 2 weeks with CT head            Time Spent on Discharge:  Greater than 30 minutes      Faustino Jack MD  [facility]  01/03/24  15:43 EST

## 2024-01-03 NOTE — CASE MANAGEMENT/SOCIAL WORK
Continued Stay Note  [facility]     Patient Name: Ayana Reyna  MRN: [id number]  Today's Date: 1/3/2024    Admit Date: 12/30/2023    Plan: [facility] SNF   Discharge Plan       Row Name 01/03/24 1556       Plan    Plan Comments DC orders noted.  S/W [facility]/ [facility] - bed remains avaialble.  S/W pt and her dtr Jody who are in agreement w/ DC to skilled bed at [facility].  DC summary and DC packet given to RN.  [facility] transport arranged for today at 1800.   Reservation # [id number].    Final Note DC to skilled bed at [facility].                   Discharge Codes    No documentation.                 Expected Discharge Date and Time       Expected Discharge Date Expected Discharge Time    Cj 3, 2024               Karyn Rinaldi RN

## 2024-01-03 NOTE — PLAN OF CARE
Problem: Asthma Comorbidity  Goal: Maintenance of Asthma Control  Outcome: Ongoing, Progressing  Intervention: Maintain Asthma Symptom Control  Recent Flowsheet Documentation  Taken 1/3/2024 1400 by Chapo Rodriguez RN  Medication Review/Management: medications reviewed  Taken 1/3/2024 1200 by Chapo Rodriguez RN  Medication Review/Management: medications reviewed  Taken 1/3/2024 1000 by Chapo Rodriguez RN  Medication Review/Management: medications reviewed  Taken 1/3/2024 0800 by Chapo Rodriguez RN  Medication Review/Management: medications reviewed     Problem: Behavioral Health Comorbidity  Goal: Maintenance of Behavioral Health Symptom Control  Outcome: Ongoing, Progressing  Intervention: Maintain Behavioral Health Symptom Control  Recent Flowsheet Documentation  Taken 1/3/2024 1400 by Chapo Rodriguez RN  Medication Review/Management: medications reviewed  Taken 1/3/2024 1200 by Chapo Rodriguez RN  Medication Review/Management: medications reviewed  Taken 1/3/2024 1000 by Chapo Rodriguez RN  Medication Review/Management: medications reviewed  Taken 1/3/2024 0800 by Chapo Rodriguez RN  Medication Review/Management: medications reviewed     Problem: COPD (Chronic Obstructive Pulmonary Disease) Comorbidity  Goal: Maintenance of COPD Symptom Control  Outcome: Ongoing, Progressing  Intervention: Maintain COPD-Symptom Control  Recent Flowsheet Documentation  Taken 1/3/2024 1400 by Chapo Rodriguez RN  Medication Review/Management: medications reviewed  Taken 1/3/2024 1200 by Chapo Rodriguez RN  Medication Review/Management: medications reviewed  Taken 1/3/2024 1000 by Chapo Rodriguez RN  Medication Review/Management: medications reviewed  Taken 1/3/2024 0800 by Chapo Rodriguez RN  Medication Review/Management: medications reviewed     Problem: Diabetes Comorbidity  Goal: Blood Glucose Level Within Targeted Range  Outcome: Ongoing, Progressing     Problem: Heart Failure Comorbidity  Goal: Maintenance of Heart Failure  Symptom Control  Outcome: Ongoing, Progressing  Intervention: Maintain Heart Failure-Management  Recent Flowsheet Documentation  Taken 1/3/2024 1400 by Chapo Rodriguez RN  Medication Review/Management: medications reviewed  Taken 1/3/2024 1200 by Chapo Rodriguez RN  Medication Review/Management: medications reviewed  Taken 1/3/2024 1000 by Chapo Rodriguez RN  Medication Review/Management: medications reviewed  Taken 1/3/2024 0800 by Chapo Rodriguez RN  Medication Review/Management: medications reviewed     Problem: Hypertension Comorbidity  Goal: Blood Pressure in Desired Range  Outcome: Ongoing, Progressing  Intervention: Maintain Blood Pressure Management  Recent Flowsheet Documentation  Taken 1/3/2024 1400 by Chapo Rodriguez RN  Medication Review/Management: medications reviewed  Taken 1/3/2024 1200 by Chapo Rodriguez RN  Medication Review/Management: medications reviewed  Taken 1/3/2024 1000 by Chapo Rodriguez RN  Medication Review/Management: medications reviewed  Taken 1/3/2024 0800 by Chapo Rodriguez RN  Medication Review/Management: medications reviewed     Problem: Obstructive Sleep Apnea Risk or Actual Comorbidity Management  Goal: Unobstructed Breathing During Sleep  Outcome: Ongoing, Progressing     Problem: Osteoarthritis Comorbidity  Goal: Maintenance of Osteoarthritis Symptom Control  Outcome: Ongoing, Progressing  Intervention: Maintain Osteoarthritis Symptom Control  Recent Flowsheet Documentation  Taken 1/3/2024 1400 by Chapo Rodriguez RN  Medication Review/Management: medications reviewed  Taken 1/3/2024 1200 by Chapo Rodriguez RN  Medication Review/Management: medications reviewed  Taken 1/3/2024 1000 by Chapo Rodriguez RN  Medication Review/Management: medications reviewed  Taken 1/3/2024 0800 by Chapo Rodriguez RN  Medication Review/Management: medications reviewed     Problem: Pain Chronic (Persistent) (Comorbidity Management)  Goal: Acceptable Pain Control and Functional Ability  Outcome:  Ongoing, Progressing  Intervention: Manage Persistent Pain  Recent Flowsheet Documentation  Taken 1/3/2024 1400 by Chapo Rodriguez RN  Medication Review/Management: medications reviewed  Taken 1/3/2024 1200 by Chapo Rodriguez RN  Medication Review/Management: medications reviewed  Taken 1/3/2024 1000 by Chapo Rodriguez RN  Medication Review/Management: medications reviewed  Taken 1/3/2024 0800 by Chapo Rodriguez RN  Medication Review/Management: medications reviewed  Intervention: Develop Pain Management Plan  Recent Flowsheet Documentation  Taken 1/3/2024 1400 by Chapo Rodriguez RN  Pain Management Interventions:   position adjusted   pillow support provided  Taken 1/3/2024 0800 by Chapo Rodriguez RN  Pain Management Interventions: see MAR  Intervention: Optimize Psychosocial Wellbeing  Recent Flowsheet Documentation  Taken 1/3/2024 1400 by Chapo Rodriguez RN  Diversional Activities: television  Taken 1/3/2024 0800 by Chapo Rodriguez RN  Diversional Activities: television     Problem: Seizure Disorder Comorbidity  Goal: Maintenance of Seizure Control  Outcome: Ongoing, Progressing     Problem: Skin Injury Risk Increased  Goal: Skin Health and Integrity  Outcome: Ongoing, Progressing  Intervention: Optimize Skin Protection  Recent Flowsheet Documentation  Taken 1/3/2024 1400 by Chapo Rodriguez RN  Pressure Reduction Techniques:   frequent weight shift encouraged   weight shift assistance provided  Head of Bed (HOB) Positioning: [facility] elevated  Pressure Reduction Devices:   alternating pressure pump (ADD)   positioning supports utilized  Skin Protection: adhesive use limited  Taken 1/3/2024 0800 by Chapo Rodriguez RN  Pressure Reduction Techniques:   frequent weight shift encouraged   weight shift assistance provided  Head of Bed (HOB) Positioning: HOB elevated  Pressure Reduction Devices:   alternating pressure pump (ADD)   pressure-redistributing mattress utilized  Skin Protection: adhesive use limited     Problem:  Fall Injury Risk  Goal: Absence of Fall and Fall-Related Injury  Outcome: Ongoing, Progressing  Intervention: Identify and Manage Contributors  Recent Flowsheet Documentation  Taken 1/3/2024 1400 by Chapo Rodriguez RN  Medication Review/Management: medications reviewed  Taken 1/3/2024 1200 by Chapo Rodriguez RN  Medication Review/Management: medications reviewed  Taken 1/3/2024 1000 by Chapo Rodriguez RN  Medication Review/Management: medications reviewed  Taken 1/3/2024 0800 by Chapo Rodriguez RN  Medication Review/Management: medications reviewed  Intervention: Promote Injury-Free Environment  Recent Flowsheet Documentation  Taken 1/3/2024 1400 by Chapo Rodriguez RN  Safety Promotion/Fall Prevention:   room organization consistent   safety round/check completed  Taken 1/3/2024 1200 by Chapo Rodriguez RN  Safety Promotion/Fall Prevention:   safety round/check completed   room organization consistent  Taken 1/3/2024 1000 by Chapo Rodriguez RN  Safety Promotion/Fall Prevention:   safety round/check completed   room organization consistent  Taken 1/3/2024 0800 by Chapo Rodriguez RN  Safety Promotion/Fall Prevention:   safety round/check completed   room organization consistent   Goal Outcome Evaluation:

## 2024-01-04 ENCOUNTER — TELEPHONE (OUTPATIENT)
Dept: NEUROSURGERY | Facility: CLINIC | Age: 84
End: 2024-01-04
Payer: MEDICARE

## 2024-01-04 DIAGNOSIS — S06.5XAA SUBDURAL HEMATOMA: ICD-10-CM

## 2024-01-04 DIAGNOSIS — I60.9 SUBARACHNOID HEMORRHAGE: Primary | ICD-10-CM

## 2024-01-04 RX ORDER — SPIRONOLACTONE 25 MG/1
25 TABLET ORAL EVERY MORNING
Qty: 90 TABLET | Refills: 3 | Status: SHIPPED | OUTPATIENT
Start: 2024-01-04

## 2024-01-04 NOTE — TELEPHONE ENCOUNTER
Please call patient.  I sent in the new prescription for spironolactone.  She was taking a 50 mg tablet and cutting this in half.  I prescribed the 25 mg tablet to take 1 tablet daily.  Looks like furosemide is on hold after her fall.  Please let her know that I am sorry she had that fall.

## 2024-01-04 NOTE — TELEPHONE ENCOUNTER
I spoke to patient's daughter who verbalized understanding on taking Spironolactone 25 mg daily as well letting her know that we are aware the furosemide on hold after her fall. Also, told patient's daughter that we are sorry for the fall and we are sending well wishes.

## 2024-01-04 NOTE — TELEPHONE ENCOUNTER
June called from [facility] asking how long patient needs to wear her collar    Please call Dillan [phone] and informed her

## 2024-01-04 NOTE — TELEPHONE ENCOUNTER
Called patient and spoke with patients daughter, Jody, verified all info. Patient was at PT at the time of the phone call and let Jody know patients CT Head WO Contrast is scheduled for 1/17/24 @ 4:30pm with arrival time of 4:15pm at the [facility] on [facility], [address] [place] [address]. Jody explained she has been there before and knows exactly where this is and I also confirmed F/U appt with [facility] on 1/18/24 @ 2:00pm. Patient and daughter were okay with this and aware of all appointments.

## 2024-01-05 DIAGNOSIS — S12.000A CLOSED DISPLACED FRACTURE OF FIRST CERVICAL VERTEBRA, UNSPECIFIED FRACTURE MORPHOLOGY, INITIAL ENCOUNTER: Primary | ICD-10-CM

## 2024-01-05 NOTE — TELEPHONE ENCOUNTER
Mark Fry, TRINI Crump, Karin, MA  You sent me a staff message for someone asking how long Ms. simon needs to wear her cervical collar.  She needs to continue to wear it until she sees us and is cleared by neurosurgery.  Even then, she might still need to continue to wear it.        Called Nayeli Gilbert June was off today, I spoke with Alina and advised of the message from Danie above. She expressed understanding.

## 2024-01-17 ENCOUNTER — HOSPITAL ENCOUNTER (OUTPATIENT)
Facility: HOSPITAL | Age: 84
Discharge: HOME | End: 2024-01-17
Payer: MEDICARE

## 2024-01-17 DIAGNOSIS — I60.9 SUBARACHNOID HEMORRHAGE: ICD-10-CM

## 2024-01-17 DIAGNOSIS — S06.5XAA SUBDURAL HEMATOMA: ICD-10-CM

## 2024-01-17 PROCEDURE — 70450 CT HEAD/BRAIN W/O DYE: CPT

## 2024-01-18 NOTE — PROGRESS NOTES
Subjective   Patient ID: Ayana Reyna is a 83 y.o. female is here today for follow-up with CT head done on 1/4/2024. Having headaches on back of head and thinks its built up pressure from neck brace. Had nausea today only in past three weeks.     History of Present Illness  She is about 3 weeks out from traumatic injury with subdural hematoma and C1 ring fracture.  She is being seen today for follow-up head CT scan after traumatic parafalcine subdural hematoma.  She does not report any new issues today besides some mild nausea this morning.  She denies upper or lower extremity weakness, numbness, tingling, visual changes,.  She does report only mild intermittent occipital headaches.      The following portions of the patient's history were reviewed and updated as appropriate: allergies, current medications, past family history, past medical history, past social history, past surgical history, and problem list.    Review of Systems   Constitutional:  Negative for chills and fever.   HENT:  Negative for facial swelling.    Eyes:  Negative for visual disturbance.   Gastrointestinal:  Positive for nausea. Negative for vomiting.   Musculoskeletal:  Negative for gait problem.   Skin:  Negative for wound (redness, drainage, swelling).   Neurological:  Positive for headaches. Negative for dizziness, seizures, facial asymmetry, speech difficulty, weakness, light-headedness and numbness.   Psychiatric/Behavioral:  Positive for sleep disturbance. Negative for confusion.            Objective     Vitals:    01/19/24 1128   BP: 130/64   Pulse: 72   SpO2: 98%     There is no height or weight on file to calculate BMI.    Tobacco Use: Low Risk  (1/19/2024)    Patient History     Smoking Tobacco Use: Never     Smokeless Tobacco Use: Never     Passive Exposure: Not on file          Physical Exam  Vitals reviewed.   Constitutional:       General: She is not in acute distress.     Appearance: Normal appearance. She is not ill-appearing,  toxic-appearing or diaphoretic.   HENT:      Head: Normocephalic.      Nose: Nose normal.      Mouth/Throat:      Mouth: Mucous membranes are moist.      Pharynx: Oropharynx is clear.   Eyes:      Extraocular Movements: Extraocular movements intact.      Conjunctiva/sclera: Conjunctivae normal.      Pupils: Pupils are equal, round, and reactive to light.   Cardiovascular:      Rate and Rhythm: Normal rate.   Pulmonary:      Effort: Pulmonary effort is normal.   Musculoskeletal:         General: Normal range of motion.   Skin:     General: Skin is warm.   Neurological:      General: No focal deficit present.      Mental Status: She is alert and oriented to person, place, and time. Mental status is at baseline.      Cranial Nerves: Cranial nerves 2-12 are intact.      Coordination: Finger-Nose-Finger Test (On LUE.  RUE not able to be tested) normal.   Psychiatric:         Mood and Affect: Mood normal.         Speech: Speech normal.         Behavior: Behavior normal.         Thought Content: Thought content normal.         Judgment: Judgment normal.       Neurologic Exam     Mental Status   Oriented to person, place, and time.   Attention: normal. Concentration: normal.   Speech: speech is normal   Level of consciousness: alert  Knowledge: consistent with education.     Cranial Nerves   Cranial nerves II through XII intact.     CN II   Visual acuity: normal  Right visual field deficit: none  Left visual field deficit: none     CN III, IV, VI   Pupils are equal, round, and reactive to light.  Right pupil: Size: 3 mm. Shape: regular. Reactivity: brisk.   Left pupil: Size: 3 mm. Shape: regular. Reactivity: brisk.   CN III: no CN III palsy  CN VI: no CN VI palsy  Nystagmus: none   Diplopia: none  Upgaze: normal  Downgaze: normal    CN V   Facial sensation intact.   Right facial sensation deficit: none  Left facial sensation deficit: none    CN VII   Right facial weakness: none  Left facial weakness: none    CN VIII   CN  VIII normal.   Hearing: intact    CN IX, X   CN IX normal.   CN X normal.     CN XI   CN XI normal.     CN XII   CN XII normal.     Motor Exam   Muscle bulk: normal  Overall muscle tone: normal  Right arm tone: normal  Left arm pronator drift: absent  Right leg tone: normal  Left leg tone: normal    Strength   Right anterior tibial: 5/5  Left anterior tibial: 5/5  Right posterior tibial: 5/5  Left posterior tibial: 5/5  Right gastroc: 5/5  Left gastroc: 5/5No lower extremity drift.  No drift in left upper extremity.  Right upper extremity not testable due to right clavicular fracture-right arm in brace.    Bilateral hand  strength 5/5.     Sensory Exam   Light touch normal.     Gait, Coordination, and Reflexes     Gait  Gait: (Patient states she ambulates well with cane.)    Coordination   Finger to nose coordination: normal (On LUE.  RUE not able to be tested)          Assessment & Plan   Independent Review of Radiographic Studies:      I personally reviewed the images from the following studies.    CT head without contrast 2024:  FINDINGS: The small left parafalcine subdural hematoma which was present  previously has resolved. There is no evidence of new hemorrhage,  hydrocephalus or of acute infarction. Age-appropriate atrophy and mild  vascular calcification is noted. There is partial visualization of the  left maxillary sinus which is nearly completely opacified. This is new  versus 2023.     IMPRESSION:  The small left parafalcine subdural hematoma which was  present previously has resolved. There is no evidence of new hemorrhage.  There is new opacification of the left maxillary sinus which is only  partially visualized.    Medical Decision Makin-year-old female with traumatic fall down steps about 3 weeks ago.  She suffered traumatic falcine subdural hematoma along with a C1 ring fracture.  She is here today for evaluation of repeat CT head scan.  CT head scan is improved with  resolution of parafalcine subdural hematoma.  No new neurologic deficits on exam.  She does not appear to be in any discomfort today.  She is quite pleasant.  Cervical hard collar is in place.  We will see her again on February 26 for evaluation of cervical x-ray for C1 ring fracture.  She will call if she develops any new symptoms or issues in the meantime.    Diagnoses and all orders for this visit:    1. Subdural hematoma (Primary)    2. Closed nondisplaced fracture of first cervical vertebra with routine healing, unspecified fracture morphology, subsequent encounter      No follow-ups on file.

## 2024-01-19 ENCOUNTER — OFFICE VISIT (OUTPATIENT)
Dept: NEUROSURGERY | Facility: CLINIC | Age: 84
End: 2024-01-19
Payer: MEDICARE

## 2024-01-19 VITALS — OXYGEN SATURATION: 98 % | DIASTOLIC BLOOD PRESSURE: 64 MMHG | SYSTOLIC BLOOD PRESSURE: 130 MMHG | HEART RATE: 72 BPM

## 2024-01-19 DIAGNOSIS — S06.5XAA SUBDURAL HEMATOMA: Primary | ICD-10-CM

## 2024-01-19 DIAGNOSIS — S12.001D CLOSED NONDISPLACED FRACTURE OF FIRST CERVICAL VERTEBRA WITH ROUTINE HEALING, UNSPECIFIED FRACTURE MORPHOLOGY, SUBSEQUENT ENCOUNTER: ICD-10-CM

## 2024-01-19 RX ORDER — ZINC GLUCONATE 50 MG
50 TABLET ORAL
COMMUNITY

## 2024-01-19 RX ORDER — HYDROCODONE BITARTRATE AND ACETAMINOPHEN 5; 325 MG/1; MG/1
1 TABLET ORAL EVERY 6 HOURS PRN
COMMUNITY

## 2024-01-19 RX ORDER — ACETAMINOPHEN 500 MG
500 TABLET ORAL EVERY 6 HOURS PRN
COMMUNITY

## 2024-01-23 ENCOUNTER — HOME HEALTH ADMISSION (OUTPATIENT)
Dept: HOME HEALTH SERVICES | Facility: HOME HEALTHCARE | Age: 84
End: 2024-01-23
Payer: MEDICARE

## 2024-01-23 ENCOUNTER — DOCUMENTATION (OUTPATIENT)
Dept: HOME HEALTH SERVICES | Facility: HOME HEALTHCARE | Age: 84
End: 2024-01-23
Payer: MEDICARE

## 2024-01-23 ENCOUNTER — TRANSCRIBE ORDERS (OUTPATIENT)
Dept: HOME HEALTH SERVICES | Facility: HOME HEALTHCARE | Age: 84
End: 2024-01-23
Payer: MEDICARE

## 2024-01-23 ENCOUNTER — READMISSION MANAGEMENT (OUTPATIENT)
Dept: CALL CENTER | Facility: HOSPITAL | Age: 84
End: 2024-01-23
Payer: MEDICARE

## 2024-01-23 DIAGNOSIS — S06.5X9D TRAUMATIC SUBDURAL HEMORRHAGE WITH LOSS OF CONSCIOUSNESS, SUBSEQUENT ENCOUNTER: Primary | ICD-10-CM

## 2024-01-23 NOTE — PROGRESS NOTES
Received referral for home health from [facility].  Met with the patient on 1/23, coordinated home health, confirmed address and phone number, patient agreeable to home health.

## 2024-01-23 NOTE — OUTREACH NOTE
Prep Survey      Flowsheet Row Responses   [facility] facility patient discharged from? Non-BH   Is LACE score < 7 ? Non- Discharge   Eligibility [facility]   Date of Discharge 01/23/24   Discharge Disposition Home or Self Care   Discharge diagnosis Unspecified displaced fracture of first cervical vertebra, subsequent encounter for fracture with routine healing   Does the patient have one of the following disease processes/diagnoses(primary or secondary)? Other   Prep survey completed? Yes            Zoe GALAN - Registered Nurse

## 2024-01-24 ENCOUNTER — HOME CARE VISIT (OUTPATIENT)
Dept: HOME HEALTH SERVICES | Facility: HOME HEALTHCARE | Age: 84
End: 2024-01-24
Payer: MEDICARE

## 2024-01-24 ENCOUNTER — TRANSITIONAL CARE MANAGEMENT TELEPHONE ENCOUNTER (OUTPATIENT)
Dept: CALL CENTER | Facility: HOSPITAL | Age: 84
End: 2024-01-24
Payer: MEDICARE

## 2024-01-24 VITALS
HEIGHT: 61 IN | TEMPERATURE: 98.7 F | HEART RATE: 78 BPM | SYSTOLIC BLOOD PRESSURE: 122 MMHG | WEIGHT: 170 LBS | DIASTOLIC BLOOD PRESSURE: 78 MMHG | RESPIRATION RATE: 17 BRPM | BODY MASS INDEX: 32.1 KG/M2 | OXYGEN SATURATION: 98 %

## 2024-01-24 DIAGNOSIS — D50.9 IRON DEFICIENCY ANEMIA, UNSPECIFIED IRON DEFICIENCY ANEMIA TYPE: Primary | ICD-10-CM

## 2024-01-24 DIAGNOSIS — S81.802A LEG WOUND, LEFT, INITIAL ENCOUNTER: ICD-10-CM

## 2024-01-24 PROCEDURE — G0299 HHS/HOSPICE OF RN EA 15 MIN: HCPCS

## 2024-01-24 RX ORDER — FERROUS SULFATE 325(65) MG
325 TABLET ORAL
Qty: 90 TABLET | Refills: 1
Start: 2024-01-24

## 2024-01-24 NOTE — HOME HEALTH
"SOC Note:  83 year old, fall at home down flight of stairs admitted to [facility] for Traumatic Subdural Hemorrhage, Displaced Fracture of C1 Vertebra, Fracture of Right Clavicle, Subarachnoid Hemorrhage. Home from rehab, and is C-collar, sling to right arm, non weight bearing on right arm. old skin tears that have healed and no open wound noted. to right forarm. hx of vision issues MG, is currently using a cane in the home, prior patient was able to get around the house and live independently but not driving due to vision. Nursing is to complete blood work in 2 weeks and send to PCP Tracy Sy.    Home Health ordered for: disciplines SN, PT, OT     Focus of Care: Traumatic subdural hemorrgage with loss of consciousness     Patient's goal(s): \"to get back to baseline\"     Current Functional status/mobility/DME: sling, cane, c-collar, shower transfer chair.     HB status/Living Arrangements: lives at home alone with daughters in during the day and at night, patient states she doesnt need them all the time.     Skin Integrity/wound status: no open wound, old skin tears to right forarm     Code Status: FULL CODE   Medication issues/Concerns: Patient was not sent home with cont iron no perscription sent to pharmacy, I have reached out to attending   MD     Plan for next visit: Neuro Assessment, wound care"

## 2024-01-24 NOTE — Clinical Note
FERROUS SULFATE   SILVER SULFADIAZINE   Patient does not have these in the home from discharge from Rehab, please verfiy if patient needs to stay on medication.   Thank you   Carina HAAS [phone]

## 2024-01-24 NOTE — OUTREACH NOTE
Call Center TCM Note      Flowsheet Row Responses   [facility] patient discharged from? Non-  [[facility]]   Does the patient have one of the following disease processes/diagnoses(primary or secondary)? Other   TCM attempt successful? Yes   Call start time 0952   Call end time 0953   Discharge diagnosis Unspecified displaced fracture of first cervical vertebra, subsequent encounter for fracture with routine healing   Does the patient have all medications ordered at discharge? Yes   Is the patient taking all medications as directed (includes completed medication regime)? Yes   Comments Hospital f/u with PCP on 2/2   Does the patient have an appointment with their PCP within 7-14 days of discharge? Yes   What is the Home health agency?  [facility]   Has home health visited the patient within 72 hours of discharge? Call prior to 72 hours   Home health comments  nurse is coming today   Psychosocial issues? No   What is the patient's perception of their health status since discharge? Improving   Is the patient/caregiver able to teach back signs and symptoms related to disease process for when to call PCP? Yes   Is the patient/caregiver able to teach back signs and symptoms related to disease process for when to call 911? Yes   Is the patient/caregiver able to teach back the hierarchy of who to call/visit for symptoms/problems? PCP, Specialist, Home health nurse, Urgent Care, ED, 911 Yes   TCM call completed? Yes   Wrap up additional comments Doing well, states HH is coming today, confirmed hospital f/u appt with PCP office for 2/2.   Call end time 0953   Would this patient benefit from a Referral to [facility] Social Work? No   Is the patient interested in additional calls from an ambulatory ? No            Magdalena Sy RN    1/24/2024, 09:54 EST

## 2024-01-24 NOTE — Clinical Note
Hello, I am verifying that you will be signing off on Home Health skilled nursing orders.   Thank you   Carina HAAS   [phone]

## 2024-01-26 ENCOUNTER — HOME CARE VISIT (OUTPATIENT)
Dept: HOME HEALTH SERVICES | Facility: HOME HEALTHCARE | Age: 84
End: 2024-01-26
Payer: MEDICARE

## 2024-01-26 VITALS
DIASTOLIC BLOOD PRESSURE: 58 MMHG | OXYGEN SATURATION: 99 % | SYSTOLIC BLOOD PRESSURE: 114 MMHG | HEART RATE: 70 BPM | TEMPERATURE: 97.8 F

## 2024-01-26 PROCEDURE — G0152 HHCP-SERV OF OT,EA 15 MIN: HCPCS

## 2024-01-26 PROCEDURE — G0151 HHCP-SERV OF PT,EA 15 MIN: HCPCS

## 2024-01-26 NOTE — HOME HEALTH
"Physical Therapy Evaluation   Diagnosis: C1 fracture, right fractured clavicle  Focus of Care:  Surgical Procedure and date:  Pertinent Medical History: went upstairs to sleep and fell down the steps when coming down in the morning    Prior level of function:   Ambulation: independent with no device   Activities of daily living: independent   Instrumental activities of daily living: family assists, daughter takes to get groceries on Fridays   Driving: does not drive due to Macular Degeneration   Other/ Hobbies/Work:  audio books    Home Environment:  lives alone in home with steps to enter/ exit with railing  Goal for Physical Therapy: \"to get as close as possible to the way I was before\"  Skilled Physical Therapy indicated for instruction in gait, exercise, education and home safety.    Plan for next visit:"

## 2024-01-26 NOTE — Clinical Note
Dear Dr. Summers,   OT julia performed this date, 1/26/24, with OT requesting 2 visits x 2 weeks addressing UBD, bathing I, IADLs, and patient edu for increased home safety secondary to traumatic subdural hemorrage, R clavical fx, and C1 fx with decline in function.   Thank you,  Lidia Jordan OTR/SAMINA

## 2024-01-27 NOTE — HOME HEALTH
"REASON FOR REFERRAL: Pt is a 82 y/o female who was hospitalized due to mechanical fall down an entire staircase within the home resulting in a subdural hematoma, C1 fx, and R clavical fx. Pt completed rehab at SNF and has returned home with OT referral to establish safe ADL routine as pt is NWB RUE at time of eval.   PMHx: HTN, hypothyroidism and macular degeneration  OT's FOCUS OF CARE: OT to focus on patient progression with UBD, bathing, and light ROM in RUE (elbow, wrist, digits only).  SUBJECTIVE: \"I am just so happy to be alive after everything I've been through.\"  SOCIAL & ENVIRONMENTAL SITUATION: Patient lives in [facility] with a basement and several steps to enter the home. Pt does live home alone, but since this event, her family has been with her 24/7 in order to ensure her safety.  PATIENT'S &/OR CAREGIVER'S GOAL: I want to take care of myself well enough for my family to go home.  INTERVENTIONS: ADLs, IADLs, home safety, RUE ROM HEP  ASSESSMENT: Patient is SBA for functional mobility within the home and moves cautiously due to her macular degeneration. Pt is very cognitively intact and is receptive to skilled OT education. Patient does struggle with bathing on main floor as patient has a  tub only. She reported her son-in-law will be installing a HH shower head so she can sit on her shower chair and bathe with I. Pt anticipated to rehab well.   MEDICAL NECESSITY: Skilled OT services will progress patient I by establishing safe routines including functional mobility within the home with compensation for visual deficits, UBD, bathing,  pt edu, and AD/AE/DME recommendations. Skilled OT services are indicated in order to increase patient safety and I with every day tasks in order to increase QOL and safely age in place.  PLAN: OT to tx 2 week 2.  PLAN FOR NEXT VISIT: UBD, light ROM of RUE (elbow, wrist, digits only)."

## 2024-01-29 ENCOUNTER — HOME CARE VISIT (OUTPATIENT)
Dept: HOME HEALTH SERVICES | Facility: HOME HEALTHCARE | Age: 84
End: 2024-01-29
Payer: MEDICARE

## 2024-01-29 VITALS
SYSTOLIC BLOOD PRESSURE: 132 MMHG | HEART RATE: 49 BPM | DIASTOLIC BLOOD PRESSURE: 72 MMHG | OXYGEN SATURATION: 99 % | TEMPERATURE: 95.3 F | RESPIRATION RATE: 16 BRPM

## 2024-01-29 PROCEDURE — G0151 HHCP-SERV OF PT,EA 15 MIN: HCPCS

## 2024-01-29 NOTE — HOME HEALTH
Plan for next visit: review exercises and add as indicated    Subjective:  Today is not the best of days.  My shoulder and neck are bothering me.    Falls since last visit:  none  Home/Social Environment:  lives alone in home with steps, daughters assisting patient 24/7

## 2024-01-30 ENCOUNTER — HOME CARE VISIT (OUTPATIENT)
Dept: HOME HEALTH SERVICES | Facility: HOME HEALTHCARE | Age: 84
End: 2024-01-30
Payer: MEDICARE

## 2024-01-30 VITALS
DIASTOLIC BLOOD PRESSURE: 66 MMHG | TEMPERATURE: 98.1 F | HEART RATE: 75 BPM | RESPIRATION RATE: 18 BRPM | OXYGEN SATURATION: 98 % | SYSTOLIC BLOOD PRESSURE: 118 MMHG

## 2024-01-30 PROCEDURE — G0300 HHS/HOSPICE OF LPN EA 15 MIN: HCPCS

## 2024-01-30 NOTE — HOME HEALTH
Patient is CP assess r/t recent fall down her steps sustaining a fx to ey socket, fx to C1, skin tear to right arm.  Patient states that her daughters manage her wound care and she did not want SN to do dressing changes, she states her daughter is coming to night to do the dressing.  She is up and ambulatory with use of a cane and is wearing a C collar due to her neck fracture.

## 2024-01-31 ENCOUNTER — HOME CARE VISIT (OUTPATIENT)
Dept: HOME HEALTH SERVICES | Facility: HOME HEALTHCARE | Age: 84
End: 2024-01-31
Payer: MEDICARE

## 2024-01-31 VITALS
HEART RATE: 71 BPM | OXYGEN SATURATION: 95 % | SYSTOLIC BLOOD PRESSURE: 126 MMHG | DIASTOLIC BLOOD PRESSURE: 74 MMHG | TEMPERATURE: 98.2 F

## 2024-01-31 VITALS
TEMPERATURE: 97.5 F | OXYGEN SATURATION: 99 % | DIASTOLIC BLOOD PRESSURE: 78 MMHG | RESPIRATION RATE: 16 BRPM | HEART RATE: 73 BPM | SYSTOLIC BLOOD PRESSURE: 134 MMHG

## 2024-01-31 PROCEDURE — G0151 HHCP-SERV OF PT,EA 15 MIN: HCPCS

## 2024-01-31 PROCEDURE — G0152 HHCP-SERV OF OT,EA 15 MIN: HCPCS

## 2024-01-31 NOTE — HOME HEALTH
Routine Visit Note:    Skill/education provided: OT educated pt on PROM, pendulums, and supportive positioning when sleeping in recliner.    Patient/caregiver response: My R shoulder is in so much pain today.    Plan for next visit: shower    Other pertinent info: N/A

## 2024-01-31 NOTE — HOME HEALTH
Plan for next visit: review exercises, add more shoulder exercises    Subjective:  Saw the orthopedic surgeon and he said I can start using my arm but to wear the sling for resting the shoulder.      Falls since last visit:  none  Home/Social Environment:  lives alone in home with steps, daughters assisting patient 24/7

## 2024-02-02 ENCOUNTER — OFFICE VISIT (OUTPATIENT)
Dept: INTERNAL MEDICINE | Facility: CLINIC | Age: 84
End: 2024-02-02
Payer: MEDICARE

## 2024-02-02 ENCOUNTER — HOME CARE VISIT (OUTPATIENT)
Dept: HOME HEALTH SERVICES | Facility: HOME HEALTHCARE | Age: 84
End: 2024-02-02
Payer: MEDICARE

## 2024-02-02 VITALS
DIASTOLIC BLOOD PRESSURE: 74 MMHG | TEMPERATURE: 97.5 F | SYSTOLIC BLOOD PRESSURE: 122 MMHG | HEIGHT: 61 IN | BODY MASS INDEX: 34.17 KG/M2 | DIASTOLIC BLOOD PRESSURE: 80 MMHG | OXYGEN SATURATION: 99 % | WEIGHT: 181 LBS | OXYGEN SATURATION: 98 % | HEART RATE: 78 BPM | HEART RATE: 77 BPM | SYSTOLIC BLOOD PRESSURE: 134 MMHG

## 2024-02-02 DIAGNOSIS — M81.0 OSTEOPOROSIS, UNSPECIFIED OSTEOPOROSIS TYPE, UNSPECIFIED PATHOLOGICAL FRACTURE PRESENCE: ICD-10-CM

## 2024-02-02 DIAGNOSIS — S06.5XAA SUBDURAL HEMATOMA: Primary | ICD-10-CM

## 2024-02-02 DIAGNOSIS — I10 PRIMARY HYPERTENSION: ICD-10-CM

## 2024-02-02 DIAGNOSIS — I25.84 CORONARY ARTERY CALCIFICATION: ICD-10-CM

## 2024-02-02 DIAGNOSIS — S42.001D CLOSED NONDISPLACED FRACTURE OF RIGHT CLAVICLE WITH ROUTINE HEALING, UNSPECIFIED PART OF CLAVICLE, SUBSEQUENT ENCOUNTER: ICD-10-CM

## 2024-02-02 DIAGNOSIS — D50.9 IRON DEFICIENCY ANEMIA, UNSPECIFIED IRON DEFICIENCY ANEMIA TYPE: ICD-10-CM

## 2024-02-02 DIAGNOSIS — E03.9 ACQUIRED HYPOTHYROIDISM: ICD-10-CM

## 2024-02-02 DIAGNOSIS — E55.9 VITAMIN D DEFICIENCY: ICD-10-CM

## 2024-02-02 DIAGNOSIS — I25.10 CORONARY ARTERY CALCIFICATION: ICD-10-CM

## 2024-02-02 DIAGNOSIS — S12.001D CLOSED NONDISPLACED FRACTURE OF FIRST CERVICAL VERTEBRA WITH ROUTINE HEALING, UNSPECIFIED FRACTURE MORPHOLOGY, SUBSEQUENT ENCOUNTER: ICD-10-CM

## 2024-02-02 PROCEDURE — G0152 HHCP-SERV OF OT,EA 15 MIN: HCPCS

## 2024-02-02 RX ORDER — FUROSEMIDE 40 MG/1
1 TABLET ORAL DAILY
COMMUNITY

## 2024-02-02 NOTE — PROGRESS NOTES
"Chief Complaint  Hospital Follow Up Visit    Subjective    {Problem List  Visit Diagnosis   Encounters  Notes  Medications  Labs  Result Review Imaging  Media :23}    Ayana Reyna presents to [facility]  History of Present Illness  This is an 82 y/o female presenting to office for hospital f/u at  on 12/30/24 through 1/3/24. PMH includes HTN, hypothyroidism, macular degeneration. Patient had presented after falling down steps at home. Patient was found to have subarachnoid hemorrhage and SDH. Patient was also noted to have nondisplaced clavicular right fracture. Neurosurgery consulted and recommended serial CT scans for surveillance and stability. Patient did have new appearance of SDH measuring 4 mm. Patient was recommended close f/u in 2 weeks post d/c. Patient was also seen by orthopedics who recommended close f/u in 4 weeks. Patient was also noted to have C1 vertebral fracture and was palced in hard cervical collar. Patient was discharged to SNF.     Objective   Vital Signs:  /80 (BP Location: Left arm, Patient Position: Sitting, Cuff Size: Adult)   Pulse 78   Ht 154.9 cm (61\")   Wt 82.1 kg (181 lb)   SpO2 99%   BMI 34.20 kg/m²   Estimated body mass index is 34.2 kg/m² as calculated from the following:    Height as of this encounter: 154.9 cm (61\").    Weight as of this encounter: 82.1 kg (181 lb).               Physical Exam   Result Review :{Labs  Result Review  Imaging  Med Tab  Media  Procedures :23}    {The following data was reviewed by (Optional):03834}  {Ambulatory Labs (Optional):26230}  {Data reviewed (Optional):47029:::1}             Assessment and Plan {CC Problem List  Visit Diagnosis   ROS  Review (Popup)  [facility] Maintenance  Quality  BestPractice  Medications  SmartSets  SnapShot Encounters  Media :23}    There are no diagnoses linked to this encounter.       {Time Spent (Optional):96539}  Follow Up {Instructions Charge Capture  " Follow-up Communications :23}    No follow-ups on file.  Patient was given instructions and counseling regarding her condition or for health maintenance advice. Please see specific information pulled into the AVS if appropriate.

## 2024-02-02 NOTE — HOME HEALTH
Routine Visit Note:    Skill/education provided: Patient educated on best practice technique for getting in/out of tub with potential need to trial tub t/f bench vs sliding bench as pt has difficultly sliding bench on her own.    Patient/caregiver response: Pt receptive to edu and agreeable to recommendations.    Plan for next visit: tub t/f bench trials, ALVARO HERNÁNDEZ, home safety    Other pertinent info:N/A

## 2024-02-02 NOTE — PROGRESS NOTES
Transitional Care Follow Up Visit  Subjective     Ayana Reyna is a 83 y.o. female who presents for a transitional care management visit.    Within 48 business hours after discharge our office contacted her via telephone to coordinate her care and needs.      I reviewed and discussed the details of that call along with the discharge summary, hospital problems, inpatient lab results, inpatient diagnostic studies, and consultation reports with Ayana.     Current outpatient and discharge medications have been reconciled for the patient.  Reviewed by: TRINI Seaman          1/23/2024     1:14 PM   Date of TCM Phone Call   [facility]   Date of Discharge 1/23/2024   Discharge Disposition Home or Self Care     Risk for Readmission (LACE) No data recorded    History of Present Illness   Course During Hospital Stay:  This is an 82 y/o female presenting to office for hospital f/u at  on 12/30/24 through 1/3/24. PMH includes HTN, hypothyroidism, macular degeneration. Patient had presented after falling down steps at home. Patient was found to have subarachnoid hemorrhage and SDH. Patient was also noted to have nondisplaced clavicular right fracture. Neurosurgery consulted and recommended serial CT scans for surveillance and stability. Patient did have new appearance of SDH measuring 4 mm. Patient was recommended close f/u in 2 weeks post d/c. Patient was also seen by orthopedics who recommended close f/u in 4 weeks. Patient was also noted to have C1 vertebral fracture and was palced in hard cervical collar. Patient was discharged to SNF. SNF discharged patient 1 week ago.     Patient is currently working with PT/OT through . Following with Dr. Patel for her clavicular fx and also seeing neurosurgery for SDH and c1 fracture. Patient reports she was recommended to continue with hard cervical collar until February 26th. Has f/u scheduled on 2/26/24. Continues wearing sling for right arm.  Reports pain controlled.      The following portions of the patient's history were reviewed and updated as appropriate: allergies, current medications, past family history, past medical history, past social history, past surgical history, and problem list.    ED to Hosp-Admission (Discharged) with Faustino Jack MD; Cooper Leung MD (12/30/2023)     Objective   Physical Exam  Constitutional:       Appearance: Normal appearance.   HENT:      Head: Normocephalic and atraumatic.      Right Ear: External ear normal.      Left Ear: External ear normal.      Nose: Nose normal.      Mouth/Throat:      Mouth: Mucous membranes are moist.      Pharynx: Oropharynx is clear.   Eyes:      Conjunctiva/sclera: Conjunctivae normal.      Pupils: Pupils are equal, round, and reactive to light.   Neck:      Comments: C-collar applied  Cardiovascular:      Rate and Rhythm: Normal rate and regular rhythm.      Pulses: Normal pulses.      Heart sounds: Murmur heard.      Systolic murmur is present with a grade of 2/6.      No friction rub. No gallop.   Pulmonary:      Effort: Pulmonary effort is normal. No respiratory distress.      Breath sounds: Normal breath sounds. No stridor. No wheezing, rhonchi or rales.   Skin:     General: Skin is warm and dry.      Capillary Refill: Capillary refill takes less than 2 seconds.   Neurological:      Mental Status: She is alert and oriented to person, place, and time. Mental status is at baseline.      Gait: Gait abnormal.      Comments: Using cane   Psychiatric:         Mood and Affect: Mood normal.         Thought Content: Thought content normal.         Judgment: Judgment normal.         Assessment & Plan   Problems Addressed this Visit          Cardiac and Vasculature    Hypertension     Hypertension is improving with treatment.  Continue current treatment regimen.  Blood pressure will be reassessed at the next regular appointment.         Relevant Medications    furosemide  (LASIX) 40 MG tablet    Other Relevant Orders    CBC & Differential    Comprehensive metabolic panel    Coronary artery calcification     Following with cardiology  Not on aspirin at this time            Endocrine and Metabolic    Vitamin D deficiency     Continues on vitamin D supplementation          Acquired hypothyroidism     Continues on synthroid             Hematology and Neoplasia    Iron deficiency anemia     Continues on iron supplementation             Musculoskeletal and Injuries    Osteoporosis     Will discuss further treatment at next visit   We can discuss dexa scan at next appt           Closed fracture of right clavicle     Continues wearing sling to help with pain control  Saw Dr. Hanna            Neuro    Closed fracture of first cervical vertebra     Continues following with neurosurgery  Continues wearing cervical collar at all times          Subdural hematoma - Primary     Continues following with neurosurgery           Diagnoses         Codes Comments    Subdural hematoma    -  Primary ICD-10-CM: S06.5XAA  ICD-9-CM: 432.1     Closed nondisplaced fracture of first cervical vertebra with routine healing, unspecified fracture morphology, subsequent encounter     ICD-10-CM: S12.001D  ICD-9-CM: V54.17     Closed nondisplaced fracture of right clavicle with routine healing, unspecified part of clavicle, subsequent encounter     ICD-10-CM: S42.001D  ICD-9-CM: V54.11     Primary hypertension     ICD-10-CM: I10  ICD-9-CM: 401.9     Acquired hypothyroidism     ICD-10-CM: E03.9  ICD-9-CM: 244.9     Coronary artery calcification     ICD-10-CM: I25.10, I25.84  ICD-9-CM: 414.00, 414.4     Vitamin D deficiency     ICD-10-CM: E55.9  ICD-9-CM: 268.9     Iron deficiency anemia, unspecified iron deficiency anemia type     ICD-10-CM: D50.9  ICD-9-CM: 280.9     Osteoporosis, unspecified osteoporosis type, unspecified pathological fracture presence     ICD-10-CM: M81.0  ICD-9-CM: 733.00

## 2024-02-03 LAB
ALBUMIN SERPL-MCNC: 4 G/DL (ref 3.5–5.2)
ALBUMIN/GLOB SERPL: 2.1 G/DL
ALP SERPL-CCNC: 99 U/L (ref 39–117)
ALT SERPL-CCNC: 11 U/L (ref 1–33)
AST SERPL-CCNC: 13 U/L (ref 1–32)
BASOPHILS # BLD AUTO: 0.06 10*3/MM3 (ref 0–0.2)
BASOPHILS NFR BLD AUTO: 0.7 % (ref 0–1.5)
BILIRUB SERPL-MCNC: 0.8 MG/DL (ref 0–1.2)
BUN SERPL-MCNC: 22 MG/DL (ref 8–23)
BUN/CREAT SERPL: 21.6 (ref 7–25)
CALCIUM SERPL-MCNC: 9.4 MG/DL (ref 8.6–10.5)
CHLORIDE SERPL-SCNC: 108 MMOL/L (ref 98–107)
CO2 SERPL-SCNC: 24.8 MMOL/L (ref 22–29)
CREAT SERPL-MCNC: 1.02 MG/DL (ref 0.57–1)
EGFRCR SERPLBLD CKD-EPI 2021: 54.4 ML/MIN/1.73
EOSINOPHIL # BLD AUTO: 0.33 10*3/MM3 (ref 0–0.4)
EOSINOPHIL NFR BLD AUTO: 4 % (ref 0.3–6.2)
ERYTHROCYTE [DISTWIDTH] IN BLOOD BY AUTOMATED COUNT: 11.8 % (ref 12.3–15.4)
GLOBULIN SER CALC-MCNC: 1.9 GM/DL
GLUCOSE SERPL-MCNC: 100 MG/DL (ref 65–99)
HCT VFR BLD AUTO: 30.5 % (ref 34–46.6)
HGB BLD-MCNC: 10 G/DL (ref 12–15.9)
IMM GRANULOCYTES # BLD AUTO: 0.02 10*3/MM3 (ref 0–0.05)
IMM GRANULOCYTES NFR BLD AUTO: 0.2 % (ref 0–0.5)
LYMPHOCYTES # BLD AUTO: 2.75 10*3/MM3 (ref 0.7–3.1)
LYMPHOCYTES NFR BLD AUTO: 33.6 % (ref 19.6–45.3)
MCH RBC QN AUTO: 32.7 PG (ref 26.6–33)
MCHC RBC AUTO-ENTMCNC: 32.8 G/DL (ref 31.5–35.7)
MCV RBC AUTO: 99.7 FL (ref 79–97)
MONOCYTES # BLD AUTO: 0.55 10*3/MM3 (ref 0.1–0.9)
MONOCYTES NFR BLD AUTO: 6.7 % (ref 5–12)
NEUTROPHILS # BLD AUTO: 4.48 10*3/MM3 (ref 1.7–7)
NEUTROPHILS NFR BLD AUTO: 54.8 % (ref 42.7–76)
NRBC BLD AUTO-RTO: 0.1 /100 WBC (ref 0–0.2)
PLATELET # BLD AUTO: 217 10*3/MM3 (ref 140–450)
POTASSIUM SERPL-SCNC: 4.9 MMOL/L (ref 3.5–5.2)
PROT SERPL-MCNC: 5.9 G/DL (ref 6–8.5)
RBC # BLD AUTO: 3.06 10*6/MM3 (ref 3.77–5.28)
SODIUM SERPL-SCNC: 144 MMOL/L (ref 136–145)
WBC # BLD AUTO: 8.19 10*3/MM3 (ref 3.4–10.8)

## 2024-02-05 ENCOUNTER — HOME CARE VISIT (OUTPATIENT)
Dept: HOME HEALTH SERVICES | Facility: HOME HEALTHCARE | Age: 84
End: 2024-02-05
Payer: MEDICARE

## 2024-02-05 VITALS
SYSTOLIC BLOOD PRESSURE: 108 MMHG | TEMPERATURE: 97.5 F | RESPIRATION RATE: 16 BRPM | OXYGEN SATURATION: 98 % | DIASTOLIC BLOOD PRESSURE: 62 MMHG | HEART RATE: 77 BPM

## 2024-02-05 VITALS
TEMPERATURE: 97.5 F | OXYGEN SATURATION: 98 % | HEART RATE: 73 BPM | DIASTOLIC BLOOD PRESSURE: 68 MMHG | SYSTOLIC BLOOD PRESSURE: 118 MMHG

## 2024-02-05 PROCEDURE — G0152 HHCP-SERV OF OT,EA 15 MIN: HCPCS

## 2024-02-05 PROCEDURE — G0151 HHCP-SERV OF PT,EA 15 MIN: HCPCS

## 2024-02-05 NOTE — PROGRESS NOTES
Please let patient know--  Kidney function stable at patient's baseline-- recommend making sure to get 64 ounces h20 daily.   CBC improving; now up to 10 which is stable.

## 2024-02-05 NOTE — HOME HEALTH
Plan for next visit: review exercises, add exercises as able    Subjective:  I've been by myself since yesterday afternoon at 3.  I've made my dinner, bathed, brushed my teeth, changed clothes.  The only problem is my shoulder.        Falls since last visit:  none  Home/Social Environment:  lives alone in home with steps, daughters assisting patient 24/7

## 2024-02-06 ENCOUNTER — HOME CARE VISIT (OUTPATIENT)
Dept: HOME HEALTH SERVICES | Facility: HOME HEALTHCARE | Age: 84
End: 2024-02-06
Payer: MEDICARE

## 2024-02-06 PROCEDURE — G0300 HHS/HOSPICE OF LPN EA 15 MIN: HCPCS

## 2024-02-06 NOTE — HOME HEALTH
Routine Visit Note:    Skill/education provided: OT educated and demo'd to put use of over the door pulley for AAROM with limited shoulder flexion to reduce pain and reduce risk of frozen shoulder.    Patient/caregiver response: Pt reported she enjoyed the pulley and intends to use daily.    Plan for next visit: DC    Other pertinent info: Pt reported she feels increasingly I with ADLs/IADLs throughout the home and is encouraged by her progression.

## 2024-02-07 ENCOUNTER — HOME CARE VISIT (OUTPATIENT)
Dept: HOME HEALTH SERVICES | Facility: HOME HEALTHCARE | Age: 84
End: 2024-02-07
Payer: MEDICARE

## 2024-02-07 VITALS — HEART RATE: 89 BPM | OXYGEN SATURATION: 99 % | TEMPERATURE: 97.2 F

## 2024-02-07 VITALS
DIASTOLIC BLOOD PRESSURE: 58 MMHG | OXYGEN SATURATION: 99 % | SYSTOLIC BLOOD PRESSURE: 132 MMHG | TEMPERATURE: 98.2 F | RESPIRATION RATE: 18 BRPM | HEART RATE: 74 BPM

## 2024-02-07 PROCEDURE — G0152 HHCP-SERV OF OT,EA 15 MIN: HCPCS

## 2024-02-07 NOTE — HOME HEALTH
Patients wound to her left arm is now healed.  She remains in her C collar- she is up and ambulatory.  Family is great support.  She is ready for D/C.

## 2024-02-07 NOTE — HOME HEALTH
Routine Visit Note:    Skill/education provided: OT educated pt on limiting her AAROM in her R shoulder if pain is above a 6 or pain worsens with time but to continue multiple planes of motion as well as try to not compensate movements while in pain free range to keep joint from getting stiff or frozen.    Patient/caregiver response: Pt able to return demo with 100% and agreeable to recommendations.    Plan for next visit: N/A    Other pertinent info: N/A

## 2024-02-07 NOTE — Clinical Note
OT DC this date 2/7/24. Pt is MI with all ADLs with the exception of bathing which is SBA. Pt has very strong family support for bathing and has all recommended AE with no safety concerns at this time.    Thanks,   Lidia Jordan OTR/L

## 2024-02-08 ENCOUNTER — HOME CARE VISIT (OUTPATIENT)
Dept: HOME HEALTH SERVICES | Facility: HOME HEALTHCARE | Age: 84
End: 2024-02-08
Payer: MEDICARE

## 2024-02-08 VITALS
RESPIRATION RATE: 18 BRPM | HEART RATE: 74 BPM | TEMPERATURE: 98.8 F | SYSTOLIC BLOOD PRESSURE: 130 MMHG | DIASTOLIC BLOOD PRESSURE: 80 MMHG | OXYGEN SATURATION: 98 %

## 2024-02-08 PROCEDURE — G0299 HHS/HOSPICE OF RN EA 15 MIN: HCPCS

## 2024-02-09 ENCOUNTER — HOME CARE VISIT (OUTPATIENT)
Dept: HOME HEALTH SERVICES | Facility: HOME HEALTHCARE | Age: 84
End: 2024-02-09
Payer: MEDICARE

## 2024-02-09 VITALS
DIASTOLIC BLOOD PRESSURE: 62 MMHG | OXYGEN SATURATION: 99 % | SYSTOLIC BLOOD PRESSURE: 112 MMHG | RESPIRATION RATE: 16 BRPM | HEART RATE: 73 BPM | TEMPERATURE: 97.3 F

## 2024-02-09 PROCEDURE — G0151 HHCP-SERV OF PT,EA 15 MIN: HCPCS

## 2024-02-09 NOTE — HOME HEALTH
Plan for next visit: continue seeing 1/ per week until patient sees spinal surgeon, review exercises, add as able    Subjective:  I'm going out on my deck this afternoon.  Slept in the bed with a wedge last night.  I did fine getting out.         Falls since last visit:  none  Home/Social Environment:  lives alone in home with steps, daughters assisting patient as needed

## 2024-02-14 ENCOUNTER — HOME CARE VISIT (OUTPATIENT)
Dept: HOME HEALTH SERVICES | Facility: HOME HEALTHCARE | Age: 84
End: 2024-02-14
Payer: MEDICARE

## 2024-02-16 ENCOUNTER — HOME CARE VISIT (OUTPATIENT)
Dept: HOME HEALTH SERVICES | Facility: HOME HEALTHCARE | Age: 84
End: 2024-02-16
Payer: MEDICARE

## 2024-02-16 VITALS
HEART RATE: 52 BPM | TEMPERATURE: 97.3 F | RESPIRATION RATE: 16 BRPM | DIASTOLIC BLOOD PRESSURE: 62 MMHG | SYSTOLIC BLOOD PRESSURE: 110 MMHG | OXYGEN SATURATION: 99 %

## 2024-02-16 PROCEDURE — G0151 HHCP-SERV OF PT,EA 15 MIN: HCPCS

## 2024-02-21 ENCOUNTER — TELEPHONE (OUTPATIENT)
Dept: NEUROSURGERY | Facility: CLINIC | Age: 84
End: 2024-02-21
Payer: MEDICARE

## 2024-02-21 ENCOUNTER — HOME CARE VISIT (OUTPATIENT)
Dept: HOME HEALTH SERVICES | Facility: HOME HEALTHCARE | Age: 84
End: 2024-02-21
Payer: MEDICARE

## 2024-02-21 VITALS
HEART RATE: 78 BPM | RESPIRATION RATE: 16 BRPM | SYSTOLIC BLOOD PRESSURE: 110 MMHG | TEMPERATURE: 97.1 F | OXYGEN SATURATION: 98 % | DIASTOLIC BLOOD PRESSURE: 62 MMHG

## 2024-02-21 PROCEDURE — G0151 HHCP-SERV OF PT,EA 15 MIN: HCPCS

## 2024-02-21 NOTE — TELEPHONE ENCOUNTER
Called and s/w patient and she will have xray done prior to f/u Monday. She is going to come in early that day.

## 2024-02-21 NOTE — PROGRESS NOTES
Subjective   Patient ID: Ayana Reyna is a 83 y.o. female is here today for follow-up with xray cervical done on 2/26/24. Right sided pain from head to shoulder.     History of Present Illness    She follows up in the office today with history of a traumatic fall in mid December resulting in subdural hematoma and C1 ring fracture.  At her last office visit in mid January CT head imaging revealed resolution of the parafalcine subdural hematoma.  She follows up with new cervical x-rays for continued surveillance of the C1 fracture.    She denies any new problems regarding her neck, and also denies any radiating arm pain.  She does have chronic right shoulder issues and pain that have worsened since wearing the hard collar.  She now has difficulty moving the right arm in certain directions.  She is being followed closely by Ortho for this issue and was planning total shoulder replacement surgery before her fall.  She has been compliant wearing the cervical hard collar at all times.  She is hopeful to get out of the collar soon.    She presents with her daughter.    The following portions of the patient's history were reviewed and updated as appropriate: allergies, current medications, past family history, past medical history, past social history, past surgical history, and problem list.    Review of Systems   HENT:  Negative for trouble swallowing.    Genitourinary:  Negative for difficulty urinating and enuresis.   Musculoskeletal:  Positive for neck pain. Negative for gait problem and neck stiffness.   Neurological:  Negative for weakness and numbness.       /78   Pulse 75   LMP  (LMP Unknown)   SpO2 98%         Objective     Vitals:    02/26/24 1351   BP: 126/78   Pulse: 75   SpO2: 98%     There is no height or weight on file to calculate BMI.    Tobacco Use: Low Risk  (2/26/2024)    Patient History     Smoking Tobacco Use: Never     Smokeless Tobacco Use: Never     Passive Exposure: Not on file           Physical Exam  Vitals reviewed.   Constitutional:       Appearance: Normal appearance.   HENT:      Head: Atraumatic.   Pulmonary:      Effort: Pulmonary effort is normal.   Musculoskeletal:         General: Tenderness (Minimal cervical muscular tenderness) present.      Comments: Wearing an ill-fitting cervical hard collar adjusted for proper fit   Skin:     General: Skin is warm and dry.   Neurological:      Mental Status: She is alert and oriented to person, place, and time.      GCS: GCS eye subscore is 4. GCS verbal subscore is 5. GCS motor subscore is 6.      Motor: Weakness (Right upper extremity weakness, chronic and unchanged) present. No tremor.      Gait: Gait normal.      Comments: Gait is stable and upright   Psychiatric:         Mood and Affect: Mood normal.       Neurologic Exam     Mental Status   Oriented to person, place, and time.           Assessment & Plan   Independent Review of Radiographic Studies:      I personally reviewed the images from the following studies.    Cervical x-rays reviewed and discussed with Dr Moreno shows stable findings with no displacement of the C1 fracture.    Medical Decision Making:      She returns office today for second follow-up with history of a traumatic C1 ring fracture status post fall.  Exam as noted above, no red flags.  The collar was very ill fitting and loose, I instructed her on how to adjust it for proper fit.  Dr Moreno I reviewed her cervical x-rays and the is stable.  We will have her return to the office in 1 month with CT cervical spine imaging for better evaluation of the C1 ring fracture.  Please she will be able to get her collar off at that office visit if stable.  In the interim, she will continue to wear it at all times as directed.  No lifting more than 5 to 10 pounds.    Plan: Return to office in 1 month with CT cervical spine imaging    Diagnoses and all orders for this visit:    1. Closed nondisplaced fracture of first cervical  vertebra with routine healing, unspecified fracture morphology, subsequent encounter (Primary)  -     CT Cervical Spine Without Contrast; Future      Return in about 4 weeks (around 3/25/2024).

## 2024-02-21 NOTE — CASE COMMUNICATION
Plan to continue seeing patient 1w3 in anticipation that cervical collar will be removed on Monday.

## 2024-02-21 NOTE — HOME HEALTH
Case Conference note: 30 Day             Care to continue into the next 30 days?  Yes             Do goals require any revisions?  no    Change in Primary Diagnosis or focus of care? no    Functional status changes  Patient improving in all areas.  Plan for next 30 days is to address cervical ROM.  Patient sees Neurologist Monday 2/26/23 and hopefully will get to remove her cervical collar.  If patient not able to remove collar she will be discharged to [facility].    LUPA risk 2nd 30 days?  possible if patient has to remain in cervical collar    SDOH Changes /Barriers: (i.e. Caregiver availability, social isolation, environment, income, transportation access, food insecurity, etc.) no  Need for MSW referral? no    Plan for next visit: see 1 visit next week, add neck ROM if collar removed    Subjective:  I tried doing the arm exercises and my shoulder hurts so bad so today I'm not doing them.  Otherwise, OK.  I'm tired because I had 3 great grand children over here all day yesterday.       Falls since last visit:  none  Home/Social Environment:  lives alone in home with steps, daughters assisting patient as needed

## 2024-02-23 ENCOUNTER — HOME CARE VISIT (OUTPATIENT)
Dept: HOME HEALTH SERVICES | Facility: HOME HEALTHCARE | Age: 84
End: 2024-02-23
Payer: MEDICARE

## 2024-02-23 NOTE — CASE COMMUNICATION
The Physical Therapy Visit for the above patient was missed on 2/23/24 due to not being medically necessary.  Therefore the prescribed frequency of visits was not met.      If you have any questions or would like further information about this patient please contact RADHA MccoyS at [phone].  Thank you for allowing us to assist you in the care of your patients.

## 2024-02-26 ENCOUNTER — HOSPITAL ENCOUNTER (OUTPATIENT)
Dept: GENERAL RADIOLOGY | Facility: HOSPITAL | Age: 84
Discharge: HOME | End: 2024-02-26
Payer: MEDICARE

## 2024-02-26 ENCOUNTER — OFFICE VISIT (OUTPATIENT)
Dept: NEUROSURGERY | Facility: CLINIC | Age: 84
End: 2024-02-26
Payer: MEDICARE

## 2024-02-26 VITALS — HEART RATE: 75 BPM | OXYGEN SATURATION: 98 % | DIASTOLIC BLOOD PRESSURE: 78 MMHG | SYSTOLIC BLOOD PRESSURE: 126 MMHG

## 2024-02-26 DIAGNOSIS — S12.000A CLOSED DISPLACED FRACTURE OF FIRST CERVICAL VERTEBRA, UNSPECIFIED FRACTURE MORPHOLOGY, INITIAL ENCOUNTER: ICD-10-CM

## 2024-02-26 DIAGNOSIS — S12.001D CLOSED NONDISPLACED FRACTURE OF FIRST CERVICAL VERTEBRA WITH ROUTINE HEALING, UNSPECIFIED FRACTURE MORPHOLOGY, SUBSEQUENT ENCOUNTER: Primary | ICD-10-CM

## 2024-02-26 PROCEDURE — 1160F RVW MEDS BY RX/DR IN RCRD: CPT | Performed by: NURSE PRACTITIONER

## 2024-02-26 PROCEDURE — 3074F SYST BP LT 130 MM HG: CPT | Performed by: NURSE PRACTITIONER

## 2024-02-26 PROCEDURE — 3078F DIAST BP <80 MM HG: CPT | Performed by: NURSE PRACTITIONER

## 2024-02-26 PROCEDURE — 1159F MED LIST DOCD IN RCRD: CPT | Performed by: NURSE PRACTITIONER

## 2024-02-26 PROCEDURE — 72040 X-RAY EXAM NECK SPINE 2-3 VW: CPT

## 2024-02-26 PROCEDURE — 99213 OFFICE O/P EST LOW 20 MIN: CPT | Performed by: NURSE PRACTITIONER

## 2024-02-28 ENCOUNTER — HOME CARE VISIT (OUTPATIENT)
Dept: HOME HEALTH SERVICES | Facility: HOME HEALTHCARE | Age: 84
End: 2024-02-28
Payer: MEDICARE

## 2024-02-28 VITALS
TEMPERATURE: 97.5 F | SYSTOLIC BLOOD PRESSURE: 122 MMHG | RESPIRATION RATE: 16 BRPM | OXYGEN SATURATION: 98 % | HEART RATE: 71 BPM | DIASTOLIC BLOOD PRESSURE: 64 MMHG

## 2024-02-28 PROCEDURE — G0151 HHCP-SERV OF PT,EA 15 MIN: HCPCS

## 2024-02-28 NOTE — HOME HEALTH
Plan for next visit: see 1 visit next week, add neck ROM if collar removed    Subjective:  I went to the doctor.  They couldn't tell by the x-ray if it was healed so I go tomorrow for a CT scan.  It could be another month.  I think it's the weather because I hurt everywhere today.         Falls since last visit:  none  Home/Social Environment:  lives alone in home with steps, daughters assisting patient as needed

## 2024-02-29 ENCOUNTER — TELEPHONE (OUTPATIENT)
Dept: NEUROSURGERY | Facility: CLINIC | Age: 84
End: 2024-02-29
Payer: MEDICARE

## 2024-02-29 NOTE — TELEPHONE ENCOUNTER
----- Message from TRINI Parra sent at 2/26/2024  3:49 PM EST -----  Please call and let her know that I reviewed her cervical x-rays with Dr Moreno.  They do appear stable.  She will need to keep the collar on for additional month and then we will have her return to the office with new CT cervical spine imaging.  Hopefully she will get the collar off in 1 month! thanks

## 2024-03-04 NOTE — TELEPHONE ENCOUNTER
Caller: Angelica Solis    Relationship to patient: Emergency Contact    Best call back number: [phone]    Chief complaint: NECK BRACE CAUSING RAWNESS    Type of visit: F/U WITH CT 3-26-24    Requested date: ASAP AFTER 3-26-24     If rescheduling, when is the original appointment: 4-4-24     Additional notes:HUB F/A WITH BIRD 4-15 SO HUB SCHEDULE F/A WITH KEELEY DO TO PT WANTING A SOONER APPT. PT WOULD LIKE TO COME IN SOONER THAN 4-4-24 DUE TO THE NECK BRACE CAUSING RAWNESS ON HER SKIN     PLEASE CALL IF ABLE TO GET PT IN SOONER    THANK YOU

## 2024-03-04 NOTE — TELEPHONE ENCOUNTER
Received [employer] App message to schedule appt with Yoselin Spivey by pt. Called pt who stated they had just got off the phone with our office and the 04/04/2024 ppt with Tiffanie is fine.

## 2024-03-06 ENCOUNTER — HOME CARE VISIT (OUTPATIENT)
Dept: HOME HEALTH SERVICES | Facility: HOME HEALTHCARE | Age: 84
End: 2024-03-06
Payer: MEDICARE

## 2024-03-06 VITALS
DIASTOLIC BLOOD PRESSURE: 64 MMHG | OXYGEN SATURATION: 99 % | RESPIRATION RATE: 16 BRPM | SYSTOLIC BLOOD PRESSURE: 132 MMHG | HEART RATE: 80 BPM | TEMPERATURE: 96.9 F

## 2024-03-06 PROCEDURE — G0151 HHCP-SERV OF PT,EA 15 MIN: HCPCS

## 2024-03-06 NOTE — HOME HEALTH
Plan for next visit: [facility] discharge    Subjective:  I have to wear this collar until at least April 4.  CT on March 26.  They said the x-ray looked like it had not completely healed.  I feel tired because I'm not sleeping.    I'm selling my house and I've found my dream patio home.       Falls since last visit:  none  Home/Social Environment:  lives alone in home with steps, daughters assisting patient as needed

## 2024-03-13 ENCOUNTER — HOME CARE VISIT (OUTPATIENT)
Dept: HOME HEALTH SERVICES | Facility: HOME HEALTHCARE | Age: 84
End: 2024-03-13
Payer: MEDICARE

## 2024-03-13 VITALS
SYSTOLIC BLOOD PRESSURE: 110 MMHG | HEART RATE: 72 BPM | RESPIRATION RATE: 16 BRPM | DIASTOLIC BLOOD PRESSURE: 72 MMHG | TEMPERATURE: 96.6 F | OXYGEN SATURATION: 99 %

## 2024-03-13 PROCEDURE — G0151 HHCP-SERV OF PT,EA 15 MIN: HCPCS

## 2024-03-13 NOTE — HOME HEALTH
Subjective:  I get this off April 4th.  And I'm going on my [place] Cruise April 17.  I'm going to do it. I've been walking more.    Falls since last visit:  none  Home/Social Environment:  lives alone in home with steps, daughters assisting patient as needed

## 2024-03-23 DIAGNOSIS — E78.49 OTHER HYPERLIPIDEMIA: ICD-10-CM

## 2024-03-25 RX ORDER — ATORVASTATIN CALCIUM 20 MG/1
TABLET, FILM COATED ORAL
Qty: 90 TABLET | Refills: 0 | Status: SHIPPED | OUTPATIENT
Start: 2024-03-25 | End: 2024-03-28 | Stop reason: SDUPTHER

## 2024-03-26 ENCOUNTER — HOSPITAL ENCOUNTER (OUTPATIENT)
Facility: HOSPITAL | Age: 84
Discharge: HOME | End: 2024-03-26
Admitting: NURSE PRACTITIONER
Payer: MEDICARE

## 2024-03-26 DIAGNOSIS — S12.001D CLOSED NONDISPLACED FRACTURE OF FIRST CERVICAL VERTEBRA WITH ROUTINE HEALING, UNSPECIFIED FRACTURE MORPHOLOGY, SUBSEQUENT ENCOUNTER: ICD-10-CM

## 2024-03-26 PROCEDURE — 72125 CT NECK SPINE W/O DYE: CPT

## 2024-03-28 ENCOUNTER — OFFICE VISIT (OUTPATIENT)
Dept: CARDIOLOGY | Facility: CLINIC | Age: 84
End: 2024-03-28
Payer: MEDICARE

## 2024-03-28 VITALS
HEIGHT: 61 IN | HEART RATE: 73 BPM | SYSTOLIC BLOOD PRESSURE: 110 MMHG | WEIGHT: 171.2 LBS | DIASTOLIC BLOOD PRESSURE: 58 MMHG | OXYGEN SATURATION: 98 % | BODY MASS INDEX: 32.32 KG/M2

## 2024-03-28 DIAGNOSIS — I10 PRIMARY HYPERTENSION: ICD-10-CM

## 2024-03-28 DIAGNOSIS — R60.0 LOWER EXTREMITY EDEMA: ICD-10-CM

## 2024-03-28 DIAGNOSIS — I25.10 CORONARY ARTERY CALCIFICATION: ICD-10-CM
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
## 2024-03-28 DIAGNOSIS — E78.49 OTHER HYPERLIPIDEMIA: ICD-10-CM

## 2024-03-28 DIAGNOSIS — I65.22 STENOSIS OF LEFT CAROTID ARTERY: ICD-10-CM

## 2024-03-28 DIAGNOSIS — R06.09 DYSPNEA ON EXERTION: ICD-10-CM

## 2024-03-28 DIAGNOSIS — I51.89 GRADE II DIASTOLIC DYSFUNCTION: ICD-10-CM

## 2024-03-28 DIAGNOSIS — I36.1 NONRHEUMATIC TRICUSPID VALVE REGURGITATION: ICD-10-CM

## 2024-03-28 DIAGNOSIS — I25.84 CORONARY ARTERY CALCIFICATION: ICD-10-CM

## 2024-03-28 DIAGNOSIS — I35.0 NONRHEUMATIC AORTIC VALVE STENOSIS: ICD-10-CM

## 2024-03-28 DIAGNOSIS — I34.0 NONRHEUMATIC MITRAL VALVE REGURGITATION: ICD-10-CM

## 2024-03-28 DIAGNOSIS — I27.20 PULMONARY HYPERTENSION: ICD-10-CM

## 2024-03-28 DIAGNOSIS — I35.1 NONRHEUMATIC AORTIC VALVE INSUFFICIENCY: Primary | ICD-10-CM

## 2024-03-28 DIAGNOSIS — H35.30 MACULAR DEGENERATION OF BOTH EYES, UNSPECIFIED TYPE: ICD-10-CM

## 2024-03-28 RX ORDER — FUROSEMIDE 40 MG/1
40 TABLET ORAL DAILY
Qty: 90 TABLET | Refills: 3 | Status: SHIPPED | OUTPATIENT
Start: 2024-03-28

## 2024-03-28 RX ORDER — RAMIPRIL 10 MG/1
10 CAPSULE ORAL NIGHTLY
Qty: 90 CAPSULE | Refills: 3 | Status: SHIPPED | OUTPATIENT
Start: 2024-03-28

## 2024-03-28 RX ORDER — ATORVASTATIN CALCIUM 20 MG/1
20 TABLET, FILM COATED ORAL DAILY
Qty: 90 TABLET | Refills: 3 | Status: SHIPPED | OUTPATIENT
Start: 2024-03-28

## 2024-03-28 NOTE — PROGRESS NOTES
Date of Office Visit: 2024  Encounter Provider: TRINI Bangura  Place of Service: [facility] CARDIOLOGY  Patient Name: Ayana Reyna  :1940  Primary Cardiologist: Dr. Ana Orozco    No chief complaint on file.  :     HPI: Ayana Reyna is a 83 y.o. female who presents today for cardiac follow-up visit.  I reviewed her medical records.    She has known hypertension, hyperlipidemia, hypothyroidism, obesity, and macular degeneration with bilateral vision loss.    She has known coronary artery calcification per CT coronary angiogram in  ((50% calcified ostial stenosis, 50% proximal calcified LAD stenosis, 50% mid calcified LAD stenosis, greater than 50% distal mixed plaque stenosis, less than 25% calcified proximal circumflex stenosis, and the RCA was almost nonexistent. Her total calcium score was 252.)    We follow her closely for valvular heart disease.  In 2021, stress echocardiogram showed normal EF, grade 2 diastolic dysfunction, and trace to mild valvular heart disease.  In 2021, coronary angiography showed calcium in the left main, LAD, and proximal left circumflex and no stenosis.    In 2023, carotid artery duplex showed mild disease on the left and plaque on the right.  Her amlodipine was discontinued due to lower extremity edema.    In 2023, echocardiogram showed normal LVEF, grade 2 diastolic dysfunction, moderate aortic regurgitation, moderate aortic stenosis, moderate mitral regurgitation, moderate tricuspid regurgitation, and moderate pulmonary hypertension.  She was having dyspnea with exertion and we did a trial of furosemide 40 mg daily.  She then became a little dehydrated and we decreased her furosemide to 20 mg daily.    At the end of 2023, she was at the top of her steps and missed the first step tumbling down.  She was diagnosed with a subarachnoid hemorrhage, subdural hematoma, right clavicular  fracture and C1 vertebral fracture.  She has been wearing a cervical collar and is followed by neurosurgery.  She was transferred to a skilled rehab nursing facility.    She presents today for follow-up visit with her daughter accompanying her.  She is very thankful to be alive from this terrible fall and is looking forward to going to [place] in April.  She is taking furosemide 40 mg daily and doing well on that.  Last creatinine was stable.  Her shortness of breath has improved and only experiences dyspnea when walking long distances.  She denies chest pain, palpitations, edema, dizziness, syncope, falls, or bleeding.  Blood pressure and heart rate are normal.  She is selling her home after 57 years and is moving to a [place] home.      Past Medical History:   Diagnosis Date    Abnormal ECG     Acquired hypothyroidism     Allergic rhinitis     Aortic stenosis     mild to moderate per echo 2020    Arthritis     Bilateral leg cramps 01/21/2022    CHF (congestive heart failure)     Coronary artery calcification 03/24/2021    Fatigue     Glucose intolerance (impaired glucose tolerance)     Hearing loss     Heart murmur     Heart palpitations     Hematuria     Hyperlipemia     Hypertension     Insomnia     Macular degeneration     vision loss of left eye     Migraine     Nonrheumatic aortic valve insufficiency 10/19/2020    Osteoporosis     Pulmonary nodules 03/24/2021    Shortness of breath     Vision loss, bilateral     left eye macular degeneration; right eye etiology unknown    Vitamin D deficiency     Wears hearing aid in both ears 12/2019       Past Surgical History:   Procedure Laterality Date    CARDIAC CATHETERIZATION N/A 03/29/2021    Procedure: Coronary angiography;  Surgeon: Zain Mendoza MD;  Location: [facility] INVASIVE LOCATION;  Service: Cardiovascular;  Laterality: N/A;    CARDIAC CATHETERIZATION N/A 03/29/2021    Procedure: Left heart cath;  Surgeon: Zain Mendoza MD;  Location: [facility]  CATH INVASIVE LOCATION;  Service: Cardiovascular;  Laterality: N/A;    CARDIAC CATHETERIZATION N/A 03/29/2021    Procedure: Left ventriculography;  Surgeon: Zain Mendoza MD;  Location: [facility] CATH INVASIVE LOCATION;  Service: Cardiovascular;  Laterality: N/A;    CHOLECYSTECTOMY      COLONOSCOPY  2008    FRACTURE SURGERY  1975,2014    HYSTERECTOMY N/A 1971    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    JOINT REPLACEMENT  1995, 2013    KNEE SURGERY  1989    TONSILLECTOMY  1950       Social History     Socioeconomic History    Marital status:    Tobacco Use    Smoking status: Never    Smokeless tobacco: Never   Vaping Use    Vaping status: Never Used   Substance and Sexual Activity    Alcohol use: Yes     Alcohol/week: 1.0 standard drink of alcohol     Types: 1 Glasses of wine per week     Comment: Less than 1 glass of wine a week//caffeine use:1 cup daily    Drug use: No    Sexual activity: Not Currently     Partners: Male     Birth control/protection: Hysterectomy       Family History   Problem Relation Age of Onset    Hodgkin's lymphoma Mother     Cancer Mother        The following portion of the patient's history were reviewed and updated as appropriate: past medical history, past surgical history, past social history, past family history, allergies, current medications, and problem list.    Review of Systems   Constitutional: Negative.   Cardiovascular:  Positive for dyspnea on exertion and leg swelling.   Respiratory: Negative.     Hematologic/Lymphatic: Negative.    Neurological: Negative.        Allergies   Allergen Reactions    Amlodipine Swelling     Lower extremity edema         Current Outpatient Medications:     acetaminophen (TYLENOL) 500 MG tablet, Take 1 tablet by mouth Every 6 (Six) Hours As Needed for Mild Pain. Indications: Pain, Disp: , Rfl:     atorvastatin (LIPITOR) 20 MG tablet, Take 1 tablet by mouth Daily. Indications: High Amount of Fats in the Blood, Disp: 90 tablet, Rfl: 3    cholecalciferol  "(VITAMIN D3) 1000 UNITS tablet, Take 1 tablet by mouth Daily. Indications: Osteoporosis, Vitamin D Deficiency, Disp: , Rfl:     furosemide (LASIX) 40 MG tablet, Take 1 tablet by mouth Daily. Indications: Edema, Disp: 90 tablet, Rfl: 3    levothyroxine (SYNTHROID, LEVOTHROID) 75 MCG tablet, Take 1 tablet by mouth Daily., Disp: 90 tablet, Rfl: 3    metoprolol succinate XL (TOPROL-XL) 100 MG 24 hr tablet, Take 1 tablet by mouth Every Night., Disp: 90 tablet, Rfl: 3    Multiple Vitamins-Minerals (PRESERVISION/LUTEIN) capsule, Take 1 tablet by mouth 2 (Two) Times a Day. Indications: AMD, Disp: , Rfl:     naproxen sodium (ALEVE) 220 MG tablet, Take 1 tablet by mouth 2 (Two) Times a Day With Meals. Indications: Pain, Disp: , Rfl:     ramipril (ALTACE) 10 MG capsule, Take 1 capsule by mouth Every Night. Indications: High Blood Pressure Disorder, Disp: 90 capsule, Rfl: 3    silver sulfadiazine (SILVADENE, SSD) 1 % cream, Apply 1 application  topically to the appropriate area as directed 2 (Two) Times a Day., Disp: 50 g, Rfl: 2    spironolactone (ALDACTONE) 25 MG tablet, Take 1 tablet by mouth Every Morning., Disp: 90 tablet, Rfl: 3    vitamin B-12 (CYANOCOBALAMIN) 100 MCG tablet, Take 0.5 tablets by mouth Daily. Indications: Inadequate Vitamin B12, Disp: , Rfl:          Objective:     Vitals:    03/28/24 1028   BP: 110/58   BP Location: Left arm   Patient Position: Sitting   Cuff Size: Large Adult   Pulse: 73   SpO2: 98%   Weight: 77.7 kg (171 lb 3.2 oz)   Height: 154.9 cm (60.98\")     Body mass index is 32.36 kg/m².    PHYSICAL EXAM:    Vitals Reviewed.   General Appearance: No acute distress, well developed and well nourished. Obese.   HENT: No hearing loss noted.  Wearing cervical collar.  Respiratory: No signs of respiratory distress. Respiration rhythm and depth normal.  Clear to auscultation.   Cardiovascular:  Jugular Venous Pressure: Normal  Heart Rate and Rhythm: Normal, Heart Sounds: Normal S1 and S2. No S3 or S4 " noted.  Murmurs: RUSB grade 1/6 murmur present.  Lower Extremities: Bilateral trace lower extremity edema present.  Edema noted.  Musculoskeletal: Normal movement of extremities.  Skin: General appearance normal.    Psychiatric: Patient alert and oriented to person, place, and time. Speech and behavior appropriate. Normal mood and affect.       ECG 12 Lead    Date/Time: 3/28/2024 9:29 AM  Performed by: Aminta Bowles APRN    Authorized by: Aminta Bowles APRN  Comparison: compared with previous ECG from 1/1/2024  Similar to previous ECG  Rhythm: sinus rhythm  Rate: normal  BPM: 73  Conduction: conduction normal  ST Segments: ST segments normal  T Waves: T waves normal  QRS axis: normal    Clinical impression: normal ECG            Assessment:       Diagnosis Plan   1. Nonrheumatic aortic valve insufficiency  Adult Transthoracic Echo Complete w/ Color, Spectral and Contrast if Necessary Per Protocol      2. Nonrheumatic aortic valve stenosis  Adult Transthoracic Echo Complete w/ Color, Spectral and Contrast if Necessary Per Protocol      3. Nonrheumatic mitral valve regurgitation  Adult Transthoracic Echo Complete w/ Color, Spectral and Contrast if Necessary Per Protocol      4. Nonrheumatic tricuspid valve regurgitation        5. Grade II diastolic dysfunction  Adult Transthoracic Echo Complete w/ Color, Spectral and Contrast if Necessary Per Protocol      6. Pulmonary hypertension        7. Dyspnea on exertion        8. Coronary artery calcification        9. Stenosis of left carotid artery        10. Other hyperlipidemia  atorvastatin (LIPITOR) 20 MG tablet    Adult Transthoracic Echo Complete w/ Color, Spectral and Contrast if Necessary Per Protocol      11. Primary hypertension        12. Lower extremity edema        13. Macular degeneration of both eyes, unspecified type        14. Fall, sequela               Plan:       1-4.  Valvular Heart Disease.  Moderate aortic regurgitation, moderate aortic  stenosis, moderate mitral regurgitation, and moderate tricuspid regurgitation per echocardiogram 12/2023.  Repeat echocardiogram in November 2024.    5/6/7.  Grade 2 diastolic dysfunction and moderate pulmonary hypertension per echo 12/2023.  Shortness of breath is stable and remains on furosemide 40 mg daily.    8.  Coronary artery calcification per CT angiogram and coronary angiography.  Continue atorvastatin.    9.  Mild left carotid artery stenosis and plaque on right.    10.  Hyperlipidemia: Remains on statin.    11.  Hypertension: Blood pressure normal.    12.  Chronic lower extremity edema; unchanged.    13.  Macular degeneration and bilateral vision loss.    14.  She had a significant fall in December 2023 resulting in subarachnoid hemorrhage, subdural hematoma, right clavicular fracture, and cervical spine fracture.  Following with neurosurgery.    15.  Follow-up with Dr. Orozco and same-day echocardiogram in December 2024.    As always, it has been a pleasure to participate in your patient's care. Thank you.         Sincerely,         TRINI Honeycutt  [facility]      Dictated utilizing Dragon Dictation  I spent 31 minutes reviewing her medical records/testing/previous office notes/labs, face-to-face interaction with patient, physical examination, formulating the plan of care, and discussion of plan of care with patient.

## 2024-03-29 NOTE — PROGRESS NOTES
Patient ID: Ayana Reyna is a 83 y.o. female is here today for follow-up for closed nondisplaced cervical fracture.    Imaging: CT of the cervical spine performed on 03/26/2024    Subjective     The patient is here in regards to   Chief Complaint   Patient presents with    Neck Pain    Follow-up       History of Present Illness  Denise does not have any new focal complaints.  She is been wearing her cervical collar consistently.  She is planning a trip in 2 weeks to [place].      While in the room and during my examination of the patient I wore a mask and eye protection.  I washed my hands before and after this patient encounter.  The patient was also wearing a mask.    The following portions of the patient's history were reviewed and updated as appropriate: allergies, current medications, past family history, past medical history, past social history, past surgical history and problem list.    Review of Systems   HENT:  Negative for tinnitus.    Eyes:  Positive for visual disturbance (macular degneration).   Musculoskeletal:  Positive for neck pain. Negative for neck stiffness.   Neurological:  Positive for headaches. Negative for dizziness, weakness, light-headedness and numbness.        Past Medical History:   Diagnosis Date    Abnormal ECG     Acquired hypothyroidism     Allergic rhinitis     Aortic stenosis     mild to moderate per echo 2020    Arthritis     Bilateral leg cramps 01/21/2022    CHF (congestive heart failure)     Coronary artery calcification 03/24/2021    Fatigue     Glucose intolerance (impaired glucose tolerance)     Hearing loss     Heart murmur     Heart palpitations     Hematuria     Hyperlipemia     Hypertension     Insomnia     Macular degeneration     vision loss of left eye     Migraine     Nonrheumatic aortic valve insufficiency 10/19/2020    Osteoporosis     Pulmonary nodules 03/24/2021    Shortness of breath     Vision loss, bilateral     left eye macular degeneration; right eye  etiology unknown    Vitamin D deficiency     Wears hearing aid in both ears 12/2019       Allergies   Allergen Reactions    Amlodipine Swelling     Lower extremity edema       Family History   Problem Relation Age of Onset    Hodgkin's lymphoma Mother     Cancer Mother        Social History     Socioeconomic History    Marital status:    Tobacco Use    Smoking status: Never    Smokeless tobacco: Never   Vaping Use    Vaping status: Never Used   Substance and Sexual Activity    Alcohol use: Yes     Alcohol/week: 1.0 standard drink of alcohol     Types: 1 Glasses of wine per week     Comment: Less than 1 glass of wine a week//caffeine use:1 cup daily    Drug use: No    Sexual activity: Not Currently     Partners: Male     Birth control/protection: Hysterectomy       Past Surgical History:   Procedure Laterality Date    CARDIAC CATHETERIZATION N/A 03/29/2021    Procedure: Coronary angiography;  Surgeon: Zain Mendoza MD;  Location:  [facility] CATH INVASIVE LOCATION;  Service: Cardiovascular;  Laterality: N/A;    CARDIAC CATHETERIZATION N/A 03/29/2021    Procedure: Left heart cath;  Surgeon: Zain Mendoza MD;  Location:  [facility] CATH INVASIVE LOCATION;  Service: Cardiovascular;  Laterality: N/A;    CARDIAC CATHETERIZATION N/A 03/29/2021    Procedure: Left ventriculography;  Surgeon: Zain Mendoza MD;  Location:  [facility] CATH INVASIVE LOCATION;  Service: Cardiovascular;  Laterality: N/A;    CHOLECYSTECTOMY      COLONOSCOPY  2008    FRACTURE SURGERY  1975,2014    HYSTERECTOMY N/A 1971    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    JOINT REPLACEMENT  1995, 2013    KNEE SURGERY  1989    TONSILLECTOMY  1950         Objective     Vitals:    04/04/24 0931   BP: 136/72   Pulse: 72   Resp: 16   SpO2: 96%     Body mass index is 32.42 kg/m².    Physical Exam  Constitutional:       Appearance: Normal appearance.   HENT:      Head: Normocephalic and atraumatic.   Eyes:      Extraocular Movements: Extraocular movements intact.       Conjunctiva/sclera: Conjunctivae normal.      Pupils: Pupils are equal, round, and reactive to light.   Cardiovascular:      Rate and Rhythm: Normal rate and regular rhythm.      Pulses: Normal pulses.   Pulmonary:      Breath sounds: Normal breath sounds.   Abdominal:      Palpations: Abdomen is soft.   Musculoskeletal:         General: Normal range of motion.      Cervical back: Normal range of motion and neck supple.   Skin:     General: Skin is warm and dry.   Neurological:      Mental Status: She is alert and oriented to person, place, and time.      Cranial Nerves: Cranial nerves 2-12 are intact.      Motor: Motor function is intact. No weakness or atrophy.      Coordination: Coordination is intact. Romberg sign negative. Romberg Test normal.      Gait: Gait is intact. Gait normal.      Deep Tendon Reflexes: Reflexes are normal and symmetric.      Reflex Scores:       Tricep reflexes are 2+ on the right side and 2+ on the left side.       Bicep reflexes are 2+ on the right side and 2+ on the left side.       Brachioradialis reflexes are 2+ on the right side and 2+ on the left side.       Patellar reflexes are 2+ on the right side and 2+ on the left side.       Achilles reflexes are 2+ on the right side and 2+ on the left side.  Psychiatric:         Speech: Speech normal.         Neurologic Exam     Mental Status   Oriented to person, place, and time.   Attention: normal. Concentration: normal.   Speech: speech is normal   Level of consciousness: alert    Cranial Nerves   Cranial nerves II through XII intact.     CN III, IV, VI   Pupils are equal, round, and reactive to light.    Motor Exam   Muscle bulk: normal  Overall muscle tone: normal    Strength   Strength 5/5 except as noted.     Sensory Exam   Light touch normal.     Gait, Coordination, and Reflexes     Gait  Gait: normal    Coordination   Romberg: negative    Reflexes   Reflexes 2+ except as noted.   Right brachioradialis: 2+  Left brachioradialis:  2+  Right biceps: 2+  Left biceps: 2+  Right triceps: 2+  Left triceps: 2+  Right patellar: 2+  Left patellar: 2+  Right achilles: 2+  Left achilles: 2+      Assessment & Plan   Independent Review of Radiographic Studies:      I personally reviewed the images from the following studies.    CT: CT of the cervical spine was reviewed and shows early healing across the C1 ring fracture.  Well approximated and opposed    Assessment/Plan: She demonstrates some early healing across her fracture.  I recommended that she stay in her cervical collar for another 6 weeks and we can repeat an x-ray at that time.  If it looks stable we can take her cervical collar off and have her participate with physical therapy regarding the atrophy of her neck muscles from wearing the cervical collar.    Medical Decision Making:      X-ray cervical spine         Diagnoses and all orders for this visit:    1. Closed nondisplaced fracture of first cervical vertebra with routine healing, unspecified fracture morphology, subsequent encounter (Primary)  -     XR spine cervical 2 or 3 vw; Future             Patient Instructions/Recommendations:    Follow-up in 6 weeks with x-ray      Noah Moreno MD  04/04/24  10:48 EDT

## 2024-04-04 ENCOUNTER — OFFICE VISIT (OUTPATIENT)
Dept: NEUROSURGERY | Facility: CLINIC | Age: 84
End: 2024-04-04
Payer: MEDICARE

## 2024-04-04 VITALS
HEART RATE: 72 BPM | RESPIRATION RATE: 16 BRPM | BODY MASS INDEX: 32.4 KG/M2 | WEIGHT: 171.6 LBS | OXYGEN SATURATION: 96 % | SYSTOLIC BLOOD PRESSURE: 136 MMHG | DIASTOLIC BLOOD PRESSURE: 72 MMHG | HEIGHT: 61 IN

## 2024-04-04 DIAGNOSIS — S12.001D CLOSED NONDISPLACED FRACTURE OF FIRST CERVICAL VERTEBRA WITH ROUTINE HEALING, UNSPECIFIED FRACTURE MORPHOLOGY, SUBSEQUENT ENCOUNTER: Primary | ICD-10-CM

## 2024-04-04 RX ORDER — TRAMADOL HYDROCHLORIDE 50 MG/1
1 TABLET ORAL EVERY 6 HOURS
COMMUNITY
Start: 2024-03-29

## 2024-05-15 NOTE — PROGRESS NOTES
Patient ID: Ayana Reyna is a 84 y.o. female is here today for follow-up for a closed cervical fracture.    Imaging: XR of the cervical spine performed on 05/16/2024    Subjective     The patient is here in regards to   Chief Complaint   Patient presents with    Neck Pain    Follow-up       History of Present Illness  Denise is overall doing well.  She does have some stiffness in her neck but no significant pain.  She has had a lot of life events recently including taking a trip to [place] and she is also sold her house and is moving into a patio home.  Denies any new neurological symptoms      While in the room and during my examination of the patient I wore a mask and eye protection.  I washed my hands before and after this patient encounter.  The patient was also wearing a mask.    The following portions of the patient's history were reviewed and updated as appropriate: allergies, current medications, past family history, past medical history, past social history, past surgical history and problem list.    Review of Systems   Constitutional:  Negative for fever.   HENT:  Negative for trouble swallowing.    Eyes:  Positive for visual disturbance (macular degeneration).   Musculoskeletal:  Positive for neck pain. Negative for neck stiffness.   Neurological:  Negative for dizziness, weakness, light-headedness, numbness and headaches.        Past Medical History:   Diagnosis Date    Abnormal ECG     Acquired hypothyroidism     Allergic rhinitis     Aortic stenosis     mild to moderate per echo 2020    Arthritis     Bilateral leg cramps 01/21/2022    CHF (congestive heart failure)     Coronary artery calcification 03/24/2021    Fatigue     Glucose intolerance (impaired glucose tolerance)     Hearing loss     Heart murmur     Heart palpitations     Hematuria     Hyperlipemia     Hypertension     Insomnia     Macular degeneration     vision loss of left eye     Migraine     Nonrheumatic aortic valve insufficiency 10/19/2020     Osteoporosis     Pulmonary nodules 03/24/2021    Shortness of breath     Vision loss, bilateral     left eye macular degeneration; right eye etiology unknown    Vitamin D deficiency     Wears hearing aid in both ears 12/2019       Allergies   Allergen Reactions    Amlodipine Swelling     Lower extremity edema       Family History   Problem Relation Age of Onset    Hodgkin's lymphoma Mother     Cancer Mother        Social History     Socioeconomic History    Marital status:    Tobacco Use    Smoking status: Never    Smokeless tobacco: Never   Vaping Use    Vaping status: Never Used   Substance and Sexual Activity    Alcohol use: Yes     Alcohol/week: 1.0 standard drink of alcohol     Types: 1 Glasses of wine per week     Comment: Less than 1 glass of wine a week//caffeine use:1 cup daily    Drug use: No    Sexual activity: Not Currently     Partners: Male     Birth control/protection: Hysterectomy       Past Surgical History:   Procedure Laterality Date    CARDIAC CATHETERIZATION N/A 03/29/2021    Procedure: Coronary angiography;  Surgeon: Zain Mendoza MD;  Location: [facility] CATH INVASIVE LOCATION;  Service: Cardiovascular;  Laterality: N/A;    CARDIAC CATHETERIZATION N/A 03/29/2021    Procedure: Left heart cath;  Surgeon: Zain Mendoza MD;  [facility] C[facility];  Service: Cardiovascular;  Laterality: N/A;    CARDIAC CATHETERIZATION N/A 03/29/2021    Procedure: Left ventriculography;  Surgeon: Zain Mendoza MD;  Location:  [facility] CATH INVASIVE LOCATION;  Service: Cardiovascular;  Laterality: N/A;    CHOLECYSTECTOMY      COLONOSCOPY  2008    FRACTURE SURGERY  1975,2014    HYSTERECTOMY N/A 1971    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    JOINT REPLACEMENT  1995, 2013    KNEE SURGERY  1989    TONSILLECTOMY  1950         Objective     Vitals:    05/16/24 0855   BP: 142/78   Pulse: 74   Resp: 16   SpO2: 97%     Body mass index is 32.41 kg/m².    Physical Exam  Constitutional:       Appearance:  Normal appearance.   HENT:      Head: Normocephalic and atraumatic.   Eyes:      Extraocular Movements: Extraocular movements intact.      Conjunctiva/sclera: Conjunctivae normal.      Pupils: Pupils are equal, round, and reactive to light.   Cardiovascular:      Rate and Rhythm: Normal rate and regular rhythm.      Pulses: Normal pulses.   Pulmonary:      Breath sounds: Normal breath sounds.   Abdominal:      Palpations: Abdomen is soft.   Musculoskeletal:         General: Normal range of motion.      Cervical back: Normal range of motion and neck supple.   Skin:     General: Skin is warm and dry.   Neurological:      Mental Status: She is alert and oriented to person, place, and time.      Cranial Nerves: Cranial nerves 2-12 are intact.      Motor: Motor function is intact. No weakness or atrophy.      Coordination: Coordination is intact. Romberg sign negative. Romberg Test normal.      Gait: Gait is intact. Gait normal.      Deep Tendon Reflexes: Reflexes are normal and symmetric.      Reflex Scores:       Tricep reflexes are 2+ on the right side and 2+ on the left side.       Bicep reflexes are 2+ on the right side and 2+ on the left side.       Brachioradialis reflexes are 2+ on the right side and 2+ on the left side.       Patellar reflexes are 2+ on the right side and 2+ on the left side.       Achilles reflexes are 2+ on the right side and 2+ on the left side.  Psychiatric:         Speech: Speech normal.         Neurologic Exam     Mental Status   Oriented to person, place, and time.   Attention: normal. Concentration: normal.   Speech: speech is normal   Level of consciousness: alert    Cranial Nerves   Cranial nerves II through XII intact.     CN III, IV, VI   Pupils are equal, round, and reactive to light.    Motor Exam   Muscle bulk: normal  Overall muscle tone: normal    Strength   Strength 5/5 except as noted.     Sensory Exam   Light touch normal.     Gait, Coordination, and Reflexes     Gait  Gait:  normal    Coordination   Romberg: negative    Reflexes   Reflexes 2+ except as noted.   Right brachioradialis: 2+  Left brachioradialis: 2+  Right biceps: 2+  Left biceps: 2+  Right triceps: 2+  Left triceps: 2+  Right patellar: 2+  Left patellar: 2+  Right achilles: 2+  Left achilles: 2+      Assessment & Plan   Independent Review of Radiographic Studies:      I personally reviewed the images from the following studies.    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  I think it is safe to remove her cervical collar and have her work with physical therapy to improve her mobility and strength of her neck.  Cautioned to remain vigilant for obstacles that may put her at risk for future falls.    Medical Decision Making:      Physical therapy         Diagnoses and all orders for this visit:    1. Closed nondisplaced fracture of first cervical vertebra with routine healing, unspecified fracture morphology, subsequent encounter (Primary)  -     Ambulatory Referral to Physical Therapy             Patient Instructions/Recommendations:    Follow-up as needed      Noah Moreno MD  05/16/24  09:21 EDT

## 2024-05-16 ENCOUNTER — HOSPITAL ENCOUNTER (OUTPATIENT)
Dept: GENERAL RADIOLOGY | Facility: HOSPITAL | Age: 84
Discharge: HOME | End: 2024-05-16
Admitting: NEUROLOGICAL SURGERY
Payer: MEDICARE

## 2024-05-16 ENCOUNTER — OFFICE VISIT (OUTPATIENT)
Dept: NEUROSURGERY | Facility: CLINIC | Age: 84
End: 2024-05-16
Payer: MEDICARE

## 2024-05-16 VITALS
OXYGEN SATURATION: 97 % | BODY MASS INDEX: 32.38 KG/M2 | HEIGHT: 61 IN | HEART RATE: 74 BPM | SYSTOLIC BLOOD PRESSURE: 142 MMHG | RESPIRATION RATE: 16 BRPM | DIASTOLIC BLOOD PRESSURE: 78 MMHG | WEIGHT: 171.52 LBS

## 2024-05-16 DIAGNOSIS — S12.001D CLOSED NONDISPLACED FRACTURE OF FIRST CERVICAL VERTEBRA WITH ROUTINE HEALING, UNSPECIFIED FRACTURE MORPHOLOGY, SUBSEQUENT ENCOUNTER: Primary | ICD-10-CM

## 2024-05-16 DIAGNOSIS — S12.001D CLOSED NONDISPLACED FRACTURE OF FIRST CERVICAL VERTEBRA WITH ROUTINE HEALING, UNSPECIFIED FRACTURE MORPHOLOGY, SUBSEQUENT ENCOUNTER: ICD-10-CM

## 2024-05-16 PROCEDURE — 72040 X-RAY EXAM NECK SPINE 2-3 VW: CPT

## 2024-06-06 DIAGNOSIS — I10 ESSENTIAL HYPERTENSION: ICD-10-CM

## 2024-06-06 RX ORDER — METOPROLOL SUCCINATE 100 MG/1
100 TABLET, EXTENDED RELEASE ORAL NIGHTLY
Qty: 90 TABLET | Refills: 1 | Status: SHIPPED | OUTPATIENT
Start: 2024-06-06

## 2024-06-06 NOTE — TELEPHONE ENCOUNTER
Caller: PHARMACY    Relationship:     Best call back number:[phone]    Requested Prescriptions:   Requested Prescriptions     Pending Prescriptions Disp Refills    metoprolol succinate XL (TOPROL-XL) 100 MG 24 hr tablet 90 tablet 3     Sig: Take 1 tablet by mouth Every Night. Indications: High Blood Pressure Disorder        Pharmacy where request should be sent: [facility] [address] [place] [address] - [phone]  - [phone]     Last office visit with prescribing clinician: 1/25/2023   Last telemedicine visit with prescribing clinician: Visit date not found   Next office visit with prescribing clinician: 11/14/2024     Additional details provided by patient:     Does the patient have less than a 3 day supply:  [] Yes  [x] No    Would you like a call back once the refill request has been completed: [] Yes [x] No    If the office needs to give you a call back, can they leave a voicemail: [x] Yes [] No    Champ Garcia   06/06/24 12:36 EDT

## 2024-08-02 ENCOUNTER — OFFICE VISIT (OUTPATIENT)
Dept: INTERNAL MEDICINE | Facility: CLINIC | Age: 84
End: 2024-08-02
Payer: MEDICARE

## 2024-08-02 ENCOUNTER — HOSPITAL ENCOUNTER (OUTPATIENT)
Facility: HOSPITAL | Age: 84
Setting detail: OBSERVATION
Discharge: HOME | End: 2024-08-03
Attending: EMERGENCY MEDICINE | Admitting: STUDENT IN AN ORGANIZED HEALTH CARE EDUCATION/TRAINING PROGRAM
Payer: MEDICARE

## 2024-08-02 ENCOUNTER — APPOINTMENT (OUTPATIENT)
Dept: GENERAL RADIOLOGY | Facility: HOSPITAL | Age: 84
End: 2024-08-02
Payer: MEDICARE

## 2024-08-02 VITALS
BODY MASS INDEX: 31.72 KG/M2 | WEIGHT: 168 LBS | HEIGHT: 61 IN | DIASTOLIC BLOOD PRESSURE: 88 MMHG | HEART RATE: 72 BPM | OXYGEN SATURATION: 97 % | SYSTOLIC BLOOD PRESSURE: 128 MMHG

## 2024-08-02 DIAGNOSIS — M81.0 OSTEOPOROSIS, UNSPECIFIED OSTEOPOROSIS TYPE, UNSPECIFIED PATHOLOGICAL FRACTURE PRESENCE: ICD-10-CM

## 2024-08-02 DIAGNOSIS — I35.0 NONRHEUMATIC AORTIC VALVE STENOSIS: Chronic | ICD-10-CM

## 2024-08-02 DIAGNOSIS — I65.22 STENOSIS OF LEFT CAROTID ARTERY: Chronic | ICD-10-CM

## 2024-08-02 DIAGNOSIS — S40.011A CONTUSION OF RIGHT SHOULDER, INITIAL ENCOUNTER: ICD-10-CM
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
## 2024-08-02 DIAGNOSIS — Z74.09 IMMOBILITY: ICD-10-CM

## 2024-08-02 DIAGNOSIS — I27.20 PULMONARY HYPERTENSION: Chronic | ICD-10-CM

## 2024-08-02 DIAGNOSIS — S80.01XA CONTUSION OF RIGHT KNEE, INITIAL ENCOUNTER: ICD-10-CM

## 2024-08-02 DIAGNOSIS — G47.09 OTHER INSOMNIA: Primary | Chronic | ICD-10-CM

## 2024-08-02 DIAGNOSIS — Z78.0 POST-MENOPAUSAL: ICD-10-CM

## 2024-08-02 DIAGNOSIS — M54.31 SCIATICA OF RIGHT SIDE: ICD-10-CM

## 2024-08-02 DIAGNOSIS — I25.10 CORONARY ARTERY CALCIFICATION: Chronic | ICD-10-CM

## 2024-08-02 DIAGNOSIS — E55.9 VITAMIN D DEFICIENCY: Chronic | ICD-10-CM
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
## 2024-08-02 DIAGNOSIS — S81.811A NONINFECTED SKIN TEAR OF RIGHT LEG, INITIAL ENCOUNTER: ICD-10-CM

## 2024-08-02 DIAGNOSIS — I10 PRIMARY HYPERTENSION: Chronic | ICD-10-CM

## 2024-08-02 DIAGNOSIS — E78.2 MIXED HYPERLIPIDEMIA: Chronic | ICD-10-CM

## 2024-08-02 DIAGNOSIS — I25.84 CORONARY ARTERY CALCIFICATION: Chronic | ICD-10-CM

## 2024-08-02 DIAGNOSIS — E03.9 ACQUIRED HYPOTHYROIDISM: Chronic | ICD-10-CM

## 2024-08-02 DIAGNOSIS — R73.03 PREDIABETES: Chronic | ICD-10-CM

## 2024-08-02 LAB
25(OH)D3+25(OH)D2 SERPL-MCNC: 35.9 NG/ML (ref 30–100)
ALBUMIN SERPL-MCNC: 4.1 G/DL (ref 3.5–5.2)
ALBUMIN SERPL-MCNC: 4.5 G/DL (ref 3.5–5.2)
ALBUMIN/GLOB SERPL: 1.9 G/DL
ALBUMIN/GLOB SERPL: 2.8 G/DL
ALP SERPL-CCNC: 113 U/L (ref 39–117)
ALP SERPL-CCNC: 120 U/L (ref 39–117)
ALT SERPL W P-5'-P-CCNC: 13 U/L (ref 1–33)
ALT SERPL-CCNC: 17 U/L (ref 1–33)
ANION GAP SERPL CALCULATED.3IONS-SCNC: 13.4 MMOL/L (ref 5–15)
AST SERPL-CCNC: 21 U/L (ref 1–32)
AST SERPL-CCNC: 24 U/L (ref 1–32)
BASOPHILS # BLD AUTO: 0.05 10*3/MM3 (ref 0–0.2)
BASOPHILS # BLD AUTO: 0.06 10*3/MM3 (ref 0–0.2)
BASOPHILS NFR BLD AUTO: 0.5 % (ref 0–1.5)
BASOPHILS NFR BLD AUTO: 0.6 % (ref 0–1.5)
BILIRUB SERPL-MCNC: 0.9 MG/DL (ref 0–1.2)
BILIRUB SERPL-MCNC: 1.2 MG/DL (ref 0–1.2)
BUN SERPL-MCNC: 34 MG/DL (ref 8–23)
BUN SERPL-MCNC: 35 MG/DL (ref 8–23)
BUN/CREAT SERPL: 26.8 (ref 7–25)
BUN/CREAT SERPL: 31 (ref 7–25)
CALCIUM SERPL-MCNC: 10.2 MG/DL (ref 8.6–10.5)
CALCIUM SPEC-SCNC: 9.5 MG/DL (ref 8.6–10.5)
CHLORIDE SERPL-SCNC: 105 MMOL/L (ref 98–107)
CHLORIDE SERPL-SCNC: 109 MMOL/L (ref 98–107)
CHOLEST SERPL-MCNC: 127 MG/DL (ref 0–200)
CO2 SERPL-SCNC: 21.6 MMOL/L (ref 22–29)
CO2 SERPL-SCNC: 25 MMOL/L (ref 22–29)
CREAT SERPL-MCNC: 1.13 MG/DL (ref 0.57–1)
CREAT SERPL-MCNC: 1.27 MG/DL (ref 0.57–1)
DEPRECATED RDW RBC AUTO: 41.7 FL (ref 37–54)
EGFRCR SERPLBLD CKD-EPI 2021: 41.5 ML/MIN/1.73
EGFRCR SERPLBLD CKD-EPI 2021: 47.8 ML/MIN/1.73
EOSINOPHIL # BLD AUTO: 0.12 10*3/MM3 (ref 0–0.4)
EOSINOPHIL # BLD AUTO: 0.21 10*3/MM3 (ref 0–0.4)
EOSINOPHIL NFR BLD AUTO: 1 % (ref 0.3–6.2)
EOSINOPHIL NFR BLD AUTO: 2.6 % (ref 0.3–6.2)
ERYTHROCYTE [DISTWIDTH] IN BLOOD BY AUTOMATED COUNT: 11.1 % (ref 12.3–15.4)
ERYTHROCYTE [DISTWIDTH] IN BLOOD BY AUTOMATED COUNT: 11.5 % (ref 12.3–15.4)
GLOBULIN SER CALC-MCNC: 1.6 GM/DL
GLOBULIN UR ELPH-MCNC: 2.2 GM/DL
GLUCOSE SERPL-MCNC: 109 MG/DL (ref 65–99)
GLUCOSE SERPL-MCNC: 110 MG/DL (ref 65–99)
HBA1C MFR BLD: 5.7 % (ref 4.8–5.6)
HCT VFR BLD AUTO: 35.6 % (ref 34–46.6)
HCT VFR BLD AUTO: 37.5 % (ref 34–46.6)
HDLC SERPL-MCNC: 69 MG/DL (ref 40–60)
HGB BLD-MCNC: 12 G/DL (ref 12–15.9)
HGB BLD-MCNC: 12.4 G/DL (ref 12–15.9)
IMM GRANULOCYTES # BLD AUTO: 0.02 10*3/MM3 (ref 0–0.05)
IMM GRANULOCYTES # BLD AUTO: 0.05 10*3/MM3 (ref 0–0.05)
IMM GRANULOCYTES NFR BLD AUTO: 0.3 % (ref 0–0.5)
IMM GRANULOCYTES NFR BLD AUTO: 0.4 % (ref 0–0.5)
LDLC SERPL CALC-MCNC: 45 MG/DL (ref 0–100)
LYMPHOCYTES # BLD AUTO: 2.69 10*3/MM3 (ref 0.7–3.1)
LYMPHOCYTES # BLD AUTO: 3.72 10*3/MM3 (ref 0.7–3.1)
LYMPHOCYTES NFR BLD AUTO: 22 % (ref 19.6–45.3)
LYMPHOCYTES NFR BLD AUTO: 46.9 % (ref 19.6–45.3)
MCH RBC QN AUTO: 32.4 PG (ref 26.6–33)
MCH RBC QN AUTO: 33.9 PG (ref 26.6–33)
MCHC RBC AUTO-ENTMCNC: 33.1 G/DL (ref 31.5–35.7)
MCHC RBC AUTO-ENTMCNC: 33.7 G/DL (ref 31.5–35.7)
MCV RBC AUTO: 100.6 FL (ref 79–97)
MCV RBC AUTO: 97.9 FL (ref 79–97)
MONOCYTES # BLD AUTO: 0.46 10*3/MM3 (ref 0.1–0.9)
MONOCYTES # BLD AUTO: 0.69 10*3/MM3 (ref 0.1–0.9)
MONOCYTES NFR BLD AUTO: 5.6 % (ref 5–12)
MONOCYTES NFR BLD AUTO: 5.8 % (ref 5–12)
NEUTROPHILS # BLD AUTO: 3.47 10*3/MM3 (ref 1.7–7)
NEUTROPHILS NFR BLD AUTO: 43.8 % (ref 42.7–76)
NEUTROPHILS NFR BLD AUTO: 70.5 % (ref 42.7–76)
NEUTROPHILS NFR BLD AUTO: 8.61 10*3/MM3 (ref 1.7–7)
NRBC BLD AUTO-RTO: 0 /100 WBC (ref 0–0.2)
NRBC BLD AUTO-RTO: 0 /100 WBC (ref 0–0.2)
PLATELET # BLD AUTO: 187 10*3/MM3 (ref 140–450)
PLATELET # BLD AUTO: 205 10*3/MM3 (ref 140–450)
PMV BLD AUTO: 11.2 FL (ref 6–12)
POTASSIUM SERPL-SCNC: 4.2 MMOL/L (ref 3.5–5.2)
POTASSIUM SERPL-SCNC: 5.4 MMOL/L (ref 3.5–5.2)
PROT SERPL-MCNC: 6.1 G/DL (ref 6–8.5)
PROT SERPL-MCNC: 6.3 G/DL (ref 6–8.5)
RBC # BLD AUTO: 3.54 10*6/MM3 (ref 3.77–5.28)
RBC # BLD AUTO: 3.83 10*6/MM3 (ref 3.77–5.28)
SODIUM SERPL-SCNC: 140 MMOL/L (ref 136–145)
SODIUM SERPL-SCNC: 146 MMOL/L (ref 136–145)
TRIGL SERPL-MCNC: 62 MG/DL (ref 0–150)
TSH SERPL DL<=0.005 MIU/L-ACNC: 4.1 UIU/ML (ref 0.27–4.2)
VLDLC SERPL CALC-MCNC: 13 MG/DL (ref 5–40)
WBC # BLD AUTO: 7.93 10*3/MM3 (ref 3.4–10.8)
WBC NRBC COR # BLD AUTO: 12.22 10*3/MM3 (ref 3.4–10.8)

## 2024-08-02 PROCEDURE — 85025 COMPLETE CBC W/AUTO DIFF WBC: CPT | Performed by: EMERGENCY MEDICINE

## 2024-08-02 PROCEDURE — 25010000002 MORPHINE PER 10 MG: Performed by: EMERGENCY MEDICINE

## 2024-08-02 PROCEDURE — G0378 HOSPITAL OBSERVATION PER HR: HCPCS

## 2024-08-02 PROCEDURE — 96374 THER/PROPH/DIAG INJ IV PUSH: CPT

## 2024-08-02 PROCEDURE — 3074F SYST BP LT 130 MM HG: CPT | Performed by: NURSE PRACTITIONER

## 2024-08-02 PROCEDURE — 99285 EMERGENCY DEPT VISIT HI MDM: CPT

## 2024-08-02 PROCEDURE — 1125F AMNT PAIN NOTED PAIN PRSNT: CPT | Performed by: NURSE PRACTITIONER

## 2024-08-02 PROCEDURE — 25010000002 ONDANSETRON PER 1 MG: Performed by: EMERGENCY MEDICINE

## 2024-08-02 PROCEDURE — 1159F MED LIST DOCD IN RCRD: CPT | Performed by: NURSE PRACTITIONER

## 2024-08-02 PROCEDURE — 99214 OFFICE O/P EST MOD 30 MIN: CPT | Performed by: NURSE PRACTITIONER

## 2024-08-02 PROCEDURE — 96375 TX/PRO/DX INJ NEW DRUG ADDON: CPT

## 2024-08-02 PROCEDURE — 73030 X-RAY EXAM OF SHOULDER: CPT

## 2024-08-02 PROCEDURE — 3079F DIAST BP 80-89 MM HG: CPT | Performed by: NURSE PRACTITIONER

## 2024-08-02 PROCEDURE — 36415 COLL VENOUS BLD VENIPUNCTURE: CPT

## 2024-08-02 PROCEDURE — 1160F RVW MEDS BY RX/DR IN RCRD: CPT | Performed by: NURSE PRACTITIONER

## 2024-08-02 PROCEDURE — 73562 X-RAY EXAM OF KNEE 3: CPT

## 2024-08-02 PROCEDURE — G2211 COMPLEX E/M VISIT ADD ON: HCPCS | Performed by: NURSE PRACTITIONER

## 2024-08-02 PROCEDURE — 80053 COMPREHEN METABOLIC PANEL: CPT | Performed by: EMERGENCY MEDICINE

## 2024-08-02 RX ORDER — OXYCODONE HYDROCHLORIDE AND ACETAMINOPHEN 5; 325 MG/1; MG/1
1 TABLET ORAL ONCE
Status: COMPLETED | OUTPATIENT
Start: 2024-08-02 | End: 2024-08-02

## 2024-08-02 RX ORDER — TRAZODONE HYDROCHLORIDE 100 MG/1
TABLET ORAL
Qty: 60 TABLET | Refills: 2 | Status: SHIPPED | OUTPATIENT
Start: 2024-08-02

## 2024-08-02 RX ORDER — SODIUM CHLORIDE 0.9 % (FLUSH) 0.9 %
10 SYRINGE (ML) INJECTION AS NEEDED
Status: DISCONTINUED | OUTPATIENT
Start: 2024-08-02 | End: 2024-08-03 | Stop reason: HOSPADM

## 2024-08-02 RX ORDER — ONDANSETRON 2 MG/ML
4 INJECTION INTRAMUSCULAR; INTRAVENOUS ONCE
Status: COMPLETED | OUTPATIENT
Start: 2024-08-02 | End: 2024-08-02

## 2024-08-02 RX ORDER — RAMIPRIL 10 MG/1
10 CAPSULE ORAL NIGHTLY
Qty: 90 CAPSULE | Refills: 3 | Status: SHIPPED | OUTPATIENT
Start: 2024-08-02

## 2024-08-02 RX ORDER — MORPHINE SULFATE 2 MG/ML
4 INJECTION, SOLUTION INTRAMUSCULAR; INTRAVENOUS ONCE
Status: COMPLETED | OUTPATIENT
Start: 2024-08-02 | End: 2024-08-02

## 2024-08-02 RX ADMIN — OXYCODONE HYDROCHLORIDE AND ACETAMINOPHEN 1 TABLET: 5; 325 TABLET ORAL at 22:11

## 2024-08-02 RX ADMIN — MORPHINE SULFATE 4 MG: 2 INJECTION, SOLUTION INTRAMUSCULAR; INTRAVENOUS at 20:01

## 2024-08-02 RX ADMIN — ONDANSETRON 4 MG: 2 INJECTION INTRAMUSCULAR; INTRAVENOUS at 20:01

## 2024-08-02 NOTE — ASSESSMENT & PLAN NOTE
Trial trazodone 50mg QHS starting out  If unable to sleep, can increase to 100mg QHS if needed  Advised this can cause residual tiredness  Discussed sleep hygiene today in office  Recommended to stay on this for short time as long term sleep aid use has been correlated with memory loss.

## 2024-08-02 NOTE — PROGRESS NOTES
"Chief Complaint  Hyperlipidemia and Hypothyroidism    Subjective        Ayana Reyna presents to [facility]  History of Present Illness  This is an 85 y/o female presenting to office for f/u with chronic health conditions.     Hx left carotid stenosis-- last sdoppler showed less than 50 percent stenosis with flow present in vertebral artery.  +plaque in right carotid bulb.  Hx VHD-- Aortic stenosis, Mild MR; following with cardiology; continues on lasix, metoprolol, altace.   2D echo scheduled.   HTN-- continues on metoprolol, ramipril, aldactone; denies CP or SOA.   HLD-- continues on statin therapy; trying to follow healthy diet choices.      Continues following with Dr. Riley with optho for hx of macular degeneration.     Recent c1 fx-- seen by Dr. Moreno-- referred to PT; cervical collar was removed.     Reports she has been struggling with sleep at night. Reports she is unable to sleep throughout the night. Reports she has not been exercising regularly. Denies using alcohol on regular basis.  Reports she just cannot sleep through the night. Reports she was on trazodone previously and this did help. Reports melatonin did not help at all.         Objective   Vital Signs:  /88 (BP Location: Left arm, Patient Position: Sitting, Cuff Size: Adult)   Pulse 72   Ht 154.9 cm (61\")   Wt 76.2 kg (168 lb)   SpO2 97%   BMI 31.74 kg/m²   Estimated body mass index is 31.74 kg/m² as calculated from the following:    Height as of this encounter: 154.9 cm (61\").    Weight as of this encounter: 76.2 kg (168 lb).               Physical Exam  Constitutional:       Appearance: Normal appearance.   HENT:      Head: Normocephalic and atraumatic.      Right Ear: External ear normal.      Left Ear: External ear normal.      Nose: Nose normal.      Mouth/Throat:      Mouth: Mucous membranes are moist.      Pharynx: Oropharynx is clear.   Eyes:      Conjunctiva/sclera: Conjunctivae normal.      " Pupils: Pupils are equal, round, and reactive to light.   Cardiovascular:      Rate and Rhythm: Normal rate and regular rhythm.      Pulses: Normal pulses.      Heart sounds: Murmur heard.      Systolic murmur is present with a grade of 2/6.      No gallop.   Pulmonary:      Effort: Pulmonary effort is normal. No respiratory distress.      Breath sounds: Normal breath sounds. No stridor. No wheezing, rhonchi or rales.   Skin:     Capillary Refill: Capillary refill takes less than 2 seconds.   Neurological:      Mental Status: She is alert and oriented to person, place, and time. Mental status is at baseline.   Psychiatric:         Mood and Affect: Mood normal.         Thought Content: Thought content normal.         Judgment: Judgment normal.      Result Review :    The following data was reviewed by: TRINI Seaman on 08/02/2024:  Common labs          1/2/2024    04:28 1/3/2024    05:00 2/2/2024    14:42   Common Labs   Glucose 99  131  100    BUN 23  19  22    Creatinine 0.87  0.83  1.02    Sodium 136  134  144    Potassium 4.5  4.6  4.9    Chloride 106  102  108    Calcium 8.3  8.4  9.4    Total Protein   5.9    Albumin   4.0    Total Bilirubin   0.8    Alkaline Phosphatase   99    AST (SGOT)   13    ALT (SGPT)   11    WBC 7.44  6.18  8.19    Hemoglobin 7.6  8.0  10.0    Hematocrit 23.4  23.4  30.5    Platelets 136  170  217      Tobacco Use: Low Risk  (8/2/2024)    Patient History     Smoking Tobacco Use: Never     Smokeless Tobacco Use: Never     Passive Exposure: Not on file     Social History     Substance and Sexual Activity   Alcohol Use Yes    Alcohol/week: 1.0 standard drink of alcohol    Types: 1 Glasses of wine per week    Comment: Less than 1 glass of wine a week//caffeine use:1 cup daily     Family History   Problem Relation Age of Onset    Hodgkin's lymphoma Mother     Cancer Mother                   Assessment and Plan     Diagnoses and all orders for this visit:    1. Other insomnia  (Primary)  Assessment & Plan:  Trial trazodone 50mg QHS starting out  If unable to sleep, can increase to 100mg QHS if needed  Advised this can cause residual tiredness  Discussed sleep hygiene today in office  Recommended to stay on this for short time as long term sleep aid use has been correlated with memory loss.     Orders:  -     traZODone (DESYREL) 100 MG tablet; Take 50 to 100mg QHS PRN Insomnia.  Dispense: 60 tablet; Refill: 2    2. Coronary artery calcification  Assessment & Plan:  Following with cardiology       3. Nonrheumatic aortic valve stenosis  Assessment & Plan:  Continues on lasix  Follows with cardiology   Repeat 2d echo scheduled      4. Acquired hypothyroidism  Assessment & Plan:  Continues on synthroid    Orders:  -     TSH Rfx On Abnormal To Free T4    5. Stenosis of left carotid artery  Assessment & Plan:  Less than 50 percent on last duplex 2023  Continues on statin therapy      6. Prediabetes  Assessment & Plan:  Recommended low glycemic diet choices; exercise as tolerated      Orders:  -     Hemoglobin A1c    7. Vitamin D deficiency  Assessment & Plan:  Continues on vitamin D supplementation     Orders:  -     Vitamin D 25 hydroxy    8. Primary hypertension  Assessment & Plan:  Hypertension is stable and controlled  Continue current treatment regimen.  Dietary sodium restriction.  Regular aerobic exercise.  Blood pressure will be reassessed in 6 months.    Orders:  -     ramipril (ALTACE) 10 MG capsule; Take 1 capsule by mouth Every Night. Indications: High Blood Pressure Disorder  Dispense: 90 capsule; Refill: 3  -     CBC & Differential  -     Comprehensive metabolic panel    9. Pulmonary hypertension  Assessment & Plan:  Continues on aldactone, lasix   Denies SOA or CP      10. Mixed hyperlipidemia  Assessment & Plan:   Lipid abnormalities are improving with treatment    Plan:  Continue same medication/s without change.      Discussed medication dosage, use, side effects, and goals of  treatment in detail.    Counseled patient on lifestyle modifications to help control hyperlipidemia.     Patient Treatment Goals:   LDL goal is under 100    Followup at the next regular appointment.    Orders:  -     Lipid panel    11. Post-menopausal  -     DEXA Bone Density Axial; Future             Follow Up     Return in about 6 months (around 2/2/2025) for Medicare Wellness.  Patient was given instructions and counseling regarding her condition or for health maintenance advice. Please see specific information pulled into the AVS if appropriate.

## 2024-08-02 NOTE — ED PROVIDER NOTES
EMERGENCY DEPARTMENT ENCOUNTER    Room Number:  15/15  PCP: Adela Sy APRN  Historian: Patient/family      HPI:  Chief Complaint: Fall with knee pain  A complete HPI/ROS/PMH/PSH/SH/FH are unobtainable due to: None  Context: Ayana Reyna is a 84 y.o. female who presents to the ED c/o sudden onset of right knee and right shoulder discomfort that occurred status post mechanical fall.  They report that she tripped while walking in her home and fell directly onto the right knee and right shoulder.  She states that she has been unable to bear any weight on that right knee since the fall.  She denies striking her head, loss of consciousness, back pain, neck pain, nausea/vomiting, or blurry vision.            PAST MEDICAL HISTORY  Active Ambulatory Problems     Diagnosis Date Noted    Vitamin D deficiency 07/05/2016    Allergic rhinitis 07/05/2016    Prediabetes 07/05/2016    Osteoporosis 07/05/2016    Hypertension 07/05/2016    Other insomnia 07/05/2016    Mixed hyperlipidemia 07/05/2016    Macular degeneration of both eyes 07/05/2016    Acquired hypothyroidism 10/11/2016    Vision loss, bilateral     Nonrheumatic aortic valve stenosis 10/19/2020    Nonrheumatic mitral valve regurgitation 10/19/2020    Nonrheumatic aortic valve insufficiency 10/19/2020    Exudative age-related macular degeneration, left eye, with inactive scar 01/05/2021    Coronary artery calcification 03/24/2021    Age-related nuclear cataract of both eyes 04/14/2021    Open angle with borderline findings, high risk, bilateral 04/14/2021    Sciatica of right side 06/09/2023    Heart murmur 07/28/2023    Class 2 obesity with alveolar hypoventilation without serious comorbidity with body mass index (BMI) of 35.0 to 35.9 in adult 07/28/2023    SDH (subdural hematoma) 12/30/2023    Closed fracture of first cervical vertebra 12/30/2023    Subarachnoid hemorrhage 12/30/2023    Subdural hematoma 12/30/2023    Closed fracture of right clavicle 12/30/2023     Iron deficiency anemia 01/03/2024    Stenosis of left carotid artery 03/28/2024    Nonrheumatic tricuspid valve regurgitation 03/28/2024    Grade II diastolic dysfunction 03/28/2024    Pulmonary hypertension 03/28/2024     Resolved Ambulatory Problems     Diagnosis Date Noted    Fatigue 07/05/2016    Migraine 07/05/2016    Frequent urination 06/12/2017    Shortness of breath 05/18/2018    Lower extremity edema 02/20/2019    Heart palpitations 02/28/2020    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    Pulmonary nodules 03/24/2021    Chest tightness 03/24/2021    Shortness of breath on exertion 03/24/2021    Bilateral leg cramps 01/21/2022    Encounter for medical examination to establish care 06/10/2022    Leg wound, left, initial encounter 11/18/2022    Dizziness 07/28/2023    GERA (acute kidney injury) 01/03/2024     Past Medical History:   Diagnosis Date    Abnormal ECG     Aortic stenosis     Arthritis     CHF (congestive heart failure)     Glucose intolerance (impaired glucose tolerance)     Hearing loss     Hematuria     Hyperlipemia     Insomnia     Macular degeneration     Wears hearing aid in both ears 12/2019         PAST SURGICAL HISTORY  Past Surgical History:   Procedure Laterality Date    CARDIAC CATHETERIZATION N/A 03/29/2021    Procedure: Coronary angiography;  Surgeon: Zain Mendoza MD;  Location: [facility] CATH INVASIVE LOCATION;  Service: Cardiovascular;  Laterality: N/A;    CARDIAC CATHETERIZATION N/A 03/29/2021    Procedure: Left heart cath;  Surgeon: Zain Mendoza MD;  Location: [facility] CATH INVASIVE LOCATION;  Service: Cardiovascular;  Laterality: N/A;    CARDIAC CATHETERIZATION N/A 03/29/2021    Procedure: Left ventriculography;  Surgeon: Zain Mendoza MD;  Location:  [facility] CATH INVASIVE LOCATION;  Service: Cardiovascular;  Laterality: N/A;    CHOLECYSTECTOMY      COLONOSCOPY  2008    FRACTURE SURGERY  1975,2014    HYSTERECTOMY N/A 1971    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     JOINT REPLACEMENT  1995, 2013    KNEE SURGERY  1989    TONSILLECTOMY  1950         FAMILY HISTORY  Family History   Problem Relation Age of Onset    Hodgkin's lymphoma Mother     Cancer Mother          SOCIAL HISTORY  Social History     Socioeconomic History    Marital status:    Tobacco Use    Smoking status: Never    Smokeless tobacco: Never   Vaping Use    Vaping status: Never Used   Substance and Sexual Activity    Alcohol use: Yes     Alcohol/week: 1.0 standard drink of alcohol     Types: 1 Glasses of wine per week     Comment: Less than 1 glass of wine a week//caffeine use:1 cup daily    Drug use: No    Sexual activity: Not Currently     Partners: Male     Birth control/protection: Hysterectomy         ALLERGIES  Amlodipine        REVIEW OF SYSTEMS  Review of Systems   Constitutional:  Negative for fever.   HENT:  Negative for sore throat.    Eyes: Negative.    Respiratory:  Negative for cough and shortness of breath.    Cardiovascular:  Negative for chest pain.   Gastrointestinal:  Negative for abdominal pain, diarrhea and vomiting.   Genitourinary:  Negative for dysuria.   Musculoskeletal:  Negative for neck pain.        Right knee/shoulder pain   Skin:  Negative for rash.   Allergic/Immunologic: Negative.    Neurological:  Negative for weakness, numbness and headaches.   Hematological: Negative.    Psychiatric/Behavioral: Negative.     All other systems reviewed and are negative.         PHYSICAL EXAM  ED Triage Vitals   Temp Heart Rate Resp BP SpO2   08/02/24 1932 08/02/24 1928 08/02/24 1928 08/02/24 1928 08/02/24 1928   97.9 °F (36.6 °C) 83 18 (!) 189/86 100 %      Temp src Heart Rate Source Patient Position BP Location FiO2 (%)   08/02/24 1932 -- -- -- --   Oral           Physical Exam  Constitutional:       General: She is not in acute distress.     Appearance: Normal appearance. She is not ill-appearing or toxic-appearing.   HENT:      Head: Normocephalic and atraumatic.   Eyes:      Extraocular  Movements: Extraocular movements intact.      Pupils: Pupils are equal, round, and reactive to light.   Cardiovascular:      Rate and Rhythm: Normal rate and regular rhythm.      Heart sounds: No murmur heard.     No friction rub. No gallop.   Pulmonary:      Effort: Pulmonary effort is normal.      Breath sounds: Normal breath sounds.   Abdominal:      General: Abdomen is flat. There is no distension.      Palpations: Abdomen is soft.      Tenderness: There is no abdominal tenderness.   Musculoskeletal:         General: No swelling or tenderness. Normal range of motion.      Cervical back: Normal range of motion and neck supple.      Comments: Large amount of swelling and bruising present to the right knee with a skin tear present to the superior portion of that knee.  Limited range of motion due to pain   Skin:     General: Skin is warm and dry.      Comments: Contusion present to the right shoulder as well as the previously described right knee   Neurological:      General: No focal deficit present.      Mental Status: She is alert and oriented to person, place, and time.      Sensory: No sensory deficit.      Motor: No weakness.   Psychiatric:         Mood and Affect: Mood normal.         Behavior: Behavior normal.           Vital signs and nursing notes reviewed.          LAB RESULTS  Recent Results (from the past 24 hour(s))   CBC & Differential    Collection Time: 08/02/24 11:23 AM    Specimen: Blood   Result Value Ref Range    WBC 7.93 3.40 - 10.80 10*3/mm3    RBC 3.83 3.77 - 5.28 10*6/mm3    Hemoglobin 12.4 12.0 - 15.9 g/dL    Hematocrit 37.5 34.0 - 46.6 %    MCV 97.9 (H) 79.0 - 97.0 fL    MCH 32.4 26.6 - 33.0 pg    MCHC 33.1 31.5 - 35.7 g/dL    RDW 11.1 (L) 12.3 - 15.4 %    Platelets 205 140 - 450 10*3/mm3    Neutrophil Rel % 43.8 42.7 - 76.0 %    Lymphocyte Rel % 46.9 (H) 19.6 - 45.3 %    Monocyte Rel % 5.8 5.0 - 12.0 %    Eosinophil Rel % 2.6 0.3 - 6.2 %    Basophil Rel % 0.6 0.0 - 1.5 %    Neutrophils  Absolute 3.47 1.70 - 7.00 10*3/mm3    Lymphocytes Absolute 3.72 (H) 0.70 - 3.10 10*3/mm3    Monocytes Absolute 0.46 0.10 - 0.90 10*3/mm3    Eosinophils Absolute 0.21 0.00 - 0.40 10*3/mm3    Basophils Absolute 0.05 0.00 - 0.20 10*3/mm3    Immature Granulocyte Rel % 0.3 0.0 - 0.5 %    Immature Grans Absolute 0.02 0.00 - 0.05 10*3/mm3    nRBC 0.0 0.0 - 0.2 /100 WBC   Comprehensive metabolic panel    Collection Time: 08/02/24 11:23 AM    Specimen: Blood   Result Value Ref Range    Glucose 109 (H) 65 - 99 mg/dL    BUN 34 (H) 8 - 23 mg/dL    Creatinine 1.27 (H) 0.57 - 1.00 mg/dL    EGFR Result 41.5 (L) >60.0 mL/min/1.73    BUN/Creatinine Ratio 26.8 (H) 7.0 - 25.0    Sodium 146 (H) 136 - 145 mmol/L    Potassium 5.4 (H) 3.5 - 5.2 mmol/L    Chloride 109 (H) 98 - 107 mmol/L    Total CO2 25.0 22.0 - 29.0 mmol/L    Calcium 10.2 8.6 - 10.5 mg/dL    Total Protein 6.1 6.0 - 8.5 g/dL    Albumin 4.5 3.5 - 5.2 g/dL    Globulin 1.6 gm/dL    A/G Ratio 2.8 g/dL    Total Bilirubin 1.2 0.0 - 1.2 mg/dL    Alkaline Phosphatase 120 (H) 39 - 117 U/L    AST (SGOT) 21 1 - 32 U/L    ALT (SGPT) 17 1 - 33 U/L   TSH Rfx On Abnormal To Free T4    Collection Time: 08/02/24 11:23 AM    Specimen: Blood   Result Value Ref Range    TSH 4.100 0.270 - 4.200 uIU/mL   Hemoglobin A1c    Collection Time: 08/02/24 11:23 AM    Specimen: Blood   Result Value Ref Range    Hemoglobin A1C 5.70 (H) 4.80 - 5.60 %   Lipid panel    Collection Time: 08/02/24 11:23 AM    Specimen: Blood   Result Value Ref Range    Total Cholesterol 127 0 - 200 mg/dL    Triglycerides 62 0 - 150 mg/dL    HDL Cholesterol 69 (H) 40 - 60 mg/dL    VLDL Cholesterol Yohannes 13 5 - 40 mg/dL    LDL Chol Calc (NIH) 45 0 - 100 mg/dL   Vitamin D 25 hydroxy    Collection Time: 08/02/24 11:23 AM    Specimen: Blood   Result Value Ref Range    25 Hydroxy, Vitamin D 35.9 30.0 - 100.0 ng/ml   Comprehensive Metabolic Panel    Collection Time: 08/02/24  8:30 PM    Specimen: Arm, Right; Blood   Result Value Ref  Range    Glucose 110 (H) 65 - 99 mg/dL    BUN 35 (H) 8 - 23 mg/dL    Creatinine 1.13 (H) 0.57 - 1.00 mg/dL    Sodium 140 136 - 145 mmol/L    Potassium 4.2 3.5 - 5.2 mmol/L    Chloride 105 98 - 107 mmol/L    CO2 21.6 (L) 22.0 - 29.0 mmol/L    Calcium 9.5 8.6 - 10.5 mg/dL    Total Protein 6.3 6.0 - 8.5 g/dL    Albumin 4.1 3.5 - 5.2 g/dL    ALT (SGPT) 13 1 - 33 U/L    AST (SGOT) 24 1 - 32 U/L    Alkaline Phosphatase 113 39 - 117 U/L    Total Bilirubin 0.9 0.0 - 1.2 mg/dL    Globulin 2.2 gm/dL    A/G Ratio 1.9 g/dL    BUN/Creatinine Ratio 31.0 (H) 7.0 - 25.0    Anion Gap 13.4 5.0 - 15.0 mmol/L    eGFR 47.8 (L) >60.0 mL/min/1.73   CBC Auto Differential    Collection Time: 08/02/24  8:30 PM    Specimen: Arm, Right; Blood   Result Value Ref Range    WBC 12.22 (H) 3.40 - 10.80 10*3/mm3    RBC 3.54 (L) 3.77 - 5.28 10*6/mm3    Hemoglobin 12.0 12.0 - 15.9 g/dL    Hematocrit 35.6 34.0 - 46.6 %    .6 (H) 79.0 - 97.0 fL    MCH 33.9 (H) 26.6 - 33.0 pg    MCHC 33.7 31.5 - 35.7 g/dL    RDW 11.5 (L) 12.3 - 15.4 %    RDW-SD 41.7 37.0 - 54.0 fl    MPV 11.2 6.0 - 12.0 fL    Platelets 187 140 - 450 10*3/mm3    Neutrophil % 70.5 42.7 - 76.0 %    Lymphocyte % 22.0 19.6 - 45.3 %    Monocyte % 5.6 5.0 - 12.0 %    Eosinophil % 1.0 0.3 - 6.2 %    Basophil % 0.5 0.0 - 1.5 %    Immature Grans % 0.4 0.0 - 0.5 %    Neutrophils, Absolute 8.61 (H) 1.70 - 7.00 10*3/mm3    Lymphocytes, Absolute 2.69 0.70 - 3.10 10*3/mm3    Monocytes, Absolute 0.69 0.10 - 0.90 10*3/mm3    Eosinophils, Absolute 0.12 0.00 - 0.40 10*3/mm3    Basophils, Absolute 0.06 0.00 - 0.20 10*3/mm3    Immature Grans, Absolute 0.05 0.00 - 0.05 10*3/mm3    nRBC 0.0 0.0 - 0.2 /100 WBC       Ordered the above labs and reviewed the results.        RADIOLOGY  XR Shoulder 2+ View Right, XR Knee 3 View Right    Result Date: 8/2/2024  XR KNEE 3 VW RIGHT-, XR SHOULDER 2+ VW RIGHT-  Clinical: Fell, pain  Right knee findings: Total knee replacement prosthesis fails to demonstrate  loosening or malalignment. Small suprapatellar effusion is suggested. There is no fracture seen. The remainder is unremarkable.  Right shoulder findings: Mild widening of the acromioclavicular joint, the alignment is satisfactory and there is unusual bone hypertrophy along the distal clavicle which could indicate old healed traumatic injury. There is glenohumeral joint narrowing with subchondral sclerosis and cyst formation, there is bone hypertrophy about the shoulder joint. No displaced fracture is demonstrated. The remainder is unremarkable.  This report was finalized on 8/2/2024 8:34 PM by Dr. Carl Fisher M.D on Workstation: [id number]       Ordered the above noted radiological studies. Reviewed by me in PACS.            PROCEDURES  Procedures          MEDICATIONS GIVEN IN ER  Medications   sodium chloride 0.9 % flush 10 mL (has no administration in time range)   sodium chloride 0.9 % flush 10 mL (has no administration in time range)   oxyCODONE-acetaminophen (PERCOCET) 5-325 MG per tablet 1 tablet (has no administration in time range)   morphine injection 4 mg (4 mg Intravenous Given 8/2/24 2001)   ondansetron (ZOFRAN) injection 4 mg (4 mg Intravenous Given 8/2/24 2001)                   MEDICAL DECISION MAKING, PROGRESS, and CONSULTS    All labs have been independently reviewed by me.  All radiology studies have been reviewed by me and I have also reviewed the radiology report.   EKG's independently viewed and interpreted by me.  Discussion below represents my analysis of pertinent findings related to patient's condition, differential diagnosis, treatment plan and final disposition.      Additional sources:  - Discussed/ obtained information from independent historians: History obtained from the patient as well as the patient's family at bedside.    - External (non-ED) record review: Upon medical records review, the patient was last seen and evaluated in the outpatient office of internal medicine earlier  today, 8/2/2024 in follow-up for her multiple chronic medical conditions.    - Chronic or social conditions impacting care: Frequent falls, macular degeneration    - Shared decision making: Admission decision based on shared conversations have between myself, the patient and family at bedside, as well as TRINI Weber.       Orders placed during this visit:  Orders Placed This Encounter   Procedures    XR Shoulder 2+ View Right    XR Knee 3 View Right    Comprehensive Metabolic Panel    CBC Auto Differential    Insert Peripheral IV    Insert Peripheral IV    Initiate ED Observation Status    CBC & Differential           Differential diagnosis includes but is not limited to:    Knee contusion, knee fracture, periprosthetic fracture, knee dislocation, patellar dislocation, soft tissue injury of the right knee, right shoulder fracture, right shoulder contusion      Independent interpretation of labs, radiology studies, and discussions with consultants:    X-ray of the right knee independently interpreted by myself with my interpretation showing fairly significant soft tissue swelling without fracture or malalignment.        ED Course as of 08/02/24 2211   Fri Aug 02, 2024   2140 On reevaluation, the patient continues to report fairly significant discomfort to her right knee.  She still does not believe that she is able to place any weight on that right lower extremity.  Therefore, think sending her home today would not be an option due to the inability to perform her activities of daily living.  We will treat her pain and admit her today to the observation unit for pain control as well as possible PT assessment.  They agree with that plan and all questions answered. [BM]   2206 The patient's presentation, workup, as well as diagnosis and treatment plan was discussed at length with TRINI Weber.  She agrees to admit the patient to the observation unit today with Dr. Hancock. [BM]      ED Course User Index  [BM]  Corky Bell MD           DIAGNOSIS  Final diagnoses:   Fall from standing, initial encounter   Contusion of right knee, initial encounter   Contusion of right shoulder, initial encounter   Immobility   Noninfected skin tear of right leg, initial encounter         DISPOSITION  ADMISSION    Discussed treatment plan and reason for admission with pt/family and admitting physician.  Pt/family voiced understanding of the plan for admission for further testing/treatment as needed.               Latest Documented Vital Signs:  As of 22:11 EDT  BP- 160/60 HR- 73 Temp- 97.9 °F (36.6 °C) (Oral) O2 sat- 98%              --    Please note that portions of this were completed with a voice recognition program.       Note Disclaimer: At [facility], we believe that sharing information builds trust and better relationships. You are receiving this note because you are receiving care at [facility] or recently visited. It is possible you will see health information before a provider has talked with you about it. This kind of information can be easy to misunderstand. To help you fully understand what it means for your health, we urge you to discuss this note with your provider.             Corky Bell MD  08/02/24 220       Corky Bell MD  08/02/24 2212

## 2024-08-03 ENCOUNTER — READMISSION MANAGEMENT (OUTPATIENT)
Dept: CALL CENTER | Facility: HOSPITAL | Age: 84
End: 2024-08-03
Payer: MEDICARE

## 2024-08-03 ENCOUNTER — APPOINTMENT (OUTPATIENT)
Dept: MRI IMAGING | Facility: HOSPITAL | Age: 84
End: 2024-08-03
Payer: MEDICARE

## 2024-08-03 VITALS
HEIGHT: 61 IN | RESPIRATION RATE: 16 BRPM | DIASTOLIC BLOOD PRESSURE: 42 MMHG | SYSTOLIC BLOOD PRESSURE: 121 MMHG | BODY MASS INDEX: 32.1 KG/M2 | WEIGHT: 170 LBS | TEMPERATURE: 97.8 F | OXYGEN SATURATION: 98 % | HEART RATE: 62 BPM
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
## 2024-08-03 LAB
ANION GAP SERPL CALCULATED.3IONS-SCNC: 9 MMOL/L (ref 5–15)
BUN SERPL-MCNC: 30 MG/DL (ref 8–23)
BUN/CREAT SERPL: 27.5 (ref 7–25)
CALCIUM SPEC-SCNC: 9.1 MG/DL (ref 8.6–10.5)
CHLORIDE SERPL-SCNC: 106 MMOL/L (ref 98–107)
CO2 SERPL-SCNC: 23 MMOL/L (ref 22–29)
CREAT SERPL-MCNC: 1.09 MG/DL (ref 0.57–1)
DEPRECATED RDW RBC AUTO: 40.9 FL (ref 37–54)
EGFRCR SERPLBLD CKD-EPI 2021: 49.9 ML/MIN/1.73
ERYTHROCYTE [DISTWIDTH] IN BLOOD BY AUTOMATED COUNT: 11.3 % (ref 12.3–15.4)
GLUCOSE SERPL-MCNC: 96 MG/DL (ref 65–99)
HCT VFR BLD AUTO: 33.2 % (ref 34–46.6)
HGB BLD-MCNC: 10.7 G/DL (ref 12–15.9)
MCH RBC QN AUTO: 31.9 PG (ref 26.6–33)
MCHC RBC AUTO-ENTMCNC: 32.2 G/DL (ref 31.5–35.7)
MCV RBC AUTO: 99.1 FL (ref 79–97)
PLATELET # BLD AUTO: 168 10*3/MM3 (ref 140–450)
PMV BLD AUTO: 11.1 FL (ref 6–12)
POTASSIUM SERPL-SCNC: 3.8 MMOL/L (ref 3.5–5.2)
RBC # BLD AUTO: 3.35 10*6/MM3 (ref 3.77–5.28)
SODIUM SERPL-SCNC: 138 MMOL/L (ref 136–145)
WBC NRBC COR # BLD AUTO: 8.14 10*3/MM3 (ref 3.4–10.8)

## 2024-08-03 PROCEDURE — 25010000002 ONDANSETRON PER 1 MG: Performed by: NURSE PRACTITIONER

## 2024-08-03 PROCEDURE — G0378 HOSPITAL OBSERVATION PER HR: HCPCS

## 2024-08-03 PROCEDURE — 85027 COMPLETE CBC AUTOMATED: CPT

## 2024-08-03 PROCEDURE — 73721 MRI JNT OF LWR EXTRE W/O DYE: CPT

## 2024-08-03 PROCEDURE — 25010000002 KETOROLAC TROMETHAMINE PER 15 MG

## 2024-08-03 PROCEDURE — 97161 PT EVAL LOW COMPLEX 20 MIN: CPT

## 2024-08-03 PROCEDURE — 96375 TX/PRO/DX INJ NEW DRUG ADDON: CPT

## 2024-08-03 PROCEDURE — 80048 BASIC METABOLIC PNL TOTAL CA: CPT

## 2024-08-03 PROCEDURE — 25010000002 HYDROMORPHONE PER 4 MG: Performed by: EMERGENCY MEDICINE

## 2024-08-03 PROCEDURE — 96376 TX/PRO/DX INJ SAME DRUG ADON: CPT

## 2024-08-03 PROCEDURE — 97530 THERAPEUTIC ACTIVITIES: CPT

## 2024-08-03 RX ORDER — FUROSEMIDE 40 MG/1
40 TABLET ORAL DAILY
Status: DISCONTINUED | OUTPATIENT
Start: 2024-08-03 | End: 2024-08-03 | Stop reason: HOSPADM

## 2024-08-03 RX ORDER — HYDROMORPHONE HYDROCHLORIDE 1 MG/ML
0.5 INJECTION, SOLUTION INTRAMUSCULAR; INTRAVENOUS; SUBCUTANEOUS
Status: DISCONTINUED | OUTPATIENT
Start: 2024-08-03 | End: 2024-08-03 | Stop reason: HOSPADM

## 2024-08-03 RX ORDER — ONDANSETRON 2 MG/ML
4 INJECTION INTRAMUSCULAR; INTRAVENOUS EVERY 6 HOURS PRN
Status: DISCONTINUED | OUTPATIENT
Start: 2024-08-03 | End: 2024-08-03 | Stop reason: HOSPADM

## 2024-08-03 RX ORDER — HYDROCODONE BITARTRATE AND ACETAMINOPHEN 5; 325 MG/1; MG/1
1 TABLET ORAL EVERY 6 HOURS PRN
Qty: 12 TABLET | Refills: 0 | Status: SHIPPED | OUTPATIENT
Start: 2024-08-03

## 2024-08-03 RX ORDER — LIDOCAINE 4 G/G
1 PATCH TOPICAL
Status: DISCONTINUED | OUTPATIENT
Start: 2024-08-03 | End: 2024-08-03 | Stop reason: HOSPADM

## 2024-08-03 RX ORDER — TRAZODONE HYDROCHLORIDE 100 MG/1
100 TABLET ORAL NIGHTLY PRN
Status: DISCONTINUED | OUTPATIENT
Start: 2024-08-03 | End: 2024-08-03 | Stop reason: HOSPADM

## 2024-08-03 RX ORDER — SPIRONOLACTONE 25 MG/1
25 TABLET ORAL EVERY MORNING
Status: DISCONTINUED | OUTPATIENT
Start: 2024-08-03 | End: 2024-08-03 | Stop reason: HOSPADM

## 2024-08-03 RX ORDER — UBIDECARENONE 75 MG
50 CAPSULE ORAL DAILY
Status: DISCONTINUED | OUTPATIENT
Start: 2024-08-03 | End: 2024-08-03 | Stop reason: HOSPADM

## 2024-08-03 RX ORDER — ATORVASTATIN CALCIUM 20 MG/1
20 TABLET, FILM COATED ORAL DAILY
Status: DISCONTINUED | OUTPATIENT
Start: 2024-08-03 | End: 2024-08-03 | Stop reason: HOSPADM

## 2024-08-03 RX ORDER — MULTIPLE VITAMINS W/ MINERALS TAB 9MG-400MCG
1 TAB ORAL DAILY
Status: DISCONTINUED | OUTPATIENT
Start: 2024-08-03 | End: 2024-08-03 | Stop reason: HOSPADM

## 2024-08-03 RX ORDER — METHOCARBAMOL 750 MG/1
750 TABLET, FILM COATED ORAL 4 TIMES DAILY PRN
Qty: 30 TABLET | Refills: 0 | Status: SHIPPED | OUTPATIENT
Start: 2024-08-03

## 2024-08-03 RX ORDER — HYDROCODONE BITARTRATE AND ACETAMINOPHEN 5; 325 MG/1; MG/1
1 TABLET ORAL EVERY 6 HOURS PRN
Status: DISCONTINUED | OUTPATIENT
Start: 2024-08-03 | End: 2024-08-03 | Stop reason: HOSPADM

## 2024-08-03 RX ORDER — KETOROLAC TROMETHAMINE 15 MG/ML
15 INJECTION, SOLUTION INTRAMUSCULAR; INTRAVENOUS ONCE
Status: COMPLETED | OUTPATIENT
Start: 2024-08-03 | End: 2024-08-03

## 2024-08-03 RX ORDER — METOPROLOL SUCCINATE 50 MG/1
100 TABLET, EXTENDED RELEASE ORAL NIGHTLY
Status: DISCONTINUED | OUTPATIENT
Start: 2024-08-03 | End: 2024-08-03 | Stop reason: HOSPADM

## 2024-08-03 RX ORDER — ACETAMINOPHEN 325 MG/1
650 TABLET ORAL EVERY 6 HOURS PRN
Status: DISCONTINUED | OUTPATIENT
Start: 2024-08-03 | End: 2024-08-03

## 2024-08-03 RX ORDER — LISINOPRIL 10 MG/1
10 TABLET ORAL
Status: DISCONTINUED | OUTPATIENT
Start: 2024-08-03 | End: 2024-08-03 | Stop reason: HOSPADM

## 2024-08-03 RX ORDER — CHOLECALCIFEROL (VITAMIN D3) 25 MCG
1000 TABLET ORAL DAILY
Status: DISCONTINUED | OUTPATIENT
Start: 2024-08-03 | End: 2024-08-03 | Stop reason: HOSPADM

## 2024-08-03 RX ADMIN — HYDROMORPHONE HYDROCHLORIDE 0.5 MG: 1 INJECTION, SOLUTION INTRAMUSCULAR; INTRAVENOUS; SUBCUTANEOUS at 13:53

## 2024-08-03 RX ADMIN — LEVOTHYROXINE SODIUM 75 MCG: 50 TABLET ORAL at 06:18

## 2024-08-03 RX ADMIN — METOPROLOL SUCCINATE 100 MG: 50 TABLET, FILM COATED, EXTENDED RELEASE ORAL at 01:39

## 2024-08-03 RX ADMIN — HYDROMORPHONE HYDROCHLORIDE 0.5 MG: 1 INJECTION, SOLUTION INTRAMUSCULAR; INTRAVENOUS; SUBCUTANEOUS at 09:43

## 2024-08-03 RX ADMIN — Medication 10 ML: at 13:53

## 2024-08-03 RX ADMIN — ONDANSETRON 4 MG: 2 INJECTION INTRAMUSCULAR; INTRAVENOUS at 14:27

## 2024-08-03 RX ADMIN — Medication 10 ML: at 09:43

## 2024-08-03 RX ADMIN — LIDOCAINE 1 PATCH: 4 PATCH TOPICAL at 09:25

## 2024-08-03 RX ADMIN — ACETAMINOPHEN 325MG 650 MG: 325 TABLET ORAL at 12:56

## 2024-08-03 RX ADMIN — Medication 50 MCG: at 09:24

## 2024-08-03 RX ADMIN — ATORVASTATIN CALCIUM 20 MG: 20 TABLET, FILM COATED ORAL at 09:24

## 2024-08-03 RX ADMIN — KETOROLAC TROMETHAMINE 15 MG: 15 INJECTION, SOLUTION INTRAMUSCULAR; INTRAVENOUS at 01:39

## 2024-08-03 RX ADMIN — Medication 1000 UNITS: at 09:24

## 2024-08-03 RX ADMIN — LISINOPRIL 10 MG: 10 TABLET ORAL at 09:24

## 2024-08-03 RX ADMIN — FUROSEMIDE 40 MG: 40 TABLET ORAL at 09:24

## 2024-08-03 RX ADMIN — ACETAMINOPHEN 325MG 650 MG: 325 TABLET ORAL at 06:18

## 2024-08-03 RX ADMIN — Medication 1 TABLET: at 09:24

## 2024-08-03 RX ADMIN — HYDROCODONE BITARTRATE AND ACETAMINOPHEN 1 TABLET: 5; 325 TABLET ORAL at 17:38

## 2024-08-03 RX ADMIN — SPIRONOLACTONE 25 MG: 25 TABLET, FILM COATED ORAL at 08:26

## 2024-08-03 NOTE — ED NOTES
".Nursing report ED to floor  Ayana Reyna  84 y.o.  female    HPI :  HPI (Adult)  Stated Reason for Visit: fall w/ knee pain  History Obtained From: EMS, patient    Chief Complaint  Chief Complaint   Patient presents with    Fall       Admitting doctor:   Julio C Hancock MD    Admitting diagnosis:   The primary encounter diagnosis was Fall from standing, initial encounter. Diagnoses of Contusion of right knee, initial encounter, Contusion of right shoulder, initial encounter, Immobility, and Noninfected skin tear of right leg, initial encounter were also pertinent to this visit.    Code status:   Current Code Status       Date Active Code Status Order ID Comments User Context       Prior            Allergies:   Amlodipine    Isolation:   No active isolations    Intake and Output  No intake or output data in the 24 hours ending 08/02/24 2226    Weight:       08/02/24 1928   Weight: 77.1 kg (170 lb)       Most recent vitals:   Vitals:    08/02/24 1928 08/02/24 1932 08/02/24 2001 08/02/24 2131   BP: (!) 189/86  (!) 182/60 160/60   Pulse: 83  73 73   Resp: 18      Temp:  97.9 °F (36.6 °C)     TempSrc:  Oral     SpO2: 100%  99% 98%   Weight: 77.1 kg (170 lb)      Height: 154.9 cm (61\")          Active LDAs/IV Access:   Lines, Drains & Airways       Active LDAs       Name Placement date Placement time Site Days    Peripheral IV 08/02/24 1956 Right Antecubital 08/02/24 1956  Antecubital  less than 1                    Labs (abnormal labs have a star):   Labs Reviewed   COMPREHENSIVE METABOLIC PANEL - Abnormal; Notable for the following components:       Result Value    Glucose 110 (*)     BUN 35 (*)     Creatinine 1.13 (*)     CO2 21.6 (*)     BUN/Creatinine Ratio 31.0 (*)     eGFR 47.8 (*)     All other components within normal limits    Narrative:     GFR Normal >60  Chronic Kidney Disease <60  Kidney Failure <15    The GFR formula is only valid for adults with stable renal function between ages 18 and 70.   CBC WITH " AUTO DIFFERENTIAL - Abnormal; Notable for the following components:    WBC 12.22 (*)     RBC 3.54 (*)     .6 (*)     MCH 33.9 (*)     RDW 11.5 (*)     Neutrophils, Absolute 8.61 (*)     All other components within normal limits   CBC AND DIFFERENTIAL    Narrative:     The following orders were created for panel order CBC & Differential.  Procedure                               Abnormality         Status                     ---------                               -----------         ------                     CBC Auto Differential[[id number]]        Abnormal            Final result                 Please view results for these tests on the individual orders.       EKG:   No orders to display       Meds given in ED:   Medications   sodium chloride 0.9 % flush 10 mL (has no administration in time range)   sodium chloride 0.9 % flush 10 mL (has no administration in time range)   morphine injection 4 mg (4 mg Intravenous Given 8/2/24 2001)   ondansetron (ZOFRAN) injection 4 mg (4 mg Intravenous Given 8/2/24 2001)   oxyCODONE-acetaminophen (PERCOCET) 5-325 MG per tablet 1 tablet (1 tablet Oral Given 8/2/24 2211)       Imaging results:  No radiology results for the last day      Social issues:   Social History     Socioeconomic History    Marital status:    Tobacco Use    Smoking status: Never    Smokeless tobacco: Never   Vaping Use    Vaping status: Never Used   Substance and Sexual Activity    Alcohol use: Yes     Alcohol/week: 1.0 standard drink of alcohol     Types: 1 Glasses of wine per week     Comment: Less than 1 glass of wine a week//caffeine use:1 cup daily    Drug use: No    Sexual activity: Not Currently     Partners: Male     Birth control/protection: Hysterectomy       Peripheral Neurovascular       Neuro Cognitive  Neuro Cognitive (Adult)  Cognitive/Neuro/Behavioral WDL: WDL, mood/behavior, orientation  Orientation: oriented x 4  Mood/Behavior: calm, cooperative    Learning  Learning Assessment  (Adult)  Learning Readiness and Ability: no barriers identified  Education Provided  Person Taught: patient, family member/friend  Teaching Method: verbal instruction  Education Outcome Evaluation: verbalizes understanding    Respiratory  Respiratory (Adult)  Airway WDL: WDL  Respiratory WDL  Respiratory WDL: WDL    Abdominal Pain       Pain Assessments  Pain (Adult)  (0-10) Pain Rating: Rest: 7  (0-10) Pain Rating: Activity: 7    NIH Stroke Scale       Praveena Hunter RN  08/02/24 22:26 EDT

## 2024-08-03 NOTE — PROGRESS NOTES
ED OBSERVATION PROGRESS/DISCHARGE SUMMARY    Date of Admission: 8/2/2024   LOS: 0 days   PCP: Adela Sy APRN      Subjective     Hospital Outcome:   84-year-old female was seen and examined at bedside following admission to the observation unit secondary to right knee pain post mechanical fall.  Initially patient presented to the ED complaining of right knee and right shoulder pain following a fall while at home and landing directly on her knees and right shoulder.  Laboratory evaluation in the ED shows creatinine 1.13, glucose 110, WBC 12.2, hemoglobin 12 and platelet 187.  Plain films of right knee shows prosthesis intact however there is small suprapatellar effusion otherwise no evidence of acute fracture.  Right shoulder x-ray shows no evidence of fracture or dislocation.    MRI knee shows moderate effusion with Baker's cyst otherwise no fracture or dislocation. Spoke with Dr. Shirley with ortho and he recommends knee immobilizer and for pt to f/u with her ortho. Discussed the above findings and recommendations with patient and all questions answered.   .  ROS:  General: no fevers, chills  Respiratory: no cough, dyspnea  Cardiovascular: no chest pain, palpitations  Abdomen: No abdominal pain, nausea, vomiting, or diarrhea  Neurologic: No focal weakness    Objective   Physical Exam:  I have reviewed the vital signs.  Temp:  [97.7 °F (36.5 °C)-98.2 °F (36.8 °C)] 97.7 °F (36.5 °C)  Heart Rate:  [71-83] 71  Resp:  [16-18] 16  BP: (128-189)/(49-88) 147/49  General Appearance:    Alert, cooperative, no distress  Head:    Normocephalic, atraumatic  Eyes:    Sclerae anicteric  Neck:   Supple, no mass  Lungs: Clear to auscultation bilaterally, respirations unlabored  Heart: Regular rate and rhythm, S1 and S2 normal, no murmur, rub or gallop  Abdomen:  Soft, nontender, bowel sounds active, nondistended  Extremities: No clubbing, cyanosis, or edema to lower extremities  Pulses:  2+ and symmetric in distal lower  extremities  Skin: No rashes   Neurologic: Oriented x3, Normal strength to extremities    Results Review:    I have reviewed the labs, radiology results and diagnostic studies.    Results from last 7 days   Lab Units 08/03/24  0443   WBC 10*3/mm3 8.14   HEMOGLOBIN g/dL 10.7*   HEMATOCRIT % 33.2*   PLATELETS 10*3/mm3 168     Results from last 7 days   Lab Units 08/03/24  0443 08/02/24 2030 08/02/24  1123   SODIUM mmol/L 138 140 146*   POTASSIUM mmol/L 3.8 4.2 5.4*   CHLORIDE mmol/L 106 105 109*   CO2 mmol/L 23.0 21.6* 25.0   BUN mg/dL 30* 35* 34*   CREATININE mg/dL 1.09* 1.13* 1.27*   CALCIUM mg/dL 9.1 9.5 10.2   BILIRUBIN mg/dL  --  0.9 1.2   ALK PHOS U/L  --  113 120*   ALT (SGPT) U/L  --  13 17   AST (SGOT) U/L  --  24 21   GLUCOSE mg/dL 96 110* 109*     Imaging Results (Last 24 Hours)       Procedure Component Value Units Date/Time    XR Shoulder 2+ View Right [[id number]] Collected: 08/02/24 2032     Updated: 08/02/24 2037    Narrative:      XR KNEE 3 VW RIGHT-, XR SHOULDER 2+ VW RIGHT-     Clinical: Fell, pain     Right knee findings: Total knee replacement prosthesis fails to  demonstrate loosening or malalignment. Small suprapatellar effusion is  suggested. There is no fracture seen. The remainder is unremarkable.     Right shoulder findings: Mild widening of the acromioclavicular joint,  the alignment is satisfactory and there is unusual bone hypertrophy  along the distal clavicle which could indicate old healed traumatic  injury. There is glenohumeral joint narrowing with subchondral sclerosis  and cyst formation, there is bone hypertrophy about the shoulder joint.  No displaced fracture is demonstrated. The remainder is unremarkable.     This report was finalized on 8/2/2024 8:34 PM by Dr. Carl Fisher M.D  on Workstation: [id number]       XR Knee 3 View Right [[id number]] Collected: 08/02/24 2032     Updated: 08/02/24 2037    Narrative:      XR KNEE 3 VW RIGHT-, XR SHOULDER 2+ VW RIGHT-     Clinical:  Fell, pain     Right knee findings: Total knee replacement prosthesis fails to  demonstrate loosening or malalignment. Small suprapatellar effusion is  suggested. There is no fracture seen. The remainder is unremarkable.     Right shoulder findings: Mild widening of the acromioclavicular joint,  the alignment is satisfactory and there is unusual bone hypertrophy  along the distal clavicle which could indicate old healed traumatic  injury. There is glenohumeral joint narrowing with subchondral sclerosis  and cyst formation, there is bone hypertrophy about the shoulder joint.  No displaced fracture is demonstrated. The remainder is unremarkable.     This report was finalized on 8/2/2024 8:34 PM by Dr. Carl Fisher M.D  on Workstation: [id number]               I have reviewed the medications.  ---------------------------------------------------------------------------------------------  Assessment & Plan   Assessment/Problem List    Fall      Plan:  Fall  Right knee injury  -Mechanical fall, struck right knee, right shoulder  -History of right knee arthroplasty  -Small right suprapatellar effusion, no fracture, prosthesis intact  -Mild widening of right acromioclavicular joint.  No displacement, no fracture.  -Right knee pain with weightbearing  -PT and CCP consult  -Pain control as needed     Hypertension  -Continue home lisinopril, furosemide, spironolactone, metoprolol     Leukocytosis  -WBC 12.22, likely reactive  -Afebrile, denies chills, recent illness.    Disposition: Home      Follow-up after Discharge: Ortho     This note will serve as a discharge summary    TRINI Madison 08/03/24 07:56 EDT    I have worn appropriate PPE during this patient encounter, sanitized my hands both with entering and exiting patient's room.      50 minutes has been spent by [facility] providers in the care of this patient while under observation status

## 2024-08-03 NOTE — CASE MANAGEMENT/SOCIAL WORK
.A refill request was received for:  Requested Prescriptions     Pending Prescriptions Disp Refills    ESCITALOPRAM 5 MG Oral Tab [Pharmacy Med Name: ESCITALOPRAM 5MG TABLETS] 30 tablet 0     Sig: TAKE 1 TABLET(5 MG) BY MOUTH DAILY       Last refill date:  7/27/2023     Last office visit: 3/14/2023    Follow up due:  Future Appointments   Date Time Provider Albert Agosto   9/14/2023 11:45 AM Jenny Ramsey MD PF HEM ONC [place]   9/14/2023 12:00 PM BRANT TX RN2 PF CHEMO I [place] Discharge Planning Assessment  [facility]     Patient Name: Ayana Reyna  MRN: [id number]  Today's Date: 8/3/2024    Admit Date: 8/2/2024    Plan: Plan to return home with daughter, where she lives in 1 level condo. Referral for HH PT sent to [facility].   Discharge Needs Assessment       Row Name 08/03/24 1104       Living Environment    People in Home child(jeremías), adult    Name(s) of People in Home Angelica (daughter)    Current Living Arrangements condominium    Potentially Unsafe Housing Conditions none    Primary Care Provided by self;child(jeremías)    Provides Primary Care For no one    Family Caregiver if Needed child(jeremías), adult    Family Caregiver Names Angelica (daughter)    Quality of Family Relationships helpful;involved;supportive    Able to Return to Prior Arrangements yes    Living Arrangement Comments Resides with daughter, Angelica, in condo.       Resource/Environmental Concerns    Resource/Environmental Concerns none    Transportation Concerns none       Transition Planning    Patient/Family Anticipates Transition to home with help/services  HHC or home PT.    Patient/Family Anticipated Services at Transition home health care    Transportation Anticipated family or friend will provide       Discharge Needs Assessment    Equipment Currently Used at Home bath bench;rollator    Concerns to be Addressed discharge planning    Concerns Comments HH referral.    Anticipated Changes Related to Illness none    Equipment Needed After Discharge none    Outpatient/Agency/Support Group Needs homecare agency    Patient's Choice of Community Agency(s) Patient has used [facility] in the past and would like to use their services again.    Current Discharge Risk physical impairment                   Discharge Plan       Row Name 08/03/24 1112       Plan    Plan Plan to return home with daughter, where she lives in 1 level condo. Referral for HH PT sent to [facility].    Patient/Family in Agreement with Plan yes    Plan Comments Patient plans to  return home with [facility] upon discharge, for home PT. She lives in a condo with daughter, who provides transport. She typically is IND with IADL's without AD, but has a rollator at home should she need one. Physical Therapy evaluation recommends home health for continued PT. Patient reports that she has used [facility] for PT in the past and is agreeable to referral for their services. Referral sent on patient's behalf. CCP to follow. CODEY Luna RN                  Continued Care and Services - Admitted Since 8/2/2024       Home Medical Care       Service Provider Request Status Selected Services Address Phone Fax Patient Preferred     [facility] Pending - Request Sent N/A [address] [address] [phone] --                     Demographic Summary       Row Name 08/03/24 1100       General Information    Admission Type observation    Arrived From home    Referral Source admission list    Reason for Consult discharge planning    Preferred Language English    General Information Comments Facesheet verified. Role of CCP explained. Appropriate PPE worn at all times.                   Functional Status       Row Name 08/03/24 1101       Functional Status, IADL    Medications independent    Meal Preparation independent    Housekeeping independent    Laundry independent    Shopping independent       Mental Status    General Appearance WDL WDL                   Psychosocial       Row Name 08/03/24 1101       Behavior WDL    Behavior WDL WDL       Emotion Mood WDL    Emotion/Mood/Affect WDL WDL       Speech WDL    Speech WDL WDL       Perceptual State WDL    Perceptual State WDL WDL       Thought Process WDL    Thought Process WDL WDL       Intellectual Performance WDL    Intellectual Performance WDL WDL    Level of Consciousness Alert       Coping/Stress    Sources of Support adult child(jeremías)       Developmental Stage (Parul's)    Developmental Stage Stage 8 (40  years-death/Late Adulthood) Integrity vs. Despair                   Abuse/Neglect    No documentation.                  Legal    No documentation.                  Substance Abuse    No documentation.                  Patient Forms    No documentation.                     Sandoval Tatum RN

## 2024-08-03 NOTE — CASE MANAGEMENT/SOCIAL WORK
Discharge Planning Assessment  [facility]     Patient Name: Ayana Reyna  MRN: [id number]  Today's Date: 8/3/2024    Admit Date: 8/2/2024    Plan: Plan to return home with daughter, where she lives in 1 level condo. Referral for  PT sent to [facility].   Discharge Needs Assessment       Row Name 08/03/24 1104       Living Environment    People in Home child(jeremías), adult    Name(s) of People in Home Angelica (daughter)    Current Living Arrangements condominium    Potentially Unsafe Housing Conditions none    Primary Care Provided by self;child(jeremías)    Provides Primary Care For no one    Family Caregiver if Needed child(jeremías), adult    Family Caregiver Names Angelica (daughter)    Quality of Family Relationships helpful;involved;supportive    Able to Return to Prior Arrangements yes    Living Arrangement Comments Resides with daughter, Angelica, in condo.       Resource/Environmental Concerns    Resource/Environmental Concerns none    Transportation Concerns none       Transition Planning    Patient/Family Anticipates Transition to home with help/services  HHC or home PT.    Patient/Family Anticipated Services at Transition home health care    Transportation Anticipated family or friend will provide       Discharge Needs Assessment    Equipment Currently Used at Home bath bench;rollator    Concerns to be Addressed discharge planning    Concerns Comments HH referral.    Anticipated Changes Related to Illness none    Equipment Needed After Discharge none    Outpatient/Agency/Support Group Needs homecare agency    Patient's Choice of Community Agency(s) Patient has used [facility] in the past and would like to use their services again.    Current Discharge Risk physical impairment                   Discharge Plan       Row Name 08/03/24 1217       Plan    Plan Comments Patient has been accepted by [facility]. Updated patient's daughter at bedside, who reports that [facility] has contacted them to update regarding next steps. No other DC needs anticipated  at this time. CODEY Luna RN      Row Name 08/03/24 1112       Plan    Plan Plan to return home with daughter, where she lives in 1 level condo. Referral for  PT sent to [facility].    Patient/Family in Agreement with Plan yes    Plan Comments Patient plans to return home with [facility] upon discharge, for home PT. She lives in a condo with daughter, who provides transport. She typically is IND with IADL's without AD, but has a rollator at home should she need one. Physical Therapy evaluation recommends home health for continued PT. Patient reports that she has used [facility] for PT in the past and is agreeable to referral for their services. Referral sent on patient's behalf. CCP to follow. CODEY Luna RN                  Continued Care and Services - Admitted Since 8/2/2024       Home Medical Care       Service Provider Request Status Selected Services Address Phone Fax Patient Preferred     [facility] Accepted N/A [id number] [address] [phone] --                     Demographic Summary       Row Name 08/03/24 1100       General Information    Admission Type observation    Arrived From home    Referral Source admission list    Reason for Consult discharge planning    Preferred Language English    General Information Comments Facesheet verified. Role of CCP explained. Appropriate PPE worn at all times.                   Functional Status       Row Name 08/03/24 1101       Functional Status, IADL    Medications independent    Meal Preparation independent    Housekeeping independent    Laundry independent    Shopping independent       Mental Status    General Appearance WDL WDL                   Psychosocial       Row Name 08/03/24 1101       Behavior WDL    Behavior WDL WDL       Emotion Mood WDL    Emotion/Mood/Affect WDL WDL       Speech WDL    Speech WDL WDL       Perceptual State WDL    Perceptual State WDL WDL       Thought Process WDL    Thought Process WDL WDL        Intellectual Performance WDL    Intellectual Performance WDL WDL    Level of Consciousness Alert       Coping/Stress    Sources of Support adult child(jeremías)       Developmental Stage (Eriksson's)    Developmental Stage Stage 8 (65 years-death/Late Adulthood) Integrity vs. Despair                   Abuse/Neglect    No documentation.                  Legal    No documentation.                  Substance Abuse    No documentation.                  Patient Forms    No documentation.                     Sandoval Tatum RN

## 2024-08-03 NOTE — PLAN OF CARE
Goal Outcome Evaluation:            Patient admitted to ED Observation for evaluation after fall at home. PT consult is placed, pain is being managed. Patient is Alert and Oriented but does not remember fall from three month's ago that is relayed to RN by family member.  She is made comfortable.  He SBP is in the 150's. Pan medications given.

## 2024-08-03 NOTE — DISCHARGE PLACEMENT REQUEST
"Ayana Reyna SAM \"Denise\" (84 y.o. Female)       Date of Birth   1940    Social Security Number       Address   [address] [place] [address]    Home Phone   [phone]    MRN   [id number]       Yazidi   Shinto    Marital Status                               Admission Date   8/2/24    Admission Type   Emergency    Admitting Provider   Julio C Hancock MD    Attending Provider   Edgard Wilson MD    Department, Room/Bed   [facility] OBSERVATION, 114/1       Discharge Date       Discharge Disposition       Discharge Destination                                 Attending Provider: Edgard Wilson MD    Allergies: Amlodipine    Isolation: None   Infection: None   Code Status: CPR    Ht: 154.9 cm (61\")   Wt: 77.1 kg (170 lb)    Admission Cmt: None   Principal Problem: Fall [W19.XXXA]                   Active Insurance as of 8/2/2024       Primary Coverage       Payor Plan Insurance Group Employer/Plan Group    MEDICARE MEDICARE A & B        Payor Plan Address Payor Plan Phone Number Payor Plan Fax Number Effective Dates    [address] [phone]  5/1/2004 - None Entered    [facility] [id number]         Subscriber Name Subscriber Birth Date Member ID       AYANA REYNA 1940 [id number]               Secondary Coverage       Payor Plan Insurance Group Employer/Plan Group    [employer] MC SUP AAR HEALTH CARE OPTIONS [id number]       Payor Plan Address Payor Plan Phone Number Payor Plan Fax Number Effective Dates    [facility] [phone]  1/1/2016 - None Entered    [address]       [facility] [address]         Subscriber Name Subscriber Birth Date Member ID       AYANA REYNA 1940 [id number]                     Emergency Contacts        (Rel.) Home Phone Work Phone Mobile Phone    Angelica Solis (Daughter) [phone] -- --    chelesavannah (Daughter) -- -- [phone]                "

## 2024-08-03 NOTE — OUTREACH NOTE
Prep Survey      Flowsheet Row Responses   [facility] patient discharged from? [facility]   Is LACE score < 7 ? Yes   Eligibility [facility]   Date of Admission 08/02/24   Date of Discharge 08/03/24   Discharge Disposition Home-Health Care Sv   Discharge diagnosis Fall  Right knee injury   Does the patient have one of the following disease processes/diagnoses(primary or secondary)? Other   Does the patient have Home health ordered? Yes   What is the Home health agency?  [facility]   Is there a DME ordered? No   Prep survey completed? Yes            TRUONG ALVAREZ - Registered Nurse

## 2024-08-03 NOTE — H&P
[facility]   HISTORY AND PHYSICAL    Patient Name: Ayana Reyna  : 1940  MRN: [id number]  Primary Care Physician:  Adela Sy APRN  Date of admission: 2024    Subjective   Subjective     Chief Complaint:   Chief Complaint   Patient presents with    Fall         HPI:    Ayana Reyna is a 84 y.o. female presenting with acute right knee and right shoulder pain status post mechanical fall.  Her medical history includes hypertension, right knee arthroplasty, hypothyroidism.  She reports she tripped over her slipper at home fell and struck her right knee and right shoulder.  She denies hitting her head, denies loss of consciousness.  She is not anticoagulated.  She denies syncope and weakness.  Right knee is edematous, painful with weightbearing, right shoulder is improved since the fall.     ED workup includes right knee x-ray showing for patellar effusion, no acute fractures, prosthesis is in good alignment.  Right shoulder x-rays suggest degenerative changes.  No fractures.  WBC is 12.2, likely reactive.  CMP is unremarkable    Review of Systems   Constitutional:  No weight changes, fever, or chills. No night sweats, no fatigue, no malaise.    ENT/Mouth:  No hearing changes, no ear pain, no nasal congestion, no sinus pain, no hoarseness, no sore throat, no rhinorrhea, no dysphagia.   Eyes:  No eye pain, swelling, or redness. No foreign body, discharge. No vision changes.    Cardiovascular:  No chest pain, no shortness of air, no dyspnea on exertion, no orthopnea, no palpitations, no edema.      Respiratory:  No cough, no smoke exposure, no dyspnea.    Gastrointestinal:  No nausea, vomiting, or diarrhea. No constipation, or GI discomfort. No reflux pain, no anorexia, no dysphagia. No hematochezia or melena.    Genitourinary:  No dyspareunia, no dysuria, no urinary frequency. No hematuria, no urinary incontinence. No flank pain or urinary flow changes.   Musculoskeletal:  No back or neck pain, no  recent injuries. + Right shoulder pain, right knee pain   Skin:  No skin lesions, no pruritus, no hair changes.    Neuro:  No weakness, no numbness, no paresthesias, no loss of consciousness, no syncope, no dizziness, no headache, no coordination changes, + recent mechanical fall   psych:  No anxiety/panic, no depression, no insomnia, no personality changes, no delusions. Denies SI/HI, denies auditory or visual hallucinations. No social issues, no memory changes.      Heme/Lymph:  No bruising, or bleeding. No transfusions history, no lymphadenopathy.   Endocrine:  No polyuria, polydipsia, no temperature intolerances.       Personal History     Past Medical History:   Diagnosis Date    Abnormal ECG     Acquired hypothyroidism     Allergic rhinitis     Aortic stenosis     mild to moderate per echo 2020    Arthritis     Bilateral leg cramps 01/21/2022    CHF (congestive heart failure)     Coronary artery calcification 03/24/2021    Fatigue     Glucose intolerance (impaired glucose tolerance)     Hearing loss     Heart murmur     Heart palpitations     Hematuria     Hyperlipemia     Hypertension     Insomnia     Macular degeneration     vision loss of left eye     Migraine     Nonrheumatic aortic valve insufficiency 10/19/2020    Osteoporosis     Pulmonary nodules 03/24/2021    Shortness of breath     Vision loss, bilateral     left eye macular degeneration; right eye etiology unknown    Vitamin D deficiency     Wears hearing aid in both ears 12/2019       Past Surgical History:   Procedure Laterality Date    CARDIAC CATHETERIZATION N/A 03/29/2021    Procedure: Coronary angiography;  Surgeon: Zain Mendoza MD;  Location: [facility] INVASIVE LOCATION;  Service: Cardiovascular;  Laterality: N/A;    CARDIAC CATHETERIZATION N/A 03/29/2021    Procedure: Left heart cath;  Surgeon: Zain Mendoza MD;  Location: [facility] CATH INVASIVE LOCATION;  Service: Cardiovascular;  Laterality: N/A;    CARDIAC CATHETERIZATION  N/A 03/29/2021    Procedure: Left ventriculography;  Surgeon: Zain Mendoza MD;  Location: [facility] INVASIVE LOCATION;  Service: Cardiovascular;  Laterality: N/A;    CHOLECYSTECTOMY      COLONOSCOPY  2008    FRACTURE SURGERY  1975,2014    HYSTERECTOMY N/A 1971    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    JOINT REPLACEMENT  1995, 2013    KNEE SURGERY  1989    TONSILLECTOMY  1950       Family History: family history includes Cancer in her mother; Hodgkin's lymphoma in her mother. Otherwise pertinent FHx was reviewed and not pertinent to current issue.    Social History:  reports that she has never smoked. She has never used smokeless tobacco. She reports current alcohol use of about 1.0 standard drink of alcohol per week. She reports that she does not use drugs.    Home Medications:  PreserVision/Lutein, acetaminophen, atorvastatin, cholecalciferol, furosemide, levothyroxine, metoprolol succinate XL, naproxen sodium, ramipril, spironolactone, traZODone, and vitamin B-12    Allergies:  Allergies   Allergen Reactions    Amlodipine Swelling     Lower extremity edema       Objective   Objective     Vitals:   Temp:  [97.9 °F (36.6 °C)-98.2 °F (36.8 °C)] 98.2 °F (36.8 °C)  Heart Rate:  [71-83] 81  Resp:  [18] 18  BP: (128-189)/(52-88) 157/71   Physical Exam:     Constitutional: Awake, alert. Well developed for age. Nontoxic appearing.   Eyes: PERRL x2, sclerae anicteric, no conjunctival injection. No EOM abnormalities.   HENT: NCAT, mucous membranes moist,   Neck: Supple, no thyromegaly, no lymphadenopathy, trachea midline  Respiratory: Clear to auscultation bilaterally, nonlabored respirations   Cardiovascular: RRR, no murmurs, rubs, or gallops, palpable pedal pulses bilaterally. No appreciable edema.   Gastrointestinal: Positive bowel sounds, soft, nontender, not distended.   Musculoskeletal: No bilateral ankle edema, no clubbing or cyanosis to extremities. No obvious deformities. + right knee edema, tender to ROM and  palpation.  Psychiatric: Appropriate affect, cooperative. Converses appropriately for age.   Neurologic: Oriented x 3, strength symmetric in all extremities. Cranial nerves grossly intact to confrontation, speech clear  Skin: No rashes, skin intact.     Result Review    Result Review:  I have personally reviewed the results from the time of this admission to 8/3/2024 02:39 EDT and agree with these findings:  [x]  Laboratory list / accordion  []  Microbiology  [x]  Radiology  []  EKG/Telemetry   []  Cardiology/Vascular   []  Pathology  []  Old records  []  Other:  Most notable findings include:   Right knee, right shoulder x-ray without acute fracture  Leukocytosis      Assessment & Plan   Assessment / Plan     Brief Patient Summary:  Ayana Reyna is a 84 y.o. female who presents with acute fall, struck right knee and right shoulder.  No acute fracture, still having pain with weightbearing, will need a PT and CCP consult.    Active Hospital Problems:  Active Hospital Problems    Diagnosis     **Fall      Plan:   Fall  Right knee injury  -Mechanical fall, struck right knee, right shoulder  -History of right knee arthroplasty  -Small right suprapatellar effusion, no fracture, prosthesis intact  -Mild widening of right acromioclavicular joint.  No displacement, no fracture.  -Right knee pain with weightbearing  -PT and CCP consult  -Pain control as needed    Hypertension  -Continue home lisinopril, furosemide, spironolactone, metoprolol    Leukocytosis  -WBC 12.22, likely reactive  -Afebrile, denies chills, recent illness.    VTE Prophylaxis:  No VTE prophylaxis order currently exists.      CODE STATUS:    Level Of Support Discussed With: Patient  Code Status (Patient has no pulse and is not breathing): CPR (Attempt to Resuscitate)  Medical Interventions (Patient has pulse or is breathing): Full Support    Admission Status:  I believe this patient meets observation status.    Electronically signed by TRINI Parra,  08/03/24, 2:39 AM EDT.        75 minutes has been spent by [facility] providers in the care of this patient while under observation status      I have worn appropriate PPE during this patient encounter, sanitized my hands both with entering and exiting patient's room.    I have discussed plan of care with patient including advance care plan and/or surrogate decision maker.  Patient advises that their daughter, Angelica Solis, will be their primary surrogate decision maker

## 2024-08-03 NOTE — PROGRESS NOTES
Patient Care Team:  Adela Sy APRN as PCP - General (Internal Medicine)  Toi Riley MD as Consulting Physician (Ophthalmology)  Monica Nichols MD (Dermatology)  Amanuel Nuñez MD as Consulting Physician (Orthopedic Surgery)  Darryl Joel MD as Consulting Physician (Otolaryngology)  Ana Orozco MD as Cardiologist (Cardiology)     RICHARD FERNANDO H&P Note    I supervised care provided by the midlevel provider. We have discussed this patient's history, physical exam, and treatment plan. I have reviewed the midlevel provider's note and I agree with the midlevel provider's findings and plan of care.   SHARED VISIT: This visit was performed by BOTH a physician and an APC. The substantive portion of the medical decision making was performed by this attesting physician who made or approved the management plan and takes responsibility for patient management.   I have personally had a face to face encounter with the patient.   My personal findings are documented below:    History:  84-year-old female presents with right shoulder and knee pain after mechanical fall.  X-ray imaging of the shoulder showed degenerative changes but no fracture, x-ray imaging of the knee showed patellar fusion, no acute fracture, hardware in place.  Lab work unremarkable other than slight leukocytosis, likely reactive in nature.  Patient unable ambulate in the ED.    Physical Exam:  General: No acute distress.  HENT: NCAT, PERRL, Nares patent.  Eyes: no scleral icterus.  Neck: trachea midline, no ROM limitations.  CV: regular rhythm, regular rate.  Respiratory: normal effort, CTAB.  Abdomen: soft, nondistended, NTTP, no rebound tenderness, no guarding or rigidity.  Musculoskeletal: no deformity.  Right knee: Contusion with anterior skin tear, significant swelling, positive effusion, limited range of motion, neurovascular intact distally.  Neuro: alert, moves all extremities, follows commands.  Skin: warm,  dry.    Assessment and Plan:  Patient admitted to the observation unit, consulting orthopedics, pending MRI imaging of the knee, treating pain, PT and CCP consult.

## 2024-08-03 NOTE — CONSULTS
Visit with patient for spiritual care consult. Visit with patient and 2 of 4 daughters. Prayer offered for comfort, peace and discernment as well as peace and unity of our country.

## 2024-08-03 NOTE — THERAPY EVALUATION
Patient Name: Ayana Reyna  : 1940    MRN: [id number]                              Today's Date: 8/3/2024       Admit Date: 2024    Visit Dx:     ICD-10-CM ICD-9-CM   1. Fall from standing, initial encounter  W19.XXXA E888.9   2. Contusion of right knee, initial encounter  S80.01XA 924.11   3. Contusion of right shoulder, initial encounter  S40.011A 923.00   4. Immobility  Z74.09 799.89   5. Noninfected skin tear of right leg, initial encounter  S81.811A 891.0     Patient Active Problem List   Diagnosis    Vitamin D deficiency    Allergic rhinitis    Prediabetes    Osteoporosis    Hypertension    Other insomnia    Mixed hyperlipidemia    Macular degeneration of both eyes    Acquired hypothyroidism    Vision loss, bilateral    Nonrheumatic aortic valve stenosis    Nonrheumatic mitral valve regurgitation    Nonrheumatic aortic valve insufficiency    Exudative age-related macular degeneration, left eye, with inactive scar    Coronary artery calcification    Age-related nuclear cataract of both eyes    Open angle with borderline findings, high risk, bilateral    Sciatica of right side    Heart murmur    Class 2 obesity with alveolar hypoventilation without serious comorbidity with body mass index (BMI) of 35.0 to 35.9 in adult    SDH (subdural hematoma)    Closed fracture of first cervical vertebra    Subarachnoid hemorrhage    Subdural hematoma    Closed fracture of right clavicle    Iron deficiency anemia    Stenosis of left carotid artery    Nonrheumatic tricuspid valve regurgitation    Grade II diastolic dysfunction    Pulmonary hypertension    Fall     Past Medical History:   Diagnosis Date    Abnormal ECG     Acquired hypothyroidism     Allergic rhinitis     Aortic stenosis     mild to moderate per echo     Arthritis     Bilateral leg cramps 2022    CHF (congestive heart failure)     Coronary artery calcification 2021    Fatigue     Glucose intolerance (impaired glucose tolerance)      Hearing loss     Heart murmur     Heart palpitations     Hematuria     Hyperlipemia     Hypertension     Insomnia     Macular degeneration     vision loss of left eye     Migraine     Nonrheumatic aortic valve insufficiency 10/19/2020    Osteoporosis     Pulmonary nodules 03/24/2021    Shortness of breath     Vision loss, bilateral     left eye macular degeneration; right eye etiology unknown    Vitamin D deficiency     Wears hearing aid in both ears 12/2019     Past Surgical History:   Procedure Laterality Date    CARDIAC CATHETERIZATION N/A 03/29/2021    Procedure: Coronary angiography;  Surgeon: Zain Mendoza MD;  Location:  [facility];  Service: Cardiovascular;  Laterality: N/A;    CARDIAC CATHETERIZATION N/A 03/29/2021    Procedure: Left heart cath;  Surgeon: Zain Mendoza MD;  Location:  [facility];  Service: Cardiovascular;  Laterality: N/A;    CARDIAC CATHETERIZATION N/A 03/29/2021    Procedure: Left ventriculography;  Surgeon: Zain Mendoza MD;  Location:  [facility];  Service: Cardiovascular;  Laterality: N/A;    CHOLECYSTECTOMY      COLONOSCOPY  2008    FRACTURE SURGERY  1975,2014    HYSTERECTOMY N/A 1971    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    JOINT REPLACEMENT  1995, 2013    KNEE SURGERY  1989    TONSILLECTOMY  1950      General Information       Row Name 08/03/24 1010          Physical Therapy Time and Intention    Document Type evaluation  -SM     Mode of Treatment individual therapy;physical therapy  -       Row Name 08/03/24 1010          General Information    Patient Profile Reviewed yes  -SM     Prior Level of Function independent:  -SM     Existing Precautions/Restrictions fall  -SM       Row Name 08/03/24 1010          Living Environment    People in Home child(jeremías), adult  -SM       Row Name 08/03/24 1010          Home Main Entrance    Number of Stairs, Main Entrance none  -SM       Row Name 08/03/24 1010          Cognition     Orientation Status (Cognition) oriented x 4  -       Row Name 08/03/24 1010          Safety Issues, Functional Mobility    Impairments Affecting Function (Mobility) endurance/activity tolerance;strength;pain;range of motion (ROM)  -               User Key  (r) = Recorded By, (t) = Taken By, (c) = Cosigned By      Initials Name Provider Type     Marina Pappas PT Physical Therapist                   Mobility       Row Name 08/03/24 1011          Bed Mobility    Bed Mobility supine-sit;sit-supine  -     Supine-Sit [place] (Bed Mobility) minimum assist (75% patient effort)  -     Sit-Supine [place] (Bed Mobility) minimum assist (75% patient effort)  -     Assistive Device (Bed Mobility) head of bed elevated;bed rails  -     Comment, (Bed Mobility) assist with R LE  -[facility] Name 08/03/24 1011          Sit-Stand Transfer    Sit-Stand [place] (Transfers) contact guard  -     Assistive Device (Sit-Stand Transfers) other (see comments)  HHA  -[facility] Name 08/03/24 1011          Gait/Stairs (Locomotion)    [place] Level (Gait) contact guard;minimum assist (75% patient effort)  -     Assistive Device (Gait) other (see comments)  HHA, bed rail  -     Distance in Feet (Gait) 1  a few side steps towards HOB  -     Right Sided Gait Deviations weight shift ability decreased  -               User Key  (r) = Recorded By, (t) = Taken By, (c) = Cosigned By      Initials Name Provider Type     Marina Pappas PT Physical Therapist                   Obj/Interventions       Row Name 08/03/24 1012          Range of Motion Comprehensive    Comment, General Range of Motion R knee limited by pain  -[facility] Name 08/03/24 1012          Strength Comprehensive (MMT)    General Manual Muscle Testing (MMT) Assessment lower extremity strength deficits identified  -     Comment, General Manual Muscle Testing (MMT) Assessment R LE limited by pain  -       Row Name 08/03/24 1012           Balance    Balance Assessment sitting static balance;sitting dynamic balance;sit to stand dynamic balance;standing static balance;standing dynamic balance  -     Static Sitting Balance supervision  -     Dynamic Sitting Balance standby assist  -     Position, Sitting Balance sitting edge of bed  -SM     Sit to Stand Dynamic Balance contact guard;verbal cues  -SM     Static Standing Balance contact guard  -SM     Dynamic Standing Balance contact guard  -     Position/Device Used, Standing Balance supported  -SM     Balance Interventions sitting;standing;sit to stand;supported;static;dynamic  -SM               User Key  (r) = Recorded By, (t) = Taken By, (c) = Cosigned By      Initials Name Provider Type     Marina Pappas PT Physical Therapist                   Goals/Plan       Row Name 08/03/24 1018          Bed Mobility Goal 1 (PT)    Activity/Assistive Device (Bed Mobility Goal 1, PT) bed mobility activities, all  -SM     Alto Level/Cues Needed (Bed Mobility Goal 1, PT) modified independence  -SM     Time Frame (Bed Mobility Goal 1, PT) 1 week  -       Row Name 08/03/24 1018          Transfer Goal 1 (PT)    Activity/Assistive Device (Transfer Goal 1, PT) sit-to-stand/stand-to-sit;bed-to-chair/chair-to-bed  -SM     Alto Level/Cues Needed (Transfer Goal 1, PT) standby assist  -SM     Time Frame (Transfer Goal 1, PT) 1 week  -       Row Name 08/03/24 1018          Gait Training Goal 1 (PT)    Activity/Assistive Device (Gait Training Goal 1, PT) gait (walking locomotion);walker, rolling  -SM     Alto Level (Gait Training Goal 1, PT) modified independence  -SM     Distance (Gait Training Goal 1, PT) 100ft  -SM     Time Frame (Gait Training Goal 1, PT) 1 week  -               User Key  (r) = Recorded By, (t) = Taken By, (c) = Cosigned By      Initials Name Provider Type     Marina Pappas PT Physical Therapist                   Clinical Impression       Row Name  08/03/24 1012          Pain    Pain Location - Side/Orientation Right  -     Pain Location - knee  -     Pre/Posttreatment Pain Comment Patient did not rate  -     Pain Intervention(s) Rest;Repositioned  -       Row Name 08/03/24 1012          Plan of Care Review    Plan of Care Reviewed With patient;daughter  -     Outcome Evaluation Patient is an 84 y.o female who presented to [facility] following a fall resulting in R knee pain. Abraisions and bruising noted on R knee. Patient AOx4 supine in bed upon arrival. Patient reports she lives in a patio home with her daughter. Patient is independent at baseline and does not use an AD. Patient required Vick to sit up to EOB this date. Patient performed STS from EOB with CGA. Patient able to take a few shuffled side steps towards HOB with HHA and patient holding onto bedrail. Patient with difficulty shifting weight through R LE due to pain limiting further ambulation. Patient would continue to benefit from skilled PT intervention to address deficits in functional mobility and maximize safety and independence. Mobility largely limited by pain at this time. Anticipate home with assist and HHPT at d/c. Patient may benefit from support of rwx. Acute PT will continue to monitor.  -       Row Name 08/03/24 1012          Therapy Assessment/Plan (PT)    Rehab Potential (PT) good, to achieve stated therapy goals  -     Criteria for Skilled Interventions Met (PT) yes  -     Therapy Frequency (PT) 5 times/wk  -       Row Name 08/03/24 1012          Vital Signs    Pre Patient Position Supine  -     Intra Patient Position Standing  -SM     Post Patient Position Supine  -       Row Name 08/03/24 1012          Positioning and Restraints    Pre-Treatment Position in bed  -SM     Post Treatment Position bed  -SM     In Bed notified nsg;call light within reach;encouraged to call for assist;exit alarm on;with family/caregiver;with kaia;andrew  -               User Key  (r) =  Recorded By, (t) = Taken By, (c) = Cosigned By      Initials Name Provider Type    Marina Ha PT Physical Therapist                   Outcome Measures       Row Name 08/03/24 1018 08/02/24 2353       How much help from another person do you currently need...    Turning from your back to your side while in flat bed without using bedrails? 4  -SM 3  -VI    Moving from lying on back to sitting on the side of a flat bed without bedrails? 3  -SM 3  -VI    Moving to and from a bed to a chair (including a wheelchair)? 3  -SM 2  -VI    Standing up from a chair using your arms (e.g., wheelchair, bedside chair)? 3  -SM 2  -VI    Climbing 3-5 steps with a railing? 2  -SM 2  -VI    To walk in hospital room? 2  -SM 2  -VI    AM-PAC 6 Clicks Score (PT) 17  -SM 14  -VI    Highest Level of Mobility Goal 5 --> Static standing  -SM 4 --> Transfer to chair/commode  -VI      Row Name 08/02/24 2338          How much help from another person do you currently need...    Turning from your back to your side while in flat bed without using bedrails? 3  -VI     Moving from lying on back to sitting on the side of a flat bed without bedrails? 3  -VI     Moving to and from a bed to a chair (including a wheelchair)? 2  -VI     Standing up from a chair using your arms (e.g., wheelchair, bedside chair)? 2  -VI     Climbing 3-5 steps with a railing? 2  -VI     To walk in hospital room? 2  -VI     AM-PAC 6 Clicks Score (PT) 14  -VI     Highest Level of Mobility Goal 4 --> Transfer to chair/commode  -VI       Row Name 08/03/24 1018          Functional Assessment    Outcome Measure Options AM-PAC 6 Clicks Basic Mobility (PT)  -               User Key  (r) = Recorded By, (t) = Taken By, (c) = Cosigned By      Initials Name Provider Type    Marina Ha PT Physical Therapist    VI Mary Carmen Lopez, RN Registered Nurse                                 Physical Therapy Education       Title: PT OT SLP Therapies (Done)       Topic:  Physical Therapy (Done)       Point: Mobility training (Done)       Learning Progress Summary             Patient Acceptance, E, VU by  at 8/3/2024 1018                         Point: Home exercise program (Done)       Learning Progress Summary             Patient Acceptance, E, VU by  at 8/3/2024 1018                         Point: Body mechanics (Done)       Learning Progress Summary             Patient Acceptance, E, VU by  at 8/3/2024 1018                         Point: Precautions (Done)       Learning Progress Summary             Patient Acceptance, E, VU by  at 8/3/2024 1018                                         User Key       Initials Effective Dates Name Provider Type Discipline     05/02/22 -  Marina Pappas, PT Physical Therapist PT                  PT Recommendation and Plan     Plan of Care Reviewed With: patient, daughter  Outcome Evaluation: Patient is an 84 y.o female who presented to [facility] following a fall resulting in R knee pain. Abraisions and bruising noted on R knee. Patient AOx4 supine in bed upon arrival. Patient reports she lives in a patio home with her daughter. Patient is independent at baseline and does not use an AD. Patient required Vick to sit up to EOB this date. Patient performed STS from EOB with CGA. Patient able to take a few shuffled side steps towards HOB with HHA and patient holding onto bedrail. Patient with difficulty shifting weight through R LE due to pain limiting further ambulation. Patient would continue to benefit from skilled PT intervention to address deficits in functional mobility and maximize safety and independence. Mobility largely limited by pain at this time. Anticipate home with assist and HHPT at d/c. Patient may benefit from support of rwx. Acute PT will continue to monitor.     Time Calculation:         PT Charges       Row Name 08/03/24 1019             Time Calculation    Start Time 0933 -      Stop Time 0945 -      Time Calculation  (min) 12 min  -SM      PT Received On 08/03/24  -      PT - Next Appointment 08/05/24  -      PT Goal Re-Cert Due Date 08/10/24  -         Time Calculation- PT    Total Timed Code Minutes- PT 8 minute(s)  -SM         Timed Charges    92433 - PT Therapeutic Activity Minutes 8  -SM         Total Minutes    Timed Charges Total Minutes 8  -SM       Total Minutes 8  -SM                User Key  (r) = Recorded By, (t) = Taken By, (c) = Cosigned By      Initials Name Provider Type     Marina Pappas PT Physical Therapist                  Therapy Charges for Today       Code Description Service Date Service Provider Modifiers Qty    33523468111 HC PT THERAPEUTIC ACT EA 15 MIN 8/3/2024 Marina Pappas, PT GP 1    82815795879 HC PT EVAL LOW COMPLEXITY 3 8/3/2024 Marina Pappas, PT GP 1            PT G-Codes  Outcome Measure Options: AM-PAC 6 Clicks Basic Mobility (PT)  AM-PAC 6 Clicks Score (PT): 17  PT Discharge Summary  Anticipated Discharge Disposition (PT): home with home health, home with assist, home with 24/7 care    Marina Pappas PT  8/3/2024

## 2024-08-03 NOTE — PLAN OF CARE
Goal Outcome Evaluation:  Plan of Care Reviewed With: patient, daughter           Outcome Evaluation: Patient is an 84 y.o female who presented to [facility] following a fall resulting in R knee pain. Abraisions and bruising noted on R knee. Patient AOx4 supine in bed upon arrival. Patient reports she lives in a patio home with her daughter. Patient is independent at baseline and does not use an AD. Patient required Vick to sit up to EOB this date. Patient performed STS from EOB with CGA. Patient able to take a few shuffled side steps towards HOB with HHA and patient holding onto bedrail. Patient with difficulty shifting weight through R LE due to pain limiting further ambulation. Patient would continue to benefit from skilled PT intervention to address deficits in functional mobility and maximize safety and independence. Mobility largely limited by pain at this time. Anticipate home with assist and HHPT at d/c. Patient may benefit from support of rwx. Acute PT will continue to monitor.      Anticipated Discharge Disposition (PT): home with home health, home with assist, home with 24/7 care

## 2024-08-04 ENCOUNTER — HOME HEALTH ADMISSION (OUTPATIENT)
Dept: HOME HEALTH SERVICES | Facility: HOME HEALTHCARE | Age: 84
End: 2024-08-04
Payer: MEDICARE

## 2024-08-04 ENCOUNTER — DOCUMENTATION (OUTPATIENT)
Dept: HOME HEALTH SERVICES | Facility: HOME HEALTHCARE | Age: 84
End: 2024-08-04
Payer: MEDICARE

## 2024-08-04 NOTE — PROGRESS NOTES
[facility] will follow post hospital as requested. Patient/daughter agreeable to service. Contact information and PCP confirmed.

## 2024-08-05 ENCOUNTER — HOME CARE VISIT (OUTPATIENT)
Dept: HOME HEALTH SERVICES | Facility: HOME HEALTHCARE | Age: 84
End: 2024-08-05
Payer: MEDICARE

## 2024-08-05 ENCOUNTER — TRANSITIONAL CARE MANAGEMENT TELEPHONE ENCOUNTER (OUTPATIENT)
Dept: CALL CENTER | Facility: HOSPITAL | Age: 84
End: 2024-08-05
Payer: MEDICARE

## 2024-08-05 PROCEDURE — G0151 HHCP-SERV OF PT,EA 15 MIN: HCPCS

## 2024-08-05 NOTE — OUTREACH NOTE
Call Center TCM Note      Flowsheet Row Responses   [facility] patient discharged from? [facility]   Does the patient have one of the following disease processes/diagnoses(primary or secondary)? Other   TCM attempt successful? Yes   Call start time 1020   Call end time 1023   Discharge diagnosis Fall  Right knee injury   Is patient permission given to speak with other caregiver? Yes   List who call center can speak with Loren Michel--Daughter   Person spoke with today (if not patient) and relationship Patient   Medication alerts for this patient Norco, Robaxin   Meds reviewed with patient/caregiver? Yes   Is the patient having any side effects they believe may be caused by any medication additions or changes? No   Does the patient have all medications ordered at discharge? Yes   Is the patient taking all medications as directed (includes completed medication regime)? Yes   Comments Patient states she has macular degeneration and no longer drives. She will call and make appt for hospital f/u and make arrangements for transportation.   Does the patient have an appointment with their PCP within 7-14 days of discharge? No   Nursing Interventions PCP office requested to make appointment - message sent   What is the Home health agency?  [facility]   Has home health visited the patient within 72 hours of discharge? Call prior to 72 hours   Home health comments Patient states  PT coming today.   Psychosocial issues? No   Comments Patient states she is still having trouble walking and needs assistance. Her daughter and son in law live with her and are assisting her. Patient asked about wound care for abrasions. Advised to clean with antibacterial soap and water. Pat dry and apply triple antibiotic to wounds. Patient verbalized understanding.   Did the patient receive a copy of their discharge instructions? Yes   Nursing interventions Reviewed instructions with patient   What is the patient's perception of  their health status since discharge? Improving   Is the patient/caregiver able to teach back signs and symptoms related to disease process for when to call PCP? Yes   Is the patient/caregiver able to teach back signs and symptoms related to disease process for when to call 911? Yes   Is the patient/caregiver able to teach back the hierarchy of who to call/visit for symptoms/problems? PCP, Specialist, Home health nurse, Urgent Care, ED, 911 Yes   If the patient is a current smoker, are they able to teach back resources for cessation? Not a smoker   TCM call completed? Yes   Wrap up additional comments Patient states she has macular degeneration and no longer drives. She will call and make appt for hospital f/u and make arrangements for transportation.   Call end time 1023   Would this patient benefit from a Referral to [facility] Social Work? No   Is the patient interested in additional calls from an ambulatory ? No            Consuelo Escalera, RN    8/5/2024, 10:26 EDT

## 2024-08-05 NOTE — PROGRESS NOTES
Please tell patient I am so sorry about the fall, she is currently in the hospital right now  Labs are stable,  A1c is up to 5.7-- needs to follow low sugar diet choices  Cholesterol is at goal  Thyroid function stable  Vitamin D stable

## 2024-08-06 VITALS
WEIGHT: 168 LBS | HEIGHT: 61 IN | SYSTOLIC BLOOD PRESSURE: 122 MMHG | TEMPERATURE: 97.6 F | HEART RATE: 73 BPM | BODY MASS INDEX: 31.72 KG/M2 | RESPIRATION RATE: 18 BRPM | DIASTOLIC BLOOD PRESSURE: 82 MMHG | OXYGEN SATURATION: 98 %

## 2024-08-06 NOTE — HOME HEALTH
"SOC Note:    Home Health ordered for: disciplines PT    Reason for Hosp/Primary Dx/Co-morbidities: contusion right knee    Focus of Care:     Patient's goal(s): \"get better\"    Current Functional status/mobility/DME: min assist transfers/rollator    HB status/Living Arrangements: with family in patio home, steps to enter front    Skin Integrity/wound status: skin tear right knee    Code Status: full    Fall Risk/Safety concerns: throw rug    Educated on Emergency Plan, steps to take prior to going to the ER and when to Call Home Health First:  yes     Medication issues/Concerns:none    Additional Problems/Concerns: none    SDOH Barriers (i.e. caregiver concerns, social isolation, transportation, food insecurity, environment, income etc.)/Need for MSW: none"

## 2024-08-08 ENCOUNTER — TELEPHONE (OUTPATIENT)
Dept: INTERNAL MEDICINE | Facility: CLINIC | Age: 84
End: 2024-08-08
Payer: MEDICARE

## 2024-08-08 ENCOUNTER — HOME CARE VISIT (OUTPATIENT)
Dept: HOME HEALTH SERVICES | Facility: HOME HEALTHCARE | Age: 84
End: 2024-08-08
Payer: MEDICARE

## 2024-08-08 VITALS
RESPIRATION RATE: 18 BRPM | DIASTOLIC BLOOD PRESSURE: 70 MMHG | OXYGEN SATURATION: 96 % | SYSTOLIC BLOOD PRESSURE: 136 MMHG | TEMPERATURE: 98.5 F | HEART RATE: 66 BPM

## 2024-08-08 PROCEDURE — G0299 HHS/HOSPICE OF RN EA 15 MIN: HCPCS

## 2024-08-08 NOTE — HOME HEALTH
"Eval Note Patient with skin tear and swelling to right knee. Reports tripping over end of sandal when walking and falling to knees and shoulder. Bandage placed by daughter with difficulty getting it to adhere to skin. Wound assessed to be healing without any signs of infection. Some steri strips remain. Wound is draining moderate amount of serosanguineous fluid. Skin cleansed and dried, applied adaptic to wound, covered with island dressing and re-inforced edge with tape. Instructed daughter to change bandage daily. Instructed on signs of infection to monitor for and when to call home health. Supplies ordered through med[facility].   Patient's goal(s): \"to move better\"   Services required to achieve goals: PT, nursing for wound assessments 1w1, 2w1, 1w2   Potential Issues for goal attainment: NA   Problems identified: skin tear to right knee   Describe the Functional status and safety: Difficulty with ambulating due to right knee pain. Pain in bilat shoulders. Left shoulder pain is intermittent and has not hurt today.   Describe any environmental issues: NA   Any equipment needs- wound care supplies ordered   POC confirmed with Maria Luz (representative for [facility] Sy) on date 8/8/24"

## 2024-08-08 NOTE — TELEPHONE ENCOUNTER
Caller: SHEKHAR @[facility]    Relationship:     Best call back number: [phone]    What orders are you requesting (i.e. lab or imaging): VERBAL ORDERS TO TREAT WOUND ON RIGHT KNEE.   NON ADHERENT GAUZE AND ISLAND DRESSING. CHANGE DRESSING DAILY

## 2024-08-09 ENCOUNTER — HOME CARE VISIT (OUTPATIENT)
Dept: HOME HEALTH SERVICES | Facility: HOME HEALTHCARE | Age: 84
End: 2024-08-09
Payer: MEDICARE

## 2024-08-09 VITALS
HEART RATE: 77 BPM | TEMPERATURE: 97 F | DIASTOLIC BLOOD PRESSURE: 76 MMHG | SYSTOLIC BLOOD PRESSURE: 118 MMHG | OXYGEN SATURATION: 98 %

## 2024-08-09 PROCEDURE — G0157 HHC PT ASSISTANT EA 15: HCPCS

## 2024-08-09 NOTE — HOME HEALTH
Subjective: I am doing pretty good    Wound-Right knee contusion- covered with island dressing- slight drainage on bandage, assessed and changed with clean technique with new island dressing and Adaptic non adherent dressing  Falls- none  Medication Changes- none  Deficits- visual deficits    Assessment: Patient seen for right knee contusion. Patient has visual deficits however appears safe with transfers and mobility. I made recommendation for safety including gripper socks, bedcane and lowering the bed and family will look into options that I suggested including sock aid. She declines need for OT.  I gave her a written handout with enhanced writing to use for visual deficits and family can assist.Patient and family re-education on medication regimen, hydration and proper nutrition and used teach back for carryover and accuracy. Patient will benefit with continued PT for progression and reduce risk of decline.. Update to PT Jenifer as needed.    Plan for next visit:  Gait training  Review and progress HEP  Balance/transfers/safety  take weekly picture if needed

## 2024-08-12 ENCOUNTER — HOME CARE VISIT (OUTPATIENT)
Dept: HOME HEALTH SERVICES | Facility: HOME HEALTHCARE | Age: 84
End: 2024-08-12
Payer: MEDICARE

## 2024-08-12 VITALS
DIASTOLIC BLOOD PRESSURE: 72 MMHG | HEART RATE: 88 BPM | RESPIRATION RATE: 20 BRPM | OXYGEN SATURATION: 99 % | TEMPERATURE: 98.3 F | SYSTOLIC BLOOD PRESSURE: 120 MMHG

## 2024-08-12 VITALS
HEART RATE: 74 BPM | DIASTOLIC BLOOD PRESSURE: 76 MMHG | SYSTOLIC BLOOD PRESSURE: 132 MMHG | OXYGEN SATURATION: 98 % | TEMPERATURE: 97.2 F

## 2024-08-12 PROCEDURE — G0157 HHC PT ASSISTANT EA 15: HCPCS

## 2024-08-12 PROCEDURE — G0299 HHS/HOSPICE OF RN EA 15 MIN: HCPCS

## 2024-08-12 NOTE — HOME HEALTH
Subjective: I still can't get comfortable in the bed    Wound-Right knee contusion- covered with island dressing - nursing addressing  Falls- none   Medication Changes- none   Deficits- visual deficits     Assessment: Patient seen for right knee contusion. Patient has visual deficits however appears safe with transfers including lower toilet and outside chair and mobility with support of the rollator. Patient able to review and progress her seated HEP with cues and rest breaks.Patient and family re-education on medication regimen, hydration and proper nutrition and used teach back for carryover and accuracy. Patient will benefit with continued PT for progression and reduce risk of decline.. Update to PT Jenifer as needed.     Plan for next visit:   Gait training   Review and progress HEP   Balance/transfers/safety   take weekly picture if needed- nursing addresses

## 2024-08-14 ENCOUNTER — HOME CARE VISIT (OUTPATIENT)
Dept: HOME HEALTH SERVICES | Facility: HOME HEALTHCARE | Age: 84
End: 2024-08-14
Payer: MEDICARE

## 2024-08-14 VITALS
DIASTOLIC BLOOD PRESSURE: 70 MMHG | OXYGEN SATURATION: 98 % | TEMPERATURE: 98.1 F | HEART RATE: 82 BPM | SYSTOLIC BLOOD PRESSURE: 118 MMHG

## 2024-08-14 PROCEDURE — G0157 HHC PT ASSISTANT EA 15: HCPCS

## 2024-08-14 NOTE — HOME HEALTH
Subjective: I slept in the bed the last two nights    Wound-Right knee contusion- covered with island dressing and changed with clean technique  Falls- none   Medication Changes- none   Deficits- visual deficits     Assessment: Patient seen for right knee contusion. Patient has visual deficits however appears safe with transfers including lower toilet,bed and mobility with support of the rollator. She demonstrated leaving the home with rollator to walk to the mailbox and back with S. Patient able to review and progress to standing HEP with cues and rest breaks.She demonstrated ability to dianne shoes and socks today. Patient and family re-education on medication regimen, hydration and proper nutrition and used teach back for carryover and accuracy. Patient will benefit with continued PT for progression and reduce risk of decline.. Update to PT Jenifer as needed.        Plan for next visit:   Gait training   Review and progress HEP   Balance/transfers/safety   take weekly picture if needed- nursing addresses

## 2024-08-15 ENCOUNTER — HOME CARE VISIT (OUTPATIENT)
Dept: HOME HEALTH SERVICES | Facility: HOME HEALTHCARE | Age: 84
End: 2024-08-15
Payer: MEDICARE

## 2024-08-15 VITALS
SYSTOLIC BLOOD PRESSURE: 112 MMHG | TEMPERATURE: 98.6 F | RESPIRATION RATE: 20 BRPM | HEART RATE: 76 BPM | DIASTOLIC BLOOD PRESSURE: 58 MMHG

## 2024-08-15 PROCEDURE — G0299 HHS/HOSPICE OF RN EA 15 MIN: HCPCS

## 2024-08-19 ENCOUNTER — HOME CARE VISIT (OUTPATIENT)
Dept: HOME HEALTH SERVICES | Facility: HOME HEALTHCARE | Age: 84
End: 2024-08-19
Payer: MEDICARE

## 2024-08-19 VITALS
DIASTOLIC BLOOD PRESSURE: 72 MMHG | TEMPERATURE: 97.6 F | HEART RATE: 73 BPM | SYSTOLIC BLOOD PRESSURE: 124 MMHG | OXYGEN SATURATION: 99 %

## 2024-08-19 PROCEDURE — G0157 HHC PT ASSISTANT EA 15: HCPCS

## 2024-08-19 NOTE — HOME HEALTH
Subjective: I stayed by myself this weekend. I just took a shower    Wound-Right knee contusion- she had bandage off since she had just taken a shower and Therapist placed island dressing- no concerns  Falls- none   Medication Changes- none   Deficits- visual deficits     Assessment: Patient seen for right knee contusion. Patient has visual deficits however appears safe with transfers including lower toilet,bed and mobility with support of the rollator. She demonstrated leaving the home with rollator to walk outside on multiple surfaces of incline/decline with S. Patient able to review and progress to standing HEP with cues and assessed with balance activity with normal kitchen tasks with using counter as needed for support. She demonstrated ability to dianne shoes and socks today with no concerns. Patient and family re-education on medication regimen, hydration and proper nutrition and used teach back for carryover and accuracy. Patient will be discharged next visit if appropriate from home PT with HEP to continue. Update to PT Jenifer     Plan for next visit:   Gait training   Review HEP   Balance  discuss discharge concerns

## 2024-08-21 ENCOUNTER — HOME CARE VISIT (OUTPATIENT)
Dept: HOME HEALTH SERVICES | Facility: HOME HEALTHCARE | Age: 84
End: 2024-08-21
Payer: MEDICARE

## 2024-08-21 VITALS
SYSTOLIC BLOOD PRESSURE: 118 MMHG | OXYGEN SATURATION: 100 % | TEMPERATURE: 97.7 F | DIASTOLIC BLOOD PRESSURE: 56 MMHG | HEART RATE: 67 BPM

## 2024-08-21 PROCEDURE — G0299 HHS/HOSPICE OF RN EA 15 MIN: HCPCS

## 2024-08-21 PROCEDURE — G0151 HHCP-SERV OF PT,EA 15 MIN: HCPCS

## 2024-08-21 NOTE — HOME HEALTH
Patient reports doing well, no pain in the knee, is having pain in the shoulders, is seeing the doctor this week for a cortisone injection for the bad shoulder.

## 2024-08-22 VITALS
OXYGEN SATURATION: 100 % | TEMPERATURE: 98.2 F | HEART RATE: 72 BPM | SYSTOLIC BLOOD PRESSURE: 128 MMHG | DIASTOLIC BLOOD PRESSURE: 64 MMHG | RESPIRATION RATE: 20 BRPM

## 2024-08-28 ENCOUNTER — HOME CARE VISIT (OUTPATIENT)
Dept: HOME HEALTH SERVICES | Facility: HOME HEALTHCARE | Age: 84
End: 2024-08-28
Payer: MEDICARE

## 2024-08-28 VITALS
DIASTOLIC BLOOD PRESSURE: 66 MMHG | SYSTOLIC BLOOD PRESSURE: 120 MMHG | RESPIRATION RATE: 20 BRPM | TEMPERATURE: 96.9 F | OXYGEN SATURATION: 99 % | HEART RATE: 68 BPM

## 2024-08-28 PROCEDURE — G0299 HHS/HOSPICE OF RN EA 15 MIN: HCPCS

## 2024-11-14 ENCOUNTER — HOSPITAL ENCOUNTER (OUTPATIENT)
Dept: CARDIOLOGY | Facility: HOSPITAL | Age: 84
Discharge: HOME | End: 2024-11-14
Admitting: NURSE PRACTITIONER
Payer: MEDICARE

## 2024-11-14 ENCOUNTER — OFFICE VISIT (OUTPATIENT)
Dept: CARDIOLOGY | Facility: CLINIC | Age: 84
End: 2024-11-14
Payer: MEDICARE

## 2024-11-14 VITALS
HEART RATE: 71 BPM | WEIGHT: 168 LBS | HEIGHT: 61 IN | SYSTOLIC BLOOD PRESSURE: 122 MMHG | BODY MASS INDEX: 31.72 KG/M2 | DIASTOLIC BLOOD PRESSURE: 70 MMHG

## 2024-11-14 VITALS
DIASTOLIC BLOOD PRESSURE: 70 MMHG | HEIGHT: 61 IN | SYSTOLIC BLOOD PRESSURE: 122 MMHG | BODY MASS INDEX: 32.85 KG/M2 | WEIGHT: 174 LBS | HEART RATE: 71 BPM

## 2024-11-14 DIAGNOSIS — I35.1 NONRHEUMATIC AORTIC VALVE INSUFFICIENCY: ICD-10-CM

## 2024-11-14 DIAGNOSIS — I10 ESSENTIAL HYPERTENSION: ICD-10-CM

## 2024-11-14 DIAGNOSIS — E78.49 OTHER HYPERLIPIDEMIA: ICD-10-CM

## 2024-11-14 DIAGNOSIS — I34.0 NONRHEUMATIC MITRAL VALVE REGURGITATION: ICD-10-CM

## 2024-11-14 DIAGNOSIS — I51.89 GRADE II DIASTOLIC DYSFUNCTION: ICD-10-CM

## 2024-11-14 DIAGNOSIS — I35.0 NONRHEUMATIC AORTIC VALVE STENOSIS: Primary | ICD-10-CM

## 2024-11-14 DIAGNOSIS — I36.1 NONRHEUMATIC TRICUSPID VALVE REGURGITATION: ICD-10-CM

## 2024-11-14 DIAGNOSIS — I35.0 NONRHEUMATIC AORTIC VALVE STENOSIS: ICD-10-CM

## 2024-11-14 LAB
AORTIC ARCH: 2 CM
AORTIC DIMENSIONLESS INDEX: 0.28 (DI)
ASCENDING AORTA: 2.6 CM
BH CV ECHO MEAS - ACS: 0.91 CM
BH CV ECHO MEAS - AI P1/2T: 348.1 MSEC
BH CV ECHO MEAS - AO MAX PG: 35 MMHG
BH CV ECHO MEAS - AO MEAN PG: 22 MMHG
BH CV ECHO MEAS - AO ROOT DIAM: 2.5 CM
BH CV ECHO MEAS - AO V2 MAX: 296 CM/SEC
BH CV ECHO MEAS - AO V2 VTI: 82.8 CM
BH CV ECHO MEAS - AVA(I,D): 0.76 CM2
BH CV ECHO MEAS - EDV(CUBED): 64 ML
BH CV ECHO MEAS - EDV(MOD-SP2): 77 ML
BH CV ECHO MEAS - EDV(MOD-SP4): 80 ML
BH CV ECHO MEAS - EF(MOD-BP): 61.3 %
BH CV ECHO MEAS - EF(MOD-SP2): 61 %
BH CV ECHO MEAS - EF(MOD-SP4): 62.5 %
BH CV ECHO MEAS - ESV(CUBED): 10.6 ML
BH CV ECHO MEAS - ESV(MOD-SP2): 30 ML
BH CV ECHO MEAS - ESV(MOD-SP4): 30 ML
BH CV ECHO MEAS - FS: 45.1 %
BH CV ECHO MEAS - IVS/LVPW: 1.1 CM
BH CV ECHO MEAS - IVSD: 1.1 CM
BH CV ECHO MEAS - LAT PEAK E' VEL: 6.8 CM/SEC
BH CV ECHO MEAS - LV DIASTOLIC VOL/BSA (35-75): 45.6 CM2
BH CV ECHO MEAS - LV MASS(C)D: 136.2 GRAMS
BH CV ECHO MEAS - LV MAX PG: 3.5 MMHG
BH CV ECHO MEAS - LV MEAN PG: 2 MMHG
BH CV ECHO MEAS - LV SYSTOLIC VOL/BSA (12-30): 17.1 CM2
BH CV ECHO MEAS - LV V1 MAX: 93 CM/SEC
BH CV ECHO MEAS - LV V1 VTI: 22.8 CM
BH CV ECHO MEAS - LVIDD: 4 CM
BH CV ECHO MEAS - LVIDS: 2.2 CM
BH CV ECHO MEAS - LVOT AREA: 2.8 CM2
BH CV ECHO MEAS - LVOT DIAM: 1.88 CM
BH CV ECHO MEAS - LVPWD: 1 CM
BH CV ECHO MEAS - MED PEAK E' VEL: 7.5 CM/SEC
BH CV ECHO MEAS - MR MAX PG: 151 MMHG
BH CV ECHO MEAS - MR MAX VEL: 614.5 CM/SEC
BH CV ECHO MEAS - MR MEAN PG: 106.1 MMHG
BH CV ECHO MEAS - MR MEAN VEL: 494.8 CM/SEC
BH CV ECHO MEAS - MR VTI: 215.6 CM
BH CV ECHO MEAS - MV A DUR: 0.13 SEC
BH CV ECHO MEAS - MV A MAX VEL: 83.2 CM/SEC
BH CV ECHO MEAS - MV DEC SLOPE: 1426 CM/SEC2
BH CV ECHO MEAS - MV DEC TIME: 0.16 SEC
BH CV ECHO MEAS - MV E MAX VEL: 166 CM/SEC
BH CV ECHO MEAS - MV E/A: 2
BH CV ECHO MEAS - MV MAX PG: 10.5 MMHG
BH CV ECHO MEAS - MV MEAN PG: 3.7 MMHG
BH CV ECHO MEAS - MV P1/2T: 33.9 MSEC
BH CV ECHO MEAS - MV V2 VTI: 34.7 CM
BH CV ECHO MEAS - MVA(P1/2T): 6.5 CM2
BH CV ECHO MEAS - MVA(VTI): 1.82 CM2
BH CV ECHO MEAS - PA ACC TIME: 0.08 SEC
BH CV ECHO MEAS - PA V2 MAX: 140.3 CM/SEC
BH CV ECHO MEAS - PI END-D VEL: 76.9 CM/SEC
BH CV ECHO MEAS - PULM A REVS DUR: 0.1 SEC
BH CV ECHO MEAS - PULM A REVS VEL: 27.8 CM/SEC
BH CV ECHO MEAS - PULM DIAS VEL: 58.2 CM/SEC
BH CV ECHO MEAS - PULM S/D: 0.93
BH CV ECHO MEAS - PULM SYS VEL: 54 CM/SEC
BH CV ECHO MEAS - RAP SYSTOLE: 8 MMHG
BH CV ECHO MEAS - RV MAX PG: 1.28 MMHG
BH CV ECHO MEAS - RV V1 MAX: 56.6 CM/SEC
BH CV ECHO MEAS - RV V1 VTI: 14.6 CM
BH CV ECHO MEAS - RVSP: 65 MMHG
BH CV ECHO MEAS - SUP REN AO DIAM: 1.5 CM
BH CV ECHO MEAS - SV(LVOT): 63 ML
BH CV ECHO MEAS - SV(MOD-SP2): 47 ML
BH CV ECHO MEAS - SV(MOD-SP4): 50 ML
BH CV ECHO MEAS - SVI(LVOT): 35.9 ML/M2
BH CV ECHO MEAS - SVI(MOD-SP2): 26.8 ML/M2
BH CV ECHO MEAS - SVI(MOD-SP4): 28.5 ML/M2
BH CV ECHO MEAS - TAPSE (>1.6): 2.36 CM
BH CV ECHO MEAS - TR MAX PG: 56.9 MMHG
BH CV ECHO MEAS - TR MAX VEL: 377.2 CM/SEC
BH CV ECHO MEASUREMENTS AVERAGE E/E' RATIO: 23.22
BH CV XLRA - RV BASE: 3.7 CM
BH CV XLRA - RV LENGTH: 7 CM
BH CV XLRA - RV MID: 2.7 CM
BH CV XLRA - TDI S': 9.7 CM/SEC
LEFT ATRIUM VOLUME INDEX: 45.2 ML/M2
SINUS: 2.8 CM
STJ: 2.14 CM

## 2024-11-14 PROCEDURE — 93306 TTE W/DOPPLER COMPLETE: CPT

## 2024-11-14 NOTE — PROGRESS NOTES
Date of Office Visit: 2024  Encounter Provider: Ana Orozco MD  Place of Service: [facility] CARDIOLOGY  Patient Name: Ayana Reyna  :1940      Patient ID:  Ayana Reyna is a 84 y.o. female is here for  followup for aortic stenosis, mitral insufficiency, tricuspid insufficiency.        History of Present Illness    She has a history of hypertension, hyperlipidemia, hypothyroidism, aortic valve calcification, chest pain, mild CAD.     She is , has 4 children and is retired.  She is 1 cup of coffee per day.  She uses no cigarettes, alcohol or drugs.  Her mother had cancer.  She now lives alone as her  has passed away.  She has been in the same house for 53 years.  She is a [employer] fan and goes to the [employer] women's and men's basketball games.     She had dyspnea with exertion starting 2020.  She underwent stress nuclear perfusion study on 2020 showing no evidence of ischemia.  She had an echocardiogram done 2020 which showed ejection fraction 65% with grade 2 diastolic dysfunction, mild concentric left ventricular pretrip he, mild to moderate aortic stenosis and insufficiency, moderate mitral insufficiency, mild to moderate tricuspid insufficiency with RVSP 43 mmHg.  She had normal left lower extremity venous duplex study on 2020.       2021, she had a CTA of her heart.  This showed a 9 mm left upper lobe pulmonary nodule, 2 smaller pleural based nodules at the left upper lobe, linear scarring at the lower and right lobes, and scarring of the medial aspect of the right lower lobe.  Structurally her heart was normal, left main had >50% calcified ostial stenosis, 50% proximal calcified LAD stenosis, 50% mid calcified LAD stenosis, >50% distal mixed plaque stenosis, <25% calcified proximal circumflex stenosis, and the RCA was almost nonexistent.  Her total calcium score was 252.     2021, she had a stress echocardiogram  completed with the following results: EF 62%, grade 2 diastolic dysfunction, mild aortic valve stenosis, moderate aortic valve calcification, trace aortic regurgitation, mild mitral valve regurgitation, moderate mitral annular calcification, mild tricuspid regurgitation, normal stress portion.  She continued to have symptoms so in March 2021, she underwent cardiac catheterization which showed the following: Left main minimally calcified, LAD mildly calcified in the proximal segment, diagonal branches normal, minimal calcification in the proximal left circumflex, obtuse marginal branches, posterior lateral, and posterior descending branches normal, RCA normal.  Her chest pain and dyspnea was not felt to be a cardiac etiology.     Labs on 8/3/2024 showed creatinine 1.09 with otherwise normal BMP, hemoglobin 10.7 with otherwise normal CBC.  Labs on 8/2/2024 showed creatinine 1.13 with otherwise normal CMP, hemoglobin 11.5 with otherwise normal CBC.  Echocardiogram done 11/4/2024 shows ejection fraction of 61% with mild concentric left ventricular hypertrophy, grade 3 diastolic dysfunction, moderate left atrial enlargement, moderate aortic stenosis with mild aortic insufficiency, moderate mitral insufficiency, moderate tricuspid insufficiency, RVSP 65 mmHg.Carotid duplex study done 3/10/2023 shows less than 50% left internal carotid artery stenosis and plaquing in the right internal carotid artery without stenosis.    He had a couple falls and then moved into a patio home in [facility] in May with her daughter.  She has done okay since then.  She has mild exertional dyspnea but no orthopnea or PND.  She has had no syncope.  She has some mild dizziness but it is mostly a balance issue and she now uses a cane.  She has no chest pain or pressure.  She does not feel her heart racing or skipping.  She does not take her Lasix every day because of urination.  Overall, she is somewhat fatigued but able to do her activities of  daily living.    Past Medical History:   Diagnosis Date    Abnormal ECG     Acquired hypothyroidism     Allergic rhinitis     Aortic stenosis     mild to moderate per echo 2020    Arthritis     Bilateral leg cramps 01/21/2022    CHF (congestive heart failure)     Coronary artery calcification 03/24/2021    Fatigue     Glucose intolerance (impaired glucose tolerance)     Hearing loss     Heart murmur     Heart palpitations     Hematuria     Hyperlipemia     Hypertension     Insomnia     Macular degeneration     vision loss of left eye     Migraine     Nonrheumatic aortic valve insufficiency 10/19/2020    Osteoporosis     Pulmonary nodules 03/24/2021    Shortness of breath     Vision loss, bilateral     left eye macular degeneration; right eye etiology unknown    Vitamin D deficiency     Wears hearing aid in both ears 12/2019         Past Surgical History:   Procedure Laterality Date    CARDIAC CATHETERIZATION N/A 03/29/2021    Procedure: Coronary angiography;  Surgeon: Zain Mendoza MD;  Location:  [facility] C[facility] INVASIVE [facility];  Service: Cardiovascular;  Laterality: N/A;    CARDIAC CATHETERIZATION N/A 03/29/2021    Procedure: Left heart cath;  Surgeon: Zain Mendoza MD;  Location:  [facility];  Service: Cardiovascular;  Laterality: N/A;    CARDIAC CATHETERIZATION N/A 03/29/2021    Procedure: Left ventriculography;  Surgeon: Zain Mendoza MD;  Location:  [facility] INVAS[facility];  Service: Cardiovascular;  Laterality: N/A;    CHOLECYSTECTOMY      COLONOSCOPY  2008    FRACTURE SURGERY  1975,2014    HYSTERECTOMY N/A 1971    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    JOINT REPLACEMENT  1995, 2013    KNEE SURGERY  1989    TONSILLECTOMY  1950       Current Outpatient Medications on File Prior to Visit   Medication Sig Dispense Refill    acetaminophen (TYLENOL) 500 MG tablet Take 1 tablet by mouth Every 6 (Six) Hours As Needed for Mild Pain. Indications: Pain      atorvastatin (LIPITOR) 20 MG tablet  Take 1 tablet by mouth Daily. Indications: High Amount of Fats in the Blood 90 tablet 3    cholecalciferol (VITAMIN D3) 1000 UNITS tablet Take 1 tablet by mouth Daily. Indications: Osteoporosis, Vitamin D Deficiency      furosemide (LASIX) 40 MG tablet Take 1 tablet by mouth Daily. Indications: Edema 90 tablet 3    levothyroxine (SYNTHROID, LEVOTHROID) 75 MCG tablet Take 1 tablet by mouth Daily. 90 tablet 3    metoprolol succinate XL (TOPROL-XL) 100 MG 24 hr tablet Take 1 tablet by mouth Every Night. Indications: High Blood Pressure Disorder 90 tablet 1    Multiple Vitamins-Minerals (PRESERVISION/LUTEIN) capsule Take 1 tablet by mouth 2 (Two) Times a Day. Indications: AMD      naproxen sodium (ALEVE) 220 MG tablet Take 1 tablet by mouth 2 (Two) Times a Day With Meals. Indications: Pain      ramipril (ALTACE) 10 MG capsule Take 1 capsule by mouth Every Night. Indications: High Blood Pressure Disorder 90 capsule 3    spironolactone (ALDACTONE) 25 MG tablet Take 1 tablet by mouth Every Morning. 90 tablet 3    traZODone (DESYREL) 100 MG tablet Take 50 to 100mg QHS PRN Insomnia. 60 tablet 2    vitamin B-12 (CYANOCOBALAMIN) 100 MCG tablet Take 0.5 tablets by mouth Daily. Indications: Inadequate Vitamin B12      [DISCONTINUED] HYDROcodone-acetaminophen (NORCO) 5-325 MG per tablet Take 1 tablet by mouth Every 6 (Six) Hours As Needed for Moderate Pain. (Patient not taking: Reported on 11/14/2024) 12 tablet 0    [DISCONTINUED] methocarbamol (ROBAXIN) 750 MG tablet Take 1 tablet by mouth 4 (Four) Times a Day As Needed for Muscle Spasms. (Patient not taking: Reported on 11/14/2024) 30 tablet 0     No current facility-administered medications on file prior to visit.       Social History     Socioeconomic History    Marital status:    Tobacco Use    Smoking status: Never     Passive exposure: Never    Smokeless tobacco: Never   Vaping Use    Vaping status: Never Used   Substance and Sexual Activity    Alcohol use: Yes      "Alcohol/week: 1.0 standard drink of alcohol     Types: 1 Glasses of wine per week     Comment: Less than 1 glass of wine a week//caffeine use:1 cup daily    Drug use: No    Sexual activity: Not Currently     Partners: Male     Birth control/protection: Hysterectomy             Procedures    ECG 12 Lead    Date/Time: 11/14/2024 10:22 AM  Performed by: Ana Orozco MD    Authorized by: Ana Orozco MD  Comparison: compared with previous ECG   Similar to previous ECG  Rhythm: sinus rhythm    Clinical impression: normal ECG              Objective:      Vitals:    11/14/24 1011   BP: 122/70   Pulse: 71   Weight: 78.9 kg (174 lb)   Height: 154.9 cm (61\")     Body mass index is 32.88 kg/m².    Vitals reviewed.   Constitutional:       General: Not in acute distress.     Appearance: Not diaphoretic.   Neck:      Vascular: No carotid bruit or JVD.   Pulmonary:      Effort: Pulmonary effort is normal.      Breath sounds: Normal breath sounds.   Cardiovascular:      Normal rate. Regular rhythm.      Murmurs: There is a harsh midsystolic murmur at the URSB, radiating to the neck.      No gallop.  No rub.   Pulses:     Intact distal pulses.      Carotid: 2+ bilaterally.     Radial: 2+ bilaterally.     Dorsalis pedis: 2+ bilaterally.     Posterior tibial: 2+ bilaterally.  Edema:     Peripheral edema absent.   Neurological:      Cranial Nerves: No cranial nerve deficit.         Lab Review:       Assessment:      Diagnosis Plan   1. Nonrheumatic aortic valve stenosis  Adult Transthoracic Echo Complete W/ Cont if Necessary Per Protocol      2. Nonrheumatic mitral valve regurgitation  Adult Transthoracic Echo Complete W/ Cont if Necessary Per Protocol      3. Nonrheumatic tricuspid valve regurgitation  Adult Transthoracic Echo Complete W/ Cont if Necessary Per Protocol      4. Other hyperlipidemia        5. Essential hypertension        6. Grade II diastolic dysfunction          Aortic stenosis, moderate with mild " aortic insufficiency.  Hypertension, goal less than 120/80.  She is at goal.  Continue spironolactone, ramipril, metoprolol.  Hyperlipidemia, controlled on atorvastatin.  Hypothyroidism.  Continue levothyroxine.  Moderate mitral insufficiency.  Moderate tricuspid insufficiency  Pulmonary hypertension, WHO 2, continue Lasix  Exertional chest tightness, noncardiac.  Mild coronary artery disease  Bilateral carotid bruits, due to transmitted valvular sounds.  Falls x2 in the last 6 months.  Now lives in a patio home.     Plan:       Repeat echocardiogram in 6 months, advised that she take Lasix every day, see me the same day as her echo in 6 months.

## 2024-12-10 DIAGNOSIS — I10 ESSENTIAL HYPERTENSION: ICD-10-CM

## 2024-12-10 RX ORDER — METOPROLOL SUCCINATE 100 MG/1
100 TABLET, EXTENDED RELEASE ORAL NIGHTLY
Qty: 90 TABLET | Refills: 0 | Status: SHIPPED | OUTPATIENT
Start: 2024-12-10

## 2024-12-20 DIAGNOSIS — E03.9 ACQUIRED HYPOTHYROIDISM: Chronic | ICD-10-CM

## 2024-12-20 RX ORDER — LEVOTHYROXINE SODIUM 75 UG/1
75 TABLET ORAL DAILY
Qty: 90 TABLET | Refills: 0 | Status: SHIPPED | OUTPATIENT
Start: 2024-12-20

## 2025-01-22 DIAGNOSIS — G47.09 OTHER INSOMNIA: Chronic | ICD-10-CM

## 2025-01-22 RX ORDER — TRAZODONE HYDROCHLORIDE 100 MG/1
TABLET ORAL
Qty: 60 TABLET | Refills: 1 | Status: SHIPPED | OUTPATIENT
Start: 2025-01-22

## 2025-01-24 NOTE — TELEPHONE ENCOUNTER
----- Message from Margarita Giraldo MD sent at 3/17/2020 11:20 AM EDT -----  Regarding: no answer on phone - pls try again  Cardiology wants to know if she wants to reschedule echo 3/23 b/c pandemic  
I spoke to Ms. Reyna, and she will contact scheduling to have her ECHO rescheduled.  
Detail Level: Detailed

## 2025-02-07 ENCOUNTER — HOSPITAL ENCOUNTER (OUTPATIENT)
Dept: BONE DENSITY | Facility: HOSPITAL | Age: 85
Discharge: HOME | End: 2025-02-07
Payer: MEDICARE

## 2025-02-07 DIAGNOSIS — Z78.0 POST-MENOPAUSAL: ICD-10-CM

## 2025-02-07 PROCEDURE — 77080 DXA BONE DENSITY AXIAL: CPT

## 2025-02-10 NOTE — PROGRESS NOTES
Please mail patient results:   Your bone density results returned showing osteoporosis which is a condition of bone loss which can lead to higher risk of bone fractures. It is recommended to get 150-300 minutes weekly weight bearing exercise including 2 full body strength training sessions in weekly that cover all muscle groups. It is also recommend to get 600-800 units of vitamin D3 available over the counter daily. It is also recommended to include calcium fortified foods in your diet including dairy products, almonds, canned fish, broccoli, tofu, beans, and leafy greens. There are medications available for treatment. This includes medications such as fosamax, prolia, or reclast. There are side effect risks that would need to be discussed before starting these. If you are interested in medication therapy, I would recommend either discussing at your next appointment or making an appointment with me so we can sit down and discuss. If you have any questions, please let me know.     Thank You,  TRINI Seaman

## 2025-02-14 ENCOUNTER — OFFICE VISIT (OUTPATIENT)
Dept: INTERNAL MEDICINE | Facility: CLINIC | Age: 85
End: 2025-02-14
Payer: MEDICARE

## 2025-02-14 VITALS
OXYGEN SATURATION: 97 % | SYSTOLIC BLOOD PRESSURE: 128 MMHG | HEART RATE: 67 BPM | HEIGHT: 61 IN | DIASTOLIC BLOOD PRESSURE: 80 MMHG | WEIGHT: 167 LBS | BODY MASS INDEX: 31.53 KG/M2

## 2025-02-14 DIAGNOSIS — Z00.00 MEDICARE ANNUAL WELLNESS VISIT, SUBSEQUENT: Primary | ICD-10-CM

## 2025-02-14 DIAGNOSIS — E55.9 VITAMIN D DEFICIENCY: Chronic | ICD-10-CM

## 2025-02-14 DIAGNOSIS — M25.511 CHRONIC RIGHT SHOULDER PAIN: Chronic | ICD-10-CM

## 2025-02-14 DIAGNOSIS — I10 PRIMARY HYPERTENSION: Chronic | ICD-10-CM

## 2025-02-14 DIAGNOSIS — H35.30 MACULAR DEGENERATION OF BOTH EYES, UNSPECIFIED TYPE: Chronic | ICD-10-CM

## 2025-02-14 DIAGNOSIS — I65.22 STENOSIS OF LEFT CAROTID ARTERY: Chronic | ICD-10-CM

## 2025-02-14 DIAGNOSIS — I34.0 NONRHEUMATIC MITRAL VALVE REGURGITATION: Chronic | ICD-10-CM

## 2025-02-14 DIAGNOSIS — E78.2 MIXED HYPERLIPIDEMIA: Chronic | ICD-10-CM

## 2025-02-14 DIAGNOSIS — I36.1 NONRHEUMATIC TRICUSPID VALVE REGURGITATION: Chronic | ICD-10-CM

## 2025-02-14 DIAGNOSIS — G89.29 CHRONIC RIGHT SHOULDER PAIN: Chronic | ICD-10-CM

## 2025-02-14 DIAGNOSIS — I27.20 PULMONARY HYPERTENSION: Chronic | ICD-10-CM

## 2025-02-14 DIAGNOSIS — I51.89 GRADE II DIASTOLIC DYSFUNCTION: Chronic | ICD-10-CM

## 2025-02-14 DIAGNOSIS — E03.9 ACQUIRED HYPOTHYROIDISM: Chronic | ICD-10-CM

## 2025-02-14 DIAGNOSIS — R73.03 PREDIABETES: Chronic | ICD-10-CM

## 2025-02-14 PROBLEM — D50.9 IRON DEFICIENCY ANEMIA: Status: RESOLVED | Noted: 2024-01-03 | Resolved: 2025-02-14

## 2025-02-14 PROBLEM — M12.811 ROTATOR CUFF ARTHROPATHY OF RIGHT SHOULDER: Status: ACTIVE | Noted: 2022-07-22

## 2025-02-14 PROCEDURE — G0439 PPPS, SUBSEQ VISIT: HCPCS | Performed by: NURSE PRACTITIONER

## 2025-02-14 PROCEDURE — 1159F MED LIST DOCD IN RCRD: CPT | Performed by: NURSE PRACTITIONER

## 2025-02-14 PROCEDURE — G2211 COMPLEX E/M VISIT ADD ON: HCPCS | Performed by: NURSE PRACTITIONER

## 2025-02-14 PROCEDURE — 99214 OFFICE O/P EST MOD 30 MIN: CPT | Performed by: NURSE PRACTITIONER

## 2025-02-14 PROCEDURE — 3079F DIAST BP 80-89 MM HG: CPT | Performed by: NURSE PRACTITIONER

## 2025-02-14 PROCEDURE — 3074F SYST BP LT 130 MM HG: CPT | Performed by: NURSE PRACTITIONER

## 2025-02-14 PROCEDURE — 1160F RVW MEDS BY RX/DR IN RCRD: CPT | Performed by: NURSE PRACTITIONER

## 2025-02-14 PROCEDURE — 1125F AMNT PAIN NOTED PAIN PRSNT: CPT | Performed by: NURSE PRACTITIONER

## 2025-02-14 RX ORDER — SPIRONOLACTONE 25 MG/1
25 TABLET ORAL EVERY MORNING
Qty: 90 TABLET | Refills: 3 | Status: SHIPPED | OUTPATIENT
Start: 2025-02-14

## 2025-02-14 NOTE — PROGRESS NOTES
Subjective   The ABCs of the Annual Wellness Visit  Medicare Wellness Visit      Ayana Reyna is a 84 y.o. patient who presents for a Medicare Wellness Visit.    The following portions of the patient's history were reviewed and   updated as appropriate: allergies, current medications, past family history, past medical history, past social history, past surgical history, and problem list.    Compared to one year ago, the patient's physical   health is worse.  Compared to one year ago, the patient's mental   health is the same.    Recent Hospitalizations:  This patient has had a [facility] admission record on file within the last 365 days.  Current Medical Providers:  Patient Care Team:  Adela Sy APRN as PCP - General (Internal Medicine)  Toi Riley MD as Consulting Physician (Ophthalmology)  Monica Nichols MD (Dermatology)  Amanuel Nuñez MD as Consulting Physician (Orthopedic Surgery)  Darryl Joel MD as Consulting Physician (Otolaryngology)  Ana Orozco MD as Cardiologist (Cardiology)    Outpatient Medications Prior to Visit   Medication Sig Dispense Refill    acetaminophen (TYLENOL) 500 MG tablet Take 1 tablet by mouth Every 6 (Six) Hours As Needed for Mild Pain. Indications: Pain      atorvastatin (LIPITOR) 20 MG tablet Take 1 tablet by mouth Daily. Indications: High Amount of Fats in the Blood 90 tablet 3    cholecalciferol (VITAMIN D3) 1000 UNITS tablet Take 1 tablet by mouth Daily. Indications: Osteoporosis, Vitamin D Deficiency      furosemide (LASIX) 40 MG tablet Take 1 tablet by mouth Daily. Indications: Edema 90 tablet 3    levothyroxine (SYNTHROID, LEVOTHROID) 75 MCG tablet Take 1 tablet by mouth once daily 90 tablet 0    metoprolol succinate XL (TOPROL-XL) 100 MG 24 hr tablet TAKE 1 TABLET BY MOUTH EVERY NIGHT 90 tablet 0    Multiple Vitamins-Minerals (PRESERVISION/LUTEIN) capsule Take 1 tablet by mouth 2 (Two) Times a Day. Indications: AMD      naproxen  sodium (ALEVE) 220 MG tablet Take 1 tablet by mouth 2 (Two) Times a Day With Meals. Indications: Pain      ramipril (ALTACE) 10 MG capsule Take 1 capsule by mouth Every Night. Indications: High Blood Pressure Disorder 90 capsule 3    spironolactone (ALDACTONE) 25 MG tablet Take 1 tablet by mouth Every Morning. 90 tablet 3    vitamin B-12 (CYANOCOBALAMIN) 100 MCG tablet Take 0.5 tablets by mouth Daily. Indications: Inadequate Vitamin B12      traZODone (DESYREL) 100 MG tablet TAKE 1/2 TO 1 (ONE-HALF TO ONE) TABLET BY MOUTH ONCE DAILY AT BEDTIME AS NEEDED FOR INSOMNIA 60 tablet 1     No facility-administered medications prior to visit.     No opioid medication identified on active medication list. I have reviewed chart for other potential  high risk medication/s and harmful drug interactions in the elderly.      Aspirin is not on active medication list.  Aspirin use is not indicated based on review of current medical condition/s. Risk of harm outweighs potential benefits.  .    Patient Active Problem List   Diagnosis    Vitamin D deficiency    Allergic rhinitis    Prediabetes    Osteoporosis    Hypertension    Other insomnia    Mixed hyperlipidemia    Macular degeneration of both eyes    Acquired hypothyroidism    Vision loss, bilateral    Nonrheumatic aortic valve stenosis    Nonrheumatic mitral valve regurgitation    Nonrheumatic aortic valve insufficiency    Exudative age-related macular degeneration, left eye, with inactive scar    Coronary artery calcification    Age-related nuclear cataract of both eyes    Open angle with borderline findings, high risk, bilateral    Sciatica of right side    Heart murmur    Class 2 obesity with alveolar hypoventilation without serious comorbidity with body mass index (BMI) of 35.0 to 35.9 in adult    SDH (subdural hematoma)    Closed fracture of first cervical vertebra    Subarachnoid hemorrhage    Subdural hematoma    Closed fracture of right clavicle    Stenosis of left  "carotid artery    Nonrheumatic tricuspid valve regurgitation    Grade II diastolic dysfunction    Pulmonary hypertension    Rotator cuff arthropathy of right shoulder    Pain in joint of right shoulder    Chronic right shoulder pain     Advance Care Planning Advance Directive is not on file.  ACP discussion was held with the patient during this visit. Patient does not have an advance directive, information provided.            Objective   Vitals:    25 0746   BP: 128/80   BP Location: Left arm   Patient Position: Sitting   Cuff Size: Large Adult   Pulse: 67   SpO2: 97%   Weight: 75.8 kg (167 lb)   Height: 154.9 cm (61\")   PainSc:   4   PainLoc: Shoulder       Estimated body mass index is 31.55 kg/m² as calculated from the following:    Height as of this encounter: 154.9 cm (61\").    Weight as of this encounter: 75.8 kg (167 lb).                Does the patient have evidence of cognitive impairment? No                                                                                                Health  Risk Assessment    Smoking Status:  Social History     Tobacco Use   Smoking Status Never    Passive exposure: Never   Smokeless Tobacco Never     Alcohol Consumption:  Social History     Substance and Sexual Activity   Alcohol Use Yes    Alcohol/week: 1.0 standard drink of alcohol    Types: 1 Glasses of wine per week    Comment: Less than 1 glass of wine a week//caffeine use:1 cup daily       Fall Risk Screen  STEADI Fall Risk Assessment was completed, and patient is at MODERATE risk for falls. Assessment completed on:2025    Depression Screening   Little interest or pleasure in doing things? Not at all   Feeling down, depressed, or hopeless? Not at all   PHQ-2 Total Score 0      Health Habits and Functional and Cognitive Screenin/7/2025     5:16 PM   Functional & Cognitive Status   Do you have difficulty preparing food and eating? No    Do you have difficulty bathing yourself, getting dressed or " grooming yourself? No    Do you have difficulty using the toilet? No    Do you have difficulty moving around from place to place? No    Do you have trouble with steps or getting out of a bed or a chair? No    Current Diet Unhealthy Diet    Dental Exam Up to date    Eye Exam Up to date    Exercise (times per week) 0 times per week    Current Exercises Include No Regular Exercise    Do you need help using the phone?  No    Are you deaf or do you have serious difficulty hearing?  Yes    Do you need help to go to places out of walking distance? Yes    Do you need help shopping? No    Do you need help preparing meals?  No    Do you need help with housework?  No    Do you need help with laundry? No    Do you need help taking your medications? No    Do you need help managing money? No    Do you ever drive or ride in a car without wearing a seat belt? No    Have you felt unusual stress, anger or loneliness in the last month? No    Who do you live with? Child    If you need help, do you have trouble finding someone available to you? No    Have you been bothered in the last four weeks by sexual problems? No    Do you have difficulty concentrating, remembering or making decisions? No        Patient-reported           Age-appropriate Screening Schedule:  Refer to the list below for future screening recommendations based on patient's age, sex and/or medical conditions. Orders for these recommended tests are listed in the plan section. The patient has been provided with a written plan.    Health Maintenance List  Health Maintenance   Topic Date Due    ZOSTER VACCINE (2 of 3) 02/14/2025 (Originally 3/28/2012)    COVID-19 Vaccine (5 - 2024-25 season) 02/16/2025 (Originally 9/1/2024)    RSV Vaccine - Adults (1 - 1-dose 75+ series) 08/02/2025 (Originally 5/13/2014)    BMI FOLLOWUP  05/16/2025    LIPID PANEL  08/02/2025    ANNUAL WELLNESS VISIT  02/14/2026    DXA SCAN  02/07/2027    INFLUENZA VACCINE  Completed    Pneumococcal Vaccine  "50+  Completed    MAMMOGRAM  Discontinued    TDAP/TD VACCINES  Discontinued    COLORECTAL CANCER SCREENING  Discontinued                                                                                                                                                CMS Preventative Services Quick Reference  Risk Factors Identified During Encounter  Fall Risk-High or Moderate: Discussed Fall Prevention in the home  Hearing Problem:  using hearing aids  Immunizations Discussed/Encouraged: Shingrix  Inactivity/Sedentary: Patient was advised to exercise at least 150 minutes a week per CDC recommendations.  Dental Screening Recommended  Vision Screening Recommended    The above risks/problems have been discussed with the patient.  Pertinent information has been shared with the patient in the After Visit Summary.  An After Visit Summary and PPPS were made available to the patient.    Follow Up:   Next Medicare Wellness visit to be scheduled in 1 year.         Additional E&M Note during same encounter follows:  Patient has additional, significant, and separately identifiable condition(s)/problem(s) that require work above and beyond the Medicare Wellness Visit     Chief Complaint  Medicare Wellness-subsequent    Subjective   HPI  Denise is also being seen today for additional medical problem/s.    HTN-- continues on lasix, metoprolol, ramipril, aldactone; denies CP or SOA.     HLD-- continues on statin therapy; reports following healthy diet choices; reports difficulty with exercise due to right shoulder pain/vision loss.     Hypothyroidism-- continues on synthroid.     Following with cardiology-- hx aortic stenosis/pulmonary hypertension; planning for repeat echo in may 2025.       Objective   Vital Signs:  /80 (BP Location: Left arm, Patient Position: Sitting, Cuff Size: Large Adult)   Pulse 67   Ht 154.9 cm (61\")   Wt 75.8 kg (167 lb)   SpO2 97%   BMI 31.55 kg/m²   Physical Exam  Constitutional:       Appearance: " Normal appearance.   HENT:      Head: Normocephalic and atraumatic.      Right Ear: External ear normal.      Left Ear: External ear normal.      Nose: Nose normal.      Mouth/Throat:      Mouth: Mucous membranes are moist.      Pharynx: Oropharynx is clear.   Eyes:      Conjunctiva/sclera: Conjunctivae normal.      Pupils: Pupils are equal, round, and reactive to light.   Cardiovascular:      Rate and Rhythm: Normal rate and regular rhythm.      Pulses: Normal pulses.      Heart sounds: Normal heart sounds. Murmur heard.      Systolic murmur is present with a grade of 2/6.      No gallop.   Pulmonary:      Effort: Pulmonary effort is normal. No respiratory distress.      Breath sounds: Normal breath sounds. No stridor. No wheezing, rhonchi or rales.   Musculoskeletal:         General: Tenderness present.      Right shoulder: Tenderness present. Decreased range of motion.      Cervical back: Normal range of motion and neck supple.   Skin:     General: Skin is warm and dry.   Neurological:      Mental Status: She is alert and oriented to person, place, and time. Mental status is at baseline.   Psychiatric:         Mood and Affect: Mood normal.         Thought Content: Thought content normal.         Judgment: Judgment normal.         The following data was reviewed by: TRINI Seaman on 02/14/2025:    Common labs          8/2/2024    11:23 8/2/2024    20:30 8/3/2024    04:43   Common Labs   Glucose 109  110  96    BUN 34  35  30    Creatinine 1.27  1.13  1.09    Sodium 146  140  138    Potassium 5.4  4.2  3.8    Chloride 109  105  106    Calcium 10.2  9.5  9.1    Albumin 4.5  4.1     Total Bilirubin 1.2  0.9     Alkaline Phosphatase 120  113     AST (SGOT) 21  24     ALT (SGPT) 17  13     WBC 7.93  12.22  8.14    Hemoglobin 12.4  12.0  10.7    Hematocrit 37.5  35.6  33.2    Platelets 205  187  168    Total Cholesterol 127      Triglycerides 62      HDL Cholesterol 69      LDL Cholesterol  45      Hemoglobin A1C  5.70                Assessment and Plan Additional age appropriate preventative wellness advice topics were discussed during today's preventative wellness exam(some topics already addressed during AWV portion of the note above):    Physical Activity: Advised cardiovascular activity 150 minutes per week as tolerated. (example brisk walk for 30 minutes, 5 days a week).     Nutrition: Discussed nutrition plan with patient. Information shared in after visit summary. Goal is for a well balanced diet to enhance overall health.     Healthy Weight: Discussed current and goal BMI with patient. Steps to attain this goal discussed. Information shared in after visit summary.           Medicare annual wellness visit, subsequent         Primary hypertension  Hypertension is stable and controlled  Continue current treatment regimen.  Dietary sodium restriction.  Weight loss.  Regular aerobic exercise.  Blood pressure will be reassessed in 6 months.    Orders:    CBC & Differential    Comprehensive Metabolic Panel    Mixed hyperlipidemia   Continues on statin therapy   Recommend following a low saturated fat, low sugar diet and getting 150 minutes of weekly exercise.     Orders:    Lipid Panel    Chronic right shoulder pain  Following with Dr. Nuñez   Reports injection therapy has not helped   She is thinking about pursuing replacement  Stenosis of left carotid artery  Continues on statin therapy  3/10/23  Clinical Indication    carotid bruit   Dx: Bilateral carotid bruits [R09.89 (ICD-10-CM)]     Interpretation Summary         Left internal carotid artery demonstrates a less than 50% stenosis.    There is plaquing noted in the right carotid bulb without evidence of significant stenosis in the right carotid system.       Acquired hypothyroidism  Advised to take medication on empty stomach; wait at least 30 minutes before eating, drinking, or taking other medications  Take medication at same time everyday    Orders:    TSH Rfx  On Abnormal To Free T4    Prediabetes  Continue with low glycemic diet choices including reducing refined carbohydrates and simple sugars in diet. Recommended 150 minutes weekly exercise or as tolerated.     Orders:    Hemoglobin A1c    Urinalysis With Microscopic If Indicated (No Culture) - Urine, Clean Catch    Protein / Creatinine Ratio, Urine - Urine, Clean Catch    Vitamin D deficiency  Continues vitamin D supplementation        Macular degeneration of both eyes, unspecified type  Following with [facility] specialists          Nonrheumatic tricuspid valve regurgitation  Clinical Indication    Valvular Function; Heart Failure, Cardiomyopathy, or Sytemic or Pulmonary Hypertension   Dx: Other hyperlipidemia [E78.49 (ICD-10-CM)]; Nonrheumatic aortic valve stenosis [I35.0 (ICD-10-CM)]; Nonrheumatic mitral valve regurgitation [I34.0 (ICD-10-CM)]; Grade II diastolic dysfunction [I51.89 (ICD-10-CM)]; Nonrheumatic aortic valve insufficiency [I35.1 (ICD-10-CM)]     Interpretation Summary         Left ventricular systolic function is normal. Calculated left ventricular EF = 61.3%    Left ventricular wall thickness is consistent with mild concentric hypertrophy.    Left ventricular diastolic function is consistent with (grade III w/high LAP) reversible restrictive pattern.    Left atrial volume is moderately increased.    The aortic valve is abnormal in structure. A functionally bicuspid aortic valve. There is calcification of the aortic valve. Mild aortic valve regurgitation is present. Moderate aortic valve stenosis is present. Aortic valve area is 0.76 cm2. Peak velocity of the flow distal to the aortic valve is 296 cm/s. Aortic valve maximum pressure gradient is 35.0 mmHg. Aortic valve mean pressure gradient is 22.0 mmHg. Aortic valve dimensionless index is 0.28 .    Moderate mitral valve regurgitation is present.    Moderate tricuspid valve regurgitation is present.    Estimated right ventricular systolic pressure from  tricuspid regurgitation is markedly elevated (>55 mmHg). Calculated right ventricular systolic pressure from tricuspid regurgitation is 65 mmHg.    Severe pulmonary hypertension is present.         Nonrheumatic mitral valve regurgitation  Clinical Indication    Valvular Function; Heart Failure, Cardiomyopathy, or Sytemic or Pulmonary Hypertension   Dx: Other hyperlipidemia [E78.49 (ICD-10-CM)]; Nonrheumatic aortic valve stenosis [I35.0 (ICD-10-CM)]; Nonrheumatic mitral valve regurgitation [I34.0 (ICD-10-CM)]; Grade II diastolic dysfunction [I51.89 (ICD-10-CM)]; Nonrheumatic aortic valve insufficiency [I35.1 (ICD-10-CM)]     Interpretation Summary         Left ventricular systolic function is normal. Calculated left ventricular EF = 61.3%    Left ventricular wall thickness is consistent with mild concentric hypertrophy.    Left ventricular diastolic function is consistent with (grade III w/high LAP) reversible restrictive pattern.    Left atrial volume is moderately increased.    The aortic valve is abnormal in structure. A functionally bicuspid aortic valve. There is calcification of the aortic valve. Mild aortic valve regurgitation is present. Moderate aortic valve stenosis is present. Aortic valve area is 0.76 cm2. Peak velocity of the flow distal to the aortic valve is 296 cm/s. Aortic valve maximum pressure gradient is 35.0 mmHg. Aortic valve mean pressure gradient is 22.0 mmHg. Aortic valve dimensionless index is 0.28 .    Moderate mitral valve regurgitation is present.    Moderate tricuspid valve regurgitation is present.    Estimated right ventricular systolic pressure from tricuspid regurgitation is markedly elevated (>55 mmHg). Calculated right ventricular systolic pressure from tricuspid regurgitation is 65 mmHg.    Severe pulmonary hypertension is present.         Grade II diastolic dysfunction  Clinical Indication    Valvular Function; Heart Failure, Cardiomyopathy, or Sytemic or Pulmonary Hypertension    Dx: Other hyperlipidemia [E78.49 (ICD-10-CM)]; Nonrheumatic aortic valve stenosis [I35.0 (ICD-10-CM)]; Nonrheumatic mitral valve regurgitation [I34.0 (ICD-10-CM)]; Grade II diastolic dysfunction [I51.89 (ICD-10-CM)]; Nonrheumatic aortic valve insufficiency [I35.1 (ICD-10-CM)]     Interpretation Summary         Left ventricular systolic function is normal. Calculated left ventricular EF = 61.3%    Left ventricular wall thickness is consistent with mild concentric hypertrophy.    Left ventricular diastolic function is consistent with (grade III w/high LAP) reversible restrictive pattern.    Left atrial volume is moderately increased.    The aortic valve is abnormal in structure. A functionally bicuspid aortic valve. There is calcification of the aortic valve. Mild aortic valve regurgitation is present. Moderate aortic valve stenosis is present. Aortic valve area is 0.76 cm2. Peak velocity of the flow distal to the aortic valve is 296 cm/s. Aortic valve maximum pressure gradient is 35.0 mmHg. Aortic valve mean pressure gradient is 22.0 mmHg. Aortic valve dimensionless index is 0.28 .    Moderate mitral valve regurgitation is present.    Moderate tricuspid valve regurgitation is present.    Estimated right ventricular systolic pressure from tricuspid regurgitation is markedly elevated (>55 mmHg). Calculated right ventricular systolic pressure from tricuspid regurgitation is 65 mmHg.    Severe pulmonary hypertension is present.         Pulmonary hypertension  Continues on aldactone, lasix   Denies SOA or CP                 Follow Up   Return in about 6 months (around 8/14/2025) for Recheck chronic conditions.  Patient was given instructions and counseling regarding her condition or for health maintenance advice. Please see specific information pulled into the AVS if appropriate.

## 2025-02-14 NOTE — ASSESSMENT & PLAN NOTE
Continues on statin therapy   Recommend following a low saturated fat, low sugar diet and getting 150 minutes of weekly exercise.     Orders:    Lipid Panel

## 2025-02-14 NOTE — TELEPHONE ENCOUNTER
Caller: savannah elaine    Relationship: Emergency Contact    Best call back number: [phone]    Requested Prescriptions:   Requested Prescriptions     Pending Prescriptions Disp Refills    spironolactone (ALDACTONE) 25 MG tablet 90 tablet 3     Sig: Take 1 tablet by mouth Every Morning. Indications: Edema        Pharmacy where request should be sent: [facility] [address] [place] [address] - [phone]  - [phone]     Last office visit with prescribing clinician: 3/28/2024   Last telemedicine visit with prescribing clinician: Visit date not found   Next office visit with prescribing clinician: Visit date not found     Additional details provided by patient: TWO DAYS    Does the patient have less than a 3 day supply:  [x] Yes  [] No    Would you like a call back once the refill request has been completed: [x] Yes [] No    If the office needs to give you a call back, can they leave a voicemail: [x] Yes [] No    Champ Hamlin Rep   02/14/25

## 2025-02-14 NOTE — ASSESSMENT & PLAN NOTE
Following with Dr. Nuñez   Reports injection therapy has not helped   She is thinking about pursuing replacement

## 2025-02-14 NOTE — ASSESSMENT & PLAN NOTE
Continues on vitamin D supplementation  Advised to take medication on empty stomach; wait at least 30 minutes before eating, drinking, or taking other medications  Take medication at same time everyday

## 2025-02-14 NOTE — ASSESSMENT & PLAN NOTE
Advised to take medication on empty stomach; wait at least 30 minutes before eating, drinking, or taking other medications  Take medication at same time everyday    Orders:    TSH Rfx On Abnormal To Free T4

## 2025-02-14 NOTE — ASSESSMENT & PLAN NOTE
Hypertension is stable and controlled  Continue current treatment regimen.  Dietary sodium restriction.  Weight loss.  Regular aerobic exercise.  Blood pressure will be reassessed in 6 months.    Orders:    CBC & Differential    Comprehensive Metabolic Panel

## 2025-02-14 NOTE — ASSESSMENT & PLAN NOTE
Clinical Indication    Valvular Function; Heart Failure, Cardiomyopathy, or Sytemic or Pulmonary Hypertension   Dx: Other hyperlipidemia [E78.49 (ICD-10-CM)]; Nonrheumatic aortic valve stenosis [I35.0 (ICD-10-CM)]; Nonrheumatic mitral valve regurgitation [I34.0 (ICD-10-CM)]; Grade II diastolic dysfunction [I51.89 (ICD-10-CM)]; Nonrheumatic aortic valve insufficiency [I35.1 (ICD-10-CM)]     Interpretation Summary         Left ventricular systolic function is normal. Calculated left ventricular EF = 61.3%    Left ventricular wall thickness is consistent with mild concentric hypertrophy.    Left ventricular diastolic function is consistent with (grade III w/high LAP) reversible restrictive pattern.    Left atrial volume is moderately increased.    The aortic valve is abnormal in structure. A functionally bicuspid aortic valve. There is calcification of the aortic valve. Mild aortic valve regurgitation is present. Moderate aortic valve stenosis is present. Aortic valve area is 0.76 cm2. Peak velocity of the flow distal to the aortic valve is 296 cm/s. Aortic valve maximum pressure gradient is 35.0 mmHg. Aortic valve mean pressure gradient is 22.0 mmHg. Aortic valve dimensionless index is 0.28 .    Moderate mitral valve regurgitation is present.    Moderate tricuspid valve regurgitation is present.    Estimated right ventricular systolic pressure from tricuspid regurgitation is markedly elevated (>55 mmHg). Calculated right ventricular systolic pressure from tricuspid regurgitation is 65 mmHg.    Severe pulmonary hypertension is present.

## 2025-02-14 NOTE — ASSESSMENT & PLAN NOTE
Continue with low glycemic diet choices including reducing refined carbohydrates and simple sugars in diet. Recommended 150 minutes weekly exercise or as tolerated.     Orders:    Hemoglobin A1c    Urinalysis With Microscopic If Indicated (No Culture) - Urine, Clean Catch    Protein / Creatinine Ratio, Urine - Urine, Clean Catch

## 2025-02-15 LAB
ALBUMIN SERPL-MCNC: 4.4 G/DL (ref 3.7–4.7)
ALP SERPL-CCNC: 118 IU/L (ref 44–121)
ALT SERPL-CCNC: 17 IU/L (ref 0–32)
APPEARANCE UR: CLEAR
AST SERPL-CCNC: 20 IU/L (ref 0–40)
BACTERIA #/AREA URNS HPF: NORMAL /[HPF]
BASOPHILS # BLD AUTO: 0.1 X10E3/UL (ref 0–0.2)
BASOPHILS NFR BLD AUTO: 1 %
BILIRUB SERPL-MCNC: 1.3 MG/DL (ref 0–1.2)
BILIRUB UR QL STRIP: NEGATIVE
BUN SERPL-MCNC: 58 MG/DL (ref 8–27)
BUN/CREAT SERPL: 39 (ref 12–28)
CALCIUM SERPL-MCNC: 9.8 MG/DL (ref 8.7–10.3)
CASTS URNS QL MICRO: NORMAL /LPF
CHLORIDE SERPL-SCNC: 106 MMOL/L (ref 96–106)
CHOLEST SERPL-MCNC: 120 MG/DL (ref 100–199)
CO2 SERPL-SCNC: 24 MMOL/L (ref 20–29)
COLOR UR: YELLOW
CREAT SERPL-MCNC: 1.5 MG/DL (ref 0.57–1)
CREAT UR-MCNC: 96.2 MG/DL
EGFRCR SERPLBLD CKD-EPI 2021: 34 ML/MIN/1.73
EOSINOPHIL # BLD AUTO: 0.2 X10E3/UL (ref 0–0.4)
EOSINOPHIL NFR BLD AUTO: 1 %
EPI CELLS #/AREA URNS HPF: NORMAL /HPF (ref 0–10)
ERYTHROCYTE [DISTWIDTH] IN BLOOD BY AUTOMATED COUNT: 11.3 % (ref 11.7–15.4)
GLOBULIN SER CALC-MCNC: 2 G/DL (ref 1.5–4.5)
GLUCOSE SERPL-MCNC: 98 MG/DL (ref 70–99)
GLUCOSE UR QL STRIP: NEGATIVE
HBA1C MFR BLD: 6.2 % (ref 4.8–5.6)
HCT VFR BLD AUTO: 39 % (ref 34–46.6)
HDLC SERPL-MCNC: 63 MG/DL
HGB BLD-MCNC: 12.9 G/DL (ref 11.1–15.9)
HGB UR QL STRIP: NEGATIVE
IMM GRANULOCYTES # BLD AUTO: 0 X10E3/UL (ref 0–0.1)
IMM GRANULOCYTES NFR BLD AUTO: 0 %
KETONES UR QL STRIP: NEGATIVE
LDLC SERPL CALC-MCNC: 46 MG/DL (ref 0–99)
LEUKOCYTE ESTERASE UR QL STRIP: ABNORMAL
LYMPHOCYTES # BLD AUTO: 3.7 X10E3/UL (ref 0.7–3.1)
LYMPHOCYTES NFR BLD AUTO: 32 %
MCH RBC QN AUTO: 32.7 PG (ref 26.6–33)
MCHC RBC AUTO-ENTMCNC: 33.1 G/DL (ref 31.5–35.7)
MCV RBC AUTO: 99 FL (ref 79–97)
MICRO URNS: ABNORMAL
MONOCYTES # BLD AUTO: 0.6 X10E3/UL (ref 0.1–0.9)
MONOCYTES NFR BLD AUTO: 5 %
NEUTROPHILS # BLD AUTO: 7 X10E3/UL (ref 1.4–7)
NEUTROPHILS NFR BLD AUTO: 61 %
NITRITE UR QL STRIP: NEGATIVE
PH UR STRIP: 6 [PH] (ref 5–7.5)
PLATELET # BLD AUTO: 232 X10E3/UL (ref 150–450)
POTASSIUM SERPL-SCNC: 5.6 MMOL/L (ref 3.5–5.2)
PROT SERPL-MCNC: 6.4 G/DL (ref 6–8.5)
PROT UR QL STRIP: ABNORMAL
PROT UR-MCNC: 48.7 MG/DL
PROT/CREAT UR: 506 MG/G CREAT (ref 0–200)
RBC # BLD AUTO: 3.95 X10E6/UL (ref 3.77–5.28)
RBC #/AREA URNS HPF: NORMAL /HPF (ref 0–2)
SODIUM SERPL-SCNC: 142 MMOL/L (ref 134–144)
SP GR UR STRIP: 1.02 (ref 1–1.03)
TRIGL SERPL-MCNC: 41 MG/DL (ref 0–149)
TSH SERPL DL<=0.005 MIU/L-ACNC: 3.2 UIU/ML (ref 0.45–4.5)
UROBILINOGEN UR STRIP-MCNC: 0.2 MG/DL (ref 0.2–1)
VLDLC SERPL CALC-MCNC: 11 MG/DL (ref 5–40)
WBC # BLD AUTO: 11.5 X10E3/UL (ref 3.4–10.8)
WBC #/AREA URNS HPF: NORMAL /HPF (ref 0–5)

## 2025-02-16 DIAGNOSIS — N18.32 STAGE 3B CHRONIC KIDNEY DISEASE: Primary | ICD-10-CM

## 2025-02-17 NOTE — PROGRESS NOTES
Please let patient know--  Her kidney function was decreased; she needs to increase water intake and avoid using any NSAIDs--  I would like to recheck this again in 2 weeks.   A1c remains in prediabetic range-- recommend low sugar/lower carb diet choices  Cholesterol and thyroid level are stable  Recheck kidney function in 2 weeks, lab ordered

## 2025-03-07 DIAGNOSIS — N28.9 KIDNEY INSUFFICIENCY: Primary | ICD-10-CM

## 2025-03-07 DIAGNOSIS — I10 ESSENTIAL HYPERTENSION: ICD-10-CM

## 2025-03-07 RX ORDER — METOPROLOL SUCCINATE 100 MG/1
100 TABLET, EXTENDED RELEASE ORAL NIGHTLY
Qty: 90 TABLET | Refills: 2 | Status: SHIPPED | OUTPATIENT
Start: 2025-03-07

## 2025-03-15 DIAGNOSIS — E03.9 ACQUIRED HYPOTHYROIDISM: Chronic | ICD-10-CM

## 2025-03-17 RX ORDER — LEVOTHYROXINE SODIUM 75 UG/1
75 TABLET ORAL DAILY
Qty: 90 TABLET | Refills: 1 | Status: SHIPPED | OUTPATIENT
Start: 2025-03-17

## 2025-03-17 RX ORDER — FUROSEMIDE 40 MG/1
TABLET ORAL
Qty: 90 TABLET | Refills: 0 | Status: SHIPPED | OUTPATIENT
Start: 2025-03-17

## 2025-03-17 NOTE — TELEPHONE ENCOUNTER
Protocol Failed.     NOV 5/21/2025 ()    LOV 11/14/2024 (RM)        Assessment:        Diagnosis Plan   1. Nonrheumatic aortic valve stenosis  Adult Transthoracic Echo Complete W/ Cont if Necessary Per Protocol       2. Nonrheumatic mitral valve regurgitation  Adult Transthoracic Echo Complete W/ Cont if Necessary Per Protocol       3. Nonrheumatic tricuspid valve regurgitation  Adult Transthoracic Echo Complete W/ Cont if Necessary Per Protocol       4. Other hyperlipidemia          5. Essential hypertension          6. Grade II diastolic dysfunction             Aortic stenosis, moderate with mild aortic insufficiency.  Hypertension, goal less than 120/80.  She is at goal.  Continue spironolactone, ramipril, metoprolol.  Hyperlipidemia, controlled on atorvastatin.  Hypothyroidism.  Continue levothyroxine.  Moderate mitral insufficiency.  Moderate tricuspid insufficiency  Pulmonary hypertension, WHO 2, continue Lasix  Exertional chest tightness, noncardiac.  Mild coronary artery disease  Bilateral carotid bruits, due to transmitted valvular sounds.  Falls x2 in the last 6 months.  Now lives in a patio home.     Plan:       Repeat echocardiogram in 6 months, advised that she take Lasix every day, see me the same day as her echo in 6 months

## 2025-05-21 ENCOUNTER — OFFICE VISIT (OUTPATIENT)
Dept: CARDIOLOGY | Age: 85
End: 2025-05-21
Payer: MEDICARE

## 2025-05-21 ENCOUNTER — HOSPITAL ENCOUNTER (OUTPATIENT)
Dept: CARDIOLOGY | Facility: HOSPITAL | Age: 85
Discharge: HOME | End: 2025-05-21
Admitting: INTERNAL MEDICINE
Payer: MEDICARE

## 2025-05-21 VITALS
HEIGHT: 61 IN | WEIGHT: 170 LBS | HEART RATE: 71 BPM | SYSTOLIC BLOOD PRESSURE: 128 MMHG | DIASTOLIC BLOOD PRESSURE: 84 MMHG | BODY MASS INDEX: 32.1 KG/M2

## 2025-05-21 VITALS
BODY MASS INDEX: 31.53 KG/M2 | HEART RATE: 70 BPM | DIASTOLIC BLOOD PRESSURE: 70 MMHG | WEIGHT: 167 LBS | HEIGHT: 61 IN | SYSTOLIC BLOOD PRESSURE: 120 MMHG

## 2025-05-21 DIAGNOSIS — E78.49 OTHER HYPERLIPIDEMIA: ICD-10-CM

## 2025-05-21 DIAGNOSIS — I35.0 NONRHEUMATIC AORTIC VALVE STENOSIS: Primary | ICD-10-CM

## 2025-05-21 DIAGNOSIS — I36.1 NONRHEUMATIC TRICUSPID VALVE REGURGITATION: ICD-10-CM

## 2025-05-21 DIAGNOSIS — I34.0 NONRHEUMATIC MITRAL VALVE REGURGITATION: ICD-10-CM

## 2025-05-21 DIAGNOSIS — I10 ESSENTIAL HYPERTENSION: ICD-10-CM

## 2025-05-21 DIAGNOSIS — I35.0 NONRHEUMATIC AORTIC VALVE STENOSIS: ICD-10-CM

## 2025-05-21 LAB
AORTIC ARCH: 2.9 CM
AORTIC DIMENSIONLESS INDEX: 0.26 (DI)
ASCENDING AORTA: 3 CM
AV MEAN PRESS GRAD SYS DOP V1V2: 20.7 MMHG
AV VMAX SYS DOP: 278 CM/SEC
BH CV ECHO MEAS - ACS: 1 CM
BH CV ECHO MEAS - AI P1/2T: 330.6 MSEC
BH CV ECHO MEAS - AO MAX PG: 30.9 MMHG
BH CV ECHO MEAS - AO ROOT DIAM: 2.32 CM
BH CV ECHO MEAS - AO V2 VTI: 79.3 CM
BH CV ECHO MEAS - AVA(I,D): 0.8 CM2
BH CV ECHO MEAS - EDV(CUBED): 69.7 ML
BH CV ECHO MEAS - EDV(MOD-SP2): 71 ML
BH CV ECHO MEAS - EDV(MOD-SP4): 75 ML
BH CV ECHO MEAS - EF(MOD-SP2): 62 %
BH CV ECHO MEAS - EF(MOD-SP4): 64 %
BH CV ECHO MEAS - ESV(CUBED): 20.2 ML
BH CV ECHO MEAS - ESV(MOD-SP2): 27 ML
BH CV ECHO MEAS - ESV(MOD-SP4): 27 ML
BH CV ECHO MEAS - FS: 33.9 %
BH CV ECHO MEAS - IVS/LVPW: 1.04 CM
BH CV ECHO MEAS - IVSD: 1.04 CM
BH CV ECHO MEAS - LAT PEAK E' VEL: 7.1 CM/SEC
BH CV ECHO MEAS - LV DIASTOLIC VOL/BSA (35-75): 42.9 CM2
BH CV ECHO MEAS - LV MASS(C)D: 136.7 GRAMS
BH CV ECHO MEAS - LV MAX PG: 2.8 MMHG
BH CV ECHO MEAS - LV MEAN PG: 1.77 MMHG
BH CV ECHO MEAS - LV SYSTOLIC VOL/BSA (12-30): 15.4 CM2
BH CV ECHO MEAS - LV V1 MAX: 84.2 CM/SEC
BH CV ECHO MEAS - LV V1 VTI: 21 CM
BH CV ECHO MEAS - LVIDD: 4.1 CM
BH CV ECHO MEAS - LVIDS: 2.7 CM
BH CV ECHO MEAS - LVOT AREA: 3 CM2
BH CV ECHO MEAS - LVOT DIAM: 1.96 CM
BH CV ECHO MEAS - LVPWD: 1 CM
BH CV ECHO MEAS - MED PEAK E' VEL: 8.3 CM/SEC
BH CV ECHO MEAS - MR MAX PG: 135.5 MMHG
BH CV ECHO MEAS - MR MAX VEL: 582 CM/SEC
BH CV ECHO MEAS - MV A DUR: 0.12 SEC
BH CV ECHO MEAS - MV A MAX VEL: 82.6 CM/SEC
BH CV ECHO MEAS - MV DEC SLOPE: 886.6 CM/SEC2
BH CV ECHO MEAS - MV DEC TIME: 0.11 SEC
BH CV ECHO MEAS - MV E MAX VEL: 127 CM/SEC
BH CV ECHO MEAS - MV E/A: 1.54
BH CV ECHO MEAS - MV MAX PG: 7.7 MMHG
BH CV ECHO MEAS - MV MEAN PG: 2.9 MMHG
BH CV ECHO MEAS - MV P1/2T: 45.9 MSEC
BH CV ECHO MEAS - MV V2 VTI: 36 CM
BH CV ECHO MEAS - MVA(P1/2T): 4.8 CM2
BH CV ECHO MEAS - MVA(VTI): 1.76 CM2
BH CV ECHO MEAS - PA ACC TIME: 0.01 SEC
BH CV ECHO MEAS - PA V2 MAX: 84.4 CM/SEC
BH CV ECHO MEAS - PULM A REVS DUR: 0.11 SEC
BH CV ECHO MEAS - PULM A REVS VEL: 22.3 CM/SEC
BH CV ECHO MEAS - PULM DIAS VEL: 75.8 CM/SEC
BH CV ECHO MEAS - PULM S/D: 1.2
BH CV ECHO MEAS - PULM SYS VEL: 90.8 CM/SEC
BH CV ECHO MEAS - RAP SYSTOLE: 3 MMHG
BH CV ECHO MEAS - RV MAX PG: 1.15 MMHG
BH CV ECHO MEAS - RV V1 MAX: 53.6 CM/SEC
BH CV ECHO MEAS - RV V1 VTI: 11.6 CM
BH CV ECHO MEAS - RVSP: 49 MMHG
BH CV ECHO MEAS - SUP REN AO DIAM: 1.3 CM
BH CV ECHO MEAS - SV(LVOT): 63.5 ML
BH CV ECHO MEAS - SV(MOD-SP2): 44 ML
BH CV ECHO MEAS - SV(MOD-SP4): 48 ML
BH CV ECHO MEAS - SVI(LVOT): 36.3 ML/M2
BH CV ECHO MEAS - SVI(MOD-SP2): 25.1 ML/M2
BH CV ECHO MEAS - SVI(MOD-SP4): 27.4 ML/M2
BH CV ECHO MEAS - TAPSE (>1.6): 1.91 CM
BH CV ECHO MEAS - TR MAX PG: 46.1 MMHG
BH CV ECHO MEAS - TR MAX VEL: 339.3 CM/SEC
BH CV ECHO MEASUREMENTS AVERAGE E/E' RATIO: 16.49
BH CV XLRA - RV BASE: 3.6 CM
BH CV XLRA - RV LENGTH: 6.4 CM
BH CV XLRA - RV MID: 2.26 CM
BH CV XLRA - TDI S': 10 CM/SEC
LEFT ATRIUM VOLUME INDEX: 38.2 ML/M2
LV EF BIPLANE MOD: 63.2 %
SINUS: 2.8 CM
STJ: 2.24 CM

## 2025-05-21 PROCEDURE — 1160F RVW MEDS BY RX/DR IN RCRD: CPT | Performed by: INTERNAL MEDICINE

## 2025-05-21 PROCEDURE — 93306 TTE W/DOPPLER COMPLETE: CPT

## 2025-05-21 PROCEDURE — 93000 ELECTROCARDIOGRAM COMPLETE: CPT | Performed by: INTERNAL MEDICINE

## 2025-05-21 PROCEDURE — 1159F MED LIST DOCD IN RCRD: CPT | Performed by: INTERNAL MEDICINE

## 2025-05-21 PROCEDURE — 99214 OFFICE O/P EST MOD 30 MIN: CPT | Performed by: INTERNAL MEDICINE

## 2025-05-21 NOTE — PROGRESS NOTES
Date of Office Visit: 2025  Encounter Provider: Ana Orozco MD  Place of Service: [facility] CARDIOLOGY  Patient Name: Ayana Reyna  :1940      Patient ID:  Ayana Reyna is a 85 y.o. female is here for  followup for aortic stenosis.        History of Present Illness    She has a history of hypertension, hyperlipidemia, hypothyroidism, aortic stenosis, CKD, mild CAD.     She is , has 4 children and is retired.  She is 1 cup of coffee per day.  She uses no cigarettes, alcohol or drugs.  Her mother had cancer.  She now lives alone as her  has passed away.  She has been in the same house for 53 years.  She is a [employer] fan and goes to the [employer] women's and men's basketball games.     She had dyspnea with exertion starting 2020.  She underwent stress nuclear perfusion study on 2020 showing no evidence of ischemia.  She had an echocardiogram done 2020 which showed ejection fraction 65% with grade 2 diastolic dysfunction, mild concentric left ventricular pretrip he, mild to moderate aortic stenosis and insufficiency, moderate mitral insufficiency, mild to moderate tricuspid insufficiency with RVSP 43 mmHg.  She had normal left lower extremity venous duplex study on 2020.       2021, she had a CTA of her heart.  This showed a 9 mm left upper lobe pulmonary nodule, 2 smaller pleural based nodules at the left upper lobe, linear scarring at the lower and right lobes, and scarring of the medial aspect of the right lower lobe.  Structurally her heart was normal, left main had >50% calcified ostial stenosis, 50% proximal calcified LAD stenosis, 50% mid calcified LAD stenosis, >50% distal mixed plaque stenosis, <25% calcified proximal circumflex stenosis, and the RCA was almost nonexistent.  Her total calcium score was 252.     2021, she had a stress echocardiogram completed with the following results: EF 62%, grade 2 diastolic  dysfunction, mild aortic valve stenosis, moderate aortic valve calcification, trace aortic regurgitation, mild mitral valve regurgitation, moderate mitral annular calcification, mild tricuspid regurgitation, normal stress portion.  She continued to have symptoms so in March 2021, she underwent cardiac catheterization which showed the following: Left main minimally calcified, LAD mildly calcified in the proximal segment, diagonal branches normal, minimal calcification in the proximal left circumflex, obtuse marginal branches, posterior lateral, and posterior descending branches normal, RCA normal.  Her chest pain and dyspnea was not felt to be a cardiac etiology.     Echocardiogram done 11/4/2024 shows ejection fraction of 61% with mild concentric left ventricular hypertrophy, grade 3 diastolic dysfunction, moderate left atrial enlargement, moderate aortic stenosis with mild aortic insufficiency, moderate mitral insufficiency, moderate tricuspid insufficiency, RVSP 65 mmHg.  Carotid duplex study done 3/10/2023 shows <50% left internal carotid artery stenosis and plaquing in the right internal carotid artery without stenosis.    Laboratory values done 2/14/2025 show potassium 5.6, creatinine 1.5, otherwise unremarkable CMP, hemoglobin A1c 6.2%, normal TSH, total cholesterol 120, HDL 63, LDL 46, triglycerides 41, normal CBC.  Repeat BMP done 4/7/2025 shows potassium 5.3, creatinine 1.15, otherwise normal BMP.    Echocardiogram done 5/21/2025 shows ejection fraction of 63% with trace to mild aortic insufficiency, mild to moderate aortic stenosis with a mean gradient of 21 mmHg, velocity 2.8 m/s, aortic valve area 0.8 cm², RVSP 49 mmHg, normal diastolic function, ejection fraction 63%.    She has mild exertional dyspnea.  She has had no dizziness.  She has poor balance and uses a cane.  She has had falls due to her poor balance.  She does walk out to her mailbox and back but other than that does not do a lot of exercise.   She is taking her medications as directed    Past Medical History:   Diagnosis Date    Abnormal ECG     Acquired hypothyroidism     Allergic rhinitis     Aortic stenosis     mild to moderate per echo 2020    Arthritis     Bilateral leg cramps 01/21/2022    CHF (congestive heart failure)     Coronary artery calcification 03/24/2021    Fatigue     Glucose intolerance (impaired glucose tolerance)     Hearing loss     Heart murmur     Heart palpitations     Hematuria     Hyperlipemia     Hypertension     Insomnia     Macular degeneration     vision loss of left eye     Migraine     Nonrheumatic aortic valve insufficiency 10/19/2020    Osteoporosis     Pulmonary nodules 03/24/2021    Shortness of breath     Vision loss, bilateral     left eye macular degeneration; right eye etiology unknown    Vitamin D deficiency     Wears hearing aid in both ears 12/2019         Past Surgical History:   Procedure Laterality Date    CARDIAC CATHETERIZATION N/A 03/29/2021    Procedure: Coronary angiography;  Surgeon: Zain Mendoza MD;  Location:  [facility];  Service: Cardiovascular;  Laterality: N/A;    CARDIAC CATHETERIZATION N/A 03/29/2021    Procedure: Left heart cath;  Surgeon: Zain Mendoza MD;  Location:  [facility];  Service: Cardiovascular;  Laterality: N/A;    CARDIAC CATHETERIZATION N/A 03/29/2021    Procedure: Left ventriculography;  Surgeon: Zain Mendoza MD;  Location:  [facility];  Service: Cardiovascular;  Laterality: N/A;    CHOLECYSTECTOMY      COLONOSCOPY  2008    FRACTURE SURGERY  1975,2014    HYSTERECTOMY N/A 1971    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    JOINT REPLACEMENT  1995, 2013    KNEE SURGERY  1989    TONSILLECTOMY  1950       Current Outpatient Medications on File Prior to Visit   Medication Sig Dispense Refill    acetaminophen (TYLENOL) 500 MG tablet Take 1 tablet by mouth Every 6 (Six) Hours As Needed for Mild Pain. Indications: Pain       atorvastatin (LIPITOR) 20 MG tablet Take 1 tablet by mouth Daily. Indications: High Amount of Fats in the Blood 90 tablet 3    cholecalciferol (VITAMIN D3) 1000 UNITS tablet Take 1 tablet by mouth Daily. Indications: Osteoporosis, Vitamin D Deficiency      furosemide (LASIX) 40 MG tablet TAKE 1 TABLET BY MOUTH ONCE DAILY FOR EDEMA 90 tablet 0    levothyroxine (SYNTHROID, LEVOTHROID) 75 MCG tablet Take 1 tablet by mouth once daily 90 tablet 1    metoprolol succinate XL (TOPROL-XL) 100 MG 24 hr tablet TAKE 1 TABLET BY MOUTH ONCE DAILY AT NIGHT 90 tablet 2    Multiple Vitamins-Minerals (PRESERVISION/LUTEIN) capsule Take 1 tablet by mouth 2 (Two) Times a Day. Indications: AMD      ramipril (ALTACE) 10 MG capsule Take 1 capsule by mouth Every Night. Indications: High Blood Pressure Disorder 90 capsule 3    spironolactone (ALDACTONE) 25 MG tablet Take 1 tablet by mouth Every Morning. Indications: Edema 90 tablet 3    vitamin B-12 (CYANOCOBALAMIN) 100 MCG tablet Take 0.5 tablets by mouth Daily. Indications: Inadequate Vitamin B12       No current facility-administered medications on file prior to visit.       Social History     Socioeconomic History    Marital status:    Tobacco Use    Smoking status: Never     Passive exposure: Never    Smokeless tobacco: Never   Vaping Use    Vaping status: Never Used   Substance and Sexual Activity    Alcohol use: Yes     Alcohol/week: 1.0 standard drink of alcohol     Types: 1 Glasses of wine per week     Comment: Less than 1 glass of wine a week//caffeine use:1 cup daily    Drug use: No    Sexual activity: Not Currently     Partners: Male     Birth control/protection: Hysterectomy             Procedures    ECG 12 Lead    Date/Time: 5/21/2025 11:46 AM  Performed by: Ana Orozco MD    Authorized by: Ana Orozco MD  Comparison: compared with previous ECG   Similar to previous ECG  Rhythm: sinus rhythm    Clinical impression: normal ECG              Objective:  "     Vitals:    05/21/25 1114   BP: 128/84   Pulse: 71   Weight: 77.1 kg (170 lb)   Height: 154.9 cm (61\")       Body mass index is 32.12 kg/m².    Vitals reviewed.   Constitutional:       General: Not in acute distress.     Appearance: Not diaphoretic.   Neck:      Vascular: No carotid bruit or JVD.   Pulmonary:      Effort: Pulmonary effort is normal.      Breath sounds: Normal breath sounds.   Cardiovascular:      Normal rate. Regular rhythm.      Murmurs: There is a harsh midsystolic murmur at the URSB, radiating to the neck.      No gallop.  No rub.   Pulses:     Intact distal pulses.      Carotid: 2+ bilaterally.     Radial: 2+ bilaterally.     Dorsalis pedis: 2+ bilaterally.     Posterior tibial: 2+ bilaterally.  Edema:     Peripheral edema absent.   Neurological:      Cranial Nerves: No cranial nerve deficit.         Lab Review:       Assessment:      Diagnosis Plan   1. Nonrheumatic aortic valve stenosis        2. Essential hypertension        3. Other hyperlipidemia            Aortic stenosis, mild to moderate.  Hypertension, goal less than 120/80.  She is at goal.  Continue spironolactone, ramipril, metoprolol.  Hyperlipidemia, controlled on atorvastatin.  Hypothyroidism.  Continue levothyroxine.  Mild mitral insufficiency.  Pulmonary hypertension, WHO 2, continue Lasix  Exertional chest tightness, noncardiac.  Mild coronary artery disease  Bilateral carotid bruits, due to transmitted valvular sounds.  Falls x2 in the last 6 months.  Now lives in a patio home.  CKD.  Her renal function is better since she has diminished her nonsteroidal anti-inflammatory use.     Plan:       See Edelmira in 6 months.  See me in 1 year with an echocardiogram the same day.  No medication changes at this time.  "

## 2025-06-14 DIAGNOSIS — E78.49 OTHER HYPERLIPIDEMIA: ICD-10-CM

## 2025-06-16 RX ORDER — FUROSEMIDE 40 MG/1
TABLET ORAL
Qty: 90 TABLET | Refills: 1 | Status: SHIPPED | OUTPATIENT
Start: 2025-06-16

## 2025-06-16 RX ORDER — ATORVASTATIN CALCIUM 20 MG/1
20 TABLET, FILM COATED ORAL DAILY
Qty: 90 TABLET | Refills: 0 | Status: SHIPPED | OUTPATIENT
Start: 2025-06-16

## 2025-06-16 NOTE — TELEPHONE ENCOUNTER
NOV-11/25/25-EE  LOV-05/21/25-    Aortic stenosis, mild to moderate.  Hypertension, goal less than 120/80.  She is at goal.  Continue spironolactone, ramipril, metoprolol.  Hyperlipidemia, controlled on atorvastatin.  Hypothyroidism.  Continue levothyroxine.  Mild mitral insufficiency.  Pulmonary hypertension, WHO 2, continue Lasix  Exertional chest tightness, noncardiac.  Mild coronary artery disease  Bilateral carotid bruits, due to transmitted valvular sounds.  Falls x2 in the last 6 months.  Now lives in a patio home.  CKD.  Her renal function is better since she has diminished her nonsteroidal anti-inflammatory use.     Plan:       See Edelmira in 6 months.  See me in 1 year with an echocardiogram the same day.  No medication changes at this time.

## 2025-08-11 DIAGNOSIS — I10 PRIMARY HYPERTENSION: Chronic | ICD-10-CM

## 2025-08-11 RX ORDER — RAMIPRIL 10 MG/1
CAPSULE ORAL
Qty: 90 CAPSULE | Refills: 1 | Status: SHIPPED | OUTPATIENT
Start: 2025-08-11

## 2025-08-15 ENCOUNTER — OFFICE VISIT (OUTPATIENT)
Dept: INTERNAL MEDICINE | Facility: CLINIC | Age: 85
End: 2025-08-15
Payer: MEDICARE

## 2025-08-15 VITALS
HEIGHT: 61 IN | DIASTOLIC BLOOD PRESSURE: 70 MMHG | WEIGHT: 181 LBS | OXYGEN SATURATION: 99 % | SYSTOLIC BLOOD PRESSURE: 135 MMHG | BODY MASS INDEX: 34.17 KG/M2 | HEART RATE: 71 BPM

## 2025-08-15 DIAGNOSIS — E55.9 VITAMIN D DEFICIENCY: Chronic | ICD-10-CM

## 2025-08-15 DIAGNOSIS — E03.9 ACQUIRED HYPOTHYROIDISM: Chronic | ICD-10-CM

## 2025-08-15 DIAGNOSIS — N18.31 STAGE 3A CHRONIC KIDNEY DISEASE: Chronic | ICD-10-CM

## 2025-08-15 DIAGNOSIS — R73.01 IMPAIRED FASTING GLUCOSE: ICD-10-CM

## 2025-08-15 DIAGNOSIS — I65.22 STENOSIS OF LEFT CAROTID ARTERY: Chronic | ICD-10-CM

## 2025-08-15 DIAGNOSIS — E78.2 MIXED HYPERLIPIDEMIA: Chronic | ICD-10-CM

## 2025-08-15 DIAGNOSIS — L98.9 SKIN LESION: ICD-10-CM

## 2025-08-15 DIAGNOSIS — M81.0 AGE-RELATED OSTEOPOROSIS WITHOUT CURRENT PATHOLOGICAL FRACTURE: Chronic | ICD-10-CM

## 2025-08-15 DIAGNOSIS — I27.20 PULMONARY HYPERTENSION: Chronic | ICD-10-CM

## 2025-08-15 DIAGNOSIS — I10 PRIMARY HYPERTENSION: Primary | Chronic | ICD-10-CM

## 2025-08-15 DIAGNOSIS — I35.0 NONRHEUMATIC AORTIC VALVE STENOSIS: Chronic | ICD-10-CM

## 2025-08-15 PROBLEM — N18.32 STAGE 3B CHRONIC KIDNEY DISEASE: Status: ACTIVE | Noted: 2025-08-15

## 2025-08-18 LAB
25(OH)D3+25(OH)D2 SERPL-MCNC: 33 NG/ML (ref 30–100)
ALBUMIN SERPL-MCNC: 4.2 G/DL (ref 3.7–4.7)
ALP SERPL-CCNC: 103 IU/L (ref 44–121)
ALT SERPL-CCNC: 12 IU/L (ref 0–32)
AST SERPL-CCNC: 16 IU/L (ref 0–40)
BASOPHILS # BLD AUTO: 0.1 X10E3/UL (ref 0–0.2)
BASOPHILS NFR BLD AUTO: 1 %
BILIRUB SERPL-MCNC: 1 MG/DL (ref 0–1.2)
BUN SERPL-MCNC: 21 MG/DL (ref 8–27)
BUN/CREAT SERPL: 20 (ref 12–28)
CALCIUM SERPL-MCNC: 9.1 MG/DL (ref 8.7–10.3)
CHLORIDE SERPL-SCNC: 110 MMOL/L (ref 96–106)
CHOLEST SERPL-MCNC: 112 MG/DL (ref 100–199)
CO2 SERPL-SCNC: 18 MMOL/L (ref 20–29)
CREAT SERPL-MCNC: 1.03 MG/DL (ref 0.57–1)
CREAT UR-MCNC: 140.9 MG/DL
EGFRCR SERPLBLD CKD-EPI 2021: 53 ML/MIN/1.73
EOSINOPHIL # BLD AUTO: 0.2 X10E3/UL (ref 0–0.4)
EOSINOPHIL NFR BLD AUTO: 2 %
ERYTHROCYTE [DISTWIDTH] IN BLOOD BY AUTOMATED COUNT: 11.7 % (ref 11.7–15.4)
GLOBULIN SER CALC-MCNC: 1.7 G/DL (ref 1.5–4.5)
GLUCOSE SERPL-MCNC: 91 MG/DL (ref 70–99)
HBA1C MFR BLD: 5.6 % (ref 4.8–5.6)
HCT VFR BLD AUTO: 33.6 % (ref 34–46.6)
HDLC SERPL-MCNC: 54 MG/DL
HGB BLD-MCNC: 11.1 G/DL (ref 11.1–15.9)
IMM GRANULOCYTES # BLD AUTO: 0 X10E3/UL (ref 0–0.1)
IMM GRANULOCYTES NFR BLD AUTO: 0 %
LDLC SERPL CALC-MCNC: 42 MG/DL (ref 0–99)
LYMPHOCYTES # BLD AUTO: 3 X10E3/UL (ref 0.7–3.1)
LYMPHOCYTES NFR BLD AUTO: 39 %
MCH RBC QN AUTO: 33.7 PG (ref 26.6–33)
MCHC RBC AUTO-ENTMCNC: 33 G/DL (ref 31.5–35.7)
MCV RBC AUTO: 102 FL (ref 79–97)
MONOCYTES # BLD AUTO: 0.3 X10E3/UL (ref 0.1–0.9)
MONOCYTES NFR BLD AUTO: 5 %
NEUTROPHILS # BLD AUTO: 4 X10E3/UL (ref 1.4–7)
NEUTROPHILS NFR BLD AUTO: 53 %
PLATELET # BLD AUTO: 180 X10E3/UL (ref 150–450)
POTASSIUM SERPL-SCNC: 4.7 MMOL/L (ref 3.5–5.2)
PROT SERPL-MCNC: 5.9 G/DL (ref 6–8.5)
PROT UR-MCNC: 80.3 MG/DL
PROT/CREAT UR: 570 MG/G CREAT (ref 0–200)
RBC # BLD AUTO: 3.29 X10E6/UL (ref 3.77–5.28)
SODIUM SERPL-SCNC: 144 MMOL/L (ref 134–144)
TRIGL SERPL-MCNC: 78 MG/DL (ref 0–149)
TSH SERPL DL<=0.005 MIU/L-ACNC: 3.69 UIU/ML (ref 0.45–4.5)
VLDLC SERPL CALC-MCNC: 16 MG/DL (ref 5–40)
WBC # BLD AUTO: 7.6 X10E3/UL (ref 3.4–10.8)

## 2025-08-30 ENCOUNTER — HOSPITAL ENCOUNTER (OUTPATIENT)
Facility: HOSPITAL | Age: 85
Discharge: HOME | End: 2025-08-30
Attending: INTERNAL MEDICINE
Payer: MEDICARE

## 2025-08-30 VITALS
DIASTOLIC BLOOD PRESSURE: 64 MMHG | BODY MASS INDEX: 33.05 KG/M2 | WEIGHT: 175.04 LBS | HEIGHT: 61 IN | SYSTOLIC BLOOD PRESSURE: 155 MMHG | TEMPERATURE: 98.4 F | RESPIRATION RATE: 18 BRPM | HEART RATE: 76 BPM | OXYGEN SATURATION: 98 %

## 2025-08-30 DIAGNOSIS — R35.0 URINARY FREQUENCY: ICD-10-CM

## 2025-08-30 DIAGNOSIS — R30.0 DYSURIA: Primary | ICD-10-CM

## 2025-08-30 LAB
BACTERIA UR QL AUTO: ABNORMAL /HPF
BILIRUB UR QL STRIP: NEGATIVE
BILIRUB UR QL STRIP: NEGATIVE
CLARITY UR: ABNORMAL
CLARITY UR: ABNORMAL
COLOR UR: YELLOW
COLOR UR: YELLOW
GLUCOSE UR STRIP-MCNC: NEGATIVE MG/DL
GLUCOSE UR STRIP-MCNC: NEGATIVE MG/DL
HGB UR QL STRIP.AUTO: ABNORMAL
HGB UR QL STRIP.AUTO: ABNORMAL
HOLD SPECIMEN: NORMAL
HYALINE CASTS UR QL AUTO: ABNORMAL /LPF
KETONES UR QL STRIP: NEGATIVE
KETONES UR QL STRIP: NEGATIVE
LEUKOCYTE ESTERASE UR QL STRIP.AUTO: ABNORMAL
LEUKOCYTE ESTERASE UR QL STRIP.AUTO: ABNORMAL
NITRITE UR QL STRIP: NEGATIVE
NITRITE UR QL STRIP: NEGATIVE
PH UR STRIP.AUTO: 5.5 [PH] (ref 5–8)
PH UR STRIP.AUTO: 5.5 [PH] (ref 5–8)
PROT UR QL STRIP: ABNORMAL
PROT UR QL STRIP: ABNORMAL
RBC # UR STRIP: ABNORMAL /HPF
REF LAB TEST METHOD: ABNORMAL
SP GR UR STRIP: 1.01 (ref 1–1.03)
SP GR UR STRIP: 1.01 (ref 1–1.03)
SQUAMOUS #/AREA URNS HPF: ABNORMAL /HPF
UROBILINOGEN UR QL STRIP: ABNORMAL
UROBILINOGEN UR QL STRIP: ABNORMAL
WBC # UR STRIP: ABNORMAL /HPF

## 2025-08-30 PROCEDURE — 87186 SC STD MICRODIL/AGAR DIL: CPT | Performed by: NURSE PRACTITIONER

## 2025-08-30 PROCEDURE — 87086 URINE CULTURE/COLONY COUNT: CPT | Performed by: NURSE PRACTITIONER

## 2025-08-30 PROCEDURE — G0463 HOSPITAL OUTPT CLINIC VISIT: HCPCS | Performed by: NURSE PRACTITIONER

## 2025-08-30 PROCEDURE — 87088 URINE BACTERIA CULTURE: CPT | Performed by: NURSE PRACTITIONER

## 2025-08-30 PROCEDURE — 81001 URINALYSIS AUTO W/SCOPE: CPT | Performed by: NURSE PRACTITIONER

## 2025-08-30 RX ORDER — NITROFURANTOIN 25; 75 MG/1; MG/1
100 CAPSULE ORAL 2 TIMES DAILY
Qty: 14 CAPSULE | Refills: 0 | Status: SHIPPED | OUTPATIENT
Start: 2025-08-30 | End: 2025-09-06

## 2025-08-30 RX ORDER — PHENAZOPYRIDINE HYDROCHLORIDE 200 MG/1
200 TABLET, FILM COATED ORAL 3 TIMES DAILY PRN
Qty: 6 TABLET | Refills: 0 | Status: SHIPPED | OUTPATIENT
Start: 2025-08-30 | End: 2025-09-01

## (undated) DEVICE — KT MANIFLD CARDIAC

## (undated) DEVICE — PK CATH CARD 40

## (undated) DEVICE — CATH DIAG IMPULSE FL3.5 5F 100CM

## (undated) DEVICE — CATH VENT MIV RADL PIG ST TIP 5F 110CM

## (undated) DEVICE — GLIDESHEATH BASIC HYDROPHILIC COATED INTRODUCER SHEATH: Brand: GLIDESHEATH

## (undated) DEVICE — GW EMR FIX EXCHG J STD .035 3MM 260CM

## (undated) DEVICE — CATH DIAG IMPULSE FR4 5F 100CM